# Patient Record
Sex: MALE | Race: WHITE | NOT HISPANIC OR LATINO | Employment: OTHER | URBAN - METROPOLITAN AREA
[De-identification: names, ages, dates, MRNs, and addresses within clinical notes are randomized per-mention and may not be internally consistent; named-entity substitution may affect disease eponyms.]

---

## 2023-05-22 LAB
ALANINE AMINOTRANSFERASE (SGPT) (U/L) IN SER/PLAS: 14 U/L (ref 10–52)
ALBUMIN (G/DL) IN SER/PLAS: 4 G/DL (ref 3.4–5)
ALKALINE PHOSPHATASE (U/L) IN SER/PLAS: 76 U/L (ref 33–136)
ANION GAP IN SER/PLAS: 12 MMOL/L (ref 10–20)
ASPARTATE AMINOTRANSFERASE (SGOT) (U/L) IN SER/PLAS: 16 U/L (ref 9–39)
BILIRUBIN TOTAL (MG/DL) IN SER/PLAS: 0.4 MG/DL (ref 0–1.2)
CALCIUM (MG/DL) IN SER/PLAS: 9.1 MG/DL (ref 8.6–10.3)
CARBON DIOXIDE, TOTAL (MMOL/L) IN SER/PLAS: 26 MMOL/L (ref 21–32)
CHLORIDE (MMOL/L) IN SER/PLAS: 107 MMOL/L (ref 98–107)
CHOLESTEROL (MG/DL) IN SER/PLAS: 149 MG/DL (ref 0–199)
CHOLESTEROL IN HDL (MG/DL) IN SER/PLAS: 47.1 MG/DL
CHOLESTEROL/HDL RATIO: 3.2
CREATININE (MG/DL) IN SER/PLAS: 0.93 MG/DL (ref 0.5–1.3)
ERYTHROCYTE DISTRIBUTION WIDTH (RATIO) BY AUTOMATED COUNT: 14 % (ref 11.5–14.5)
ERYTHROCYTE MEAN CORPUSCULAR HEMOGLOBIN CONCENTRATION (G/DL) BY AUTOMATED: 31.2 G/DL (ref 32–36)
ERYTHROCYTE MEAN CORPUSCULAR VOLUME (FL) BY AUTOMATED COUNT: 94 FL (ref 80–100)
ERYTHROCYTES (10*6/UL) IN BLOOD BY AUTOMATED COUNT: 4.79 X10E12/L (ref 4.5–5.9)
ESTIMATED AVERAGE GLUCOSE FOR HBA1C: 108 MG/DL
GFR MALE: 90 ML/MIN/1.73M2
GLUCOSE (MG/DL) IN SER/PLAS: 91 MG/DL (ref 74–99)
HEMATOCRIT (%) IN BLOOD BY AUTOMATED COUNT: 44.9 % (ref 41–52)
HEMOGLOBIN (G/DL) IN BLOOD: 14 G/DL (ref 13.5–17.5)
HEMOGLOBIN A1C/HEMOGLOBIN TOTAL IN BLOOD: 5.4 %
LDL: 85 MG/DL (ref 0–99)
LEUKOCYTES (10*3/UL) IN BLOOD BY AUTOMATED COUNT: 12 X10E9/L (ref 4.4–11.3)
NRBC (PER 100 WBCS) BY AUTOMATED COUNT: 0 /100 WBC (ref 0–0)
PLATELETS (10*3/UL) IN BLOOD AUTOMATED COUNT: 281 X10E9/L (ref 150–450)
POTASSIUM (MMOL/L) IN SER/PLAS: 3.9 MMOL/L (ref 3.5–5.3)
PROTEIN TOTAL: 7 G/DL (ref 6.4–8.2)
SEDIMENTATION RATE, ERYTHROCYTE: 25 MM/H (ref 0–20)
SODIUM (MMOL/L) IN SER/PLAS: 141 MMOL/L (ref 136–145)
THYROTROPIN (MIU/L) IN SER/PLAS BY DETECTION LIMIT <= 0.05 MIU/L: 0.57 MIU/L (ref 0.44–3.98)
TRIGLYCERIDE (MG/DL) IN SER/PLAS: 85 MG/DL (ref 0–149)
UREA NITROGEN (MG/DL) IN SER/PLAS: 20 MG/DL (ref 6–23)
VLDL: 17 MG/DL (ref 0–40)

## 2023-08-21 LAB
ALANINE AMINOTRANSFERASE (SGPT) (U/L) IN SER/PLAS: 14 U/L (ref 10–52)
ALBUMIN (G/DL) IN SER/PLAS: 4 G/DL (ref 3.4–5)
ALKALINE PHOSPHATASE (U/L) IN SER/PLAS: 68 U/L (ref 33–136)
ANION GAP IN SER/PLAS: 12 MMOL/L (ref 10–20)
ASPARTATE AMINOTRANSFERASE (SGOT) (U/L) IN SER/PLAS: 15 U/L (ref 9–39)
BILIRUBIN TOTAL (MG/DL) IN SER/PLAS: 0.4 MG/DL (ref 0–1.2)
CALCIUM (MG/DL) IN SER/PLAS: 9.2 MG/DL (ref 8.6–10.3)
CARBON DIOXIDE, TOTAL (MMOL/L) IN SER/PLAS: 27 MMOL/L (ref 21–32)
CHLORIDE (MMOL/L) IN SER/PLAS: 106 MMOL/L (ref 98–107)
CREATININE (MG/DL) IN SER/PLAS: 0.9 MG/DL (ref 0.5–1.3)
ERYTHROCYTE DISTRIBUTION WIDTH (RATIO) BY AUTOMATED COUNT: 14.3 % (ref 11.5–14.5)
ERYTHROCYTE MEAN CORPUSCULAR HEMOGLOBIN CONCENTRATION (G/DL) BY AUTOMATED: 31.6 G/DL (ref 32–36)
ERYTHROCYTE MEAN CORPUSCULAR VOLUME (FL) BY AUTOMATED COUNT: 93 FL (ref 80–100)
ERYTHROCYTES (10*6/UL) IN BLOOD BY AUTOMATED COUNT: 4.81 X10E12/L (ref 4.5–5.9)
GFR MALE: >90 ML/MIN/1.73M2
GLUCOSE (MG/DL) IN SER/PLAS: 98 MG/DL (ref 74–99)
HEMATOCRIT (%) IN BLOOD BY AUTOMATED COUNT: 44.6 % (ref 41–52)
HEMOGLOBIN (G/DL) IN BLOOD: 14.1 G/DL (ref 13.5–17.5)
LEUKOCYTES (10*3/UL) IN BLOOD BY AUTOMATED COUNT: 13.4 X10E9/L (ref 4.4–11.3)
MAGNESIUM (MG/DL) IN SER/PLAS: 1.98 MG/DL (ref 1.6–2.4)
NRBC (PER 100 WBCS) BY AUTOMATED COUNT: 0 /100 WBC (ref 0–0)
PLATELETS (10*3/UL) IN BLOOD AUTOMATED COUNT: 290 X10E9/L (ref 150–450)
POTASSIUM (MMOL/L) IN SER/PLAS: 4.9 MMOL/L (ref 3.5–5.3)
PROTEIN TOTAL: 6.8 G/DL (ref 6.4–8.2)
SODIUM (MMOL/L) IN SER/PLAS: 140 MMOL/L (ref 136–145)
UREA NITROGEN (MG/DL) IN SER/PLAS: 27 MG/DL (ref 6–23)

## 2023-10-24 DIAGNOSIS — I25.10 CORONARY ARTERY DISEASE INVOLVING NATIVE CORONARY ARTERY OF NATIVE HEART WITHOUT ANGINA PECTORIS: Primary | ICD-10-CM

## 2023-10-24 RX ORDER — SIMVASTATIN 40 MG/1
40 TABLET, FILM COATED ORAL NIGHTLY
COMMUNITY
Start: 2023-07-21 | End: 2023-10-24 | Stop reason: SDUPTHER

## 2023-10-24 RX ORDER — SIMVASTATIN 40 MG/1
40 TABLET, FILM COATED ORAL NIGHTLY
Qty: 90 TABLET | Refills: 1 | Status: SHIPPED | OUTPATIENT
Start: 2023-10-24 | End: 2024-04-15

## 2023-11-11 ENCOUNTER — HOSPITAL ENCOUNTER (OUTPATIENT)
Facility: HOSPITAL | Age: 68
Setting detail: OBSERVATION
Discharge: HOME | DRG: 694 | End: 2023-11-12
Attending: STUDENT IN AN ORGANIZED HEALTH CARE EDUCATION/TRAINING PROGRAM | Admitting: STUDENT IN AN ORGANIZED HEALTH CARE EDUCATION/TRAINING PROGRAM
Payer: COMMERCIAL

## 2023-11-11 ENCOUNTER — CLINICAL SUPPORT (OUTPATIENT)
Dept: CARDIOLOGY | Facility: CLINIC | Age: 68
End: 2023-11-11
Payer: COMMERCIAL

## 2023-11-11 ENCOUNTER — APPOINTMENT (OUTPATIENT)
Dept: RADIOLOGY | Facility: HOSPITAL | Age: 68
DRG: 694 | End: 2023-11-11
Payer: COMMERCIAL

## 2023-11-11 DIAGNOSIS — R10.30 LOWER ABDOMINAL PAIN: ICD-10-CM

## 2023-11-11 DIAGNOSIS — N13.39 OTHER HYDRONEPHROSIS: Primary | ICD-10-CM

## 2023-11-11 DIAGNOSIS — T82.310A TYPE I ENDOLEAK OF AORTIC GRAFT (CMS-HCC): ICD-10-CM

## 2023-11-11 LAB
ALBUMIN SERPL BCP-MCNC: 4.6 G/DL (ref 3.4–5)
ALP SERPL-CCNC: 74 U/L (ref 33–136)
ALT SERPL W P-5'-P-CCNC: 8 U/L (ref 10–52)
ANION GAP SERPL CALC-SCNC: 12 MMOL/L (ref 10–20)
APPEARANCE UR: ABNORMAL
AST SERPL W P-5'-P-CCNC: 11 U/L (ref 9–39)
BASOPHILS # BLD AUTO: 0.06 X10*3/UL (ref 0–0.1)
BASOPHILS NFR BLD AUTO: 0.5 %
BILIRUB SERPL-MCNC: 0.6 MG/DL (ref 0–1.2)
BILIRUB UR STRIP.AUTO-MCNC: NEGATIVE MG/DL
BUN SERPL-MCNC: 23 MG/DL (ref 6–23)
CALCIUM SERPL-MCNC: 9.6 MG/DL (ref 8.6–10.3)
CARDIAC TROPONIN I PNL SERPL HS: 3 NG/L (ref 0–20)
CARDIAC TROPONIN I PNL SERPL HS: 7 NG/L (ref 0–20)
CHLORIDE SERPL-SCNC: 107 MMOL/L (ref 98–107)
CO2 SERPL-SCNC: 27 MMOL/L (ref 21–32)
COLOR UR: ABNORMAL
CREAT SERPL-MCNC: 0.88 MG/DL (ref 0.5–1.3)
EOSINOPHIL # BLD AUTO: 0.15 X10*3/UL (ref 0–0.7)
EOSINOPHIL NFR BLD AUTO: 1.1 %
ERYTHROCYTE [DISTWIDTH] IN BLOOD BY AUTOMATED COUNT: 14 % (ref 11.5–14.5)
GFR SERPL CREATININE-BSD FRML MDRD: >90 ML/MIN/1.73M*2
GLUCOSE SERPL-MCNC: 98 MG/DL (ref 74–99)
GLUCOSE UR STRIP.AUTO-MCNC: NEGATIVE MG/DL
HCT VFR BLD AUTO: 43.5 % (ref 41–52)
HGB BLD-MCNC: 14.3 G/DL (ref 13.5–17.5)
IMM GRANULOCYTES # BLD AUTO: 0.06 X10*3/UL (ref 0–0.7)
IMM GRANULOCYTES NFR BLD AUTO: 0.5 % (ref 0–0.9)
KETONES UR STRIP.AUTO-MCNC: NEGATIVE MG/DL
LEUKOCYTE ESTERASE UR QL STRIP.AUTO: NEGATIVE
LIPASE SERPL-CCNC: 33 U/L (ref 9–82)
LYMPHOCYTES # BLD AUTO: 2.41 X10*3/UL (ref 1.2–4.8)
LYMPHOCYTES NFR BLD AUTO: 18.4 %
MAGNESIUM SERPL-MCNC: 1.73 MG/DL (ref 1.6–2.4)
MCH RBC QN AUTO: 30.6 PG (ref 26–34)
MCHC RBC AUTO-ENTMCNC: 32.9 G/DL (ref 32–36)
MCV RBC AUTO: 93 FL (ref 80–100)
MONOCYTES # BLD AUTO: 0.63 X10*3/UL (ref 0.1–1)
MONOCYTES NFR BLD AUTO: 4.8 %
MUCOUS THREADS #/AREA URNS AUTO: ABNORMAL /LPF
NEUTROPHILS # BLD AUTO: 9.81 X10*3/UL (ref 1.2–7.7)
NEUTROPHILS NFR BLD AUTO: 74.7 %
NITRITE UR QL STRIP.AUTO: NEGATIVE
NRBC BLD-RTO: 0 /100 WBCS (ref 0–0)
PH UR STRIP.AUTO: 6 [PH]
PLATELET # BLD AUTO: 275 X10*3/UL (ref 150–450)
POTASSIUM SERPL-SCNC: 4.6 MMOL/L (ref 3.5–5.3)
PROT SERPL-MCNC: 7.6 G/DL (ref 6.4–8.2)
PROT UR STRIP.AUTO-MCNC: ABNORMAL MG/DL
RBC # BLD AUTO: 4.68 X10*6/UL (ref 4.5–5.9)
RBC # UR STRIP.AUTO: ABNORMAL /UL
RBC #/AREA URNS AUTO: >20 /HPF
SODIUM SERPL-SCNC: 141 MMOL/L (ref 136–145)
SP GR UR STRIP.AUTO: 1.05
UROBILINOGEN UR STRIP.AUTO-MCNC: <2 MG/DL
WBC # BLD AUTO: 13.1 X10*3/UL (ref 4.4–11.3)
WBC #/AREA URNS AUTO: ABNORMAL /HPF

## 2023-11-11 PROCEDURE — G0378 HOSPITAL OBSERVATION PER HR: HCPCS

## 2023-11-11 PROCEDURE — 74174 CTA ABD&PLVS W/CONTRAST: CPT | Performed by: RADIOLOGY

## 2023-11-11 PROCEDURE — 85025 COMPLETE CBC W/AUTO DIFF WBC: CPT | Performed by: STUDENT IN AN ORGANIZED HEALTH CARE EDUCATION/TRAINING PROGRAM

## 2023-11-11 PROCEDURE — 71045 X-RAY EXAM CHEST 1 VIEW: CPT

## 2023-11-11 PROCEDURE — 84484 ASSAY OF TROPONIN QUANT: CPT | Performed by: STUDENT IN AN ORGANIZED HEALTH CARE EDUCATION/TRAINING PROGRAM

## 2023-11-11 PROCEDURE — 83690 ASSAY OF LIPASE: CPT | Performed by: STUDENT IN AN ORGANIZED HEALTH CARE EDUCATION/TRAINING PROGRAM

## 2023-11-11 PROCEDURE — 99291 CRITICAL CARE FIRST HOUR: CPT | Mod: 25 | Performed by: STUDENT IN AN ORGANIZED HEALTH CARE EDUCATION/TRAINING PROGRAM

## 2023-11-11 PROCEDURE — 81001 URINALYSIS AUTO W/SCOPE: CPT | Performed by: STUDENT IN AN ORGANIZED HEALTH CARE EDUCATION/TRAINING PROGRAM

## 2023-11-11 PROCEDURE — 71275 CT ANGIOGRAPHY CHEST: CPT

## 2023-11-11 PROCEDURE — 2500000004 HC RX 250 GENERAL PHARMACY W/ HCPCS (ALT 636 FOR OP/ED): Performed by: STUDENT IN AN ORGANIZED HEALTH CARE EDUCATION/TRAINING PROGRAM

## 2023-11-11 PROCEDURE — 2550000001 HC RX 255 CONTRASTS: Performed by: STUDENT IN AN ORGANIZED HEALTH CARE EDUCATION/TRAINING PROGRAM

## 2023-11-11 PROCEDURE — 71045 X-RAY EXAM CHEST 1 VIEW: CPT | Performed by: RADIOLOGY

## 2023-11-11 PROCEDURE — 71275 CT ANGIOGRAPHY CHEST: CPT | Performed by: RADIOLOGY

## 2023-11-11 PROCEDURE — 2500000004 HC RX 250 GENERAL PHARMACY W/ HCPCS (ALT 636 FOR OP/ED)

## 2023-11-11 PROCEDURE — 93005 ELECTROCARDIOGRAM TRACING: CPT

## 2023-11-11 PROCEDURE — 80053 COMPREHEN METABOLIC PANEL: CPT | Performed by: STUDENT IN AN ORGANIZED HEALTH CARE EDUCATION/TRAINING PROGRAM

## 2023-11-11 PROCEDURE — 1210000001 HC SEMI-PRIVATE ROOM DAILY

## 2023-11-11 PROCEDURE — 83735 ASSAY OF MAGNESIUM: CPT | Performed by: STUDENT IN AN ORGANIZED HEALTH CARE EDUCATION/TRAINING PROGRAM

## 2023-11-11 PROCEDURE — 36415 COLL VENOUS BLD VENIPUNCTURE: CPT | Performed by: STUDENT IN AN ORGANIZED HEALTH CARE EDUCATION/TRAINING PROGRAM

## 2023-11-11 RX ORDER — MORPHINE SULFATE 4 MG/ML
4 INJECTION, SOLUTION INTRAMUSCULAR; INTRAVENOUS ONCE
Status: COMPLETED | OUTPATIENT
Start: 2023-11-11 | End: 2023-11-11

## 2023-11-11 RX ADMIN — MORPHINE SULFATE 4 MG: 4 INJECTION, SOLUTION INTRAMUSCULAR; INTRAVENOUS at 20:43

## 2023-11-11 RX ADMIN — HYDROMORPHONE HYDROCHLORIDE 0.5 MG: 1 INJECTION, SOLUTION INTRAMUSCULAR; INTRAVENOUS; SUBCUTANEOUS at 22:53

## 2023-11-11 RX ADMIN — HYDROMORPHONE HYDROCHLORIDE 0.5 MG: 1 INJECTION, SOLUTION INTRAMUSCULAR; INTRAVENOUS; SUBCUTANEOUS at 21:50

## 2023-11-11 RX ADMIN — IOHEXOL 100 ML: 350 INJECTION, SOLUTION INTRAVENOUS at 21:01

## 2023-11-11 ASSESSMENT — PAIN - FUNCTIONAL ASSESSMENT
PAIN_FUNCTIONAL_ASSESSMENT: 0-10
PAIN_FUNCTIONAL_ASSESSMENT: 0-10

## 2023-11-11 ASSESSMENT — LIFESTYLE VARIABLES
HAVE YOU EVER FELT YOU SHOULD CUT DOWN ON YOUR DRINKING: NO
REASON UNABLE TO ASSESS: NO
REASON UNABLE TO ASSESS: YES
EVER FELT BAD OR GUILTY ABOUT YOUR DRINKING: NO
HAVE PEOPLE ANNOYED YOU BY CRITICIZING YOUR DRINKING: NO
EVER HAD A DRINK FIRST THING IN THE MORNING TO STEADY YOUR NERVES TO GET RID OF A HANGOVER: NO

## 2023-11-11 ASSESSMENT — PAIN DESCRIPTION - LOCATION
LOCATION: ABDOMEN
LOCATION: ABDOMEN

## 2023-11-11 ASSESSMENT — PAIN SCALES - GENERAL
PAINLEVEL_OUTOF10: 10 - WORST POSSIBLE PAIN
PAINLEVEL_OUTOF10: 10 - WORST POSSIBLE PAIN

## 2023-11-12 VITALS
HEIGHT: 70 IN | BODY MASS INDEX: 18.61 KG/M2 | RESPIRATION RATE: 17 BRPM | HEART RATE: 82 BPM | DIASTOLIC BLOOD PRESSURE: 62 MMHG | SYSTOLIC BLOOD PRESSURE: 132 MMHG | OXYGEN SATURATION: 94 % | WEIGHT: 130 LBS | TEMPERATURE: 97.2 F

## 2023-11-12 LAB
ANION GAP SERPL CALC-SCNC: 9 MMOL/L (ref 10–20)
BUN SERPL-MCNC: 19 MG/DL (ref 6–23)
CALCIUM SERPL-MCNC: 9 MG/DL (ref 8.6–10.3)
CHLORIDE SERPL-SCNC: 106 MMOL/L (ref 98–107)
CO2 SERPL-SCNC: 27 MMOL/L (ref 21–32)
CREAT SERPL-MCNC: 0.76 MG/DL (ref 0.5–1.3)
GFR SERPL CREATININE-BSD FRML MDRD: >90 ML/MIN/1.73M*2
GLUCOSE SERPL-MCNC: 115 MG/DL (ref 74–99)
MAGNESIUM SERPL-MCNC: 1.65 MG/DL (ref 1.6–2.4)
POTASSIUM SERPL-SCNC: 3.4 MMOL/L (ref 3.5–5.3)
SODIUM SERPL-SCNC: 139 MMOL/L (ref 136–145)

## 2023-11-12 PROCEDURE — 2500000001 HC RX 250 WO HCPCS SELF ADMINISTERED DRUGS (ALT 637 FOR MEDICARE OP): Performed by: STUDENT IN AN ORGANIZED HEALTH CARE EDUCATION/TRAINING PROGRAM

## 2023-11-12 PROCEDURE — 80048 BASIC METABOLIC PNL TOTAL CA: CPT | Performed by: STUDENT IN AN ORGANIZED HEALTH CARE EDUCATION/TRAINING PROGRAM

## 2023-11-12 PROCEDURE — 99239 HOSP IP/OBS DSCHRG MGMT >30: CPT | Performed by: INTERNAL MEDICINE

## 2023-11-12 PROCEDURE — 2500000001 HC RX 250 WO HCPCS SELF ADMINISTERED DRUGS (ALT 637 FOR MEDICARE OP)

## 2023-11-12 PROCEDURE — 2500000004 HC RX 250 GENERAL PHARMACY W/ HCPCS (ALT 636 FOR OP/ED): Performed by: STUDENT IN AN ORGANIZED HEALTH CARE EDUCATION/TRAINING PROGRAM

## 2023-11-12 PROCEDURE — 99223 1ST HOSP IP/OBS HIGH 75: CPT | Performed by: STUDENT IN AN ORGANIZED HEALTH CARE EDUCATION/TRAINING PROGRAM

## 2023-11-12 PROCEDURE — G0378 HOSPITAL OBSERVATION PER HR: HCPCS

## 2023-11-12 PROCEDURE — 99222 1ST HOSP IP/OBS MODERATE 55: CPT | Performed by: SURGERY

## 2023-11-12 PROCEDURE — 36415 COLL VENOUS BLD VENIPUNCTURE: CPT | Performed by: STUDENT IN AN ORGANIZED HEALTH CARE EDUCATION/TRAINING PROGRAM

## 2023-11-12 PROCEDURE — 83735 ASSAY OF MAGNESIUM: CPT

## 2023-11-12 RX ORDER — CALCIUM CARBONATE 300MG(750)
400 TABLET,CHEWABLE ORAL DAILY
COMMUNITY
End: 2024-04-22 | Stop reason: WASHOUT

## 2023-11-12 RX ORDER — ONDANSETRON HYDROCHLORIDE 2 MG/ML
4 INJECTION, SOLUTION INTRAVENOUS EVERY 6 HOURS PRN
Status: DISCONTINUED | OUTPATIENT
Start: 2023-11-12 | End: 2023-11-12 | Stop reason: HOSPADM

## 2023-11-12 RX ORDER — FERROUS SULFATE 325(65) MG
325 TABLET ORAL
COMMUNITY

## 2023-11-12 RX ORDER — POTASSIUM CHLORIDE 1.5 G/1.58G
20 POWDER, FOR SOLUTION ORAL ONCE
Status: DISCONTINUED | OUTPATIENT
Start: 2023-11-12 | End: 2023-11-12 | Stop reason: HOSPADM

## 2023-11-12 RX ORDER — SIMVASTATIN 20 MG/1
40 TABLET, FILM COATED ORAL NIGHTLY
Status: DISCONTINUED | OUTPATIENT
Start: 2023-11-12 | End: 2023-11-12 | Stop reason: HOSPADM

## 2023-11-12 RX ORDER — PREDNISONE 10 MG/1
5 TABLET ORAL DAILY
Status: DISCONTINUED | OUTPATIENT
Start: 2023-11-12 | End: 2023-11-12 | Stop reason: HOSPADM

## 2023-11-12 RX ORDER — SACUBITRIL AND VALSARTAN 24; 26 MG/1; MG/1
1 TABLET, FILM COATED ORAL 2 TIMES DAILY
COMMUNITY
End: 2024-01-15 | Stop reason: SDUPTHER

## 2023-11-12 RX ORDER — METHOTREXATE 2.5 MG/1
6 TABLET ORAL
COMMUNITY

## 2023-11-12 RX ORDER — PREDNISONE 10 MG/1
5 TABLET ORAL DAILY
COMMUNITY

## 2023-11-12 RX ORDER — ACETAMINOPHEN 325 MG/1
650 TABLET ORAL EVERY 6 HOURS PRN
Status: DISCONTINUED | OUTPATIENT
Start: 2023-11-12 | End: 2023-11-12 | Stop reason: HOSPADM

## 2023-11-12 RX ORDER — ASPIRIN 81 MG/1
81 TABLET ORAL DAILY
COMMUNITY

## 2023-11-12 RX ORDER — OXYCODONE HYDROCHLORIDE 5 MG/1
5 TABLET ORAL EVERY 6 HOURS PRN
Qty: 15 TABLET | Refills: 0 | Status: ON HOLD | OUTPATIENT
Start: 2023-11-12 | End: 2023-11-22

## 2023-11-12 RX ORDER — ASPIRIN 81 MG/1
81 TABLET ORAL DAILY
Status: DISCONTINUED | OUTPATIENT
Start: 2023-11-12 | End: 2023-11-12 | Stop reason: HOSPADM

## 2023-11-12 RX ORDER — FLUTICASONE FUROATE, UMECLIDINIUM BROMIDE AND VILANTEROL TRIFENATATE 100; 62.5; 25 UG/1; UG/1; UG/1
1 POWDER RESPIRATORY (INHALATION) DAILY
COMMUNITY

## 2023-11-12 RX ORDER — LANOLIN ALCOHOL/MO/W.PET/CERES
400 CREAM (GRAM) TOPICAL DAILY
Status: DISCONTINUED | OUTPATIENT
Start: 2023-11-12 | End: 2023-11-12 | Stop reason: HOSPADM

## 2023-11-12 RX ORDER — OXYCODONE HYDROCHLORIDE 5 MG/1
5 TABLET ORAL EVERY 6 HOURS PRN
Status: DISCONTINUED | OUTPATIENT
Start: 2023-11-12 | End: 2023-11-12 | Stop reason: HOSPADM

## 2023-11-12 RX ORDER — TRAMADOL HYDROCHLORIDE 50 MG/1
50 TABLET ORAL EVERY 8 HOURS PRN
Status: DISCONTINUED | OUTPATIENT
Start: 2023-11-12 | End: 2023-11-12 | Stop reason: HOSPADM

## 2023-11-12 RX ORDER — METOPROLOL SUCCINATE 50 MG/1
50 TABLET, EXTENDED RELEASE ORAL DAILY
Status: DISCONTINUED | OUTPATIENT
Start: 2023-11-12 | End: 2023-11-12 | Stop reason: HOSPADM

## 2023-11-12 RX ORDER — METOPROLOL SUCCINATE 50 MG/1
50 TABLET, EXTENDED RELEASE ORAL DAILY
COMMUNITY
End: 2024-01-15 | Stop reason: SDUPTHER

## 2023-11-12 RX ORDER — ACETAMINOPHEN, DIPHENHYDRAMINE HCL, PHENYLEPHRINE HCL 325; 25; 5 MG/1; MG/1; MG/1
TABLET ORAL
COMMUNITY
End: 2024-04-29 | Stop reason: ENTERED-IN-ERROR

## 2023-11-12 RX ORDER — FERROUS SULFATE 325(65) MG
65 TABLET ORAL DAILY
Status: DISCONTINUED | OUTPATIENT
Start: 2023-11-12 | End: 2023-11-12 | Stop reason: HOSPADM

## 2023-11-12 RX ADMIN — METOPROLOL SUCCINATE 50 MG: 50 TABLET, EXTENDED RELEASE ORAL at 09:15

## 2023-11-12 RX ADMIN — HYDROMORPHONE HYDROCHLORIDE 0.5 MG: 1 INJECTION, SOLUTION INTRAMUSCULAR; INTRAVENOUS; SUBCUTANEOUS at 02:36

## 2023-11-12 ASSESSMENT — ENCOUNTER SYMPTOMS
RESPIRATORY NEGATIVE: 1
ENDOCRINE NEGATIVE: 1
CARDIOVASCULAR NEGATIVE: 1
DIFFICULTY URINATING: 0
ALLERGIC/IMMUNOLOGIC NEGATIVE: 1
CONSTITUTIONAL NEGATIVE: 1
ABDOMINAL PAIN: 1
NEUROLOGICAL NEGATIVE: 1
PSYCHIATRIC NEGATIVE: 1
HEMATOLOGIC/LYMPHATIC NEGATIVE: 1
FLANK PAIN: 1
HEMATURIA: 1

## 2023-11-12 NOTE — ED NOTES
Patient is becoming agitated with staff and asking to leave home at this time. States there isn't any reason why the doctor's can't just admit him when there's a bed available and that he wants to go home. When told that his test results were critical and needing to stay for monitored. Patient became defensive and stated that all we would do was watch him die and he can do that at home. MD made aware and going to talk to patient     Helena Hoyt RN  11/12/23 0128

## 2023-11-12 NOTE — DISCHARGE SUMMARY
Discharge Diagnosis  Other hydronephrosis    Discharge Meds     Your medication list        CONTINUE taking these medications        Instructions Last Dose Given Next Dose Due   aspirin 81 mg EC tablet           Entresto 24-26 mg tablet  Generic drug: sacubitriL-valsartan           FeosoL tablet  Generic drug: ferrous sulfate (325 mg ferrous sulfate)           magnesium oxide 400 mg tablet  Commonly known as: Mag-Ox           methotrexate 2.5 mg tablet  Commonly known as: Trexall           metoprolol succinate XL 50 mg 24 hr tablet  Commonly known as: Toprol-XL           predniSONE 5 mg tablet  Commonly known as: Deltasone           simvastatin 40 mg tablet  Commonly known as: Zocor      Take 1 tablet (40 mg) by mouth once daily at bedtime.       Trelegy Ellipta 100-62.5-25 mcg blister with device  Generic drug: fluticasone-umeclidin-vilanter           Vitamin B-12 5,000 mcg tablet, sublingual  Generic drug: cyanocobalamin (vitamin B-12)                    Test Results Pending At Discharge  Pending Labs       No current pending labs.            Hospital Course   67 yo M with PMHx of ruptured AAA s/p repair 2020, CAD with h/o PCI, chronic systolic CHF (EF 25-30%, 10/2021), s/p ICD, HTN, HLD, COPD, and rheumatoid arthritis presents with intermittent L flank pain accompanied with hematuria over the past week. This evening his L flank pain became much more severe. 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 Urology evaluated, to follow up as an outpatient for biopsy. Vascular surgery evaluated, to follow up as an outpatient. Provided pain medications for 5 days per [place] law as time of dc. Given information regarding lung nodule clinic.     Pertinent Physical Exam At Time of Discharge  Physical Exam  G: aox3, NAD, cooperative, resting  comfortably  HENT: neck supple, no JVD  Eyes: clear sclera  CV: RRR s1 s2  L: clear  Abd: soft, NT, non distended  Ext: no c/c/e  : L flank pain, no CVA tenderness  N: no appreciable acute focal deficits  Psych: appropriate mood and behavior    Outpatient Follow-Up  Future Appointments   Date Time Provider Department Center   11/27/2023 11:15 AM Olvin Alfred MD PhD [id number] [place]         Heike Barrera DO

## 2023-11-12 NOTE — ED PROCEDURE NOTE
Procedure  Critical Care    Performed by: Miki Wiley MD  Authorized by: Miki Wiley MD    Critical care provider statement:     Critical care time (minutes):  60    Critical care time was exclusive of:  Separately billable procedures and treating other patients and teaching time    Critical care was necessary to treat or prevent imminent or life-threatening deterioration of the following conditions: Graft endoleak, hydronephrosis.    Critical care was time spent personally by me on the following activities:  Development of treatment plan with patient or surrogate, discussions with consultants, evaluation of patient's response to treatment, examination of patient, obtaining history from patient or surrogate, ordering and review of laboratory studies, ordering and review of radiographic studies, ordering and performing treatments and interventions and review of old charts    Care discussed with: admitting provider                 Miki Wiley MD  11/12/23 0022

## 2023-11-12 NOTE — PROGRESS NOTES
Attempted bedside visit for Care Coordination assessment and discharge planning. Provider service at bedside. Will re-attempt assessment as able.    1226: Patient discharged home. No skilled needs identified.       JERRY NG, RN ED [facility]

## 2023-11-12 NOTE — H&P
History Of Present Illness:  67 yo M with PMHx of ruptured AAA s/p repair 2020, CAD with h/o PCI, chronic systolic CHF (EF 25-30%, 10/2021), s/p ICD, HTN, HLD, COPD, and rheumatoid arthritis presents with intermittent L flank pain accompanied with hematuria over the past week. This evening his L flank pain became much more severe. He denies fever, chills, CP, sob, n/v. He did have diarrhea earlier in the week which has since resolved. In the ED CT showing concern for a possible multifocal urothelial malignancy as well as a type I endoleak. Vascular sx was consulted and no acute intervention recommended. Urology also made aware of CT findings.     PSHx: bronchoscopy, LHC with PCI, AAA repair, ICD, seroma I&D, R salivary gland sx  Social: former smoker, denies EtOH, and illicit  Fam Hx: HTN    Allergies  Patient has no known allergies.    Review of Systems   Genitourinary:  Positive for flank pain and hematuria.   All other systems reviewed and are negative.    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  New 10mm ELVIE lung nodule    CAD with h/o PCI  Chronic systolic CHF (EF 25-30%, 10/2021)  s/p ICD  HTN, HLD  COPD  Rheumatoid arthritis  DVT ppx  Full code    Plan:  - Urology consulted from the ED, will followup further recommendations, pain control  - Vascular sx consulted from the ED, reviewed images and no intervention at  this point in time  - Outpatient followup regarding lung nodule, can refer to lung nodule clinic  - Continue home metoprolol, entresto, statin  - Continue home chronic prednisone (for RA)  - Continue home Trelegy, he states daughter will bring in   - With hematuria and possible type I endoleak will hold home ASA and pharmacologic DVT ppx at this point     Estuardo Barrera, DO

## 2023-11-12 NOTE — ED PROVIDER NOTES
HPI   Chief Complaint   Patient presents with    Abdominal Pain     Patient with hx of ruptured AAA with repair in 2020 presents today with severe abdominal pain radiating to right size and states he is also experiencing hematuria. He is doubled over in triage, yelling out in pain.        HPI  Patient is a 68-year-old male with a past medical history of AAA s/p repair who presents to the ED complaining of severe abdominal pain.  Patient reports experiencing hematuria that began 1 week ago. Denies any dysuria, fever, chills, constipation, chest pain, shortness of breath.  He reports having diarrhea for the past 2 to 3 days.  Patient reports that he is aware of lung nodule found on CT and has been following with a pulmonologist.                  Bernardo Coma Scale Score: 15                  Patient History   Past Medical History:   Diagnosis Date    Abnormal findings on diagnostic imaging of heart and coronary circulation     Abnormal echocardiogram    Abnormal findings on diagnostic imaging of other specified body structures     Abnormal ultrasound    Abnormal findings on diagnostic imaging of other specified body structures     Abnormal ultrasound    Abnormal findings on diagnostic imaging of other specified body structures     Abnormal chest x-ray    Abnormal findings on diagnostic imaging of other specified body structures     Abnormal CT of the chest    Abnormal findings on diagnostic imaging of other specified body structures     Abnormal computed tomography angiography (CTA)    Abnormal findings on diagnostic imaging of other specified body structures     Abnormal CT of the chest    Personal history of other medical treatment     History of echocardiogram     Past Surgical History:   Procedure Laterality Date    CT ABDOMEN PELVIS ANGIOGRAM W AND/OR WO IV CONTRAST  11/7/2019    CT ABDOMEN PELVIS ANGIOGRAM W AND/OR WO IV CONTRAST 11/7/2019 [facility]    CT ABDOMEN PELVIS ANGIOGRAM W AND/OR WO IV  CONTRAST  7/8/2020    CT ABDOMEN PELVIS ANGIOGRAM W AND/OR WO IV CONTRAST 7/8/2020 [facility]    OTHER SURGICAL HISTORY  05/30/2019    Salivary gland surgery    OTHER SURGICAL HISTORY  06/16/2020    Bronchoscopy    OTHER SURGICAL HISTORY  06/16/2020    Seroma incision and drainage    OTHER SURGICAL HISTORY  04/02/2021    Cardioverter defibrillator insertion    OTHER SURGICAL HISTORY  01/30/2020    Cardiac catheterization with stent placement    OTHER SURGICAL HISTORY  06/16/2020    Abdominal aortic aneurysm repair endovascular     No family history on file.  Social History     Tobacco Use    Smoking status: Not on file    Smokeless tobacco: Not on file   Substance Use Topics    Alcohol use: Not on file    Drug use: Not on file       Physical Exam   ED Triage Vitals [11/11/23 2030]   Temp Heart Rate Resp BP   36.2 °C (97.2 °F) 75 22 (!) 186/105      SpO2 Temp src Heart Rate Source Patient Position   96 % -- -- --      BP Location FiO2 (%)     Right arm --       Physical Exam  Abdominal:      Tenderness: There is generalized abdominal tenderness and tenderness in the left lower quadrant. There is guarding.         ED Course & MDM   ED Course as of 11/11/23 2316   Sat Nov 11, 2023 2040 68-year-old male presenting with abdominal pain. [WH]   2040 Order CTA chest abdomen pelvis, CXR, EKG, CMP, CBC, UA, lipase, troponin, magnesium, morphine 4 mg [WH]   2301 Case was discussed with Dr. Ngo and  with concerns for endoleak.  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  Patient had improvement after Dilaudid.  He continues to be stable.  Awaiting urology recommendations.  states that patient does not require transfer from vascular surgery standpoint.  Dr. Ngo will consult at [facility]. [ZS]   2303 Dr. You from urology was placed on consultation for hydronephrosis [ZS]   2316 Spoke with Dr. Barrera.  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

## 2023-11-12 NOTE — CONSULTS
Inpatient consult to Vascular Surgery  Consult performed by: Erika Ngo MD  Consult ordered by: Miki Wiley MD  Reason for consult: endoleak          Reason For Consult  endoleak     History Of Present Illness  Yobany Small is a 68 y.o. male presenting with 3 day h/o LLQ abdominal pain. He states it became acutely more severe last night, prompting him to present to the ED. He also reports associated blood in the urine. CTA reveals left ureteral mass and hydronephrosis. 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 He underwent EVAR for rupture infrarenal AAA in 2020.     Past Medical History  He has a past medical history of Abnormal findings on diagnostic imaging of heart and coronary circulation, Abnormal findings on diagnostic imaging of other specified body structures, Abnormal findings on diagnostic imaging of other specified body structures, Abnormal findings on diagnostic imaging of other specified body structures, Abnormal findings on diagnostic imaging of other specified body structures, Abnormal findings on diagnostic imaging of other specified body structures, Abnormal findings on diagnostic imaging of other specified body structures, and Personal history of other medical treatment.    Surgical History  He has a past surgical history that includes Other surgical history (05/30/2019); Other surgical history (06/16/2020); Other surgical history (06/16/2020); Other surgical history (04/02/2021); Other surgical history (01/30/2020); Other surgical history (06/16/2020); CT angio abdomen pelvis w and or wo IV IV contrast (11/7/2019); and CT angio abdomen pelvis w and or wo IV IV contrast (7/8/2020).     Social History  He has no history on file for tobacco use, alcohol use, and drug use.    Family History  No family history on file.     Allergies  Patient has no known allergies.    Review of Systems   Constitutional: Negative.    HENT: Negative.      Respiratory: Negative.     Cardiovascular: Negative.    Gastrointestinal:  Positive for abdominal pain.   Endocrine: Negative.    Genitourinary:  Positive for flank pain and hematuria.   Allergic/Immunologic: Negative.    Neurological: Negative.    Hematological: Negative.    Psychiatric/Behavioral: Negative.          Physical Exam  Vitals reviewed.   Constitutional:       General: He is not in acute distress.     Appearance: Normal appearance. He is normal weight.   HENT:      Head: Normocephalic and atraumatic.   Eyes:      Extraocular Movements: Extraocular movements intact.      Conjunctiva/sclera: Conjunctivae normal.      Pupils: Pupils are equal, round, and reactive to light.   Neck:      Vascular: No carotid bruit.   Cardiovascular:      Rate and Rhythm: Normal rate and regular rhythm.      Pulses: Normal pulses.           Femoral pulses are 2+ on the right side and 2+ on the left side.       Dorsalis pedis pulses are 2+ on the right side and 2+ on the left side.        Posterior tibial pulses are 2+ on the right side and 2+ on the left side.      Heart sounds: Normal heart sounds.   Pulmonary:      Effort: Pulmonary effort is normal.      Breath sounds: Normal breath sounds.   Abdominal:      General: Abdomen is flat. Bowel sounds are normal. There is no distension.      Palpations: Abdomen is soft.      Tenderness: There is no abdominal tenderness. There is no guarding or rebound.   Genitourinary:     Comments: Blood-tinged urine noted in urinal container  Musculoskeletal:         General: No swelling or tenderness. Normal range of motion.      Cervical back: Normal range of motion and neck supple. No tenderness.   Skin:     General: Skin is warm and dry.      Capillary Refill: Capillary refill takes less than 2 seconds.   Neurological:      General: No focal deficit present.      Mental Status: He is alert and oriented to person, place, and time.      Cranial Nerves: No cranial nerve deficit.       "Sensory: No sensory deficit.      Motor: No weakness.   Psychiatric:         Mood and Affect: Mood normal.         Behavior: Behavior normal.          Last Recorded Vitals  Blood pressure 120/75, pulse 78, temperature 36.2 °C (97.2 °F), resp. rate 17, height 1.778 m (5' 10\"), weight 59 kg (130 lb), SpO2 96 %.    Relevant Results    Current Facility-Administered Medications:     acetaminophen (Tylenol) tablet 650 mg, 650 mg, oral, q6h PRN, Estuardo Barrera DO    [Held by provider] aspirin EC tablet 81 mg, 81 mg, oral, Daily, Estuardo Barrera, DO    cyanocobalamin (Vitamin B-12) tablet 2,500 mcg, 2,500 mcg, oral, Daily, Estuardo Barrera, DO    ferrous sulfate (325 mg ferrous sulfate) tablet 65 mg of iron, 65 mg of iron, oral, Daily, Estuardo Barrera, DO    fluticasone-umeclidin-vilanter (TRELEGY-ELLIPTA) 100-62.5-25 mcg 100-62.5-25 mcg/puff inhaler 1 puff, 1 puff, inhalation, Daily, Estuardo Barrera DO    magnesium oxide (Mag-Ox) tablet 400 mg, 400 mg, oral, Daily, Estuardo Barrera DO    metoprolol succinate XL (Toprol-XL) 24 hr tablet 50 mg, 50 mg, oral, Daily, Estuardo Barrera, DO, 50 mg at 11/12/23 0915    ondansetron (Zofran) injection 4 mg, 4 mg, intravenous, q6h PRN, Estuardo Barrera DO    oxyCODONE (Roxicodone) immediate release tablet 5 mg, 5 mg, oral, q6h PRN, Stan Kelly, DO    oxygen (O2) therapy, , inhalation, Continuous PRN - O2/gases, Estuardo Barrera DO    potassium chloride (Klor-Con) packet 20 mEq, 20 mEq, oral, Once, Heike Barrera DO    predniSONE (Deltasone) tablet 5 mg, 5 mg, oral, Daily, Estuardo Barrera DO    sacubitriL-valsartan (Entresto) 24-26 mg per tablet 1 tablet, 1 tablet, oral, BID, Estuardo S Barrera, DO    simvastatin (Zocor) tablet 40 mg, 40 mg, oral, Nightly, Estuardo Barrera DO    traMADol (Ultram) tablet 50 mg, 50 mg, oral, q8h PRN, Stan Kelly DO    Current Outpatient Medications:     aspirin 81 mg EC tablet, Take 1 tablet (81 mg) by mouth once daily., Disp: , Rfl:     cyanocobalamin, vitamin B-12, (Vitamin B-12) 5,000 mcg " tablet, sublingual, Place under the tongue., Disp: , Rfl:     ferrous sulfate, 325 mg ferrous sulfate, (FeosoL) tablet, Take 1 tablet (325 mg) by mouth once daily with a meal., Disp: , Rfl:     fluticasone-umeclidin-vilanter (Trelegy Ellipta) 100-62.5-25 mcg blister with device, Inhale., Disp: , Rfl:     magnesium oxide (Mag-Ox) 400 mg tablet, 1 tablet (400 mg) once daily., Disp: , Rfl:     methotrexate (Trexall) 2.5 mg tablet, Take 1 tablet (2.5 mg total) by mouth 1 (one) time per week.  Follow directions carefully, and ask to explain any part you do not understand. Take exactly as directed. 6 tabs Saturday, Disp: , Rfl:     metoprolol succinate XL (Toprol-XL) 50 mg 24 hr tablet, Take 1 tablet (50 mg) by mouth once daily. Do not crush or chew., Disp: , Rfl:     predniSONE (Deltasone) 5 mg tablet, Take 1 tablet (5 mg) by mouth once daily., Disp: , Rfl:     sacubitriL-valsartan (Entresto) 24-26 mg tablet, Take 1 tablet by mouth 2 times a day., Disp: , Rfl:     simvastatin (Zocor) 40 mg tablet, Take 1 tablet (40 mg) by mouth once daily at bedtime., Disp: 90 tablet, Rfl: 1     Results for orders placed or performed during the hospital encounter of 11/11/23 (from the past 24 hour(s))   CBC and Auto Differential   Result Value Ref Range    WBC 13.1 (H) 4.4 - 11.3 x10*3/uL    nRBC 0.0 0.0 - 0.0 /100 WBCs    RBC 4.68 4.50 - 5.90 x10*6/uL    Hemoglobin 14.3 13.5 - 17.5 g/dL    Hematocrit 43.5 41.0 - 52.0 %    MCV 93 80 - 100 fL    MCH 30.6 26.0 - 34.0 pg    MCHC 32.9 32.0 - 36.0 g/dL    RDW 14.0 11.5 - 14.5 %    Platelets 275 150 - 450 x10*3/uL    Neutrophils % 74.7 40.0 - 80.0 %    Immature Granulocytes %, Automated 0.5 0.0 - 0.9 %    Lymphocytes % 18.4 13.0 - 44.0 %    Monocytes % 4.8 2.0 - 10.0 %    Eosinophils % 1.1 0.0 - 6.0 %    Basophils % 0.5 0.0 - 2.0 %    Neutrophils Absolute 9.81 (H) 1.20 - 7.70 x10*3/uL    Immature Granulocytes Absolute, Automated 0.06 0.00 - 0.70 x10*3/uL    Lymphocytes Absolute 2.41 1.20 -  4.80 x10*3/uL    Monocytes Absolute 0.63 0.10 - 1.00 x10*3/uL    Eosinophils Absolute 0.15 0.00 - 0.70 x10*3/uL    Basophils Absolute 0.06 0.00 - 0.10 x10*3/uL   Comprehensive Metabolic Panel   Result Value Ref Range    Glucose 98 74 - 99 mg/dL    Sodium 141 136 - 145 mmol/L    Potassium 4.6 3.5 - 5.3 mmol/L    Chloride 107 98 - 107 mmol/L    Bicarbonate 27 21 - 32 mmol/L    Anion Gap 12 10 - 20 mmol/L    Urea Nitrogen 23 6 - 23 mg/dL    Creatinine 0.88 0.50 - 1.30 mg/dL    eGFR >90 >60 mL/min/1.73m*2    Calcium 9.6 8.6 - 10.3 mg/dL    Albumin 4.6 3.4 - 5.0 g/dL    Alkaline Phosphatase 74 33 - 136 U/L    Total Protein 7.6 6.4 - 8.2 g/dL    AST 11 9 - 39 U/L    Bilirubin, Total 0.6 0.0 - 1.2 mg/dL    ALT 8 (L) 10 - 52 U/L   Magnesium   Result Value Ref Range    Magnesium 1.73 1.60 - 2.40 mg/dL   Lipase   Result Value Ref Range    Lipase 33 9 - 82 U/L   Troponin I, High Sensitivity, Initial   Result Value Ref Range    Troponin I, High Sensitivity 7 0 - 20 ng/L   Urinalysis with Reflex Microscopic   Result Value Ref Range    Color, Urine Red (N) Straw, Yellow    Appearance, Urine Hazy (N) Clear    Specific Gravity, Urine 1.047 (N) 1.005 - 1.035    pH, Urine 6.0 5.0, 5.5, 6.0, 6.5, 7.0, 7.5, 8.0    Protein, Urine 100 (2+) (N) NEGATIVE mg/dL    Glucose, Urine NEGATIVE NEGATIVE mg/dL    Blood, Urine LARGE (3+) (A) NEGATIVE    Ketones, Urine NEGATIVE NEGATIVE mg/dL    Bilirubin, Urine NEGATIVE NEGATIVE    Urobilinogen, Urine <2.0 <2.0 mg/dL    Nitrite, Urine NEGATIVE NEGATIVE    Leukocyte Esterase, Urine NEGATIVE NEGATIVE   Microscopic Only, Urine   Result Value Ref Range    WBC, Urine 1-5 1-5, NONE /HPF    RBC, Urine >20 (A) NONE, 1-2, 3-5 /HPF    Mucus, Urine 2+ Reference range not established. /LPF   Troponin, High Sensitivity, 1 Hour   Result Value Ref Range    Troponin I, High Sensitivity 3 0 - 20 ng/L   Basic Metabolic Panel   Result Value Ref Range    Glucose 115 (H) 74 - 99 mg/dL    Sodium 139 136 - 145 mmol/L     Potassium 3.4 (L) 3.5 - 5.3 mmol/L    Chloride 106 98 - 107 mmol/L    Bicarbonate 27 21 - 32 mmol/L    Anion Gap 9 (L) 10 - 20 mmol/L    Urea Nitrogen 19 6 - 23 mg/dL    Creatinine 0.76 0.50 - 1.30 mg/dL    eGFR >90 >60 mL/min/1.73m*2    Calcium 9.0 8.6 - 10.3 mg/dL   Magnesium   Result Value Ref Range    Magnesium 1.65 1.60 - 2.40 mg/dL      XR chest 1 view    Result Date: 11/11/2023  Interpreted By:  Sarah Pham, STUDY: XR CHEST 1 VIEW;  11/11/2023 10:31 pm   INDICATION: Signs/Symptoms:Chest Pain   COMPARISON: Chest x-ray 03/14/2023. CT angiogram chest, abdomen, pelvis 11/11/2023.   ACCESSION NUMBER(S): [id number]   ORDERING CLINICIAN: THERESA LOPEZ   TECHNIQUE: 2 portable upright frontal views of the chest were obtained.   FINDINGS: Monitoring wires are overlying the patient.   There is a left-sided pacemaker partially obscuring left hemithorax.   The cardiomediastinal silhouette is within normal limits. Redemonstration of bilateral emphysematous changes with linear scarring at the right upper lobe. There is a 10 mm nodularity at the left upper lobe, corresponding to the 10 mm left upper lobe pulmonary nodule on earlier CT angiogram.   No focal consolidation, pleural effusion or pneumothorax.   Vascular stent noted at the upper abdomen.       1. No radiographic evidence of acute cardiopulmonary process. 2. Emphysema. 3. 10 mm nodularity at the left upper lobe, corresponding to the 10 mm left upper lobe pulmonary nodule on earlier CT angiogram. Please follow-up as per CT angiogram recommendation.       MACRO: None.   Signed by: Sarah Pham 11/11/2023 11:01 PM Dictation workstation:   [id number]    CT angio chest abdomen pelvis    Result Date: 11/11/2023  Interpreted By:  Miladis Alas, STUDY: CT ANGIO CHEST ABDOMEN PELVIS;  11/11/2023 9:02 pm   INDICATION: Signs/Symptoms:History of AAA with repair presenting for severe abdominal pain with tenderness to palpation.   COMPARISON: CT chest and CT abdomen  pelvis 03/14/2023, chest CT 03/03/2022   ACCESSION NUMBER(S): [id number]   ORDERING CLINICIAN: THERESA LOPEZ   TECHNIQUE: Axial CT images of the chest, abdomen and pelvis  before and after intravenous administration of contrast using CT angiographic technique. Coronal and sagittal images are reconstructed.  3D reconstructions were obtained at a separate workstation.   FINDINGS: VASCULAR: THORACIC AORTA: Initial noncontrast images demonstrate no aortic intramural hematoma. No aortic aneurysm or dissection. Mild calcification and noncalcified plaque/thrombus in the aortic arch and descending thoracic aorta. The great vessel origins are patent with no significant stenosis.   ABDOMINAL AORTA: Aorta bi-iliac stent graft extending from the level of the renal arteries through the left common iliac and right external iliac arteries. There is contrast pooling within the excluded aneurysm sac at the upper aspect of the stent, just inferior to the accessory renal artery origin. The excluded aneurysm sac has increased in size, measuring 4.4 x 4 cm just below the level of the renal arteries (previously 3.7 x 3.2 cm) and 4.1 x 3.5 cm more distally (previously 4.3 x 3.3 cm).   No periaortic hematoma, fluid or inflammatory stranding.   New irregular contrast outpouching at the celiac artery origin measuring 6 mm in depth. 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   The superior mesenteric artery is patent with no significant stenosis. The bilateral renal arteries are patent with no significant stenosis. Accessory right renal artery is noted. Retrograde flow noted in the inferior mesenteric artery, not extend into the excluded aneurysm sac.   Stable size of the excluded aneurysm sac involving the right common iliac artery, 2.4 cm. 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 No significant stenosis of the bilateral common, internal and external iliac arteries. No significant stenosis of the common femoral arteries.   No pulmonary embolism.   CHEST:   HEART: Normal size. Pacemaker/ICD lead in the right ventricle. Severe coronary artery calcifications noted. No pericardial effusion. MEDIASTINUM AND TORIE: No pathologically enlarged thoracic lymph nodes. LUNG, PLEURA, LARGE AIRWAYS: No pleural effusion or pneumothorax. Severe emphysema. New 10 mm left upper lobe nodule, series 601, image 111. Stable nodular/bandlike opacity in the right upper lobe measuring 1.4 x 2 cm. 7 mm right upper lobe nodule is stable from 03/03/2022. CHEST WALL AND LOWER NECK: Within normal limits.   ABDOMEN:   BONES: No acute osseous abnormality. ABDOMINAL WALL: Within normal limits.   LIVER: Numerous low-density hepatic lesions are stable and most consistent with cysts. BILE DUCTS: No biliary dilatation. GALLBLADDER: No calcified gallstones. No pericholecystic inflammatory changes. PANCREAS: Within normal limits. SPLEEN: Within normal limits. ADRENALS:  Within normal limits. KIDNEYS AND URETERS: New mild left hydroureteronephrosis. There 2 separate hyperdense filling defects in the mid and distal left ureter, the largest measures 13 mm and demonstrates enhancement (46 Hounsfield units on precontrast and 105 Hounsfield units on the portal venous phase). The smaller distal mass measures approximately 8 mm and also appears to enhance. Diffuse left ureteral enhancement is noted. There are nonobstructing left renal calculi measuring 11 mm and 3 mm as well as a 2 mm nonobstructing the right renal calculus. Bilateral renal cysts are noted.   PELVIS:   REPRODUCTIVE ORGANS: Enlarged prostate. BLADDER: A nodule involving the base of the urinary bladder is favored to represent a prostate nodule impressing on the bladder. There is ill-defined hyperdensity within the urinary bladder at the left ureterovesical junction,  differential considerations include hematoma or mass.   RETROPERITONEUM: No retroperitoneal hematoma. No pathologically enlarged lymph nodes. BOWEL: No dilated bowel. Colonic diverticulosis without acute diverticulitis. The appendix is not seen with certainty. No pericecal inflammatory changes to suggest acute appendicitis. PERITONEUM: No ascites or free air, no fluid collection.       New mild left hydroureteronephrosis with enhancing filling defects in the mid and distal the left ureter as described above. 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 Ill-defined hyperdensity in the urinary bladder near the left ureterovesical junction may represent mass or hematoma. Urology consultation is recommended.   Status post abdominal aortic stent graft. 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 Interval increase in the excluded aneurysm sac size.   New irregular outpouching of contrast at the celiac artery origin measuring 6 mm in depth, suspicious for ulcerated plaque versus penetrating atheromatous ulcer. Vascular surgery consultation is recommended.   New 10 mm left upper lobe pulmonary nodule. Follow-up PET CT is recommended. Additional nodular densities as above are stable from 03/03/2022, however, attention on follow-up is recommended.   Severe emphysema.   MACRO: Miladis Alas discussed the significance and urgency of this critical finding by telephone with  THERESA LOPEZ on 11/11/2023 at 9:51 pm. (**-RCF-**) Findings:  See findings.   Signed by: Miladis Alas 11/11/2023 10:08 PM Dictation workstation:   [id number]       Assessment/Plan   Type 1a endoleak s/p EVAR  Left ureteral mass  Hydronephrosis    67yo male with h/o EVAR for ruptured AAA. He is found to have type 1a endoleak on current CTA with increase in size of aneurysm sac (was 4.4cm, now 3.7cm) when compared to prior imaging 8 months ago. There is no e/o rupture. He also has e/o left ureteral mass and  hydronephrosis, which has found to be the cause of his left flank pain and hematuria. This is concerning for malignancy and patient currently being evaluated by urology. Repair of his endoleak will likely require a major open repair. There is no need to do this on an urgent basis. He should first complete urology work up, as decision to proceed with endoleak repair will be dependent on urology prognosis. He is to follow up in the vascular office in a few weeks once urology work up complete.     I spent 35 minutes in the professional and overall care of this patient.

## 2023-11-12 NOTE — CONSULTS
Reason For Consult  L hydro with filling defects in L ureter, gross hematuria    History Of Present Illness  Yobany Small is a 68 y.o. male presenting with abdominal pain. Pain started L flank radiates to LLQ and R LLQ/R flank. Initially pain was 11/10. This more it is now 3/10. Saw blood in his urine a week ago. Has never seen a Urologist. 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      Past Medical History  He has a past medical history of Abnormal findings on diagnostic imaging of heart and coronary circulation, Abnormal findings on diagnostic imaging of other specified body structures, Abnormal findings on diagnostic imaging of other specified body structures, Abnormal findings on diagnostic imaging of other specified body structures, Abnormal findings on diagnostic imaging of other specified body structures, Abnormal findings on diagnostic imaging of other specified body structures, Abnormal findings on diagnostic imaging of other specified body structures, and Personal history of other medical treatment.    Surgical History  He has a past surgical history that includes Other surgical history (05/30/2019); Other surgical history (06/16/2020); Other surgical history (06/16/2020); Other surgical history (04/02/2021); Other surgical history (01/30/2020); Other surgical history (06/16/2020); CT angio abdomen pelvis w and or wo IV IV contrast (11/7/2019); and CT angio abdomen pelvis w and or wo IV IV contrast (7/8/2020).     Social History  He has no history on file for tobacco use, alcohol use, and drug use.    Family History  No family history on file.     Allergies  Patient has no known allergies.    Review of Systems   Genitourinary:  Positive for flank pain, hematuria and penile pain. Negative for difficulty urinating.         Physical Exam  No distress  Nasal cannula  Bedside urinal with jean colored urine      Current Facility-Administered Medications:     acetaminophen  (Tylenol) tablet 650 mg, 650 mg, oral, q6h PRN, Estuardo Barrera, DO    [Held by provider] aspirin EC tablet 81 mg, 81 mg, oral, Daily, Estuardo Barrera, DO    cyanocobalamin (Vitamin B-12) tablet 2,500 mcg, 2,500 mcg, oral, Daily, Estuardo S Barrera, DO    ferrous sulfate (325 mg ferrous sulfate) tablet 65 mg of iron, 65 mg of iron, oral, Daily, Estuardo Barrera, DO    fluticasone-umeclidin-vilanter (TRELEGY-ELLIPTA) 100-62.5-25 mcg 100-62.5-25 mcg/puff inhaler 1 puff, 1 puff, inhalation, Daily, Estuardo Barrera, DO    HYDROmorphone (Dilaudid) injection 0.5 mg, 0.5 mg, intravenous, q3h PRN, Estuardo Barrera, DO, 0.5 mg at 11/12/23 0236    magnesium oxide (Mag-Ox) tablet 400 mg, 400 mg, oral, Daily, Estuardo Barrera, DO    metoprolol succinate XL (Toprol-XL) 24 hr tablet 50 mg, 50 mg, oral, Daily, Estuardo Barrera, DO    ondansetron (Zofran) injection 4 mg, 4 mg, intravenous, q6h PRN, Estuarod Barrera,     oxygen (O2) therapy, , inhalation, Continuous PRN - O2/gases, Estuardo Barrera DO    potassium chloride (Klor-Con) packet 20 mEq, 20 mEq, oral, Once, Heike Barrera DO    predniSONE (Deltasone) tablet 5 mg, 5 mg, oral, Daily, Estuardo Barrera, DO    sacubitriL-valsartan (Entresto) 24-26 mg per tablet 1 tablet, 1 tablet, oral, BID, Estuardo Barrera, DO    simvastatin (Zocor) tablet 40 mg, 40 mg, oral, Nightly, Estuardo Barrera, DO    Current Outpatient Medications:     aspirin 81 mg EC tablet, Take 1 tablet (81 mg) by mouth once daily., Disp: , Rfl:     cyanocobalamin, vitamin B-12, (Vitamin B-12) 5,000 mcg tablet, sublingual, Place under the tongue., Disp: , Rfl:     ferrous sulfate, 325 mg ferrous sulfate, (FeosoL) tablet, Take 1 tablet (325 mg) by mouth once daily with a meal., Disp: , Rfl:     fluticasone-umeclidin-vilanter (Trelegy Ellipta) 100-62.5-25 mcg blister with device, Inhale., Disp: , Rfl:     magnesium oxide (Mag-Ox) 400 mg tablet, 1 tablet (400 mg) once daily., Disp: , Rfl:     methotrexate (Trexall) 2.5 mg tablet, Take 1 tablet (2.5 mg total) by  "mouth 1 (one) time per week.  Follow directions carefully, and ask to explain any part you do not understand. Take exactly as directed. 6 tabs Saturday, Disp: , Rfl:     metoprolol succinate XL (Toprol-XL) 50 mg 24 hr tablet, Take 1 tablet (50 mg) by mouth once daily. Do not crush or chew., Disp: , Rfl:     predniSONE (Deltasone) 5 mg tablet, Take 1 tablet (5 mg) by mouth once daily., Disp: , Rfl:     sacubitriL-valsartan (Entresto) 24-26 mg tablet, Take 1 tablet by mouth 2 times a day., Disp: , Rfl:     simvastatin (Zocor) 40 mg tablet, Take 1 tablet (40 mg) by mouth once daily at bedtime., Disp: 90 tablet, Rfl: 1     Last Recorded Vitals  Blood pressure 149/66, pulse 87, temperature 36.2 °C (97.2 °F), resp. rate 23, height 1.778 m (5' 10\"), weight 59 kg (130 lb), SpO2 98 %.    Relevant Results  Results for orders placed or performed during the hospital encounter of 11/11/23 (from the past 96 hour(s))   CBC and Auto Differential   Result Value Ref Range    WBC 13.1 (H) 4.4 - 11.3 x10*3/uL    nRBC 0.0 0.0 - 0.0 /100 WBCs    RBC 4.68 4.50 - 5.90 x10*6/uL    Hemoglobin 14.3 13.5 - 17.5 g/dL    Hematocrit 43.5 41.0 - 52.0 %    MCV 93 80 - 100 fL    MCH 30.6 26.0 - 34.0 pg    MCHC 32.9 32.0 - 36.0 g/dL    RDW 14.0 11.5 - 14.5 %    Platelets 275 150 - 450 x10*3/uL    Neutrophils % 74.7 40.0 - 80.0 %    Immature Granulocytes %, Automated 0.5 0.0 - 0.9 %    Lymphocytes % 18.4 13.0 - 44.0 %    Monocytes % 4.8 2.0 - 10.0 %    Eosinophils % 1.1 0.0 - 6.0 %    Basophils % 0.5 0.0 - 2.0 %    Neutrophils Absolute 9.81 (H) 1.20 - 7.70 x10*3/uL    Immature Granulocytes Absolute, Automated 0.06 0.00 - 0.70 x10*3/uL    Lymphocytes Absolute 2.41 1.20 - 4.80 x10*3/uL    Monocytes Absolute 0.63 0.10 - 1.00 x10*3/uL    Eosinophils Absolute 0.15 0.00 - 0.70 x10*3/uL    Basophils Absolute 0.06 0.00 - 0.10 x10*3/uL   Comprehensive Metabolic Panel   Result Value Ref Range    Glucose 98 74 - 99 mg/dL    Sodium 141 136 - 145 mmol/L    " Potassium 4.6 3.5 - 5.3 mmol/L    Chloride 107 98 - 107 mmol/L    Bicarbonate 27 21 - 32 mmol/L    Anion Gap 12 10 - 20 mmol/L    Urea Nitrogen 23 6 - 23 mg/dL    Creatinine 0.88 0.50 - 1.30 mg/dL    eGFR >90 >60 mL/min/1.73m*2    Calcium 9.6 8.6 - 10.3 mg/dL    Albumin 4.6 3.4 - 5.0 g/dL    Alkaline Phosphatase 74 33 - 136 U/L    Total Protein 7.6 6.4 - 8.2 g/dL    AST 11 9 - 39 U/L    Bilirubin, Total 0.6 0.0 - 1.2 mg/dL    ALT 8 (L) 10 - 52 U/L   Magnesium   Result Value Ref Range    Magnesium 1.73 1.60 - 2.40 mg/dL   Lipase   Result Value Ref Range    Lipase 33 9 - 82 U/L   Troponin I, High Sensitivity, Initial   Result Value Ref Range    Troponin I, High Sensitivity 7 0 - 20 ng/L   Urinalysis with Reflex Microscopic   Result Value Ref Range    Color, Urine Red (N) Straw, Yellow    Appearance, Urine Hazy (N) Clear    Specific Gravity, Urine 1.047 (N) 1.005 - 1.035    pH, Urine 6.0 5.0, 5.5, 6.0, 6.5, 7.0, 7.5, 8.0    Protein, Urine 100 (2+) (N) NEGATIVE mg/dL    Glucose, Urine NEGATIVE NEGATIVE mg/dL    Blood, Urine LARGE (3+) (A) NEGATIVE    Ketones, Urine NEGATIVE NEGATIVE mg/dL    Bilirubin, Urine NEGATIVE NEGATIVE    Urobilinogen, Urine <2.0 <2.0 mg/dL    Nitrite, Urine NEGATIVE NEGATIVE    Leukocyte Esterase, Urine NEGATIVE NEGATIVE   Microscopic Only, Urine   Result Value Ref Range    WBC, Urine 1-5 1-5, NONE /HPF    RBC, Urine >20 (A) NONE, 1-2, 3-5 /HPF    Mucus, Urine 2+ Reference range not established. /LPF   Troponin, High Sensitivity, 1 Hour   Result Value Ref Range    Troponin I, High Sensitivity 3 0 - 20 ng/L   Basic Metabolic Panel   Result Value Ref Range    Glucose 115 (H) 74 - 99 mg/dL    Sodium 139 136 - 145 mmol/L    Potassium 3.4 (L) 3.5 - 5.3 mmol/L    Chloride 106 98 - 107 mmol/L    Bicarbonate 27 21 - 32 mmol/L    Anion Gap 9 (L) 10 - 20 mmol/L    Urea Nitrogen 19 6 - 23 mg/dL    Creatinine 0.76 0.50 - 1.30 mg/dL    eGFR >90 >60 mL/min/1.73m*2    Calcium 9.0 8.6 - 10.3 mg/dL      XR chest  1 view    Result Date: 11/11/2023  Interpreted By:  Sarah Pham, STUDY: XR CHEST 1 VIEW;  11/11/2023 10:31 pm   INDICATION: Signs/Symptoms:Chest Pain   COMPARISON: Chest x-ray 03/14/2023. CT angiogram chest, abdomen, pelvis 11/11/2023.   ACCESSION NUMBER(S): [id number]   ORDERING CLINICIAN: THERESA LOPEZ   TECHNIQUE: 2 portable upright frontal views of the chest were obtained.   FINDINGS: Monitoring wires are overlying the patient.   There is a left-sided pacemaker partially obscuring left hemithorax.   The cardiomediastinal silhouette is within normal limits. Redemonstration of bilateral emphysematous changes with linear scarring at the right upper lobe. There is a 10 mm nodularity at the left upper lobe, corresponding to the 10 mm left upper lobe pulmonary nodule on earlier CT angiogram.   No focal consolidation, pleural effusion or pneumothorax.   Vascular stent noted at the upper abdomen.       1. No radiographic evidence of acute cardiopulmonary process. 2. Emphysema. 3. 10 mm nodularity at the left upper lobe, corresponding to the 10 mm left upper lobe pulmonary nodule on earlier CT angiogram. Please follow-up as per CT angiogram recommendation.       MACRO: None.   Signed by: Sarah Pham 11/11/2023 11:01 PM Dictation workstation:   [id number]    CT angio chest abdomen pelvis    Result Date: 11/11/2023  Interpreted By:  Miladis Alas, STUDY: CT ANGIO CHEST ABDOMEN PELVIS;  11/11/2023 9:02 pm   INDICATION: Signs/Symptoms:History of AAA with repair presenting for severe abdominal pain with tenderness to palpation.   COMPARISON: CT chest and CT abdomen pelvis 03/14/2023, chest CT 03/03/2022   ACCESSION NUMBER(S): [id number]   ORDERING CLINICIAN: THERESA LOPEZ   TECHNIQUE: Axial CT images of the chest, abdomen and pelvis  before and after intravenous administration of contrast using CT angiographic technique. Coronal and sagittal images are reconstructed.  3D reconstructions were obtained at a separate  workstation.   FINDINGS: VASCULAR: THORACIC AORTA: Initial noncontrast images demonstrate no aortic intramural hematoma. No aortic aneurysm or dissection. Mild calcification and noncalcified plaque/thrombus in the aortic arch and descending thoracic aorta. The great vessel origins are patent with no significant stenosis.   ABDOMINAL AORTA: Aorta bi-iliac stent graft extending from the level of the renal arteries through the left common iliac and right external iliac arteries. There is contrast pooling within the excluded aneurysm sac at the upper aspect of the stent, just inferior to the accessory renal artery origin. The excluded aneurysm sac has increased in size, measuring 4.4 x 4 cm just below the level of the renal arteries (previously 3.7 x 3.2 cm) and 4.1 x 3.5 cm more distally (previously 4.3 x 3.3 cm).   No periaortic hematoma, fluid or inflammatory stranding.   New irregular contrast outpouching at the celiac artery origin measuring 6 mm in depth. 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   The superior mesenteric artery is patent with no significant stenosis. The bilateral renal arteries are patent with no significant stenosis. Accessory right renal artery is noted. Retrograde flow noted in the inferior mesenteric artery, not extend into the excluded aneurysm sac.   Stable size of the excluded aneurysm sac involving the right common iliac artery, 2.4 cm. 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 No significant stenosis of the bilateral common, internal and external iliac arteries. No significant stenosis of the common femoral arteries.   No pulmonary embolism.   CHEST:   HEART: Normal size. Pacemaker/ICD lead in the right ventricle. Severe coronary artery calcifications noted. No pericardial effusion. MEDIASTINUM AND TORIE: No  pathologically enlarged thoracic lymph nodes. LUNG, PLEURA, LARGE AIRWAYS: No pleural effusion or pneumothorax. Severe emphysema. New 10 mm left upper lobe nodule, series 601, image 111. Stable nodular/bandlike opacity in the right upper lobe measuring 1.4 x 2 cm. 7 mm right upper lobe nodule is stable from 03/03/2022. CHEST WALL AND LOWER NECK: Within normal limits.   ABDOMEN:   BONES: No acute osseous abnormality. ABDOMINAL WALL: Within normal limits.   LIVER: Numerous low-density hepatic lesions are stable and most consistent with cysts. BILE DUCTS: No biliary dilatation. GALLBLADDER: No calcified gallstones. No pericholecystic inflammatory changes. PANCREAS: Within normal limits. SPLEEN: Within normal limits. ADRENALS:  Within normal limits. KIDNEYS AND URETERS: New mild left hydroureteronephrosis. There 2 separate hyperdense filling defects in the mid and distal left ureter, the largest measures 13 mm and demonstrates enhancement (46 Hounsfield units on precontrast and 105 Hounsfield units on the portal venous phase). The smaller distal mass measures approximately 8 mm and also appears to enhance. Diffuse left ureteral enhancement is noted. There are nonobstructing left renal calculi measuring 11 mm and 3 mm as well as a 2 mm nonobstructing the right renal calculus. Bilateral renal cysts are noted.   PELVIS:   REPRODUCTIVE ORGANS: Enlarged prostate. BLADDER: A nodule involving the base of the urinary bladder is favored to represent a prostate nodule impressing on the bladder. There is ill-defined hyperdensity within the urinary bladder at the left ureterovesical junction, differential considerations include hematoma or mass.   RETROPERITONEUM: No retroperitoneal hematoma. No pathologically enlarged lymph nodes. BOWEL: No dilated bowel. Colonic diverticulosis without acute diverticulitis. The appendix is not seen with certainty. No pericecal inflammatory changes to suggest acute appendicitis. PERITONEUM: No  ascites or free air, no fluid collection.       New mild left hydroureteronephrosis with enhancing filling defects in the mid and distal the left ureter as described above. 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 Ill-defined hyperdensity in the urinary bladder near the left ureterovesical junction may represent mass or hematoma. Urology consultation is recommended.   Status post abdominal aortic stent graft. 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 Interval increase in the excluded aneurysm sac size.   New irregular outpouching of contrast at the celiac artery origin measuring 6 mm in depth, suspicious for ulcerated plaque versus penetrating atheromatous ulcer. Vascular surgery consultation is recommended.   New 10 mm left upper lobe pulmonary nodule. Follow-up PET CT is recommended. Additional nodular densities as above are stable from 03/03/2022, however, attention on follow-up is recommended.   Severe emphysema.   MACRO: Miladis Alas discussed the significance and urgency of this critical finding by telephone with  THERESA LOPEZ on 11/11/2023 at 9:51 pm. (**-RCF-**) Findings:  See findings.   Signed by: Miladis Alas 11/11/2023 10:08 PM Dictation workstation:   [id number]        Assessment/Plan   Gross hematuria/L hydro L ureteral filling defects  -Pain improving  -No intervention while admitted  -Needs outpatient cysto poss TURBT b/l RGP L ureteroscopy L ureteral bx poss ureteral stent  -If bx confirmed malignancy, needs L nephroureterectomy    Thank you for allowing us to participate in this patient's care, pt OK for discharge from a  standpoint.    Deepak Benson PA-C

## 2023-11-18 ENCOUNTER — APPOINTMENT (OUTPATIENT)
Dept: RADIOLOGY | Facility: HOSPITAL | Age: 68
DRG: 656 | End: 2023-11-18
Payer: COMMERCIAL

## 2023-11-18 ENCOUNTER — HOSPITAL ENCOUNTER (INPATIENT)
Facility: HOSPITAL | Age: 68
LOS: 4 days | Discharge: HOME | DRG: 656 | End: 2023-11-22
Attending: STUDENT IN AN ORGANIZED HEALTH CARE EDUCATION/TRAINING PROGRAM | Admitting: INTERNAL MEDICINE
Payer: COMMERCIAL

## 2023-11-18 DIAGNOSIS — N13.39 OTHER HYDRONEPHROSIS: ICD-10-CM

## 2023-11-18 DIAGNOSIS — N20.1 URETERAL CALCULUS: ICD-10-CM

## 2023-11-18 DIAGNOSIS — K52.9 COLITIS: Primary | ICD-10-CM

## 2023-11-18 DIAGNOSIS — N32.89 BLADDER MASS: ICD-10-CM

## 2023-11-18 DIAGNOSIS — N28.89 URETERAL MASS: ICD-10-CM

## 2023-11-18 LAB
ALBUMIN SERPL BCP-MCNC: 4 G/DL (ref 3.4–5)
ALP SERPL-CCNC: 69 U/L (ref 33–136)
ALT SERPL W P-5'-P-CCNC: 7 U/L (ref 10–52)
ANION GAP SERPL CALC-SCNC: 12 MMOL/L (ref 10–20)
APPEARANCE UR: ABNORMAL
AST SERPL W P-5'-P-CCNC: 10 U/L (ref 9–39)
BASOPHILS # BLD AUTO: 0.05 X10*3/UL (ref 0–0.1)
BASOPHILS NFR BLD AUTO: 0.3 %
BILIRUB SERPL-MCNC: 0.4 MG/DL (ref 0–1.2)
BILIRUB UR STRIP.AUTO-MCNC: NEGATIVE MG/DL
BUN SERPL-MCNC: 23 MG/DL (ref 6–23)
CALCIUM SERPL-MCNC: 9 MG/DL (ref 8.6–10.3)
CHLORIDE SERPL-SCNC: 108 MMOL/L (ref 98–107)
CO2 SERPL-SCNC: 27 MMOL/L (ref 21–32)
COLOR UR: ABNORMAL
CREAT SERPL-MCNC: 1.06 MG/DL (ref 0.5–1.3)
EOSINOPHIL # BLD AUTO: 0.09 X10*3/UL (ref 0–0.7)
EOSINOPHIL NFR BLD AUTO: 0.5 %
ERYTHROCYTE [DISTWIDTH] IN BLOOD BY AUTOMATED COUNT: 14 % (ref 11.5–14.5)
GFR SERPL CREATININE-BSD FRML MDRD: 77 ML/MIN/1.73M*2
GLUCOSE SERPL-MCNC: 107 MG/DL (ref 74–99)
GLUCOSE UR STRIP.AUTO-MCNC: NEGATIVE MG/DL
HCT VFR BLD AUTO: 41 % (ref 41–52)
HGB BLD-MCNC: 13.4 G/DL (ref 13.5–17.5)
IMM GRANULOCYTES # BLD AUTO: 0.07 X10*3/UL (ref 0–0.7)
IMM GRANULOCYTES NFR BLD AUTO: 0.4 % (ref 0–0.9)
KETONES UR STRIP.AUTO-MCNC: ABNORMAL MG/DL
LACTATE SERPL-SCNC: 1.2 MMOL/L (ref 0.4–2)
LEUKOCYTE ESTERASE UR QL STRIP.AUTO: ABNORMAL
LIPASE SERPL-CCNC: 29 U/L (ref 9–82)
LYMPHOCYTES # BLD AUTO: 1.72 X10*3/UL (ref 1.2–4.8)
LYMPHOCYTES NFR BLD AUTO: 9.7 %
MAGNESIUM SERPL-MCNC: 1.79 MG/DL (ref 1.6–2.4)
MCH RBC QN AUTO: 30.4 PG (ref 26–34)
MCHC RBC AUTO-ENTMCNC: 32.7 G/DL (ref 32–36)
MCV RBC AUTO: 93 FL (ref 80–100)
MONOCYTES # BLD AUTO: 0.85 X10*3/UL (ref 0.1–1)
MONOCYTES NFR BLD AUTO: 4.8 %
NEUTROPHILS # BLD AUTO: 14.9 X10*3/UL (ref 1.2–7.7)
NEUTROPHILS NFR BLD AUTO: 84.3 %
NITRITE UR QL STRIP.AUTO: NEGATIVE
NRBC BLD-RTO: 0 /100 WBCS (ref 0–0)
PH UR STRIP.AUTO: 6.5 [PH]
PLATELET # BLD AUTO: 271 X10*3/UL (ref 150–450)
POTASSIUM SERPL-SCNC: 3.4 MMOL/L (ref 3.5–5.3)
PROT SERPL-MCNC: 6.8 G/DL (ref 6.4–8.2)
PROT UR STRIP.AUTO-MCNC: ABNORMAL MG/DL
RBC # BLD AUTO: 4.41 X10*6/UL (ref 4.5–5.9)
RBC # UR STRIP.AUTO: ABNORMAL /UL
RBC #/AREA URNS AUTO: >20 /HPF
SODIUM SERPL-SCNC: 144 MMOL/L (ref 136–145)
SP GR UR STRIP.AUTO: 1.01
UROBILINOGEN UR STRIP.AUTO-MCNC: ABNORMAL MG/DL
WBC # BLD AUTO: 17.7 X10*3/UL (ref 4.4–11.3)
WBC #/AREA URNS AUTO: ABNORMAL /HPF

## 2023-11-18 PROCEDURE — 2500000005 HC RX 250 GENERAL PHARMACY W/O HCPCS

## 2023-11-18 PROCEDURE — 80053 COMPREHEN METABOLIC PANEL: CPT | Performed by: STUDENT IN AN ORGANIZED HEALTH CARE EDUCATION/TRAINING PROGRAM

## 2023-11-18 PROCEDURE — 83605 ASSAY OF LACTIC ACID: CPT | Performed by: STUDENT IN AN ORGANIZED HEALTH CARE EDUCATION/TRAINING PROGRAM

## 2023-11-18 PROCEDURE — 87086 URINE CULTURE/COLONY COUNT: CPT | Mod: PARLAB | Performed by: STUDENT IN AN ORGANIZED HEALTH CARE EDUCATION/TRAINING PROGRAM

## 2023-11-18 PROCEDURE — 74177 CT ABD & PELVIS W/CONTRAST: CPT

## 2023-11-18 PROCEDURE — 87040 BLOOD CULTURE FOR BACTERIA: CPT | Mod: PARLAB | Performed by: STUDENT IN AN ORGANIZED HEALTH CARE EDUCATION/TRAINING PROGRAM

## 2023-11-18 PROCEDURE — 96374 THER/PROPH/DIAG INJ IV PUSH: CPT

## 2023-11-18 PROCEDURE — 2500000004 HC RX 250 GENERAL PHARMACY W/ HCPCS (ALT 636 FOR OP/ED): Performed by: STUDENT IN AN ORGANIZED HEALTH CARE EDUCATION/TRAINING PROGRAM

## 2023-11-18 PROCEDURE — 99285 EMERGENCY DEPT VISIT HI MDM: CPT | Mod: 25 | Performed by: STUDENT IN AN ORGANIZED HEALTH CARE EDUCATION/TRAINING PROGRAM

## 2023-11-18 PROCEDURE — 2500000004 HC RX 250 GENERAL PHARMACY W/ HCPCS (ALT 636 FOR OP/ED)

## 2023-11-18 PROCEDURE — 2550000001 HC RX 255 CONTRASTS: Performed by: STUDENT IN AN ORGANIZED HEALTH CARE EDUCATION/TRAINING PROGRAM

## 2023-11-18 PROCEDURE — 96361 HYDRATE IV INFUSION ADD-ON: CPT

## 2023-11-18 PROCEDURE — 83735 ASSAY OF MAGNESIUM: CPT

## 2023-11-18 PROCEDURE — 96375 TX/PRO/DX INJ NEW DRUG ADDON: CPT

## 2023-11-18 PROCEDURE — 74177 CT ABD & PELVIS W/CONTRAST: CPT | Performed by: SURGERY

## 2023-11-18 PROCEDURE — 99222 1ST HOSP IP/OBS MODERATE 55: CPT

## 2023-11-18 PROCEDURE — 81001 URINALYSIS AUTO W/SCOPE: CPT | Performed by: STUDENT IN AN ORGANIZED HEALTH CARE EDUCATION/TRAINING PROGRAM

## 2023-11-18 PROCEDURE — 83690 ASSAY OF LIPASE: CPT | Performed by: STUDENT IN AN ORGANIZED HEALTH CARE EDUCATION/TRAINING PROGRAM

## 2023-11-18 PROCEDURE — 96376 TX/PRO/DX INJ SAME DRUG ADON: CPT

## 2023-11-18 PROCEDURE — C9113 INJ PANTOPRAZOLE SODIUM, VIA: HCPCS

## 2023-11-18 PROCEDURE — 36415 COLL VENOUS BLD VENIPUNCTURE: CPT | Performed by: STUDENT IN AN ORGANIZED HEALTH CARE EDUCATION/TRAINING PROGRAM

## 2023-11-18 PROCEDURE — 2500000001 HC RX 250 WO HCPCS SELF ADMINISTERED DRUGS (ALT 637 FOR MEDICARE OP)

## 2023-11-18 PROCEDURE — 1200000002 HC GENERAL ROOM WITH TELEMETRY DAILY

## 2023-11-18 PROCEDURE — 85025 COMPLETE CBC W/AUTO DIFF WBC: CPT | Performed by: STUDENT IN AN ORGANIZED HEALTH CARE EDUCATION/TRAINING PROGRAM

## 2023-11-18 RX ORDER — LABETALOL HYDROCHLORIDE 5 MG/ML
10 INJECTION, SOLUTION INTRAVENOUS EVERY 4 HOURS PRN
Status: DISCONTINUED | OUTPATIENT
Start: 2023-11-18 | End: 2023-11-18

## 2023-11-18 RX ORDER — FLUTICASONE FUROATE AND VILANTEROL 100; 25 UG/1; UG/1
1 POWDER RESPIRATORY (INHALATION)
Status: DISCONTINUED | OUTPATIENT
Start: 2023-11-18 | End: 2023-11-22

## 2023-11-18 RX ORDER — LIDOCAINE 560 MG/1
1 PATCH PERCUTANEOUS; TOPICAL; TRANSDERMAL DAILY
Status: DISCONTINUED | OUTPATIENT
Start: 2023-11-18 | End: 2023-11-22 | Stop reason: HOSPADM

## 2023-11-18 RX ORDER — HEPARIN SODIUM 5000 [USP'U]/ML
5000 INJECTION, SOLUTION INTRAVENOUS; SUBCUTANEOUS EVERY 8 HOURS
Status: DISCONTINUED | OUTPATIENT
Start: 2023-11-18 | End: 2023-11-22 | Stop reason: HOSPADM

## 2023-11-18 RX ORDER — PREDNISONE 5 MG/1
5 TABLET ORAL DAILY
Status: DISCONTINUED | OUTPATIENT
Start: 2023-11-18 | End: 2023-11-22 | Stop reason: HOSPADM

## 2023-11-18 RX ORDER — PANTOPRAZOLE SODIUM 40 MG/10ML
40 INJECTION, POWDER, LYOPHILIZED, FOR SOLUTION INTRAVENOUS DAILY
Status: DISCONTINUED | OUTPATIENT
Start: 2023-11-18 | End: 2023-11-21

## 2023-11-18 RX ORDER — ALUMINUM HYDROXIDE, MAGNESIUM HYDROXIDE, AND SIMETHICONE 1200; 120; 1200 MG/30ML; MG/30ML; MG/30ML
30 SUSPENSION ORAL EVERY 6 HOURS PRN
Status: DISCONTINUED | OUTPATIENT
Start: 2023-11-18 | End: 2023-11-22 | Stop reason: HOSPADM

## 2023-11-18 RX ORDER — SIMVASTATIN 20 MG/1
40 TABLET, FILM COATED ORAL NIGHTLY
Status: DISCONTINUED | OUTPATIENT
Start: 2023-11-18 | End: 2023-11-22 | Stop reason: HOSPADM

## 2023-11-18 RX ORDER — ONDANSETRON HYDROCHLORIDE 2 MG/ML
4 INJECTION, SOLUTION INTRAVENOUS EVERY 8 HOURS PRN
Status: DISCONTINUED | OUTPATIENT
Start: 2023-11-18 | End: 2023-11-22 | Stop reason: HOSPADM

## 2023-11-18 RX ORDER — ACETAMINOPHEN 650 MG/1
650 SUPPOSITORY RECTAL EVERY 6 HOURS PRN
Status: DISCONTINUED | OUTPATIENT
Start: 2023-11-18 | End: 2023-11-22 | Stop reason: HOSPADM

## 2023-11-18 RX ORDER — OXYCODONE AND ACETAMINOPHEN 5; 325 MG/1; MG/1
1 TABLET ORAL EVERY 6 HOURS PRN
Status: DISCONTINUED | OUTPATIENT
Start: 2023-11-18 | End: 2023-11-19

## 2023-11-18 RX ORDER — ACETAMINOPHEN 160 MG/5ML
650 SOLUTION ORAL EVERY 6 HOURS PRN
Status: DISCONTINUED | OUTPATIENT
Start: 2023-11-18 | End: 2023-11-22 | Stop reason: HOSPADM

## 2023-11-18 RX ORDER — DEXTROSE MONOHYDRATE, SODIUM CHLORIDE, SODIUM LACTATE, POTASSIUM CHLORIDE, CALCIUM CHLORIDE 5; 600; 310; 179; 20 G/100ML; MG/100ML; MG/100ML; MG/100ML; MG/100ML
150 INJECTION, SOLUTION INTRAVENOUS CONTINUOUS
Status: DISCONTINUED | OUTPATIENT
Start: 2023-11-18 | End: 2023-11-20

## 2023-11-18 RX ORDER — ONDANSETRON HYDROCHLORIDE 2 MG/ML
4 INJECTION, SOLUTION INTRAVENOUS ONCE
Status: COMPLETED | OUTPATIENT
Start: 2023-11-18 | End: 2023-11-18

## 2023-11-18 RX ORDER — LABETALOL HYDROCHLORIDE 5 MG/ML
10 INJECTION, SOLUTION INTRAVENOUS EVERY 4 HOURS PRN
Status: DISCONTINUED | OUTPATIENT
Start: 2023-11-18 | End: 2023-11-22 | Stop reason: HOSPADM

## 2023-11-18 RX ORDER — TALC
3 POWDER (GRAM) TOPICAL NIGHTLY PRN
Status: DISCONTINUED | OUTPATIENT
Start: 2023-11-18 | End: 2023-11-22 | Stop reason: HOSPADM

## 2023-11-18 RX ORDER — POLYETHYLENE GLYCOL 3350 17 G/17G
17 POWDER, FOR SOLUTION ORAL DAILY PRN
Status: DISCONTINUED | OUTPATIENT
Start: 2023-11-18 | End: 2023-11-22 | Stop reason: HOSPADM

## 2023-11-18 RX ORDER — LEVOFLOXACIN 5 MG/ML
750 INJECTION, SOLUTION INTRAVENOUS ONCE
Status: COMPLETED | OUTPATIENT
Start: 2023-11-18 | End: 2023-11-18

## 2023-11-18 RX ORDER — DICYCLOMINE HYDROCHLORIDE 20 MG/1
20 TABLET ORAL 3 TIMES DAILY PRN
Status: DISCONTINUED | OUTPATIENT
Start: 2023-11-18 | End: 2023-11-22 | Stop reason: HOSPADM

## 2023-11-18 RX ORDER — METHOTREXATE 2.5 MG/1
15 TABLET ORAL
Status: DISCONTINUED | OUTPATIENT
Start: 2023-11-18 | End: 2023-11-22 | Stop reason: HOSPADM

## 2023-11-18 RX ORDER — ASPIRIN 81 MG/1
81 TABLET ORAL DAILY
Status: DISCONTINUED | OUTPATIENT
Start: 2023-11-18 | End: 2023-11-22 | Stop reason: HOSPADM

## 2023-11-18 RX ORDER — METOPROLOL SUCCINATE 50 MG/1
50 TABLET, EXTENDED RELEASE ORAL DAILY
Status: DISCONTINUED | OUTPATIENT
Start: 2023-11-18 | End: 2023-11-22 | Stop reason: HOSPADM

## 2023-11-18 RX ORDER — FERROUS SULFATE 325(65) MG
65 TABLET ORAL DAILY
Status: DISCONTINUED | OUTPATIENT
Start: 2023-11-18 | End: 2023-11-22 | Stop reason: HOSPADM

## 2023-11-18 RX ORDER — ACETAMINOPHEN 325 MG/1
650 TABLET ORAL EVERY 6 HOURS PRN
Status: DISCONTINUED | OUTPATIENT
Start: 2023-11-18 | End: 2023-11-22 | Stop reason: HOSPADM

## 2023-11-18 RX ADMIN — SODIUM CHLORIDE, POTASSIUM CHLORIDE, SODIUM LACTATE AND CALCIUM CHLORIDE 1000 ML: 600; 310; 30; 20 INJECTION, SOLUTION INTRAVENOUS at 03:25

## 2023-11-18 RX ADMIN — HYDROMORPHONE HYDROCHLORIDE 0.4 MG: 1 INJECTION, SOLUTION INTRAMUSCULAR; INTRAVENOUS; SUBCUTANEOUS at 16:42

## 2023-11-18 RX ADMIN — PANTOPRAZOLE SODIUM 40 MG: 40 INJECTION, POWDER, FOR SOLUTION INTRAVENOUS at 12:19

## 2023-11-18 RX ADMIN — HYDROMORPHONE HYDROCHLORIDE 0.5 MG: 1 INJECTION, SOLUTION INTRAMUSCULAR; INTRAVENOUS; SUBCUTANEOUS at 03:03

## 2023-11-18 RX ADMIN — IOHEXOL 75 ML: 350 INJECTION, SOLUTION INTRAVENOUS at 04:06

## 2023-11-18 RX ADMIN — METHOTREXATE SODIUM 15 MG: 2.5 TABLET ORAL at 23:11

## 2023-11-18 RX ADMIN — HYDROMORPHONE HYDROCHLORIDE 0.4 MG: 1 INJECTION, SOLUTION INTRAMUSCULAR; INTRAVENOUS; SUBCUTANEOUS at 20:42

## 2023-11-18 RX ADMIN — LEVOFLOXACIN 750 MG: 750 INJECTION, SOLUTION INTRAVENOUS at 07:34

## 2023-11-18 RX ADMIN — ONDANSETRON 4 MG: 2 INJECTION INTRAMUSCULAR; INTRAVENOUS at 03:04

## 2023-11-18 RX ADMIN — OXYCODONE HYDROCHLORIDE AND ACETAMINOPHEN 1 TABLET: 5; 325 TABLET ORAL at 11:27

## 2023-11-18 RX ADMIN — HYDROMORPHONE HYDROCHLORIDE 0.5 MG: 1 INJECTION, SOLUTION INTRAMUSCULAR; INTRAVENOUS; SUBCUTANEOUS at 03:41

## 2023-11-18 RX ADMIN — METOPROLOL SUCCINATE 50 MG: 50 TABLET, EXTENDED RELEASE ORAL at 13:47

## 2023-11-18 RX ADMIN — DICYCLOMINE HYDROCHLORIDE 20 MG: 20 TABLET ORAL at 13:47

## 2023-11-18 RX ADMIN — ONDANSETRON 4 MG: 2 INJECTION INTRAMUSCULAR; INTRAVENOUS at 08:37

## 2023-11-18 RX ADMIN — HYDROMORPHONE HYDROCHLORIDE 0.5 MG: 1 INJECTION, SOLUTION INTRAMUSCULAR; INTRAVENOUS; SUBCUTANEOUS at 05:53

## 2023-11-18 RX ADMIN — Medication 2 L/MIN: at 15:00

## 2023-11-18 RX ADMIN — PREDNISONE 5 MG: 5 TABLET ORAL at 13:47

## 2023-11-18 RX ADMIN — POTASSIUM CHLORIDE, SODIUM CHLORIDE, CALCIUM CHLORIDE, SODIUM LACTATE, AND DEXTROSE MONOHYDRATE 75 ML/HR: 1.79; 6; .2; 3.1; 5 INJECTION, SOLUTION INTRAVENOUS at 08:30

## 2023-11-18 RX ADMIN — HYDROMORPHONE HYDROCHLORIDE 0.4 MG: 1 INJECTION, SOLUTION INTRAMUSCULAR; INTRAVENOUS; SUBCUTANEOUS at 12:19

## 2023-11-18 RX ADMIN — POTASSIUM CHLORIDE, SODIUM CHLORIDE, CALCIUM CHLORIDE, SODIUM LACTATE, AND DEXTROSE MONOHYDRATE 100 ML/HR: 1.79; 6; .2; 3.1; 5 INJECTION, SOLUTION INTRAVENOUS at 23:10

## 2023-11-18 RX ADMIN — HYDROMORPHONE HYDROCHLORIDE 0.5 MG: 1 INJECTION, SOLUTION INTRAMUSCULAR; INTRAVENOUS; SUBCUTANEOUS at 08:24

## 2023-11-18 RX ADMIN — SIMVASTATIN 40 MG: 20 TABLET, FILM COATED ORAL at 20:43

## 2023-11-18 SDOH — SOCIAL STABILITY: SOCIAL INSECURITY: ARE YOU OR HAVE YOU BEEN THREATENED OR ABUSED PHYSICALLY, EMOTIONALLY, OR SEXUALLY BY ANYONE?: NO

## 2023-11-18 SDOH — SOCIAL STABILITY: SOCIAL INSECURITY: DOES ANYONE TRY TO KEEP YOU FROM HAVING/CONTACTING OTHER FRIENDS OR DOING THINGS OUTSIDE YOUR HOME?: NO

## 2023-11-18 SDOH — SOCIAL STABILITY: SOCIAL INSECURITY: HAS ANYONE EVER THREATENED TO HURT YOUR FAMILY OR YOUR PETS?: NO

## 2023-11-18 SDOH — SOCIAL STABILITY: SOCIAL INSECURITY: HAVE YOU HAD THOUGHTS OF HARMING ANYONE ELSE?: YES

## 2023-11-18 SDOH — SOCIAL STABILITY: SOCIAL INSECURITY: DO YOU FEEL ANYONE HAS EXPLOITED OR TAKEN ADVANTAGE OF YOU FINANCIALLY OR OF YOUR PERSONAL PROPERTY?: NO

## 2023-11-18 SDOH — SOCIAL STABILITY: SOCIAL INSECURITY: ABUSE: ADULT

## 2023-11-18 SDOH — SOCIAL STABILITY: SOCIAL INSECURITY: ARE THERE ANY APPARENT SIGNS OF INJURIES/BEHAVIORS THAT COULD BE RELATED TO ABUSE/NEGLECT?: NO

## 2023-11-18 SDOH — SOCIAL STABILITY: SOCIAL INSECURITY: DO YOU FEEL UNSAFE GOING BACK TO THE PLACE WHERE YOU ARE LIVING?: NO

## 2023-11-18 SDOH — SOCIAL STABILITY: SOCIAL INSECURITY: WERE YOU ABLE TO COMPLETE ALL THE BEHAVIORAL HEALTH SCREENINGS?: YES

## 2023-11-18 ASSESSMENT — COGNITIVE AND FUNCTIONAL STATUS - GENERAL
PATIENT BASELINE BEDBOUND: NO
DAILY ACTIVITIY SCORE: 24
CLIMB 3 TO 5 STEPS WITH RAILING: A LITTLE
MOBILITY SCORE: 22
WALKING IN HOSPITAL ROOM: A LITTLE

## 2023-11-18 ASSESSMENT — ACTIVITIES OF DAILY LIVING (ADL)
LACK_OF_TRANSPORTATION: NO
JUDGMENT_ADEQUATE_SAFELY_COMPLETE_DAILY_ACTIVITIES: YES
TOILETING: INDEPENDENT
HEARING - RIGHT EAR: FUNCTIONAL
PATIENT'S MEMORY ADEQUATE TO SAFELY COMPLETE DAILY ACTIVITIES?: YES
ADEQUATE_TO_COMPLETE_ADL: YES
BATHING: INDEPENDENT
WALKS IN HOME: INDEPENDENT
DRESSING YOURSELF: INDEPENDENT
FEEDING YOURSELF: INDEPENDENT
GROOMING: INDEPENDENT
HEARING - LEFT EAR: FUNCTIONAL

## 2023-11-18 ASSESSMENT — PAIN SCALES - GENERAL
PAINLEVEL_OUTOF10: 9
PAINLEVEL_OUTOF10: 7
PAINLEVEL_OUTOF10: 9
PAINLEVEL_OUTOF10: 10 - WORST POSSIBLE PAIN
PAINLEVEL_OUTOF10: 6
PAINLEVEL_OUTOF10: 10 - WORST POSSIBLE PAIN
PAINLEVEL_OUTOF10: 7

## 2023-11-18 ASSESSMENT — LIFESTYLE VARIABLES
HAVE PEOPLE ANNOYED YOU BY CRITICIZING YOUR DRINKING: NO
SKIP TO QUESTIONS 9-10: 1
REASON UNABLE TO ASSESS: NO
HOW MANY STANDARD DRINKS CONTAINING ALCOHOL DO YOU HAVE ON A TYPICAL DAY: PATIENT DOES NOT DRINK
HOW OFTEN DO YOU HAVE 6 OR MORE DRINKS ON ONE OCCASION: NEVER
HOW OFTEN DO YOU HAVE A DRINK CONTAINING ALCOHOL: NEVER
EVER HAD A DRINK FIRST THING IN THE MORNING TO STEADY YOUR NERVES TO GET RID OF A HANGOVER: NO
HAVE YOU EVER FELT YOU SHOULD CUT DOWN ON YOUR DRINKING: NO
EVER FELT BAD OR GUILTY ABOUT YOUR DRINKING: NO
AUDIT-C TOTAL SCORE: 0
AUDIT-C TOTAL SCORE: 0

## 2023-11-18 ASSESSMENT — PAIN DESCRIPTION - PAIN TYPE: TYPE: ACUTE PAIN

## 2023-11-18 ASSESSMENT — PATIENT HEALTH QUESTIONNAIRE - PHQ9
1. LITTLE INTEREST OR PLEASURE IN DOING THINGS: NOT AT ALL
2. FEELING DOWN, DEPRESSED OR HOPELESS: NOT AT ALL
SUM OF ALL RESPONSES TO PHQ9 QUESTIONS 1 & 2: 0

## 2023-11-18 ASSESSMENT — PAIN DESCRIPTION - LOCATION
LOCATION: ABDOMEN
LOCATION: OTHER (COMMENT)

## 2023-11-18 ASSESSMENT — COLUMBIA-SUICIDE SEVERITY RATING SCALE - C-SSRS
2. HAVE YOU ACTUALLY HAD ANY THOUGHTS OF KILLING YOURSELF?: NO
1. IN THE PAST MONTH, HAVE YOU WISHED YOU WERE DEAD OR WISHED YOU COULD GO TO SLEEP AND NOT WAKE UP?: NO
6. HAVE YOU EVER DONE ANYTHING, STARTED TO DO ANYTHING, OR PREPARED TO DO ANYTHING TO END YOUR LIFE?: NO

## 2023-11-18 ASSESSMENT — PAIN - FUNCTIONAL ASSESSMENT
PAIN_FUNCTIONAL_ASSESSMENT: 0-10
PAIN_FUNCTIONAL_ASSESSMENT: 0-10

## 2023-11-18 ASSESSMENT — PAIN DESCRIPTION - ORIENTATION: ORIENTATION: LEFT

## 2023-11-18 NOTE — ED PROVIDER NOTES
HPI   Chief Complaint   Patient presents with    Abdominal Pain     Patient arrives with severe abdominal pain in his upper left quadrant. Patient was here last week and diagnosed with a bladder mass and was told to follow up with urology but hasn't been able to get into the office. Patient is stating 10/10 pain with nausea and vomiting that started around 2000 tonight. Patient was prescribed oxycodone with no relief last time he was here.        This is a 68-year-old male with past medical history of ruptured AAA status postrepair recent diagnosis of intra-abdominal mass with hydronephrosis presenting to the emergency department for abdominal pain, vomiting, hematuria.  Patient states she is supposed to have follow-up with urology to schedule a biopsy of the mass.  He had initially presented to the emergency department for similar symptoms 1 week ago and had been admitted for symptoms.  Also been found to have an endoleak at that time.  States that since discharge he had been doing well until this evening when he had acute onset sharp abdominal pain.  States he took 3 oxycodone over 3 hours with no improvement of his symptoms.  He has had multiple episodes of vomiting and nausea, all nonbloody.  Symptoms feel similar to past episode.  He was still having normal bowel movements today.  He is still urinating blood.  He otherwise denies fever/chills, headaches, chest pain, shortness of breath.        History provided by:  Spouse and patient   used: No                        No data recorded                Patient History   Past Medical History:   Diagnosis Date    Abnormal findings on diagnostic imaging of heart and coronary circulation     Abnormal echocardiogram    Abnormal findings on diagnostic imaging of other specified body structures     Abnormal ultrasound    Abnormal findings on diagnostic imaging of other specified body structures     Abnormal ultrasound    Abnormal findings on diagnostic  imaging of other specified body structures     Abnormal chest x-ray    Abnormal findings on diagnostic imaging of other specified body structures     Abnormal CT of the chest    Abnormal findings on diagnostic imaging of other specified body structures     Abnormal computed tomography angiography (CTA)    Abnormal findings on diagnostic imaging of other specified body structures     Abnormal CT of the chest    Personal history of other medical treatment     History of echocardiogram     Past Surgical History:   Procedure Laterality Date    CT ABDOMEN PELVIS ANGIOGRAM W AND/OR WO IV CONTRAST  11/7/2019    CT ABDOMEN PELVIS ANGIOGRAM W AND/OR WO IV CONTRAST 11/7/2019 PAR ANCILLARY LEGACY    CT ABDOMEN PELVIS ANGIOGRAM W AND/OR WO IV CONTRAST  7/8/2020    CT ABDOMEN PELVIS ANGIOGRAM W AND/OR WO IV CONTRAST 7/8/2020 PAR ANCILLARY LEGACY    OTHER SURGICAL HISTORY  05/30/2019    Salivary gland surgery    OTHER SURGICAL HISTORY  06/16/2020    Bronchoscopy    OTHER SURGICAL HISTORY  06/16/2020    Seroma incision and drainage    OTHER SURGICAL HISTORY  04/02/2021    Cardioverter defibrillator insertion    OTHER SURGICAL HISTORY  01/30/2020    Cardiac catheterization with stent placement    OTHER SURGICAL HISTORY  06/16/2020    Abdominal aortic aneurysm repair endovascular     No family history on file.  Social History     Tobacco Use    Smoking status: Not on file    Smokeless tobacco: Not on file   Substance Use Topics    Alcohol use: Not on file    Drug use: Not on file       Physical Exam   ED Triage Vitals [11/18/23 0203]   Temp Heart Rate Resp BP   36.9 °C (98.4 °F) 91 18 (!) 185/94      SpO2 Temp Source Heart Rate Source Patient Position   96 % Tympanic Monitor --      BP Location FiO2 (%)     -- --       Physical Exam  GEN: uncomfortable appearing, no acute distress  CVS/CHEST: reg rate, nl rhythm, no murmurs/gallops/rubs  PULM: CTAB b/l no wheezes, crackles, or rhonchi   GI: left sided ttp with voluntary guarding,  ND, no masses or organomegaly  BACK: left CVA tenderness, no vertebral point tenderness  EXT: no LE edema, 2+ periph pulses in bilat radial and DP   NEURO:  no focal deficits, no facial asymmetry, moving all extremities  PSYCH: AAOx3 answers questions appropriately    ED Course & MDM   Diagnoses as of 11/24/23 1959   Colitis       Medical Decision Making  This is a 68-year-old male with past medical history of ruptured AAA status postrepair recent diagnosis of intra-abdominal mass with hydronephrosis presenting to the emergency department for abdominal pain, vomiting, hematuria.  Patient stable upon presentation to the emergency department, does appear to be uncomfortable but in no acute distress, vitals are unremarkable.  On exam he does have left-sided abdominal tenderness with voluntary guarding.  Remainder of his exam is largely unremarkable.  EMR was reviewed from his recent emergency department visit.  At that time patient's lab work had been remarkable for mild leukocytosis but otherwise largely unremarkable.  He did have a CT angio of the chest/abdomen/pelvis which showed his endoleak.  There was also a potential ulcerated plaque versus penetrating atheromatous ulcer.  The findings of the multifocal urothelial malignancy concerns were also seen.  Patient had been admitted overnight with plans for outpatient follow-up with both vascular surgery and urology.  Patient to be evaluated for possible worsening of these conditions.  Patient's lab work now with a rising leukocytosis.  Repeat CT imaging of the abdomen and pelvis did redemonstrate the previous findings which were largely unchanged.  Patient does now have signs of distal colitis which is likely contributing to his symptoms given the abdominal pain and diarrhea.  Patient's urinalysis also with findings of an infection.  He was treated with Levaquin for both his colitis and UTI.  He did require multiple doses of pain medications and nausea medications and  will require admission for further management.    Procedure  Procedures     Yoni Vargas MD  11/24/23 2006

## 2023-11-18 NOTE — H&P
History Of Present Illness  Yobany Small is a 68 y.o. male presenting with severe abdominal pain, nausea and vomiting. The patient states that this pain reminds him of the episode that happened last week when he came to the ED for further evaluation.  He says that he is frustrated that no biopsy has been obtained of his abdominal mass yet, he states he knows it could be cancerous but that he hasn't been able to follow up with Urology in the OP setting yet. Continues to have hematuria and non-bloody emesis with intense cramping LLQ abdominal pain that comes and goes spontaneously. Denies constitutional symptoms, fever, chills. Endorses loose stool up to 4 times daily. He states that pain medicine he got in the ED decreased his pain from 10 to 7  but it didn't last very long, only 1-2 hours. He states he is unable to sleep for in a row days now due to the severity and frequency of the bouts of pain in the abdomen. U/A with LE, red, with hematuria and WBCs     Past Medical History  He has a past medical history of Abnormal findings on diagnostic imaging of heart and coronary circulation, Abnormal findings on diagnostic imaging of other specified body structures, Abnormal findings on diagnostic imaging of other specified body structures, Abnormal findings on diagnostic imaging of other specified body structures, Abnormal findings on diagnostic imaging of other specified body structures, Abnormal findings on diagnostic imaging of other specified body structures, Abnormal findings on diagnostic imaging of other specified body structures, and Personal history of other medical treatment.    Surgical History  He has a past surgical history that includes Other surgical history (05/30/2019); Other surgical history (06/16/2020); Other surgical history (06/16/2020); Other surgical history (04/02/2021); Other surgical history (01/30/2020); Other surgical history (06/16/2020); CT angio abdomen pelvis w and or wo IV IV  contrast (11/7/2019); and CT angio abdomen pelvis w and or wo IV IV contrast (7/8/2020).     Social History  He reports that he has never smoked. He has never used smokeless tobacco. No history on file for alcohol use and drug use.    Family History  No family history on file.     Allergies  Patient has no known allergies.       Physical Exam  Constitutional: No respiratory acute distress, cooperative, answers questions appropriately then elderly male  Eyes: EOMI, clear sclera  ENMT: mucous membranes moist  Head/Neck: Neck supple, Trachea midline  Respiratory/Thorax: CTAB, no wheezes, no crackles  Cardiovascular: Tachycardia with regular rate and rhythm, normal s1, s2, no murmurs, distal pulses 2+, no edema  Gastrointestinal: Bowel sounds present, diffuse tenderness, no rebounding, nonperitoneal, no palpable masses  Musculoskeletal: ROM intact, no gross deformity diffuse  Neurological: No focal deficits, normal sensation  Psychological: Appropriate mood and behavior, however extremely anxious  Skin: Warm and dry, jeff blood in the urinal, ~200 cc     Last Recorded Vitals  /56   Pulse 98   Temp 35.9 °C (96.6 °F)   Resp 22   Wt 54.4 kg (120 lb)   SpO2 (!) 87%     Relevant Results  Scheduled medications  [Held by provider] aspirin, 81 mg, oral, Daily  ferrous sulfate (325 mg ferrous sulfate), 65 mg of iron, oral, TID  tiotropium, 2 Inhalation, inhalation, Daily   And  fluticasone furoate-vilanteroL, 1 puff, inhalation, Daily  [Held by provider] heparin (porcine), 5,000 Units, subcutaneous, q8h  lidocaine, 1 patch, transdermal, Daily  methotrexate, 2.5 mg, oral, Weekly  metoprolol succinate XL, 50 mg, oral, Daily  pantoprazole, 40 mg, intravenous, Daily  predniSONE, 5 mg, oral, Daily  simvastatin, 40 mg, oral, Nightly        Continuous medications  dextrose 5% lactated Ringer's with KCl 20 mEq/L, 75 mL/hr, Last Rate: 75 mL/hr (11/18/23 1030)      PRN medications  PRN medications: acetaminophen **OR**  acetaminophen **OR** acetaminophen, alum-mag hydroxide-simeth, dicyclomine, HYDROmorphone, melatonin, ondansetron, oxyCODONE-acetaminophen, polyethylene glycol  Results for orders placed or performed during the hospital encounter of 11/18/23 (from the past 24 hour(s))   CBC and Auto Differential   Result Value Ref Range    WBC 17.7 (H) 4.4 - 11.3 x10*3/uL    nRBC 0.0 0.0 - 0.0 /100 WBCs    RBC 4.41 (L) 4.50 - 5.90 x10*6/uL    Hemoglobin 13.4 (L) 13.5 - 17.5 g/dL    Hematocrit 41.0 41.0 - 52.0 %    MCV 93 80 - 100 fL    MCH 30.4 26.0 - 34.0 pg    MCHC 32.7 32.0 - 36.0 g/dL    RDW 14.0 11.5 - 14.5 %    Platelets 271 150 - 450 x10*3/uL    Neutrophils % 84.3 40.0 - 80.0 %    Immature Granulocytes %, Automated 0.4 0.0 - 0.9 %    Lymphocytes % 9.7 13.0 - 44.0 %    Monocytes % 4.8 2.0 - 10.0 %    Eosinophils % 0.5 0.0 - 6.0 %    Basophils % 0.3 0.0 - 2.0 %    Neutrophils Absolute 14.90 (H) 1.20 - 7.70 x10*3/uL    Immature Granulocytes Absolute, Automated 0.07 0.00 - 0.70 x10*3/uL    Lymphocytes Absolute 1.72 1.20 - 4.80 x10*3/uL    Monocytes Absolute 0.85 0.10 - 1.00 x10*3/uL    Eosinophils Absolute 0.09 0.00 - 0.70 x10*3/uL    Basophils Absolute 0.05 0.00 - 0.10 x10*3/uL   Comprehensive metabolic panel   Result Value Ref Range    Glucose 107 (H) 74 - 99 mg/dL    Sodium 144 136 - 145 mmol/L    Potassium 3.4 (L) 3.5 - 5.3 mmol/L    Chloride 108 (H) 98 - 107 mmol/L    Bicarbonate 27 21 - 32 mmol/L    Anion Gap 12 10 - 20 mmol/L    Urea Nitrogen 23 6 - 23 mg/dL    Creatinine 1.06 0.50 - 1.30 mg/dL    eGFR 77 >60 mL/min/1.73m*2    Calcium 9.0 8.6 - 10.3 mg/dL    Albumin 4.0 3.4 - 5.0 g/dL    Alkaline Phosphatase 69 33 - 136 U/L    Total Protein 6.8 6.4 - 8.2 g/dL    AST 10 9 - 39 U/L    Bilirubin, Total 0.4 0.0 - 1.2 mg/dL    ALT 7 (L) 10 - 52 U/L   Lipase   Result Value Ref Range    Lipase 29 9 - 82 U/L   Lactate   Result Value Ref Range    Lactate 1.2 0.4 - 2.0 mmol/L   Urinalysis with Reflex Microscopic and Culture   Result  Value Ref Range    Color, Urine Red (N) Straw, Yellow    Appearance, Urine Turbid (N) Clear    Specific Gravity, Urine 1.015 1.005 - 1.035    pH, Urine 6.5 5.0, 5.5, 6.0, 6.5, 7.0, 7.5, 8.0    Protein, Urine 100 (2+) (A) NEGATIVE, 10 (TRACE) mg/dL    Glucose, Urine NEGATIVE NEGATIVE mg/dL    Blood, Urine 1.0 (3+) (A) NEGATIVE    Ketones, Urine 5 (TRACE) (A) NEGATIVE mg/dL    Bilirubin, Urine NEGATIVE NEGATIVE    Urobilinogen, Urine NORM NORM mg/dL    Nitrite, Urine NEGATIVE NEGATIVE    Leukocyte Esterase, Urine 75 Jose Carlos/µL (A) NEGATIVE   Microscopic Only, Urine   Result Value Ref Range    WBC, Urine 21-50 (A) 1-5, NONE /HPF    RBC, Urine >20 (A) NONE, 1-2, 3-5 /HPF   Blood Culture    Specimen: Peripheral Venipuncture; Blood culture   Result Value Ref Range    Blood Culture Loaded on Instrument - Culture in progress    Blood Culture    Specimen: Peripheral Venipuncture; Blood culture   Result Value Ref Range    Blood Culture Loaded on Instrument - Culture in progress      [unfilled]      Assessment/Plan   Assessment:  Colitis   Concern for urothelial malignancy  Concern for acute cystitis  Severe LLQ abdominal pain  Hematuria  Hypokalemia  Hyperchloremia  Diarrhea  Hypertensive urgency  Nausea and vomiting  Leukocytosis w/ bandemia  Protein calorie malnutrition  AAA status post-repair  Pulmonary nodule  Arthropathic psoriasis  Presence of automatic (implantable) cardiac defibrillator    -Levaquin Day 1. Blood and urine cx pending. 11/18 Ct abdomen with findings compatible to urothelial malignancy. NPO with meds with sips, ice chips okay. Zofran PRN. Consult placed to urology.  -Pain management. IV dilaudid and oral Percocet. No need for bowel regimen as the patient endorses severe diarrhea that has been unremitting for days. Previous C dif + 2020, will repeat PCR.   -Will consider consulting palliative care for pain management and/or goals of care discussion if the patient pain continues to be difficult to manage, or his  prognosis becomes more guarded.  -Med rec updated.  -Will attempt to keep BP well controlled during this hospitalization to decrease the strain on the AAA. PRN antihypertensives added.  -Suspended DVT prophylaxis heparin and holding aspirin while hematuria is prominent. Continued to use SCDs for dvt prophylaxis.    I have reviewed and interpreted all lab test imaging studies and documentations from other healthcare providers. Discussed antibiotics and potential side effects with patient.     Case to be discussed with attending, A&P above reflect tentative plan. Please await for final signature from attending physician on service.    Gina Gutierrez MD PGY-2  Please message me if you have any questions

## 2023-11-18 NOTE — CARE PLAN
The patient's goals for the shift include      The clinical goals for the shift include control pain.      Problem: Pain - Adult  Goal: Verbalizes/displays adequate comfort level or baseline comfort level  Outcome: Progressing     Problem: Safety - Adult  Goal: Free from fall injury  Outcome: Progressing     Problem: Discharge Planning  Goal: Discharge to home or other facility with appropriate resources  Outcome: Progressing     Problem: Chronic Conditions and Co-morbidities  Goal: Patient's chronic conditions and co-morbidity symptoms are monitored and maintained or improved  Outcome: Progressing

## 2023-11-19 LAB
ANION GAP SERPL CALC-SCNC: 10 MMOL/L (ref 10–20)
BACTERIA UR CULT: NO GROWTH
BUN SERPL-MCNC: 15 MG/DL (ref 6–23)
CALCIUM SERPL-MCNC: 8.2 MG/DL (ref 8.6–10.3)
CHLORIDE SERPL-SCNC: 108 MMOL/L (ref 98–107)
CO2 SERPL-SCNC: 26 MMOL/L (ref 21–32)
CREAT SERPL-MCNC: 1.36 MG/DL (ref 0.5–1.3)
ERYTHROCYTE [DISTWIDTH] IN BLOOD BY AUTOMATED COUNT: 13.9 % (ref 11.5–14.5)
GFR SERPL CREATININE-BSD FRML MDRD: 57 ML/MIN/1.73M*2
GLUCOSE SERPL-MCNC: 96 MG/DL (ref 74–99)
HCT VFR BLD AUTO: 34.3 % (ref 41–52)
HGB BLD-MCNC: 11.3 G/DL (ref 13.5–17.5)
MCH RBC QN AUTO: 30.8 PG (ref 26–34)
MCHC RBC AUTO-ENTMCNC: 32.9 G/DL (ref 32–36)
MCV RBC AUTO: 94 FL (ref 80–100)
NRBC BLD-RTO: 0 /100 WBCS (ref 0–0)
PLATELET # BLD AUTO: 183 X10*3/UL (ref 150–450)
POTASSIUM SERPL-SCNC: 4.1 MMOL/L (ref 3.5–5.3)
RBC # BLD AUTO: 3.67 X10*6/UL (ref 4.5–5.9)
SODIUM SERPL-SCNC: 140 MMOL/L (ref 136–145)
WBC # BLD AUTO: 9.7 X10*3/UL (ref 4.4–11.3)

## 2023-11-19 PROCEDURE — 85027 COMPLETE CBC AUTOMATED: CPT

## 2023-11-19 PROCEDURE — 2500000001 HC RX 250 WO HCPCS SELF ADMINISTERED DRUGS (ALT 637 FOR MEDICARE OP)

## 2023-11-19 PROCEDURE — 99232 SBSQ HOSP IP/OBS MODERATE 35: CPT

## 2023-11-19 PROCEDURE — 80048 BASIC METABOLIC PNL TOTAL CA: CPT

## 2023-11-19 PROCEDURE — C9113 INJ PANTOPRAZOLE SODIUM, VIA: HCPCS

## 2023-11-19 PROCEDURE — 1200000002 HC GENERAL ROOM WITH TELEMETRY DAILY

## 2023-11-19 PROCEDURE — 2500000004 HC RX 250 GENERAL PHARMACY W/ HCPCS (ALT 636 FOR OP/ED)

## 2023-11-19 PROCEDURE — 36415 COLL VENOUS BLD VENIPUNCTURE: CPT

## 2023-11-19 RX ORDER — OXYCODONE AND ACETAMINOPHEN 5; 325 MG/1; MG/1
1 TABLET ORAL EVERY 4 HOURS PRN
Status: DISCONTINUED | OUTPATIENT
Start: 2023-11-19 | End: 2023-11-19

## 2023-11-19 RX ORDER — LEVOFLOXACIN 5 MG/ML
750 INJECTION, SOLUTION INTRAVENOUS
Status: DISCONTINUED | OUTPATIENT
Start: 2023-11-20 | End: 2023-11-20

## 2023-11-19 RX ORDER — OXYCODONE HYDROCHLORIDE 5 MG/1
10 TABLET ORAL EVERY 4 HOURS PRN
Status: DISCONTINUED | OUTPATIENT
Start: 2023-11-19 | End: 2023-11-22 | Stop reason: HOSPADM

## 2023-11-19 RX ORDER — POLYETHYLENE GLYCOL 3350 17 G/17G
17 POWDER, FOR SOLUTION ORAL DAILY
Status: DISCONTINUED | OUTPATIENT
Start: 2023-11-19 | End: 2023-11-22 | Stop reason: HOSPADM

## 2023-11-19 RX ORDER — HYDROCODONE BITARTRATE AND ACETAMINOPHEN 10; 325 MG/1; MG/1
1 TABLET ORAL EVERY 4 HOURS PRN
Status: DISCONTINUED | OUTPATIENT
Start: 2023-11-19 | End: 2023-11-19

## 2023-11-19 RX ORDER — AMOXICILLIN 250 MG
1 CAPSULE ORAL DAILY
Status: DISCONTINUED | OUTPATIENT
Start: 2023-11-19 | End: 2023-11-21

## 2023-11-19 RX ADMIN — OXYCODONE HYDROCHLORIDE 10 MG: 5 TABLET ORAL at 09:45

## 2023-11-19 RX ADMIN — METOPROLOL SUCCINATE 50 MG: 50 TABLET, EXTENDED RELEASE ORAL at 08:01

## 2023-11-19 RX ADMIN — HYDROMORPHONE HYDROCHLORIDE 0.4 MG: 1 INJECTION, SOLUTION INTRAMUSCULAR; INTRAVENOUS; SUBCUTANEOUS at 08:01

## 2023-11-19 RX ADMIN — PANTOPRAZOLE SODIUM 40 MG: 40 INJECTION, POWDER, FOR SOLUTION INTRAVENOUS at 08:01

## 2023-11-19 RX ADMIN — PREDNISONE 5 MG: 5 TABLET ORAL at 08:01

## 2023-11-19 RX ADMIN — POLYETHYLENE GLYCOL 3350 17 G: 17 POWDER, FOR SOLUTION ORAL at 13:51

## 2023-11-19 RX ADMIN — OXYCODONE HYDROCHLORIDE 10 MG: 5 TABLET ORAL at 22:25

## 2023-11-19 RX ADMIN — HYDROMORPHONE HYDROCHLORIDE 0.4 MG: 1 INJECTION, SOLUTION INTRAMUSCULAR; INTRAVENOUS; SUBCUTANEOUS at 01:26

## 2023-11-19 RX ADMIN — OXYCODONE HYDROCHLORIDE 10 MG: 5 TABLET ORAL at 13:45

## 2023-11-19 RX ADMIN — ONDANSETRON 4 MG: 2 INJECTION INTRAMUSCULAR; INTRAVENOUS at 13:46

## 2023-11-19 RX ADMIN — OXYCODONE HYDROCHLORIDE 10 MG: 5 TABLET ORAL at 18:22

## 2023-11-19 RX ADMIN — SIMVASTATIN 40 MG: 20 TABLET, FILM COATED ORAL at 22:25

## 2023-11-19 RX ADMIN — HYDROMORPHONE HYDROCHLORIDE 0.4 MG: 1 INJECTION, SOLUTION INTRAMUSCULAR; INTRAVENOUS; SUBCUTANEOUS at 16:03

## 2023-11-19 RX ADMIN — POTASSIUM CHLORIDE, SODIUM CHLORIDE, CALCIUM CHLORIDE, SODIUM LACTATE, AND DEXTROSE MONOHYDRATE 150 ML/HR: 1.79; 6; .2; 3.1; 5 INJECTION, SOLUTION INTRAVENOUS at 22:26

## 2023-11-19 ASSESSMENT — COGNITIVE AND FUNCTIONAL STATUS - GENERAL
CLIMB 3 TO 5 STEPS WITH RAILING: A LITTLE
WALKING IN HOSPITAL ROOM: A LITTLE
DAILY ACTIVITIY SCORE: 24
MOBILITY SCORE: 22
MOBILITY SCORE: 22
DAILY ACTIVITIY SCORE: 24
DAILY ACTIVITIY SCORE: 24
WALKING IN HOSPITAL ROOM: A LITTLE
WALKING IN HOSPITAL ROOM: A LITTLE
CLIMB 3 TO 5 STEPS WITH RAILING: A LITTLE
MOBILITY SCORE: 22
CLIMB 3 TO 5 STEPS WITH RAILING: A LITTLE

## 2023-11-19 ASSESSMENT — PAIN SCALES - GENERAL
PAINLEVEL_OUTOF10: 7
PAINLEVEL_OUTOF10: 8
PAINLEVEL_OUTOF10: 6
PAINLEVEL_OUTOF10: 6
PAINLEVEL_OUTOF10: 7
PAINLEVEL_OUTOF10: 9
PAINLEVEL_OUTOF10: 7

## 2023-11-19 ASSESSMENT — PAIN - FUNCTIONAL ASSESSMENT: PAIN_FUNCTIONAL_ASSESSMENT: 0-10

## 2023-11-19 NOTE — PROGRESS NOTES
Medication Adjustment    The following medication(s) was/were adjusted for Yobany Small per protocol/policy due to altered renal function.    Medication(s) adjusted:   Levofloxacin from 750mg IV q24h to 750mg IV q48h    Adams Galeas, [facility]

## 2023-11-19 NOTE — CONSULTS
Reason For Consult  Hematuria, urothelial lesion    History Of Present Illness  Yobany Small is a 68 y.o. male presenting with severe abdominal pain, nausea and vomiting. The patient states that this pain reminds him of the episode that happened last week when he came to the ED for further evaluation.  He says that he is frustrated that no biopsy has been obtained of his abdominal mass yet, He was seen in our office last Tuesday and Surgery was arrange for him, I will have our surgery schedulers reach out to him with the date and time.         Past Medical History  He has a past medical history of Abnormal findings on diagnostic imaging of heart and coronary circulation, Abnormal findings on diagnostic imaging of other specified body structures, Abnormal findings on diagnostic imaging of other specified body structures, Abnormal findings on diagnostic imaging of other specified body structures, Abnormal findings on diagnostic imaging of other specified body structures, Abnormal findings on diagnostic imaging of other specified body structures, Abnormal findings on diagnostic imaging of other specified body structures, and Personal history of other medical treatment.    Surgical History  He has a past surgical history that includes Other surgical history (05/30/2019); Other surgical history (06/16/2020); Other surgical history (06/16/2020); Other surgical history (04/02/2021); Other surgical history (01/30/2020); Other surgical history (06/16/2020); CT angio abdomen pelvis w and or wo IV IV contrast (11/7/2019); and CT angio abdomen pelvis w and or wo IV IV contrast (7/8/2020).     Social History  He reports that he has never smoked. He has never used smokeless tobacco. No history on file for alcohol use and drug use.    Family History  No family history on file.     Allergies  Patient has no known allergies.    Review of Systems  Reviewed ROS from H&P     Physical Exam  Alert and oriented x3  Abd  soft     Last  "Recorded Vitals  Blood pressure 132/71, pulse 72, temperature 36.8 °C (98.2 °F), resp. rate 14, height 1.803 m (5' 11\"), weight 54.4 kg (120 lb), SpO2 94 %.    Relevant Results      Results for orders placed or performed during the hospital encounter of 11/18/23 (from the past 24 hour(s))   CBC   Result Value Ref Range    WBC 9.7 4.4 - 11.3 x10*3/uL    nRBC 0.0 0.0 - 0.0 /100 WBCs    RBC 3.67 (L) 4.50 - 5.90 x10*6/uL    Hemoglobin 11.3 (L) 13.5 - 17.5 g/dL    Hematocrit 34.3 (L) 41.0 - 52.0 %    MCV 94 80 - 100 fL    MCH 30.8 26.0 - 34.0 pg    MCHC 32.9 32.0 - 36.0 g/dL    RDW 13.9 11.5 - 14.5 %    Platelets 183 150 - 450 x10*3/uL   Basic metabolic panel   Result Value Ref Range    Glucose 96 74 - 99 mg/dL    Sodium 140 136 - 145 mmol/L    Potassium 4.1 3.5 - 5.3 mmol/L    Chloride 108 (H) 98 - 107 mmol/L    Bicarbonate 26 21 - 32 mmol/L    Anion Gap 10 10 - 20 mmol/L    Urea Nitrogen 15 6 - 23 mg/dL    Creatinine 1.36 (H) 0.50 - 1.30 mg/dL    eGFR 57 (L) >60 mL/min/1.73m*2    Calcium 8.2 (L) 8.6 - 10.3 mg/dL    CT abdomen pelvis w IV contrast    Result Date: 11/18/2023  Interpreted By:  Deepak Ornelas, STUDY: CT ABDOMEN PELVIS W IV CONTRAST; ;  11/18/2023 4:05 am   INDICATION: Signs/Symptoms:left abdominal pain, known mass.   COMPARISON: CT angiography of the chest, abdomen and pelvis from 11/11/2023.   ACCESSION NUMBER(S): [id number]   ORDERING CLINICIAN: DEYANIRA CERVANTES   TECHNIQUE: Axial CT images of the abdomen and pelvis with coronal and sagittal reconstructed images performed after intravenous administration of 75 cc Omnipaque 350.   FINDINGS: LOWER CHEST: No acute abnormality of the lung bases. Severe emphysema. Normal heart size. Pacing lead in right ventricle. BONES: No acute osseous abnormality. Mild degenerative changes of the imaged spine. ABDOMINAL WALL: Within normal limits.   ABDOMEN:   LIVER: Enlarged. Few scattered cysts. BILE DUCTS: Normal caliber. GALLBLADDER: No calcified gallstones. No wall " thickening. PANCREAS: Stable cystic lesion at the juncture of the head and uncinate process of the pancreas, measuring up to 16 mm in transaxial diameter. Otherwise, normal. SPLEEN: Within normal limits. ADRENALS: Within normal limits. KIDNEYS and URETERS: Similar mild left pelvicaliectasis and proximal ureterectasis to the level of the mid ureter, within and distal to which there is enhancing soft tissue that extends to the UVJ, compatible with urothelial malignancy. Trace left perinephric fluid may relate to prior caliceal rupture. Slightly asymmetric delayed enhancement of the left kidney relative to the right kidney. Few scattered renal cortical cysts incidentally noted. 11 mm nonobstructing calculus in the left lower pole. Right kidney and ureter otherwise are normal.     VESSELS: No change in appearance of juxtarenal and infrarenal abdominal aortic aneurysm, post endovascular stent repair, again noting abnormal enhancement in the aneurysm sac, predominating along the proximal right lateral aspect, compatible with endoleak. And dilation of the celiac axis measuring up to 11 mm again seen. Redemonstrated irregular outpouching enhancement from the distal aspect of the celiac axis suspicious for penetrating atherosclerotic ulcer or small aneurysm. Focal dilation of the right common femoral vein again seen. RETROPERITONEUM: No pathologically enlarged retroperitoneal lymph nodes.   PELVIS:   REPRODUCTIVE ORGANS: Prostate is not enlarged. BLADDER: Ill-defined intraluminal hyperattenuation along the posterior bladder wall may relate to a mass and/or hematoma.   BOWEL: Stomach is grossly unremarkable. No dilated or thickened small bowel. New/progressive disproportionate mural thickening in the distal colon, with mucosal hyperemia suggests colitis. Colonic diverticulosis without evidence of acute diverticulitis. Normal appendix. PERITONEUM: No ascites or free air, no fluid collection.       New/progressive  disproportionate mural thickening in the distal colon, with mucosal hyperemia suggests colitis. Colonic diverticulosis without evidence of acute diverticulitis.   Similar mild left pelvicaliectasis and proximal ureterectasis to the level of the mid ureter, within and distal to which there is enhancing soft tissue that extends to the UVJ, compatible with urothelial malignancy. Trace left perinephric fluid may relate to prior caliceal rupture. Slightly asymmetric delayed enhancement of the left kidney relative to the right kidney. 11 mm calculus again seen in the left lower pole.   Ill-defined intraluminal hyperattenuation along the posterior bladder wall may relate to a mass and/or hematoma.   Hepatomegaly. Incidental scattered hepatic cysts.   No change in appearance of juxtarenal and infrarenal abdominal aortic aneurysm, post endovascular stent repair, again noting abnormal enhancement in the aneurysm sac, predominating along the proximal right lateral aspect, compatible with endoleak. And dilation of the celiac axis measuring up to 11 mm again seen. Redemonstrated irregular outpouching enhancement from the distal aspect of the celiac axis suspicious for penetrating atherosclerotic ulcer or small aneurysm.   Severe emphysema in the lung bases.   Additional findings as discussed above.             MACRO: None   Signed by: Deepak Ornelas 11/18/2023 4:33 AM Dictation workstation:   [id number]    XR chest 1 view    Result Date: 11/11/2023  Interpreted By:  Sarah Pham, STUDY: XR CHEST 1 VIEW;  11/11/2023 10:31 pm   INDICATION: Signs/Symptoms:Chest Pain   COMPARISON: Chest x-ray 03/14/2023. CT angiogram chest, abdomen, pelvis 11/11/2023.   ACCESSION NUMBER(S): [id number]   ORDERING CLINICIAN: THERESA LOPEZ   TECHNIQUE: 2 portable upright frontal views of the chest were obtained.   FINDINGS: Monitoring wires are overlying the patient.   There is a left-sided pacemaker partially obscuring left hemithorax.   The  cardiomediastinal silhouette is within normal limits. Redemonstration of bilateral emphysematous changes with linear scarring at the right upper lobe. There is a 10 mm nodularity at the left upper lobe, corresponding to the 10 mm left upper lobe pulmonary nodule on earlier CT angiogram.   No focal consolidation, pleural effusion or pneumothorax.   Vascular stent noted at the upper abdomen.       1. No radiographic evidence of acute cardiopulmonary process. 2. Emphysema. 3. 10 mm nodularity at the left upper lobe, corresponding to the 10 mm left upper lobe pulmonary nodule on earlier CT angiogram. Please follow-up as per CT angiogram recommendation.       MACRO: None.   Signed by: Sarah Pham 11/11/2023 11:01 PM Dictation workstation:   [id number]    CT angio chest abdomen pelvis    Result Date: 11/11/2023  Interpreted By:  Miladis Alas, STUDY: CT ANGIO CHEST ABDOMEN PELVIS;  11/11/2023 9:02 pm   INDICATION: Signs/Symptoms:History of AAA with repair presenting for severe abdominal pain with tenderness to palpation.   COMPARISON: CT chest and CT abdomen pelvis 03/14/2023, chest CT 03/03/2022   ACCESSION NUMBER(S): [id number]   ORDERING CLINICIAN: THERESA LOPEZ   TECHNIQUE: Axial CT images of the chest, abdomen and pelvis  before and after intravenous administration of contrast using CT angiographic technique. Coronal and sagittal images are reconstructed.  3D reconstructions were obtained at a separate workstation.   FINDINGS: VASCULAR: THORACIC AORTA: Initial noncontrast images demonstrate no aortic intramural hematoma. No aortic aneurysm or dissection. Mild calcification and noncalcified plaque/thrombus in the aortic arch and descending thoracic aorta. The great vessel origins are patent with no significant stenosis.   ABDOMINAL AORTA: Aorta bi-iliac stent graft extending from the level of the renal arteries through the left common iliac and right external iliac arteries. There is contrast pooling within the  excluded aneurysm sac at the upper aspect of the stent, just inferior to the accessory renal artery origin. The excluded aneurysm sac has increased in size, measuring 4.4 x 4 cm just below the level of the renal arteries (previously 3.7 x 3.2 cm) and 4.1 x 3.5 cm more distally (previously 4.3 x 3.3 cm).   No periaortic hematoma, fluid or inflammatory stranding.   New irregular contrast outpouching at the celiac artery origin measuring 6 mm in depth. 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   The superior mesenteric artery is patent with no significant stenosis. The bilateral renal arteries are patent with no significant stenosis. Accessory right renal artery is noted. Retrograde flow noted in the inferior mesenteric artery, not extend into the excluded aneurysm sac.   Stable size of the excluded aneurysm sac involving the right common iliac artery, 2.4 cm. 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 No significant stenosis of the bilateral common, internal and external iliac arteries. No significant stenosis of the common femoral arteries.   No pulmonary embolism.   CHEST:   HEART: Normal size. Pacemaker/ICD lead in the right ventricle. Severe coronary artery calcifications noted. No pericardial effusion. MEDIASTINUM AND TORIE: No pathologically enlarged thoracic lymph nodes. LUNG, PLEURA, LARGE AIRWAYS: No pleural effusion or pneumothorax. Severe emphysema. New 10 mm left upper lobe nodule, series 601, image 111. Stable nodular/bandlike opacity in the right upper lobe measuring 1.4 x 2 cm. 7 mm right upper lobe nodule is stable from 03/03/2022. CHEST WALL AND LOWER NECK: Within normal limits.   ABDOMEN:   BONES: No acute osseous abnormality. ABDOMINAL WALL: Within normal limits.   LIVER: Numerous low-density hepatic lesions are stable and most  consistent with cysts. BILE DUCTS: No biliary dilatation. GALLBLADDER: No calcified gallstones. No pericholecystic inflammatory changes. PANCREAS: Within normal limits. SPLEEN: Within normal limits. ADRENALS:  Within normal limits. KIDNEYS AND URETERS: New mild left hydroureteronephrosis. There 2 separate hyperdense filling defects in the mid and distal left ureter, the largest measures 13 mm and demonstrates enhancement (46 Hounsfield units on precontrast and 105 Hounsfield units on the portal venous phase). The smaller distal mass measures approximately 8 mm and also appears to enhance. Diffuse left ureteral enhancement is noted. There are nonobstructing left renal calculi measuring 11 mm and 3 mm as well as a 2 mm nonobstructing the right renal calculus. Bilateral renal cysts are noted.   PELVIS:   REPRODUCTIVE ORGANS: Enlarged prostate. BLADDER: A nodule involving the base of the urinary bladder is favored to represent a prostate nodule impressing on the bladder. There is ill-defined hyperdensity within the urinary bladder at the left ureterovesical junction, differential considerations include hematoma or mass.   RETROPERITONEUM: No retroperitoneal hematoma. No pathologically enlarged lymph nodes. BOWEL: No dilated bowel. Colonic diverticulosis without acute diverticulitis. The appendix is not seen with certainty. No pericecal inflammatory changes to suggest acute appendicitis. PERITONEUM: No ascites or free air, no fluid collection.       New mild left hydroureteronephrosis with enhancing filling defects in the mid and distal the left ureter as described above. 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 Ill-defined hyperdensity in the urinary bladder near the left ureterovesical junction may represent mass or hematoma. Urology consultation is recommended.   Status post abdominal aortic stent graft. 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 Interval increase in the  excluded aneurysm sac size.   New irregular outpouching of contrast at the celiac artery origin measuring 6 mm in depth, suspicious for ulcerated plaque versus penetrating atheromatous ulcer. Vascular surgery consultation is recommended.   New 10 mm left upper lobe pulmonary nodule. Follow-up PET CT is recommended. Additional nodular densities as above are stable from 03/03/2022, however, attention on follow-up is recommended.   Severe emphysema.   MACRO: Miladis Alas discussed the significance and urgency of this critical finding by telephone with  THERESA LOPEZ on 11/11/2023 at 9:51 pm. (**-RCF-**) Findings:  See findings.   Signed by: Miladis Alas 11/11/2023 10:08 PM Dictation workstation:   [id number]       Assessment/Plan     Gross hematuria/L hydro L ureteral filling defects  -Pain improving  -No intervention while admitted Will have office schedulers call tomorrow to give him his surgery date and time  -Needs outpatient cysto poss TURBT b/l RGP L ureteroscopy L ureteral bx poss ureteral stent  -If bx confirmed malignancy, needs L nephroureterectomy    I spent 20 minutes in the professional and overall care of this patient.      Yobany Packer PA-C

## 2023-11-19 NOTE — CARE PLAN
The patient's goals for the shift include      The clinical goals for the shift include pain control      Problem: Pain - Adult  Goal: Verbalizes/displays adequate comfort level or baseline comfort level  Outcome: Progressing     Problem: Safety - Adult  Goal: Free from fall injury  Outcome: Progressing     Problem: Discharge Planning  Goal: Discharge to home or other facility with appropriate resources  Outcome: Progressing     Problem: Chronic Conditions and Co-morbidities  Goal: Patient's chronic conditions and co-morbidity symptoms are monitored and maintained or improved  Outcome: Progressing

## 2023-11-19 NOTE — PROGRESS NOTES
Subjective:  Complaining of difficulty with pain control and frustration with his general health.          Physical Exam  Constitutional: No respiratory acute distress, cooperative, answers questions appropriately then elderly male  Eyes: EOMI, clear sclera  ENMT: mucous membranes moist  Head/Neck: Neck supple, Trachea midline  Respiratory/Thorax: CTAB, no wheezes, no crackles  Cardiovascular: Tachycardia with regular rate and rhythm, normal s1, s2, no murmurs, distal pulses 2+, no edema  Gastrointestinal: Bowel sounds present, diffuse tenderness, no rebounding, nonperitoneal, no palpable masses  Musculoskeletal: ROM intact, no gross deformity diffuse  Neurological: No focal deficits, normal sensation  Psychological: Appropriate mood and behavior, however extremely anxious  Skin: Warm and dry    Last Recorded Vitals  BP (!) 125/91   Pulse 89   Temp 36.6 °C (97.9 °F)   Resp 16   Wt 54.4 kg (120 lb)   SpO2 93%     Relevant Results  Scheduled medications  [Held by provider] aspirin, 81 mg, oral, Daily  ferrous sulfate (325 mg ferrous sulfate), 65 mg of iron, oral, Daily  tiotropium, 2 Inhalation, inhalation, Daily   And  fluticasone furoate-vilanteroL, 1 puff, inhalation, Daily  [Held by provider] heparin (porcine), 5,000 Units, subcutaneous, q8h  [START ON 11/20/2023] levoFLOXacin, 750 mg, intravenous, q48h  lidocaine, 1 patch, transdermal, Daily  methotrexate, 15 mg, oral, Weekly  metoprolol succinate XL, 50 mg, oral, Daily  pantoprazole, 40 mg, intravenous, Daily  polyethylene glycol, 17 g, oral, Daily  predniSONE, 5 mg, oral, Daily  sennosides-docusate sodium, 1 tablet, oral, Daily  simvastatin, 40 mg, oral, Nightly        Continuous medications  dextrose 5% lactated Ringer's with KCl 20 mEq/L, 150 mL/hr, Last Rate: 150 mL/hr (11/19/23 0931)      PRN medications  PRN medications: acetaminophen **OR** acetaminophen **OR** acetaminophen, alum-mag hydroxide-simeth, dicyclomine, HYDROmorphone, labetaloL,  melatonin, ondansetron, oxyCODONE, oxygen, polyethylene glycol  Results for orders placed or performed during the hospital encounter of 11/18/23 (from the past 24 hour(s))   CBC   Result Value Ref Range    WBC 9.7 4.4 - 11.3 x10*3/uL    nRBC 0.0 0.0 - 0.0 /100 WBCs    RBC 3.67 (L) 4.50 - 5.90 x10*6/uL    Hemoglobin 11.3 (L) 13.5 - 17.5 g/dL    Hematocrit 34.3 (L) 41.0 - 52.0 %    MCV 94 80 - 100 fL    MCH 30.8 26.0 - 34.0 pg    MCHC 32.9 32.0 - 36.0 g/dL    RDW 13.9 11.5 - 14.5 %    Platelets 183 150 - 450 x10*3/uL   Basic metabolic panel   Result Value Ref Range    Glucose 96 74 - 99 mg/dL    Sodium 140 136 - 145 mmol/L    Potassium 4.1 3.5 - 5.3 mmol/L    Chloride 108 (H) 98 - 107 mmol/L    Bicarbonate 26 21 - 32 mmol/L    Anion Gap 10 10 - 20 mmol/L    Urea Nitrogen 15 6 - 23 mg/dL    Creatinine 1.36 (H) 0.50 - 1.30 mg/dL    eGFR 57 (L) >60 mL/min/1.73m*2    Calcium 8.2 (L) 8.6 - 10.3 mg/dL     [unfilled]      Assessment/Plan   Assessment:  Colitis   Concern for urothelial malignancy  Concern for acute cystitis  Severe LLQ abdominal pain  Hematuria  Hypokalemia  Hyperchloremia  Acute kidney injury  Diarrhea - resolved  Hypertensive urgency - improved  Nausea and vomiting- resolved  Leukocytosis w/ bandemia  Protein calorie malnutrition  AAA status post-repair  Pulmonary nodule  Arthropathic psoriasis  Presence of automatic (implantable) cardiac defibrillator     -Levaquin Day 2. Blood and urine cx pending. 11/18 Ct abdomen with findings compatible to urothelial malignancy. The patient has noted calculi of 11 mm on the left side, his pain is cramping in nature and colicky. Consult placed to urology, no surgical plans at this time. Consult to GI per patient request.  -Pain management. IV dilaudid and oral Roxicodone. Bowel regimen as the patient endorses constipation, no concern for C diff.  -Will consider consulting palliative care for pain management and/or goals of care discussion if the patient pain continues to  be difficult to manage, or his prognosis becomes more guarded.  -Med rec updated.  -Will attempt to keep BP well controlled during this hospitalization to decrease the strain on the AAA. PRN antihypertensives added.  -Suspended DVT prophylaxis heparin and holding aspirin while hematuria is prominent. Continued to use SCDs for dvt prophylaxis.  -Spoke extensively with patient's wife regarding her concerns, messaged urology team to assess possibility for inpatient operative management given the note states no inpatient surgery.     I have reviewed and interpreted all lab test imaging studies and documentations from other healthcare providers. Discussed antibiotics and potential side effects with patient.      Case to be discussed with attending, A&P above reflect tentative plan. Please await for final signature from attending physician on service.     Gina Gutierrez MD PGY-2  Please message me if you have any questions

## 2023-11-20 PROBLEM — N28.89 URETERAL MASS: Status: ACTIVE | Noted: 2023-11-18

## 2023-11-20 PROBLEM — N32.89 BLADDER MASS: Status: ACTIVE | Noted: 2023-11-18

## 2023-11-20 PROBLEM — N20.1 URETERAL CALCULUS: Status: ACTIVE | Noted: 2023-11-18

## 2023-11-20 LAB
ANION GAP SERPL CALC-SCNC: 9 MMOL/L (ref 10–20)
BUN SERPL-MCNC: 10 MG/DL (ref 6–23)
CALCIUM SERPL-MCNC: 8.2 MG/DL (ref 8.6–10.3)
CHLORIDE SERPL-SCNC: 105 MMOL/L (ref 98–107)
CO2 SERPL-SCNC: 28 MMOL/L (ref 21–32)
CREAT SERPL-MCNC: 1.39 MG/DL (ref 0.5–1.3)
ERYTHROCYTE [DISTWIDTH] IN BLOOD BY AUTOMATED COUNT: 13.8 % (ref 11.5–14.5)
GFR SERPL CREATININE-BSD FRML MDRD: 56 ML/MIN/1.73M*2
GLUCOSE SERPL-MCNC: 84 MG/DL (ref 74–99)
HCT VFR BLD AUTO: 36.2 % (ref 41–52)
HGB BLD-MCNC: 11.6 G/DL (ref 13.5–17.5)
MCH RBC QN AUTO: 30.4 PG (ref 26–34)
MCHC RBC AUTO-ENTMCNC: 32 G/DL (ref 32–36)
MCV RBC AUTO: 95 FL (ref 80–100)
NRBC BLD-RTO: 0 /100 WBCS (ref 0–0)
PLATELET # BLD AUTO: 195 X10*3/UL (ref 150–450)
POTASSIUM SERPL-SCNC: 4.3 MMOL/L (ref 3.5–5.3)
RBC # BLD AUTO: 3.81 X10*6/UL (ref 4.5–5.9)
SODIUM SERPL-SCNC: 138 MMOL/L (ref 136–145)
WBC # BLD AUTO: 10.6 X10*3/UL (ref 4.4–11.3)

## 2023-11-20 PROCEDURE — 1200000002 HC GENERAL ROOM WITH TELEMETRY DAILY

## 2023-11-20 PROCEDURE — 2500000005 HC RX 250 GENERAL PHARMACY W/O HCPCS

## 2023-11-20 PROCEDURE — 36415 COLL VENOUS BLD VENIPUNCTURE: CPT

## 2023-11-20 PROCEDURE — 99222 1ST HOSP IP/OBS MODERATE 55: CPT | Performed by: STUDENT IN AN ORGANIZED HEALTH CARE EDUCATION/TRAINING PROGRAM

## 2023-11-20 PROCEDURE — 80048 BASIC METABOLIC PNL TOTAL CA: CPT

## 2023-11-20 PROCEDURE — 99232 SBSQ HOSP IP/OBS MODERATE 35: CPT

## 2023-11-20 PROCEDURE — 85027 COMPLETE CBC AUTOMATED: CPT

## 2023-11-20 PROCEDURE — 2500000001 HC RX 250 WO HCPCS SELF ADMINISTERED DRUGS (ALT 637 FOR MEDICARE OP)

## 2023-11-20 PROCEDURE — 99222 1ST HOSP IP/OBS MODERATE 55: CPT

## 2023-11-20 PROCEDURE — 2500000004 HC RX 250 GENERAL PHARMACY W/ HCPCS (ALT 636 FOR OP/ED): Performed by: STUDENT IN AN ORGANIZED HEALTH CARE EDUCATION/TRAINING PROGRAM

## 2023-11-20 PROCEDURE — C9113 INJ PANTOPRAZOLE SODIUM, VIA: HCPCS

## 2023-11-20 PROCEDURE — 2500000004 HC RX 250 GENERAL PHARMACY W/ HCPCS (ALT 636 FOR OP/ED)

## 2023-11-20 RX ORDER — CEFTRIAXONE 1 G/50ML
1 INJECTION, SOLUTION INTRAVENOUS EVERY 24 HOURS
Status: DISCONTINUED | OUTPATIENT
Start: 2023-11-20 | End: 2023-11-22 | Stop reason: HOSPADM

## 2023-11-20 RX ORDER — HYDROXYZINE HYDROCHLORIDE 25 MG/1
25 TABLET, FILM COATED ORAL EVERY 8 HOURS PRN
Status: DISCONTINUED | OUTPATIENT
Start: 2023-11-20 | End: 2023-11-22 | Stop reason: HOSPADM

## 2023-11-20 RX ORDER — BISACODYL 10 MG/1
10 SUPPOSITORY RECTAL DAILY PRN
Status: DISCONTINUED | OUTPATIENT
Start: 2023-11-20 | End: 2023-11-22 | Stop reason: HOSPADM

## 2023-11-20 RX ADMIN — SIMVASTATIN 40 MG: 20 TABLET, FILM COATED ORAL at 20:11

## 2023-11-20 RX ADMIN — PREDNISONE 5 MG: 5 TABLET ORAL at 09:46

## 2023-11-20 RX ADMIN — LEVOFLOXACIN 750 MG: 750 INJECTION, SOLUTION INTRAVENOUS at 13:06

## 2023-11-20 RX ADMIN — PANTOPRAZOLE SODIUM 40 MG: 40 INJECTION, POWDER, FOR SOLUTION INTRAVENOUS at 09:46

## 2023-11-20 RX ADMIN — OXYCODONE HYDROCHLORIDE 10 MG: 5 TABLET ORAL at 21:26

## 2023-11-20 RX ADMIN — METOPROLOL SUCCINATE 50 MG: 50 TABLET, EXTENDED RELEASE ORAL at 09:46

## 2023-11-20 RX ADMIN — OXYCODONE HYDROCHLORIDE 10 MG: 5 TABLET ORAL at 02:49

## 2023-11-20 RX ADMIN — OXYCODONE HYDROCHLORIDE 10 MG: 5 TABLET ORAL at 17:17

## 2023-11-20 RX ADMIN — CEFTRIAXONE SODIUM 1 G: 1 INJECTION, SOLUTION INTRAVENOUS at 20:11

## 2023-11-20 RX ADMIN — POLYETHYLENE GLYCOL 3350 17 G: 17 POWDER, FOR SOLUTION ORAL at 09:47

## 2023-11-20 RX ADMIN — OXYCODONE HYDROCHLORIDE 10 MG: 5 TABLET ORAL at 07:39

## 2023-11-20 RX ADMIN — POTASSIUM CHLORIDE, SODIUM CHLORIDE, CALCIUM CHLORIDE, SODIUM LACTATE, AND DEXTROSE MONOHYDRATE 150 ML/HR: 1.79; 6; .2; 3.1; 5 INJECTION, SOLUTION INTRAVENOUS at 07:40

## 2023-11-20 RX ADMIN — OXYCODONE HYDROCHLORIDE 10 MG: 5 TABLET ORAL at 13:06

## 2023-11-20 ASSESSMENT — COGNITIVE AND FUNCTIONAL STATUS - GENERAL
MOBILITY SCORE: 24
DAILY ACTIVITIY SCORE: 24
DRESSING REGULAR LOWER BODY CLOTHING: A LITTLE
MOBILITY SCORE: 24
HELP NEEDED FOR BATHING: A LITTLE
DAILY ACTIVITIY SCORE: 21
DRESSING REGULAR UPPER BODY CLOTHING: A LITTLE

## 2023-11-20 ASSESSMENT — ENCOUNTER SYMPTOMS
APPETITE CHANGE: 1
CONSTIPATION: 1
FLANK PAIN: 1
NEUROLOGICAL NEGATIVE: 1
FATIGUE: 1
VOMITING: 1
HEMATOLOGIC/LYMPHATIC NEGATIVE: 1
ENDOCRINE NEGATIVE: 1
DYSURIA: 1
DIARRHEA: 1
CARDIOVASCULAR NEGATIVE: 1
NAUSEA: 1
EYES NEGATIVE: 1
ABDOMINAL PAIN: 1
SLEEP DISTURBANCE: 1
NERVOUS/ANXIOUS: 1
FREQUENCY: 1
ALLERGIC/IMMUNOLOGIC NEGATIVE: 1
RESPIRATORY NEGATIVE: 1

## 2023-11-20 ASSESSMENT — PAIN - FUNCTIONAL ASSESSMENT
PAIN_FUNCTIONAL_ASSESSMENT: 0-10

## 2023-11-20 ASSESSMENT — PAIN DESCRIPTION - LOCATION
LOCATION: ABDOMEN
LOCATION: BACK

## 2023-11-20 ASSESSMENT — PAIN SCALES - GENERAL
PAINLEVEL_OUTOF10: 9
PAINLEVEL_OUTOF10: 3
PAINLEVEL_OUTOF10: 6
PAINLEVEL_OUTOF10: 8
PAINLEVEL_OUTOF10: 8

## 2023-11-20 ASSESSMENT — PAIN DESCRIPTION - ORIENTATION: ORIENTATION: LEFT

## 2023-11-20 NOTE — PROGRESS NOTES
Subjective:  Patient seen at bedside.  He reports persistent abdominal pain.  Denies any chest pain, shortness of breath, fevers, chills.  Plan for inpatient urology procedure on Wednesday at 12 PM         Physical Exam  Constitutional: No respiratory acute distress, cooperative, answers questions appropriately then elderly male  Eyes: EOMI, clear sclera  ENMT: mucous membranes moist  Head/Neck: Neck supple, Trachea midline  Respiratory/Thorax: CTAB, no wheezes, no crackles  Cardiovascular: Tachycardia with regular rate and rhythm, normal s1, s2, no murmurs, distal pulses 2+, no edema  Gastrointestinal: Bowel sounds present, diffuse tenderness, no rebounding, nonperitoneal, no palpable masses  Musculoskeletal: ROM intact, no gross deformity diffuse  Neurological: No focal deficits, normal sensation  Psychological: Appropriate mood and behavior, however extremely anxious  Skin: Warm and dry    Last Recorded Vitals  /59   Pulse 83   Temp 35.9 °C (96.6 °F) (Temporal)   Resp 16   Wt 54.4 kg (119 lb 14.9 oz)   SpO2 (!) 88%        Assessment/Plan   Assessment:  Hematuria  Concern for urothelial malignancy  Ureteral mass  Bladder mass  Acute cystitis  LLQ pain  Colitis   Hypokalemia, resolved  Hyperchloremia, resolved  Acute kidney injury  Diarrhea - resolved  Hypertensive urgency - improved  Nausea and vomiting- resolved  Leukocytosis w/ bandemia  Protein calorie malnutrition  AAA status post-repair  Pulmonary nodule  Arthropathic psoriasis  Presence of automatic (implantable) cardiac defibrillator     -Discontinued Levaquin 11/20 initiate rocephin for possible UTI.  Blood and urine cx pending. 11/18 CT abdomen with findings compatible to urothelial malignancy. The patient has noted calculi of 11 mm on the left side, his pain is cramping in nature and colicky.   -Consult placed to urology, plan for cystoscopy possible TURBT b/l RGP L ureteroscopy L ureteral biopsy w/ possible ureteral stent on Wednesday at  noon. Consult to GI per patient request.  -Discontinued fluids, creatinine uptrending.  Initial bladder scan showed 180 cc.  Will repeat and monitor output.  -Pain management. IV dilaudid and oral Roxicodone. Bowel regimen as the patient endorses constipation, no concern for C diff.  -Will consider consulting palliative care for pain management and/or goals of care discussion if the patient pain continues to be difficult to manage, or his prognosis becomes more guarded.  -Will attempt to keep BP well controlled during this hospitalization to decrease the strain on the AAA. PRN antihypertensives added.  -Suspended DVT prophylaxis heparin and holding aspirin while hematuria is prominent. Continued to use SCDs for dvt prophylaxis.       Chris Pereira, DO

## 2023-11-20 NOTE — PROGRESS NOTES
11/20/23 1407   Discharge Planning   Living Arrangements Spouse/significant other   Support Systems Spouse/significant other   Type of Residence Private residence   Who is requesting discharge planning? Provider   Patient expects to be discharged to: home     Met with pt, pt admit for nausea and vomiting--colitis, await for consulted plans, pt will need bx to r/o urothelial maglinancy.  Pt tells me that he is independent at home and CT team will follow as needs arise.  Doretha Ruiz RN [facility]

## 2023-11-20 NOTE — PROGRESS NOTES
Physical Therapy                 Therapy Communication Note    Patient Name: Yobany Small  MRN: [id number]  Today's Date: 11/20/2023     Discipline: Physical Therapy    Missed Visit Reason:      Missed Time: Attempt    Comment:  Screen only.  Pt ind, no PT needs

## 2023-11-20 NOTE — PROGRESS NOTES
"Yobany Small is a 68 y.o. male on day 2 of admission presenting with Colitis.    Subjective   F/U Gross hematuria/L hydro L ureteral filling defects  Doing well plan surgery for Wednesday.      Objective     Physical Exam    Last Recorded Vitals  Blood pressure 137/71, pulse 70, temperature 36.5 °C (97.7 °F), temperature source Temporal, resp. rate 16, height 1.803 m (5' 11\"), weight 54.4 kg (120 lb), SpO2 95 %.  Intake/Output last 3 Shifts:  I/O last 3 completed shifts:  In: 1260 (23.1 mL/kg) [P.O.:60; IV Piggyback:1200]  Out: 1050 (19.3 mL/kg) [Urine:1050 (0.5 mL/kg/hr)]  Weight: 54.4 kg     Relevant Results  Scheduled medications  [Held by provider] aspirin, 81 mg, oral, Daily  ferrous sulfate (325 mg ferrous sulfate), 65 mg of iron, oral, Daily  tiotropium, 2 Inhalation, inhalation, Daily   And  fluticasone furoate-vilanteroL, 1 puff, inhalation, Daily  [Held by provider] heparin (porcine), 5,000 Units, subcutaneous, q8h  levoFLOXacin, 750 mg, intravenous, q48h  lidocaine, 1 patch, transdermal, Daily  methotrexate, 15 mg, oral, Weekly  metoprolol succinate XL, 50 mg, oral, Daily  pantoprazole, 40 mg, intravenous, Daily  polyethylene glycol, 17 g, oral, Daily  predniSONE, 5 mg, oral, Daily  sennosides-docusate sodium, 1 tablet, oral, Daily  simvastatin, 40 mg, oral, Nightly      Continuous medications     PRN medications  PRN medications: acetaminophen **OR** acetaminophen **OR** acetaminophen, alum-mag hydroxide-simeth, bisacodyl, dicyclomine, HYDROmorphone, hydrOXYzine HCL, labetaloL, melatonin, ondansetron, oxyCODONE, oxygen, polyethylene glycol    Results for orders placed or performed during the hospital encounter of 11/18/23 (from the past 24 hour(s))   CBC   Result Value Ref Range    WBC 10.6 4.4 - 11.3 x10*3/uL    nRBC 0.0 0.0 - 0.0 /100 WBCs    RBC 3.81 (L) 4.50 - 5.90 x10*6/uL    Hemoglobin 11.6 (L) 13.5 - 17.5 g/dL    Hematocrit 36.2 (L) 41.0 - 52.0 %    MCV 95 80 - 100 fL    MCH 30.4 26.0 - 34.0 " pg    MCHC 32.0 32.0 - 36.0 g/dL    RDW 13.8 11.5 - 14.5 %    Platelets 195 150 - 450 x10*3/uL   Basic metabolic panel   Result Value Ref Range    Glucose 84 74 - 99 mg/dL    Sodium 138 136 - 145 mmol/L    Potassium 4.3 3.5 - 5.3 mmol/L    Chloride 105 98 - 107 mmol/L    Bicarbonate 28 21 - 32 mmol/L    Anion Gap 9 (L) 10 - 20 mmol/L    Urea Nitrogen 10 6 - 23 mg/dL    Creatinine 1.39 (H) 0.50 - 1.30 mg/dL    eGFR 56 (L) >60 mL/min/1.73m*2    Calcium 8.2 (L) 8.6 - 10.3 mg/dL       CT abdomen pelvis w IV contrast    Result Date: 11/18/2023  Interpreted By:  Deepak Ornelas, STUDY: CT ABDOMEN PELVIS W IV CONTRAST; ;  11/18/2023 4:05 am   INDICATION: Signs/Symptoms:left abdominal pain, known mass.   COMPARISON: CT angiography of the chest, abdomen and pelvis from 11/11/2023.   ACCESSION NUMBER(S): [id number]   ORDERING CLINICIAN: DEYANIRA CERVANTES   TECHNIQUE: Axial CT images of the abdomen and pelvis with coronal and sagittal reconstructed images performed after intravenous administration of 75 cc Omnipaque 350.   FINDINGS: LOWER CHEST: No acute abnormality of the lung bases. Severe emphysema. Normal heart size. Pacing lead in right ventricle. BONES: No acute osseous abnormality. Mild degenerative changes of the imaged spine. ABDOMINAL WALL: Within normal limits.   ABDOMEN:   LIVER: Enlarged. Few scattered cysts. BILE DUCTS: Normal caliber. GALLBLADDER: No calcified gallstones. No wall thickening. PANCREAS: Stable cystic lesion at the juncture of the head and uncinate process of the pancreas, measuring up to 16 mm in transaxial diameter. Otherwise, normal. SPLEEN: Within normal limits. ADRENALS: Within normal limits. KIDNEYS and URETERS: Similar mild left pelvicaliectasis and proximal ureterectasis to the level of the mid ureter, within and distal to which there is enhancing soft tissue that extends to the UVJ, compatible with urothelial malignancy. Trace left perinephric fluid may relate to prior caliceal rupture. Slightly  asymmetric delayed enhancement of the left kidney relative to the right kidney. Few scattered renal cortical cysts incidentally noted. 11 mm nonobstructing calculus in the left lower pole. Right kidney and ureter otherwise are normal.     VESSELS: No change in appearance of juxtarenal and infrarenal abdominal aortic aneurysm, post endovascular stent repair, again noting abnormal enhancement in the aneurysm sac, predominating along the proximal right lateral aspect, compatible with endoleak. And dilation of the celiac axis measuring up to 11 mm again seen. Redemonstrated irregular outpouching enhancement from the distal aspect of the celiac axis suspicious for penetrating atherosclerotic ulcer or small aneurysm. Focal dilation of the right common femoral vein again seen. RETROPERITONEUM: No pathologically enlarged retroperitoneal lymph nodes.   PELVIS:   REPRODUCTIVE ORGANS: Prostate is not enlarged. BLADDER: Ill-defined intraluminal hyperattenuation along the posterior bladder wall may relate to a mass and/or hematoma.   BOWEL: Stomach is grossly unremarkable. No dilated or thickened small bowel. New/progressive disproportionate mural thickening in the distal colon, with mucosal hyperemia suggests colitis. Colonic diverticulosis without evidence of acute diverticulitis. Normal appendix. PERITONEUM: No ascites or free air, no fluid collection.       New/progressive disproportionate mural thickening in the distal colon, with mucosal hyperemia suggests colitis. Colonic diverticulosis without evidence of acute diverticulitis.   Similar mild left pelvicaliectasis and proximal ureterectasis to the level of the mid ureter, within and distal to which there is enhancing soft tissue that extends to the UVJ, compatible with urothelial malignancy. Trace left perinephric fluid may relate to prior caliceal rupture. Slightly asymmetric delayed enhancement of the left kidney relative to the right kidney. 11 mm calculus again seen  in the left lower pole.   Ill-defined intraluminal hyperattenuation along the posterior bladder wall may relate to a mass and/or hematoma.   Hepatomegaly. Incidental scattered hepatic cysts.   No change in appearance of juxtarenal and infrarenal abdominal aortic aneurysm, post endovascular stent repair, again noting abnormal enhancement in the aneurysm sac, predominating along the proximal right lateral aspect, compatible with endoleak. And dilation of the celiac axis measuring up to 11 mm again seen. Redemonstrated irregular outpouching enhancement from the distal aspect of the celiac axis suspicious for penetrating atherosclerotic ulcer or small aneurysm.   Severe emphysema in the lung bases.   Additional findings as discussed above.             MACRO: None   Signed by: Deepak Ornelas 11/18/2023 4:33 AM Dictation workstation:   [id number]    XR chest 1 view    Result Date: 11/11/2023  Interpreted By:  Sarah Pham, STUDY: XR CHEST 1 VIEW;  11/11/2023 10:31 pm   INDICATION: Signs/Symptoms:Chest Pain   COMPARISON: Chest x-ray 03/14/2023. CT angiogram chest, abdomen, pelvis 11/11/2023.   ACCESSION NUMBER(S): [id number]   ORDERING CLINICIAN: THERESA LOPEZ   TECHNIQUE: 2 portable upright frontal views of the chest were obtained.   FINDINGS: Monitoring wires are overlying the patient.   There is a left-sided pacemaker partially obscuring left hemithorax.   The cardiomediastinal silhouette is within normal limits. Redemonstration of bilateral emphysematous changes with linear scarring at the right upper lobe. There is a 10 mm nodularity at the left upper lobe, corresponding to the 10 mm left upper lobe pulmonary nodule on earlier CT angiogram.   No focal consolidation, pleural effusion or pneumothorax.   Vascular stent noted at the upper abdomen.       1. No radiographic evidence of acute cardiopulmonary process. 2. Emphysema. 3. 10 mm nodularity at the left upper lobe, corresponding to the 10 mm left upper lobe  pulmonary nodule on earlier CT angiogram. Please follow-up as per CT angiogram recommendation.       MACRO: None.   Signed by: Sarah Pham 11/11/2023 11:01 PM Dictation workstation:   [id number]    CT angio chest abdomen pelvis    Result Date: 11/11/2023  Interpreted By:  Miladis Alas, STUDY: CT ANGIO CHEST ABDOMEN PELVIS;  11/11/2023 9:02 pm   INDICATION: Signs/Symptoms:History of AAA with repair presenting for severe abdominal pain with tenderness to palpation.   COMPARISON: CT chest and CT abdomen pelvis 03/14/2023, chest CT 03/03/2022   ACCESSION NUMBER(S): [id number]   ORDERING CLINICIAN: THERESA LOPEZ   TECHNIQUE: Axial CT images of the chest, abdomen and pelvis  before and after intravenous administration of contrast using CT angiographic technique. Coronal and sagittal images are reconstructed.  3D reconstructions were obtained at a separate workstation.   FINDINGS: VASCULAR: THORACIC AORTA: Initial noncontrast images demonstrate no aortic intramural hematoma. No aortic aneurysm or dissection. Mild calcification and noncalcified plaque/thrombus in the aortic arch and descending thoracic aorta. The great vessel origins are patent with no significant stenosis.   ABDOMINAL AORTA: Aorta bi-iliac stent graft extending from the level of the renal arteries through the left common iliac and right external iliac arteries. There is contrast pooling within the excluded aneurysm sac at the upper aspect of the stent, just inferior to the accessory renal artery origin. The excluded aneurysm sac has increased in size, measuring 4.4 x 4 cm just below the level of the renal arteries (previously 3.7 x 3.2 cm) and 4.1 x 3.5 cm more distally (previously 4.3 x 3.3 cm).   No periaortic hematoma, fluid or inflammatory stranding.   New irregular contrast outpouching at the celiac artery origin measuring 6 mm in depth. 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 Focal nonocclusive  dissection of the celiac near the bifurcation is stable.   The superior mesenteric artery is patent with no significant stenosis. The bilateral renal arteries are patent with no significant stenosis. Accessory right renal artery is noted. Retrograde flow noted in the inferior mesenteric artery, not extend into the excluded aneurysm sac.   Stable size of the excluded aneurysm sac involving the right common iliac artery, 2.4 cm. 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 No significant stenosis of the bilateral common, internal and external iliac arteries. No significant stenosis of the common femoral arteries.   No pulmonary embolism.   CHEST:   HEART: Normal size. Pacemaker/ICD lead in the right ventricle. Severe coronary artery calcifications noted. No pericardial effusion. MEDIASTINUM AND TORIE: No pathologically enlarged thoracic lymph nodes. LUNG, PLEURA, LARGE AIRWAYS: No pleural effusion or pneumothorax. Severe emphysema. New 10 mm left upper lobe nodule, series 601, image 111. Stable nodular/bandlike opacity in the right upper lobe measuring 1.4 x 2 cm. 7 mm right upper lobe nodule is stable from 03/03/2022. CHEST WALL AND LOWER NECK: Within normal limits.   ABDOMEN:   BONES: No acute osseous abnormality. ABDOMINAL WALL: Within normal limits.   LIVER: Numerous low-density hepatic lesions are stable and most consistent with cysts. BILE DUCTS: No biliary dilatation. GALLBLADDER: No calcified gallstones. No pericholecystic inflammatory changes. PANCREAS: Within normal limits. SPLEEN: Within normal limits. ADRENALS:  Within normal limits. KIDNEYS AND URETERS: New mild left hydroureteronephrosis. There 2 separate hyperdense filling defects in the mid and distal left ureter, the largest measures 13 mm and demonstrates enhancement (46 Hounsfield units on precontrast and 105 Hounsfield units on the portal venous phase). The smaller distal mass measures approximately 8  mm and also appears to enhance. Diffuse left ureteral enhancement is noted. There are nonobstructing left renal calculi measuring 11 mm and 3 mm as well as a 2 mm nonobstructing the right renal calculus. Bilateral renal cysts are noted.   PELVIS:   REPRODUCTIVE ORGANS: Enlarged prostate. BLADDER: A nodule involving the base of the urinary bladder is favored to represent a prostate nodule impressing on the bladder. There is ill-defined hyperdensity within the urinary bladder at the left ureterovesical junction, differential considerations include hematoma or mass.   RETROPERITONEUM: No retroperitoneal hematoma. No pathologically enlarged lymph nodes. BOWEL: No dilated bowel. Colonic diverticulosis without acute diverticulitis. The appendix is not seen with certainty. No pericecal inflammatory changes to suggest acute appendicitis. PERITONEUM: No ascites or free air, no fluid collection.       New mild left hydroureteronephrosis with enhancing filling defects in the mid and distal the left ureter as described above. 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 Ill-defined hyperdensity in the urinary bladder near the left ureterovesical junction may represent mass or hematoma. Urology consultation is recommended.   Status post abdominal aortic stent graft. 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 Interval increase in the excluded aneurysm sac size.   New irregular outpouching of contrast at the celiac artery origin measuring 6 mm in depth, suspicious for ulcerated plaque versus penetrating atheromatous ulcer. Vascular surgery consultation is recommended.   New 10 mm left upper lobe pulmonary nodule. Follow-up PET CT is recommended. Additional nodular densities as above are stable from 03/03/2022, however, attention on follow-up is recommended.   Severe emphysema.   MACRO: Miladis Alas discussed the significance and urgency of this critical finding by telephone with  THERESA  JOHN on 11/11/2023 at 9:51 pm. (**-RCF-**) Findings:  See findings.   Signed by: Miladis Alas 11/11/2023 10:08 PM Dictation workstation:   [id number]           This patient currently has cardiac telemetry ordered; if you would like to modify or discontinue the telemetry order, click here to go to the orders activity to modify/discontinue the order.                 Assessment/Plan   Principal Problem:    Colitis  Active Problems:    Bladder mass    Ureteral mass    Ureteral calculus    Gross hematuria/L hydro L ureteral filling defects  -Pain improving  -Planm surgery Wednesday with Dr You for cysto poss TURBT b/l RGP L ureteroscopy L ureteral bx poss ureteral stent  -If bx confirmed malignancy, needs L nephroureterectomy       I spent 30 minutes in the professional and overall care of this patient.      Yobany Packer PA-C

## 2023-11-20 NOTE — CONSULTS
"GI consult note:    HPI:  Yobany Small is a 68 y.o. year old male with a history of psoriasis, AAA s/p repair, pulmonary nodule, CAD and s/p ICD, and recently discovered abdominal mass who presented to [facility] for worsening abdominal pain as well as nausea/vomiting. He reports lifelong IBS (unclear diagnosis) due to which he has 2-3 bowel movements daily.  Since last ER visit, he states he was doing overall per his baseline until recently.  Patient reports Friday night he had hotdogs for dinner which he did not tolerate.  He reports 1 episode of nonbloody emesis and left sided abdominal pain which he describes as \"pressure.\"  Patient has not a bowel movement during this time either.  Patient states he attempted to tolerate the pain, however noting hematuria and poor appetite which warranted him to come into the ER.  He denies any weight loss over this time, no fevers or chills.    Admission work-up for CT abdomen/pelvis demonstrating new/progressive mural thickening of distal colon, left urothelial malignancy possibly, ill-defined intraluminal hyperattenuation along posterior bladder, and hepatomegaly.  Urology evaluated patient, recommended outpatient biopsy and surgery.  Patient is additionally being treated with Levaquin for suspected colitis per primary team.  GI consulted for further comanagement.    A 10 point ROS was performed with the patient denying any complaint at this time aside from those listed in the HPI above.     Past Medical History: as above  Past Surgical History: denies   Family History: unknown   Social History: denies smoking, EtOH, illicit drug use    Visit Vitals  /71   Pulse 70   Temp 36.5 °C (97.7 °F) (Temporal)   Resp 16      Physical Exam:   GENERAL: Awake/ alert, cooperative.   HEAD:  Normocephalic, atraumatic.  EYES:  Round and reactive.  ENT:  No nasal discharge, mucous membranes moist and pink.  NECK:  Atraumatic, no meningismus.  CARDIOVASCULAR:  Regular rate and rhythm, " no murmur.  RESPIRATORY:  Diminished breath sounds, no active work of breathing.   ABDOMEN:  Soft, left sided tenderness, nondistended.  EXTREMITIES:  No peripheral edema, no calf tenderness.  SKIN:  Warm and dry.  NEUROLOGICAL:  Nonfocal grossly.    Lab Results   Component Value Date    WBC 10.6 11/20/2023    HGB 11.6 (L) 11/20/2023    HCT 36.2 (L) 11/20/2023    MCV 95 11/20/2023     11/20/2023      Lab Results   Component Value Date    GLUCOSE 84 11/20/2023    CALCIUM 8.2 (L) 11/20/2023     11/20/2023    K 4.3 11/20/2023    CO2 28 11/20/2023     11/20/2023    BUN 10 11/20/2023    CREATININE 1.39 (H) 11/20/2023      Medications:  [Held by provider] aspirin, 81 mg, oral, Daily  ferrous sulfate (325 mg ferrous sulfate), 65 mg of iron, oral, Daily  tiotropium, 2 Inhalation, inhalation, Daily   And  fluticasone furoate-vilanteroL, 1 puff, inhalation, Daily  [Held by provider] heparin (porcine), 5,000 Units, subcutaneous, q8h  levoFLOXacin, 750 mg, intravenous, q48h  lidocaine, 1 patch, transdermal, Daily  methotrexate, 15 mg, oral, Weekly  metoprolol succinate XL, 50 mg, oral, Daily  pantoprazole, 40 mg, intravenous, Daily  polyethylene glycol, 17 g, oral, Daily  predniSONE, 5 mg, oral, Daily  sennosides-docusate sodium, 1 tablet, oral, Daily  simvastatin, 40 mg, oral, Nightly       Assessment/ Plan:  Yobany Small is a 68 y.o. year old male with a history of psoriasis, AAA s/p repair, pulmonary nodule, CAD and s/p ICD, and recently discovered urothelial mass who presented to [facility] for worsening abdominal pain as well as nausea/vomiting.    #Left-sided urothelial malignancy, with hematuria  #Acute on chronic anemia  #Constipation, history of IBS?   -CT abdomen/pelvis reviewed.  Suspect mural thickening as well as clinical features of abdominal pain/vomiting relating to urologic etiology.  Urology following.  -Monitor H/H, suspect all likely secondary to hematuria. Admits to daily Excedrin use.    -Bowel regimen and GasX PRN     Ignacio Sandoval DO   Internal Medicine PGY-3      Attending note    EGD never  Colonoscopy never  Family history reviewed, not pertinent to chief complaint  2 bowel movements per day  Takes Excedrin daily, denies alcohol marijuana drug use smoking    1-2-week history of left lower quadrant discomfort, positive at night, associated with hematuria, recent CAT scan showing mass rule out urothelial cancer, thickened distal colon, aortic aneurysm endoleak, esophageal thickening, see official report, bowel regimen, Gas-X as needed, Protonix twice a day, antiemetics as needed, will need to investigate possible urothelial mass, after which can proceed with GI investigations, will follow

## 2023-11-20 NOTE — PROGRESS NOTES
Occupational Therapy                 Therapy Communication Note    Patient Name: Yobany Small  MRN: [id number]  Today's Date: 11/19/2023     Discipline: Occupational Therapy    Missed Visit Reason: Missed Visit Reason:  (not needed per interview with pt -declined.Screen only)    Missed Time: Attempt    Comment:

## 2023-11-20 NOTE — CONSULTS
Consults    Reason For Consult  Reason for Consult: communication / medical decision making and symptom management     History Of Present Illness  Yobany Small is a 68 y.o. male with past medical history of  previously imaged abdominal mass not further evaluated, pulmonary nodule, AAA status postrepair, arthropathic psoriasis, and present automatic implantable cardiac defibrillator,  is presenting from home with abdominal pain, nausea, and vomiting.  The patient stated he was evaluated by the emergency department last week and had the abdominal mass identified, but was frustrated that no biopsy has yet been obtained, and does have concern that this mass could be cancerous, but did state he has not been able to follow-up with urology as an outpatient yet.  The patient endorsed continued hematuria and nonbloody emesis along with intense cramping of the left lower quadrant abdomen that is sporadic and intermittent in nature; he also endorsed loose stools up to 4 times per day.  The patient stated he was given pain medication in the emergency department the last time, and that this did reduce his pain, but did not last for long.  He also endorsed inability to sleep for days in a row due to the severity and frequency of the abdominal pain.  Findings in the emergency department at this time were consistent for colitis as well as concern for urothelial malignancy and concern for acute cystitis, as well as hematuria, hypokalemia, hyperchloremia, diarrhea, hypertensive urgency, nausea and vomiting, and leukocytosis with bandemia, as well as protein calorie malnutrition.  The patient was initiated on IV antibiotics and made n.p.o. except for ice chips; he was admitted under the medicine service.  He was initiated on IV Dilaudid and oral Percocet.  Urology was consulted and noted gross hematuria/left hydro ureteral filling defects; recommendation was that the patient's pain was improving and no immediate intervention was  "needed, but the patient will need outpatient cystoscopy and possibly TURBT bilateral RGP left ureteroscopy, left ureteral biopsy, and possible ureteral stent, and if the patient's biopsy confirms malignancy, will need left nephroureterectomy.  Palliative care was consulted to discuss goals of care and for symptom management.    Upon assessment of the patient this morning, the patient was awake and stated he is feeling \"horrible\".  The patient stated he has had left lower quadrant and flank pain for approximately 2 weeks, and stated the only cause of worsening pain seems to be eating.  The patient stated he subsequently has not been eating well.  He did report recent nausea, but stated he is not having nausea this morning.  The patient stated his last bowel movement was 3 days ago, and did endorse feeling mildly constipated.  The patient stated he has been having \"horrible sleep\", especially since admission to the hospital.  He also endorsed feeling anxious and depressed due to not having had the biopsy yet and wondering if he does have cancer.  The patient stated his overall goal is that he wants to live.    ESAS ([place] Symptom Assessment System):  Pain: Moderate to severe  Shortness of breath: None  Loss of appetite: Severe  Nausea: None  Constipation: Mild  Tiredness: Mild  Drowsiness: Moderate  Anxiety: Moderate  Depression: Moderate  How you feel overall: \"How am I supposed to feel?  I feel horrible.\"    PPS (Palliative Prognostic Scale): 100%    PPI (Palliative Prognostic Index): 2.5    PMH: as above     Personal/Social History  The patient previously lives at home.  He reports that he has never smoked. He has never used smokeless tobacco. No history on file for alcohol use and drug use.    Past Medical History  He has a past medical history of Abnormal findings on diagnostic imaging of heart and coronary circulation, Abnormal findings on diagnostic imaging of other specified body structures, Abnormal " findings on diagnostic imaging of other specified body structures, Abnormal findings on diagnostic imaging of other specified body structures, Abnormal findings on diagnostic imaging of other specified body structures, Abnormal findings on diagnostic imaging of other specified body structures, Abnormal findings on diagnostic imaging of other specified body structures, and Personal history of other medical treatment.    Surgical History  He has a past surgical history that includes Other surgical history (05/30/2019); Other surgical history (06/16/2020); Other surgical history (06/16/2020); Other surgical history (04/02/2021); Other surgical history (01/30/2020); Other surgical history (06/16/2020); CT angio abdomen pelvis w and or wo IV IV contrast (11/7/2019); and CT angio abdomen pelvis w and or wo IV IV contrast (7/8/2020).     Family History  No family history on file.  Allergies  Patient has no known allergies.    Review of Systems   Constitutional:  Positive for appetite change and fatigue.   HENT: Negative.     Eyes: Negative.    Respiratory: Negative.     Cardiovascular: Negative.    Gastrointestinal:  Positive for abdominal pain, constipation, diarrhea, nausea and vomiting.   Endocrine: Negative.    Genitourinary:  Positive for dysuria, flank pain, frequency and urgency.   Skin: Negative.    Allergic/Immunologic: Negative.    Neurological: Negative.    Hematological: Negative.    Psychiatric/Behavioral:  Positive for sleep disturbance. The patient is nervous/anxious.         Physical Exam  Constitutional:       General: He is not in acute distress.     Appearance: Normal appearance. He is normal weight. He is not ill-appearing.      Comments: Thin   HENT:      Head: Normocephalic and atraumatic.      Nose: Nose normal.      Mouth/Throat:      Mouth: Mucous membranes are moist.      Pharynx: Oropharynx is clear.   Eyes:      Extraocular Movements: Extraocular movements intact.      Pupils: Pupils are equal,  "round, and reactive to light.   Cardiovascular:      Rate and Rhythm: Normal rate and regular rhythm.      Pulses: Normal pulses.      Heart sounds: Normal heart sounds.   Pulmonary:      Effort: Pulmonary effort is normal. No respiratory distress.      Breath sounds: Normal breath sounds.   Abdominal:      General: Abdomen is flat. Bowel sounds are normal. There is no distension.      Palpations: Abdomen is soft.      Tenderness: There is abdominal tenderness. There is left CVA tenderness and guarding.   Musculoskeletal:         General: No deformity. Normal range of motion.      Cervical back: Normal range of motion.   Skin:     General: Skin is warm and dry.   Neurological:      General: No focal deficit present.      Mental Status: He is alert and oriented to person, place, and time.   Psychiatric:         Mood and Affect: Mood normal.         Behavior: Behavior normal.         Thought Content: Thought content normal.         Judgment: Judgment normal.      Comments: Slight agitation with assessment questions noted; does endorse anxiety and depression that he has not yet had the biopsy to identify if he has cancer         Last Recorded Vitals  Blood pressure 124/86, pulse 80, temperature 36.4 °C (97.5 °F), resp. rate 14, height 1.803 m (5' 11\"), weight 54.4 kg (120 lb), SpO2 94 %.    Relevant Results  Scheduled medications  [Held by provider] aspirin, 81 mg, oral, Daily  ferrous sulfate (325 mg ferrous sulfate), 65 mg of iron, oral, Daily  tiotropium, 2 Inhalation, inhalation, Daily   And  fluticasone furoate-vilanteroL, 1 puff, inhalation, Daily  [Held by provider] heparin (porcine), 5,000 Units, subcutaneous, q8h  levoFLOXacin, 750 mg, intravenous, q48h  lidocaine, 1 patch, transdermal, Daily  methotrexate, 15 mg, oral, Weekly  metoprolol succinate XL, 50 mg, oral, Daily  pantoprazole, 40 mg, intravenous, Daily  polyethylene glycol, 17 g, oral, Daily  predniSONE, 5 mg, oral, Daily  sennosides-docusate sodium, " 1 tablet, oral, Daily  simvastatin, 40 mg, oral, Nightly      Continuous medications  dextrose 5% lactated Ringer's with KCl 20 mEq/L, 150 mL/hr, Last Rate: 150 mL/hr (11/20/23 0740)      PRN medications  PRN medications: acetaminophen **OR** acetaminophen **OR** acetaminophen, alum-mag hydroxide-simeth, dicyclomine, HYDROmorphone, hydrOXYzine HCL, labetaloL, melatonin, ondansetron, oxyCODONE, oxygen, polyethylene glycol    Results for orders placed or performed during the hospital encounter of 11/18/23 (from the past 24 hour(s))   CBC   Result Value Ref Range    WBC 10.6 4.4 - 11.3 x10*3/uL    nRBC 0.0 0.0 - 0.0 /100 WBCs    RBC 3.81 (L) 4.50 - 5.90 x10*6/uL    Hemoglobin 11.6 (L) 13.5 - 17.5 g/dL    Hematocrit 36.2 (L) 41.0 - 52.0 %    MCV 95 80 - 100 fL    MCH 30.4 26.0 - 34.0 pg    MCHC 32.0 32.0 - 36.0 g/dL    RDW 13.8 11.5 - 14.5 %    Platelets 195 150 - 450 x10*3/uL   Basic metabolic panel   Result Value Ref Range    Glucose 84 74 - 99 mg/dL    Sodium 138 136 - 145 mmol/L    Potassium 4.3 3.5 - 5.3 mmol/L    Chloride 105 98 - 107 mmol/L    Bicarbonate 28 21 - 32 mmol/L    Anion Gap 9 (L) 10 - 20 mmol/L    Urea Nitrogen 10 6 - 23 mg/dL    Creatinine 1.39 (H) 0.50 - 1.30 mg/dL    eGFR 56 (L) >60 mL/min/1.73m*2    Calcium 8.2 (L) 8.6 - 10.3 mg/dL     CT abdomen pelvis w IV contrast    Result Date: 11/18/2023  Interpreted By:  Deepak Ornelas, STUDY: CT ABDOMEN PELVIS W IV CONTRAST; ;  11/18/2023 4:05 am   INDICATION: Signs/Symptoms:left abdominal pain, known mass.   COMPARISON: CT angiography of the chest, abdomen and pelvis from 11/11/2023.   ACCESSION NUMBER(S): [id number]   ORDERING CLINICIAN: DEYANIRA CERVANTES   TECHNIQUE: Axial CT images of the abdomen and pelvis with coronal and sagittal reconstructed images performed after intravenous administration of 75 cc Omnipaque 350.   FINDINGS: LOWER CHEST: No acute abnormality of the lung bases. Severe emphysema. Normal heart size. Pacing lead in right ventricle. BONES:  No acute osseous abnormality. Mild degenerative changes of the imaged spine. ABDOMINAL WALL: Within normal limits.   ABDOMEN:   LIVER: Enlarged. Few scattered cysts. BILE DUCTS: Normal caliber. GALLBLADDER: No calcified gallstones. No wall thickening. PANCREAS: Stable cystic lesion at the juncture of the head and uncinate process of the pancreas, measuring up to 16 mm in transaxial diameter. Otherwise, normal. SPLEEN: Within normal limits. ADRENALS: Within normal limits. KIDNEYS and URETERS: Similar mild left pelvicaliectasis and proximal ureterectasis to the level of the mid ureter, within and distal to which there is enhancing soft tissue that extends to the UVJ, compatible with urothelial malignancy. Trace left perinephric fluid may relate to prior caliceal rupture. Slightly asymmetric delayed enhancement of the left kidney relative to the right kidney. Few scattered renal cortical cysts incidentally noted. 11 mm nonobstructing calculus in the left lower pole. Right kidney and ureter otherwise are normal.     VESSELS: No change in appearance of juxtarenal and infrarenal abdominal aortic aneurysm, post endovascular stent repair, again noting abnormal enhancement in the aneurysm sac, predominating along the proximal right lateral aspect, compatible with endoleak. And dilation of the celiac axis measuring up to 11 mm again seen. Redemonstrated irregular outpouching enhancement from the distal aspect of the celiac axis suspicious for penetrating atherosclerotic ulcer or small aneurysm. Focal dilation of the right common femoral vein again seen. RETROPERITONEUM: No pathologically enlarged retroperitoneal lymph nodes.   PELVIS:   REPRODUCTIVE ORGANS: Prostate is not enlarged. BLADDER: Ill-defined intraluminal hyperattenuation along the posterior bladder wall may relate to a mass and/or hematoma.   BOWEL: Stomach is grossly unremarkable. No dilated or thickened small bowel. New/progressive disproportionate mural  thickening in the distal colon, with mucosal hyperemia suggests colitis. Colonic diverticulosis without evidence of acute diverticulitis. Normal appendix. PERITONEUM: No ascites or free air, no fluid collection.       New/progressive disproportionate mural thickening in the distal colon, with mucosal hyperemia suggests colitis. Colonic diverticulosis without evidence of acute diverticulitis.   Similar mild left pelvicaliectasis and proximal ureterectasis to the level of the mid ureter, within and distal to which there is enhancing soft tissue that extends to the UVJ, compatible with urothelial malignancy. Trace left perinephric fluid may relate to prior caliceal rupture. Slightly asymmetric delayed enhancement of the left kidney relative to the right kidney. 11 mm calculus again seen in the left lower pole.   Ill-defined intraluminal hyperattenuation along the posterior bladder wall may relate to a mass and/or hematoma.   Hepatomegaly. Incidental scattered hepatic cysts.   No change in appearance of juxtarenal and infrarenal abdominal aortic aneurysm, post endovascular stent repair, again noting abnormal enhancement in the aneurysm sac, predominating along the proximal right lateral aspect, compatible with endoleak. And dilation of the celiac axis measuring up to 11 mm again seen. Redemonstrated irregular outpouching enhancement from the distal aspect of the celiac axis suspicious for penetrating atherosclerotic ulcer or small aneurysm.   Severe emphysema in the lung bases.   Additional findings as discussed above.             MACRO: None   Signed by: Deepak Ornelas 11/18/2023 4:33 AM Dictation workstation:   [id number]    XR chest 1 view    Result Date: 11/11/2023  Interpreted By:  Sarah Pham, STUDY: XR CHEST 1 VIEW;  11/11/2023 10:31 pm   INDICATION: Signs/Symptoms:Chest Pain   COMPARISON: Chest x-ray 03/14/2023. CT angiogram chest, abdomen, pelvis 11/11/2023.   ACCESSION NUMBER(S): [id number]   ORDERING  CLINICIAN: THERESA LOPEZ   TECHNIQUE: 2 portable upright frontal views of the chest were obtained.   FINDINGS: Monitoring wires are overlying the patient.   There is a left-sided pacemaker partially obscuring left hemithorax.   The cardiomediastinal silhouette is within normal limits. Redemonstration of bilateral emphysematous changes with linear scarring at the right upper lobe. There is a 10 mm nodularity at the left upper lobe, corresponding to the 10 mm left upper lobe pulmonary nodule on earlier CT angiogram.   No focal consolidation, pleural effusion or pneumothorax.   Vascular stent noted at the upper abdomen.       1. No radiographic evidence of acute cardiopulmonary process. 2. Emphysema. 3. 10 mm nodularity at the left upper lobe, corresponding to the 10 mm left upper lobe pulmonary nodule on earlier CT angiogram. Please follow-up as per CT angiogram recommendation.       MACRO: None.   Signed by: Sarah Pham 11/11/2023 11:01 PM Dictation workstation:   [id number]    CT angio chest abdomen pelvis    Result Date: 11/11/2023  Interpreted By:  Miladis Alas, STUDY: CT ANGIO CHEST ABDOMEN PELVIS;  11/11/2023 9:02 pm   INDICATION: Signs/Symptoms:History of AAA with repair presenting for severe abdominal pain with tenderness to palpation.   COMPARISON: CT chest and CT abdomen pelvis 03/14/2023, chest CT 03/03/2022   ACCESSION NUMBER(S): [id number]   ORDERING CLINICIAN: THERESA LOPEZ   TECHNIQUE: Axial CT images of the chest, abdomen and pelvis  before and after intravenous administration of contrast using CT angiographic technique. Coronal and sagittal images are reconstructed.  3D reconstructions were obtained at a separate workstation.   FINDINGS: VASCULAR: THORACIC AORTA: Initial noncontrast images demonstrate no aortic intramural hematoma. No aortic aneurysm or dissection. Mild calcification and noncalcified plaque/thrombus in the aortic arch and descending thoracic aorta. The great vessel origins are  patent with no significant stenosis.   ABDOMINAL AORTA: Aorta bi-iliac stent graft extending from the level of the renal arteries through the left common iliac and right external iliac arteries. There is contrast pooling within the excluded aneurysm sac at the upper aspect of the stent, just inferior to the accessory renal artery origin. The excluded aneurysm sac has increased in size, measuring 4.4 x 4 cm just below the level of the renal arteries (previously 3.7 x 3.2 cm) and 4.1 x 3.5 cm more distally (previously 4.3 x 3.3 cm).   No periaortic hematoma, fluid or inflammatory stranding.   New irregular contrast outpouching at the celiac artery origin measuring 6 mm in depth. 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   The superior mesenteric artery is patent with no significant stenosis. The bilateral renal arteries are patent with no significant stenosis. Accessory right renal artery is noted. Retrograde flow noted in the inferior mesenteric artery, not extend into the excluded aneurysm sac.   Stable size of the excluded aneurysm sac involving the right common iliac artery, 2.4 cm. 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 No significant stenosis of the bilateral common, internal and external iliac arteries. No significant stenosis of the common femoral arteries.   No pulmonary embolism.   CHEST:   HEART: Normal size. Pacemaker/ICD lead in the right ventricle. Severe coronary artery calcifications noted. No pericardial effusion. MEDIASTINUM AND TORIE: No pathologically enlarged thoracic lymph nodes. LUNG, PLEURA, LARGE AIRWAYS: No pleural effusion or pneumothorax. Severe emphysema. New 10 mm left upper lobe nodule, series 601, image 111. Stable nodular/bandlike opacity in the right upper lobe measuring 1.4 x 2 cm. 7 mm right upper lobe nodule is  stable from 03/03/2022. CHEST WALL AND LOWER NECK: Within normal limits.   ABDOMEN:   BONES: No acute osseous abnormality. ABDOMINAL WALL: Within normal limits.   LIVER: Numerous low-density hepatic lesions are stable and most consistent with cysts. BILE DUCTS: No biliary dilatation. GALLBLADDER: No calcified gallstones. No pericholecystic inflammatory changes. PANCREAS: Within normal limits. SPLEEN: Within normal limits. ADRENALS:  Within normal limits. KIDNEYS AND URETERS: New mild left hydroureteronephrosis. There 2 separate hyperdense filling defects in the mid and distal left ureter, the largest measures 13 mm and demonstrates enhancement (46 Hounsfield units on precontrast and 105 Hounsfield units on the portal venous phase). The smaller distal mass measures approximately 8 mm and also appears to enhance. Diffuse left ureteral enhancement is noted. There are nonobstructing left renal calculi measuring 11 mm and 3 mm as well as a 2 mm nonobstructing the right renal calculus. Bilateral renal cysts are noted.   PELVIS:   REPRODUCTIVE ORGANS: Enlarged prostate. BLADDER: A nodule involving the base of the urinary bladder is favored to represent a prostate nodule impressing on the bladder. There is ill-defined hyperdensity within the urinary bladder at the left ureterovesical junction, differential considerations include hematoma or mass.   RETROPERITONEUM: No retroperitoneal hematoma. No pathologically enlarged lymph nodes. BOWEL: No dilated bowel. Colonic diverticulosis without acute diverticulitis. The appendix is not seen with certainty. No pericecal inflammatory changes to suggest acute appendicitis. PERITONEUM: No ascites or free air, no fluid collection.       New mild left hydroureteronephrosis with enhancing filling defects in the mid and distal the left ureter as described above. 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 Ill-defined hyperdensity in the urinary bladder near the  left ureterovesical junction may represent mass or hematoma. Urology consultation is recommended.   Status post abdominal aortic stent graft. 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 Interval increase in the excluded aneurysm sac size.   New irregular outpouching of contrast at the celiac artery origin measuring 6 mm in depth, suspicious for ulcerated plaque versus penetrating atheromatous ulcer. Vascular surgery consultation is recommended.   New 10 mm left upper lobe pulmonary nodule. Follow-up PET CT is recommended. Additional nodular densities as above are stable from 03/03/2022, however, attention on follow-up is recommended.   Severe emphysema.   MACRO: Miladis Alas discussed the significance and urgency of this critical finding by telephone with  THERESA LOPEZ on 11/11/2023 at 9:51 pm. (**-RCF-**) Findings:  See findings.   Signed by: Miladis Alas 11/11/2023 10:08 PM Dictation workstation:   [id number]          Assessment/Plan   IMP:  Yobany Small is a 68 y.o. male with past medical history of previously imaged abdominal mass not further evaluated, pulmonary nodule, AAA status postrepair, arthropathic psoriasis, and present automatic implantable cardiac defibrillator, is presenting from home with abdominal pain, nausea, and vomiting.  The patient stated he was evaluated by the emergency department last week and had the abdominal mass identified, but was frustrated that no biopsy has yet been obtained, and does have concern that this mass could be cancerous, but did state he has not been able to follow-up with urology as an outpatient yet.  The patient endorsed continued hematuria and nonbloody emesis along with intense cramping of the left lower quadrant abdomen that is sporadic and intermittent in nature; he also endorsed loose stools up to 4 times per day.  The patient stated he was given pain medication in the emergency department the last time, and that this did reduce his pain,  but did not last for long.  He also endorsed inability to sleep for days in a row due to the severity and frequency of the abdominal pain.  Findings in the emergency department at this time were consistent for colitis as well as concern for urothelial malignancy and concern for acute cystitis, as well as hematuria, hypokalemia, hyperchloremia, diarrhea, hypertensive urgency, nausea and vomiting, and leukocytosis with bandemia, as well as protein calorie malnutrition.  The patient was initiated on IV antibiotics and made n.p.o. except for ice chips; he was admitted under the medicine service.  He was initiated on IV Dilaudid and oral Percocet.  Urology was consulted and noted gross hematuria/left hydro ureteral filling defects; recommendation was that the patient's pain was improving and no immediate intervention was needed, but the patient will need outpatient cystoscopy and possibly TURBT bilateral RGP left ureteroscopy, left ureteral biopsy, and possible ureteral stent, and if the patient's biopsy confirms malignancy, will need left nephroureterectomy.  Palliative care was consulted to discuss goals of care and for symptom management.    11/20/2023-   The patient did endorse moderate to severe left flank/left lower quadrant pain that is tender to touch and is worsened by eating.  Of note, the patient has used Dilaudid 0.4 mg IV x2 in the past 24 hours, as well as Oxy IR 10 mg p.o. x6 in the past 24 hours.  Because the patient is using Oxy IR as it is available, and has not had a bowel movement in 3 days and does feel some constipation, this may be adding to his discomfort, and so we do recommend continued MiraLAX as it is ordered twice daily, and if the patient is unable to produce a bowel movement today, we do recommend administering a Dulcolax suppository, and have ordered as needed, as milk of magnesia remains on backorder and is unavailable.  The patient has had intermittent nausea, but stated this is now  resolved; we will not recommend to change his current as needed Zofran order.  The patient did endorse sleeping poorly due to the pain, and so we can consider initiating low-dose trazodone at bedtime if the primary team does agree.  The patient stated his overall goal is to live and he very much would like his biopsy performed so that he knows his plan of care moving forward.  I introduced the practice of palliative care and the services it provides. I then reviewed at length with the patient the clinical diagnosis/medical problems that the patient presented with and the treatments provided so far. We discussed the implications of the current circumstances and option plans going forward.  The patient stated he will pursue all outpatient follow-ups and we will prioritize having the biopsy performed.  I do recommend outpatient palliative care for ongoing symptom management, however, it is unclear if the patient will be receptive to this service, as he did have agitation during my assessment questions today.  I answered the patient's questions and concerns to the best of my ability.  We will continue to monitor for further recommendations of adjustment of pain medication.  Palliative care will continue to follow.    Thank you for allowing us to participate in the care of this gentleman.  Should you have any further questions or concerns regarding his care, please do not hesitate to contact us via [email or web address] (Gina Ulloa during weekdays work hours and Andrez Singh during weekdays after hours and over the weekend)    (This note was generated with voice recognition software and may contain errors including spelling, grammar, syntax and misrecognition of what was dictated, that are not fully corrected)    Patient/proxy preference for information  Prefers full information    Goals of Care  The patient does remain a full code and does wish to continue pursuing curative measures.    I spent 60 minutes in the professional  and overall care of this patient.      Gina Ulloa, APRN-CNP

## 2023-11-20 NOTE — CARE PLAN
The patient's goals for the shift include  to be less anxious.    The clinical goals for the shift include pain control

## 2023-11-20 NOTE — CONSULTS
Nutrition Note  Reason for Assessment  Reason for Assessment: Provider consult order (MST=0)    Pt admitted for severe abdominal pain, N/V; colitis    Recommendation(s):  Advance diet as able  Once diet advanced to at least clear liquids, will resume Ensure clear three times daily (For an additional 240 kcals, 9 gm protein each )    Reweigh at least every 5 days       Assessment    Subjective Data  Energy Intake:  (not established; now NPO)  Food and Nutrient History: Pt has pain whenever he eats;  Pt reports he is having surgery on Wednesday for biopsy.  Pt was on clear liquids, but did  not like the options we have to offer.  Was agreeable to ensure clear     Difficulty chewing: denies   Difficulty swallowing: denies    Objective Data  Per Flowsheet Percent Meal intake:  NPO  Dietary Orders (From admission, onward)       Start     Ordered    11/20/23 1026  NPO Diet Except: Sips with meds; Effective now  Diet effective now        Question:  Except:  Answer:  Sips with meds    11/20/23 1030    11/20/23 1011  Oral nutritional supplements  Until discontinued        Question Answer Comment   Deliver with All meals    Select supplement: Ensure Clear        11/20/23 1010    11/18/23 1024  May Participate in Room Service  Once        Question:  .  Answer:  Yes    11/18/23 1023                     Results from last 7 days   Lab Units 11/20/23  0513 11/19/23  0534 11/18/23  0304   GLUCOSE mg/dL 84 96 107*   SODIUM mmol/L 138 140 144   POTASSIUM mmol/L 4.3 4.1 3.4*   CHLORIDE mmol/L 105 108* 108*   CO2 mmol/L 28 26 27   BUN mg/dL 10 15 23   CREATININE mg/dL 1.39* 1.36* 1.06   EGFR mL/min/1.73m*2 56* 57* 77   CALCIUM mg/dL 8.2* 8.2* 9.0   MAGNESIUM mg/dL  --   --  1.79     Lab Results   Component Value Date    HGBA1C 5.4 05/22/2023    HGBA1C 5.8 01/17/2020         GI per flowsheet:  Gastrointestinal  Gastrointestinal (WDL): Exceptions to WDL  Abdomen Inspection: Soft  Abdominal Tenderness: Guarding, Tenderness  Bowel Sounds:  "All quadrants  Bowel Sounds (All Quadrants): Active  Passing Flatus: Yes  Last BM Date: 11/17/23  Gastrointestinal Symptoms: Nausea (PT refusing bowel regimine at this time.)  Last bowel movement documented: 11/17/23  PMH: concern for urothelial malignancy, PCM, s/p AAA repair   Allergies: Patient has no known allergies.     Anthropometrics:  Height: 180.3 cm (5' 10.98\")  Weight: 54.4 kg (119 lb 14.9 oz)  BMI (Calculated): 16.73  IBW: 78.1 kg  %IBW: 69 %      Weight History / % Weight Change: 11/11/23 59kg (7.7% loss x1 week), 3/14/23 70kg (22% weight loss x8 mo), 11/2022 60.3kg;  Pt reports he used to weigh 155lbs \"many many years ago\"   Interpretation of Weight Loss: >2% in 1 week         Estimated Nutritional Needs:  Method for Estimating Needs: 0311-1753 kcals (25-27 kcals/kg IBW)    Method for Estimating Needs: 65-76g (1.2-1.4g/kg ABW)    Method for Estimating Needs: 1ml/kcal or per MD    Nutrition Focused Physical Findings:   Orbital Fat Pads: Mild-Moderate (slight dark circles and slight hollowing)  Buccal Fat Pads: Mild-Moderate (flat cheeks, minimal bounce)  Triceps: Defer  Ribs: Defer    Temporalis: Mild-Moderate (slight depression)  Pectoralis (Clavicular Region): Mild-Moderate (some protrusion of clavicle)  Deltoid/Trapezius: Mild-Moderate (slight protrusion of acromion process)  Interosseous: Defer  Trapezius/Infraspinatus/Supraspinatus (Scapular Region): Defer  Quadriceps: Defer  Gastrocnemius: Defer    Edema  Edema: none    Hair: Negative  Eyes: Negative  Mouth: Negative  Nails: Negative  Skin: Negative  Pain Score: 9     Nutrition Diagnosis   Patient has Malnutrition Diagnosis: Yes  Diagnosis Status: New  Malnutrition Diagnosis: Severe malnutrition related to acute disease or injury  As Evidenced by: 7% weight loss x1 week, 22% weight loss x8 months; mild to moderate muscle wasting and subcutaneous fat loss.    Patient has Nutrition Diagnosis: Yes  New  Nutrition Diagnosis 1: Inadequate protein " energy intake  Related to (1): colitis, severe abdominal pain  As Evidenced by (1): NPO       Plan    Interventions  Collaboration and Referral of Nutrition Care:  (spoke with patient and s/o in room)  Additional Interventions: advance to oral diet as able  Individualized Nutrition Prescription Provided for : currently NPO;    Nutrition Monitoring and Evaluation   Body Composition/Growth/Weight History  Monitoring and Evaluation Plan: Weight  Weight: Measured weight  Criteria: maintain weight, reweigh at least every 5 days  Biochemical Data, Medical Tests and Procedures  Monitoring and Evaluation Plan: Electrolyte/renal panel  Food/Nutrient Related History Monitoring  Additional Plans: ability to advance oral diet    Progress towards goals: Will assess progress towards goals at follow up.      Education Documentation  No documentation found.        Time Spent (min): 45 minutes  Last Date of Nutrition Visit: 11/20/23  Nutrition Follow-Up Needed?: 3-5 days  Follow up Comment: 11/22-11/24 TG

## 2023-11-21 ENCOUNTER — ANESTHESIA EVENT (OUTPATIENT)
Dept: OPERATING ROOM | Facility: HOSPITAL | Age: 68
DRG: 656 | End: 2023-11-21
Payer: COMMERCIAL

## 2023-11-21 PROBLEM — R94.30 CARDIAC LV EJECTION FRACTION 21-30%: Status: ACTIVE | Noted: 2023-11-21

## 2023-11-21 PROBLEM — N28.1 CYST OF LEFT KIDNEY: Status: ACTIVE | Noted: 2023-11-21

## 2023-11-21 PROBLEM — Z79.02 LONG TERM (CURRENT) USE OF ANTITHROMBOTICS/ANTIPLATELETS: Status: ACTIVE | Noted: 2020-05-04

## 2023-11-21 PROBLEM — R29.898 TMJ CLICK: Status: ACTIVE | Noted: 2023-11-21

## 2023-11-21 PROBLEM — I10 HTN (HYPERTENSION): Status: ACTIVE | Noted: 2023-11-21

## 2023-11-21 PROBLEM — J69.0 ASPIRATION PNEUMONIA (MULTI): Status: ACTIVE | Noted: 2023-11-21

## 2023-11-21 PROBLEM — J44.9 CHRONIC OBSTRUCTIVE PULMONARY DISEASE (COPD) (MULTI): Status: ACTIVE | Noted: 2020-05-04

## 2023-11-21 PROBLEM — Z95.828 S/P REPAIR OF ABDOMINAL AORTIC ANEURYSM USING BIFURCATION GRAFT: Status: ACTIVE | Noted: 2023-11-21

## 2023-11-21 PROBLEM — I25.5 ISCHEMIC CARDIOMYOPATHY: Status: ACTIVE | Noted: 2020-05-04

## 2023-11-21 PROBLEM — F41.1 GENERALIZED ANXIETY DISORDER: Status: ACTIVE | Noted: 2020-05-04

## 2023-11-21 PROBLEM — I25.10 CAD IN NATIVE ARTERY: Status: ACTIVE | Noted: 2023-11-21

## 2023-11-21 PROBLEM — I51.89 CARDIAC MASS: Status: ACTIVE | Noted: 2023-11-21

## 2023-11-21 PROBLEM — I50.22 CHRONIC SYSTOLIC CONGESTIVE HEART FAILURE (MULTI): Status: ACTIVE | Noted: 2020-05-04

## 2023-11-21 PROBLEM — I50.20 HEART FAILURE WITH REDUCED EJECTION FRACTION (MULTI): Status: ACTIVE | Noted: 2023-11-21

## 2023-11-21 PROBLEM — R31.21 ASYMPTOMATIC MICROSCOPIC HEMATURIA: Status: ACTIVE | Noted: 2023-11-21

## 2023-11-21 PROBLEM — Z95.5 PRESENCE OF CORONARY ANGIOPLASTY IMPLANT AND GRAFT: Status: ACTIVE | Noted: 2020-01-01

## 2023-11-21 PROBLEM — I72.3 ANEURYSM OF RIGHT COMMON ILIAC ARTERY (CMS-HCC): Status: ACTIVE | Noted: 2020-05-04

## 2023-11-21 PROBLEM — R93.1 CARDIAC LV EJECTION FRACTION 21-30%: Status: ACTIVE | Noted: 2023-11-21

## 2023-11-21 PROBLEM — Z86.79 S/P REPAIR OF ABDOMINAL AORTIC ANEURYSM USING BIFURCATION GRAFT: Status: ACTIVE | Noted: 2023-11-21

## 2023-11-21 PROBLEM — I71.40 AAA (ABDOMINAL AORTIC ANEURYSM) (CMS-HCC): Status: ACTIVE | Noted: 2020-05-04

## 2023-11-21 LAB
ANION GAP SERPL CALC-SCNC: 10 MMOL/L (ref 10–20)
BUN SERPL-MCNC: 9 MG/DL (ref 6–23)
CALCIUM SERPL-MCNC: 8.3 MG/DL (ref 8.6–10.3)
CHLORIDE SERPL-SCNC: 105 MMOL/L (ref 98–107)
CO2 SERPL-SCNC: 29 MMOL/L (ref 21–32)
CREAT SERPL-MCNC: 1 MG/DL (ref 0.5–1.3)
ERYTHROCYTE [DISTWIDTH] IN BLOOD BY AUTOMATED COUNT: 13.6 % (ref 11.5–14.5)
GFR SERPL CREATININE-BSD FRML MDRD: 82 ML/MIN/1.73M*2
GLUCOSE SERPL-MCNC: 71 MG/DL (ref 74–99)
HCT VFR BLD AUTO: 35.3 % (ref 41–52)
HGB BLD-MCNC: 11.4 G/DL (ref 13.5–17.5)
MAGNESIUM SERPL-MCNC: 1.55 MG/DL (ref 1.6–2.4)
MCH RBC QN AUTO: 30.2 PG (ref 26–34)
MCHC RBC AUTO-ENTMCNC: 32.3 G/DL (ref 32–36)
MCV RBC AUTO: 94 FL (ref 80–100)
NRBC BLD-RTO: 0 /100 WBCS (ref 0–0)
PLATELET # BLD AUTO: 200 X10*3/UL (ref 150–450)
POTASSIUM SERPL-SCNC: 4.3 MMOL/L (ref 3.5–5.3)
RBC # BLD AUTO: 3.77 X10*6/UL (ref 4.5–5.9)
SODIUM SERPL-SCNC: 140 MMOL/L (ref 136–145)
WBC # BLD AUTO: 9.4 X10*3/UL (ref 4.4–11.3)

## 2023-11-21 PROCEDURE — 36415 COLL VENOUS BLD VENIPUNCTURE: CPT

## 2023-11-21 PROCEDURE — 80048 BASIC METABOLIC PNL TOTAL CA: CPT

## 2023-11-21 PROCEDURE — 2500000004 HC RX 250 GENERAL PHARMACY W/ HCPCS (ALT 636 FOR OP/ED)

## 2023-11-21 PROCEDURE — 85027 COMPLETE CBC AUTOMATED: CPT

## 2023-11-21 PROCEDURE — 1200000002 HC GENERAL ROOM WITH TELEMETRY DAILY

## 2023-11-21 PROCEDURE — 2500000001 HC RX 250 WO HCPCS SELF ADMINISTERED DRUGS (ALT 637 FOR MEDICARE OP)

## 2023-11-21 PROCEDURE — 2500000004 HC RX 250 GENERAL PHARMACY W/ HCPCS (ALT 636 FOR OP/ED): Performed by: INTERNAL MEDICINE

## 2023-11-21 PROCEDURE — 2500000001 HC RX 250 WO HCPCS SELF ADMINISTERED DRUGS (ALT 637 FOR MEDICARE OP): Performed by: NURSE PRACTITIONER

## 2023-11-21 PROCEDURE — 99232 SBSQ HOSP IP/OBS MODERATE 35: CPT

## 2023-11-21 PROCEDURE — 2500000004 HC RX 250 GENERAL PHARMACY W/ HCPCS (ALT 636 FOR OP/ED): Performed by: STUDENT IN AN ORGANIZED HEALTH CARE EDUCATION/TRAINING PROGRAM

## 2023-11-21 PROCEDURE — 83735 ASSAY OF MAGNESIUM: CPT

## 2023-11-21 PROCEDURE — 99232 SBSQ HOSP IP/OBS MODERATE 35: CPT | Performed by: INTERNAL MEDICINE

## 2023-11-21 PROCEDURE — 99232 SBSQ HOSP IP/OBS MODERATE 35: CPT | Performed by: STUDENT IN AN ORGANIZED HEALTH CARE EDUCATION/TRAINING PROGRAM

## 2023-11-21 RX ORDER — MAGNESIUM SULFATE HEPTAHYDRATE 40 MG/ML
2 INJECTION, SOLUTION INTRAVENOUS ONCE
Status: COMPLETED | OUTPATIENT
Start: 2023-11-21 | End: 2023-11-21

## 2023-11-21 RX ORDER — PANTOPRAZOLE SODIUM 40 MG/1
40 TABLET, DELAYED RELEASE ORAL
Status: DISCONTINUED | OUTPATIENT
Start: 2023-11-21 | End: 2023-11-22 | Stop reason: HOSPADM

## 2023-11-21 RX ORDER — BISACODYL 5 MG
5 TABLET, DELAYED RELEASE (ENTERIC COATED) ORAL ONCE
Status: DISCONTINUED | OUTPATIENT
Start: 2023-11-21 | End: 2023-11-22 | Stop reason: HOSPADM

## 2023-11-21 RX ORDER — SIMETHICONE 80 MG
80 TABLET,CHEWABLE ORAL 4 TIMES DAILY
Status: DISCONTINUED | OUTPATIENT
Start: 2023-11-21 | End: 2023-11-22 | Stop reason: HOSPADM

## 2023-11-21 RX ORDER — PANTOPRAZOLE SODIUM 40 MG/1
40 TABLET, DELAYED RELEASE ORAL DAILY
Status: DISCONTINUED | OUTPATIENT
Start: 2023-11-21 | End: 2023-11-21

## 2023-11-21 RX ORDER — AMOXICILLIN 250 MG
1 CAPSULE ORAL 2 TIMES DAILY
Status: DISCONTINUED | OUTPATIENT
Start: 2023-11-21 | End: 2023-11-22 | Stop reason: HOSPADM

## 2023-11-21 RX ADMIN — OXYCODONE HYDROCHLORIDE 10 MG: 5 TABLET ORAL at 23:58

## 2023-11-21 RX ADMIN — CEFTRIAXONE SODIUM 1 G: 1 INJECTION, SOLUTION INTRAVENOUS at 18:00

## 2023-11-21 RX ADMIN — OXYCODONE HYDROCHLORIDE 10 MG: 5 TABLET ORAL at 20:07

## 2023-11-21 RX ADMIN — METOPROLOL SUCCINATE 50 MG: 50 TABLET, EXTENDED RELEASE ORAL at 09:07

## 2023-11-21 RX ADMIN — SIMETHICONE 80 MG: 80 TABLET, CHEWABLE ORAL at 18:00

## 2023-11-21 RX ADMIN — OXYCODONE HYDROCHLORIDE 10 MG: 5 TABLET ORAL at 10:15

## 2023-11-21 RX ADMIN — OXYCODONE HYDROCHLORIDE 10 MG: 5 TABLET ORAL at 05:38

## 2023-11-21 RX ADMIN — OXYCODONE HYDROCHLORIDE 10 MG: 5 TABLET ORAL at 15:59

## 2023-11-21 RX ADMIN — SIMVASTATIN 40 MG: 20 TABLET, FILM COATED ORAL at 20:07

## 2023-11-21 RX ADMIN — OXYCODONE HYDROCHLORIDE 10 MG: 5 TABLET ORAL at 01:33

## 2023-11-21 RX ADMIN — MAGNESIUM SULFATE HEPTAHYDRATE 2 G: 40 INJECTION, SOLUTION INTRAVENOUS at 09:10

## 2023-11-21 RX ADMIN — PANTOPRAZOLE SODIUM 40 MG: 40 TABLET, DELAYED RELEASE ORAL at 10:15

## 2023-11-21 RX ADMIN — PREDNISONE 5 MG: 5 TABLET ORAL at 09:07

## 2023-11-21 ASSESSMENT — COGNITIVE AND FUNCTIONAL STATUS - GENERAL
MOBILITY SCORE: 24
MOBILITY SCORE: 24
DAILY ACTIVITIY SCORE: 24
DAILY ACTIVITIY SCORE: 24

## 2023-11-21 ASSESSMENT — PAIN SCALES - GENERAL
PAINLEVEL_OUTOF10: 8
PAINLEVEL_OUTOF10: 0 - NO PAIN
PAINLEVEL_OUTOF10: 7
PAINLEVEL_OUTOF10: 7
PAINLEVEL_OUTOF10: 2
PAINLEVEL_OUTOF10: 6
PAINLEVEL_OUTOF10: 9
PAINLEVEL_OUTOF10: 8
PAINLEVEL_OUTOF10: 3

## 2023-11-21 ASSESSMENT — PAIN - FUNCTIONAL ASSESSMENT
PAIN_FUNCTIONAL_ASSESSMENT: 0-10

## 2023-11-21 ASSESSMENT — PAIN DESCRIPTION - DESCRIPTORS
DESCRIPTORS: ACHING
DESCRIPTORS: ACHING

## 2023-11-21 NOTE — PROGRESS NOTES
Patient admitted for abd pain, nausea, recently discovered urothelial mass, plan for cystoscopy tomorrow afternoon, plan for tomorrow discharge to home, denies any home going needs, Oncoming TCC to follow through hospital course for any changes in discharge plan.

## 2023-11-21 NOTE — PROGRESS NOTES
"Yobany Small is a 68 y.o. male on day 3 of admission presenting with Colitis.    Subjective   Upon assessment of the patient this morning, the patient was drowsy but easily arousable to verbal stimuli.  The patient expressed frustration that he \"is never able to get any sleep here\".  The patient did state his pain is slightly better.  He also vocalized that he is ready for his procedure and biopsy tomorrow, and wants to \"get it over with\".  The patient does continue to have poor appetite.  He remains continent of urine regularly.  The patient's last bowel movement was 4 days ago.    Objective     Physical Exam  Constitutional:       General: He is not in acute distress.     Appearance: Normal appearance. He is normal weight. He is not ill-appearing.      Comments: Thin   HENT:      Head: Normocephalic and atraumatic.      Nose: Nose normal.      Mouth/Throat:      Mouth: Mucous membranes are moist.      Pharynx: Oropharynx is clear.   Eyes:      Extraocular Movements: Extraocular movements intact.      Pupils: Pupils are equal, round, and reactive to light.   Cardiovascular:      Rate and Rhythm: Normal rate and regular rhythm.      Pulses: Normal pulses.      Heart sounds: Normal heart sounds.   Pulmonary:      Effort: Pulmonary effort is normal. No respiratory distress.      Breath sounds: Normal breath sounds.   Abdominal:      General: Abdomen is flat. Bowel sounds are normal. There is no distension.      Palpations: Abdomen is soft.      Tenderness: There is abdominal tenderness. There is left CVA tenderness and guarding.   Musculoskeletal:         General: No deformity. Normal range of motion.      Cervical back: Normal range of motion.   Skin:     General: Skin is warm and dry.   Neurological:      General: No focal deficit present.      Mental Status: He is alert and oriented to person, place, and time.   Psychiatric:         Mood and Affect: Mood normal.         Behavior: Behavior normal.         " "Thought Content: Thought content normal.         Judgment: Judgment normal.      Comments: Slight agitation with assessment questions noted; does endorse anxiety and depression that he has not yet had the biopsy to identify if he has cance    Last Recorded Vitals  Blood pressure 133/65, pulse 79, temperature 36.1 °C (97 °F), resp. rate 16, height 1.803 m (5' 10.98\"), weight 54.4 kg (119 lb 14.9 oz), SpO2 (!) 82 %.  Intake/Output last 3 Shifts:  I/O last 3 completed shifts:  In: 200 (3.7 mL/kg) [IV Piggyback:200]  Out: - (0 mL/kg)   Weight: 54.4 kg     Relevant Results  Scheduled medications  [Held by provider] aspirin, 81 mg, oral, Daily  cefTRIAXone, 1 g, intravenous, q24h  ferrous sulfate (325 mg ferrous sulfate), 65 mg of iron, oral, Daily  tiotropium, 2 Inhalation, inhalation, Daily   And  fluticasone furoate-vilanteroL, 1 puff, inhalation, Daily  [Held by provider] heparin (porcine), 5,000 Units, subcutaneous, q8h  lidocaine, 1 patch, transdermal, Daily  magnesium sulfate, 2 g, intravenous, Once  methotrexate, 15 mg, oral, Weekly  metoprolol succinate XL, 50 mg, oral, Daily  pantoprazole, 40 mg, oral, Daily  polyethylene glycol, 17 g, oral, Daily  predniSONE, 5 mg, oral, Daily  sennosides-docusate sodium, 1 tablet, oral, Daily  simvastatin, 40 mg, oral, Nightly      Continuous medications     PRN medications  PRN medications: acetaminophen **OR** acetaminophen **OR** acetaminophen, alum-mag hydroxide-simeth, bisacodyl, dicyclomine, HYDROmorphone, hydrOXYzine HCL, labetaloL, melatonin, ondansetron, oxyCODONE, oxygen, polyethylene glycol    Results for orders placed or performed during the hospital encounter of 11/18/23 (from the past 24 hour(s))   Magnesium   Result Value Ref Range    Magnesium 1.55 (L) 1.60 - 2.40 mg/dL   Basic Metabolic Panel   Result Value Ref Range    Glucose 71 (L) 74 - 99 mg/dL    Sodium 140 136 - 145 mmol/L    Potassium 4.3 3.5 - 5.3 mmol/L    Chloride 105 98 - 107 mmol/L    Bicarbonate " 29 21 - 32 mmol/L    Anion Gap 10 10 - 20 mmol/L    Urea Nitrogen 9 6 - 23 mg/dL    Creatinine 1.00 0.50 - 1.30 mg/dL    eGFR 82 >60 mL/min/1.73m*2    Calcium 8.3 (L) 8.6 - 10.3 mg/dL   CBC   Result Value Ref Range    WBC 9.4 4.4 - 11.3 x10*3/uL    nRBC 0.0 0.0 - 0.0 /100 WBCs    RBC 3.77 (L) 4.50 - 5.90 x10*6/uL    Hemoglobin 11.4 (L) 13.5 - 17.5 g/dL    Hematocrit 35.3 (L) 41.0 - 52.0 %    MCV 94 80 - 100 fL    MCH 30.2 26.0 - 34.0 pg    MCHC 32.3 32.0 - 36.0 g/dL    RDW 13.6 11.5 - 14.5 %    Platelets 200 150 - 450 x10*3/uL               Malnutrition Diagnosis Status: New  Malnutrition Diagnosis: Severe malnutrition related to acute disease or injury  As Evidenced by: 7% weight loss x1 week, 22% weight loss x8 months; mild to moderate muscle wasting and subcutaneous fat loss.  I agree with the dietitian's malnutrition diagnosis.      Assessment/Plan   IMP:  Yobany Small is a 68 y.o. male with past medical history of previously imaged abdominal mass not further evaluated, pulmonary nodule, AAA status postrepair, arthropathic psoriasis, and present automatic implantable cardiac defibrillator, is presenting from home with abdominal pain, nausea, and vomiting.  The patient stated he was evaluated by the emergency department last week and had the abdominal mass identified, but was frustrated that no biopsy has yet been obtained, and does have concern that this mass could be cancerous, but did state he has not been able to follow-up with urology as an outpatient yet.  The patient endorsed continued hematuria and nonbloody emesis along with intense cramping of the left lower quadrant abdomen that is sporadic and intermittent in nature; he also endorsed loose stools up to 4 times per day.  The patient stated he was given pain medication in the emergency department the last time, and that this did reduce his pain, but did not last for long.  He also endorsed inability to sleep for days in a row due to the severity  and frequency of the abdominal pain.  Findings in the emergency department at this time were consistent for colitis as well as concern for urothelial malignancy and concern for acute cystitis, as well as hematuria, hypokalemia, hyperchloremia, diarrhea, hypertensive urgency, nausea and vomiting, and leukocytosis with bandemia, as well as protein calorie malnutrition.  The patient was initiated on IV antibiotics and made n.p.o. except for ice chips; he was admitted under the medicine service.  He was initiated on IV Dilaudid and oral Percocet.  Urology was consulted and noted gross hematuria/left hydro ureteral filling defects; recommendation was that the patient's pain was improving and no immediate intervention was needed, but the patient will need outpatient cystoscopy and possibly TURBT bilateral RGP left ureteroscopy, left ureteral biopsy, and possible ureteral stent, and if the patient's biopsy confirms malignancy, will need left nephroureterectomy.  Palliative care was consulted to discuss goals of care and for symptom management.     11/20/2023-   The patient did endorse moderate to severe left flank/left lower quadrant pain that is tender to touch and is worsened by eating.  Of note, the patient has used Dilaudid 0.4 mg IV x2 in the past 24 hours, as well as Oxy IR 10 mg p.o. x6 in the past 24 hours.  Because the patient is using Oxy IR as it is available, and has not had a bowel movement in 3 days and does feel some constipation, this may be adding to his discomfort, and so we do recommend continued MiraLAX as it is ordered twice daily, and if the patient is unable to produce a bowel movement today, we do recommend administering a Dulcolax suppository, and have ordered as needed, as milk of magnesia remains on backorder and is unavailable.  The patient has had intermittent nausea, but stated this is now resolved; we will not recommend to change his current as needed Zofran order.  The patient did endorse  "sleeping poorly due to the pain, and so we can consider initiating low-dose trazodone at bedtime if the primary team does agree.  The patient stated his overall goal is to live and he very much would like his biopsy performed so that he knows his plan of care moving forward.  I introduced the practice of palliative care and the services it provides. I then reviewed at length with the patient the clinical diagnosis/medical problems that the patient presented with and the treatments provided so far. We discussed the implications of the current circumstances and option plans going forward.  The patient stated he will pursue all outpatient follow-ups and we will prioritize having the biopsy performed.  I do recommend outpatient palliative care for ongoing symptom management, however, it is unclear if the patient will be receptive to this service, as he did have agitation during my assessment questions today.  I answered the patient's questions and concerns to the best of my ability.  We will continue to monitor for further recommendations of adjustment of pain medication.  Palliative care will continue to follow.    11/21/2023-   The patient stated his pain is slightly improved from yesterday with his current pain regimen.  The patient only used 1 dose as needed Dilaudid since my last assessment, and has been taking his Oxy IR more regularly, with 5 doses in the 24 hours prior to this assessment.  As the patient reported his pain is improved, we do recommend continuation of his current pain regimen.  The patient has not had a bowel movement in 4 days, and is already taking MiraLAX daily as well as Mary Beth-Colace; I would recommend giving a dose of Dulcolax suppository today, and this is already ordered as needed, in order to address constipation.  The patient stated he is ready for his urologic procedure and biopsy tomorrow and wants to \"get it over with\".  The patient did express frustration with having caregivers enter " his room regularly; the patient's door does remain closed, and if privacy can be respected as appropriate during this hospitalization, this may help to alleviate some of the patient's agitation with assessment questions.  The patient's goal is to return home after this hospitalization, and I do continue to recommend outpatient palliative care for ongoing symptom management, however, it is unclear if the patient will want this service.  Palliative care will continue to follow.     Thank you for allowing us to participate in the care of this gentleman.  Should you have any further questions or concerns regarding his care, please do not hesitate to contact us via [employer] (Gina Ulloa during weekdays work hours and Andrez Singh during weekdays after hours and over the weekend)     (This note was generated with voice recognition software and may contain errors including spelling, grammar, syntax and misrecognition of what was dictated, that are not fully corrected)    Provider estimate of survival: Pending biopsy results  Patient Prognostic Awareness: Yes - congruent with provider estimation    Patient/proxy preference for information  Prefers full information    Goals of Care  The patient does remain a full code and does wish to continue pursuing curative measures.         I spent 30 minutes in the professional and overall care of this patient.      Gina Ulloa, APRN-CNP

## 2023-11-21 NOTE — CARE PLAN
Problem: Pain - Adult  Goal: Verbalizes/displays adequate comfort level or baseline comfort level  Outcome: Progressing     Problem: Safety - Adult  Goal: Free from fall injury  Outcome: Progressing     Problem: Discharge Planning  Goal: Discharge to home or other facility with appropriate resources  Outcome: Progressing     Problem: Chronic Conditions and Co-morbidities  Goal: Patient's chronic conditions and co-morbidity symptoms are monitored and maintained or improved  Outcome: Progressing    The clinical goals for the shift include pain control

## 2023-11-21 NOTE — PROGRESS NOTES
Subjective:  Patient seen at bedside.  He reports feeling about the same as yesterday.  Last bowel movement 3 days ago.  Denies any chest pain, shortness of breath, fevers, chills.  Plan for inpatient urology procedure tomorrow at 12 PM.         Physical Exam  Constitutional: No respiratory acute distress, cooperative, answers questions appropriately then elderly male  Eyes: EOMI, clear sclera  ENMT: mucous membranes moist  Head/Neck: Neck supple, Trachea midline  Respiratory/Thorax: CTAB, no wheezes, no crackles  Cardiovascular: Tachycardia with regular rate and rhythm, normal s1, s2, no murmurs, distal pulses 2+, no edema  Gastrointestinal: Bowel sounds present, LLQ tenderness, no rebounding, nonperitoneal, no palpable masses  Musculoskeletal: ROM intact, no gross deformity diffuse  Neurological: No focal deficits, normal sensation  Psychological: Appropriate mood and behavior, however extremely anxious  Skin: Warm and dry    Last Recorded Vitals  /69   Pulse 73   Temp 37 °C (98.6 °F)   Resp 16   Wt 54.4 kg (119 lb 14.9 oz)   SpO2 (!) 88%        Assessment/Plan   Assessment:  Hematuria  Concern for urothelial malignancy  Ureteral mass  Bladder mass  Acute cystitis  Acute kidney injury, resolved  Hypokalemia, resolved  Hyperchloremia, resolved  Diarrhea - resolved  Hypertensive urgency - improved  Nausea and vomiting- resolved  Leukocytosis w/ bandemia  Protein calorie malnutrition  AAA status post-repair  Pulmonary nodule  Arthropathic psoriasis  Presence of automatic (implantable) cardiac defibrillator     -Day 2 Rocephin for possible UTI.  Blood and urine cx pending. 11/18 CT abdomen with findings compatible to urothelial malignancy. The patient has noted calculi of 11 mm on the left side, his pain is cramping in nature and colicky.   -Urology following, plan for cystoscopy possible TURBT b/l RGP L ureteroscopy L ureteral biopsy w/ possible ureteral stent tomorrow at noon.  -Creatinine downtrending,  LAWRENCE resolved.  Patient is making urine with no retention.  -Pain management. IV dilaudid and oral Roxicodone. Bowel regimen as the patient endorses constipation, no concern for C diff.  Ordered Dulcolax once.  -Will consider consulting palliative care for pain management and/or goals of care discussion if the patient pain continues to be difficult to manage, or his prognosis becomes more guarded.  -Will attempt to keep BP well controlled during this hospitalization to decrease the strain on the AAA. PRN antihypertensives added.  -Suspended DVT prophylaxis heparin and holding aspirin while hematuria is prominent. Continued to use SCDs for dvt prophylaxis.       Chris Pereira, DO

## 2023-11-21 NOTE — PROGRESS NOTES
GI consult note:    Subjective:  No overnight events.  Patient has had no bowel movements still, refusing enemas.  Plan for cystoscopy tomorrow afternoon.    Visit Vitals  /69   Pulse 73   Temp 37 °C (98.6 °F)   Resp 16      Physical Exam:   GENERAL: Awake/ alert, cooperative.   HEAD:  Normocephalic, atraumatic.  EYES:  Round and reactive.  ENT:  No nasal discharge, mucous membranes moist and pink.  NECK:  Atraumatic, no meningismus.  CARDIOVASCULAR:  Regular rate and rhythm, no murmur.  RESPIRATORY:  Diminished breath sounds, no active work of breathing.   ABDOMEN:  Soft, left sided tenderness, nondistended.  EXTREMITIES:  No peripheral edema, no calf tenderness.  SKIN:  Warm and dry.  NEUROLOGICAL:  Nonfocal grossly.    Medications:  [Held by provider] aspirin, 81 mg, oral, Daily  bisacodyl, 5 mg, oral, Once  cefTRIAXone, 1 g, intravenous, q24h  ferrous sulfate (325 mg ferrous sulfate), 65 mg of iron, oral, Daily  tiotropium, 2 Inhalation, inhalation, Daily   And  fluticasone furoate-vilanteroL, 1 puff, inhalation, Daily  [Held by provider] heparin (porcine), 5,000 Units, subcutaneous, q8h  lidocaine, 1 patch, transdermal, Daily  methotrexate, 15 mg, oral, Weekly  metoprolol succinate XL, 50 mg, oral, Daily  pantoprazole, 40 mg, oral, Daily  polyethylene glycol, 17 g, oral, Daily  predniSONE, 5 mg, oral, Daily  sennosides-docusate sodium, 1 tablet, oral, Daily  simvastatin, 40 mg, oral, Nightly       Assessment/ Plan:  Yobany Small is a 68 y.o. year old male with a history of psoriasis, AAA s/p repair, pulmonary nodule, CAD and s/p ICD, and recently discovered urothelial mass who presented to [facility] for worsening abdominal pain as well as nausea/vomiting.    #Left-sided urothelial malignancy, with hematuria  #Acute on chronic anemia  #Constipation, history of IBS?   -CT abdomen/pelvis reviewed.  Suspect mural thickening as well as clinical features of abdominal pain/vomiting relating to urologic  etiology.  Urology following, plan for cystoscopy tomorrow.  -Monitor H/H, suspect all likely secondary to hematuria. Admits to daily Excedrin use.   -Bowel regimen and GasX PRN, increase regimen as needed.      Ignacio Sandoval DO   Internal Medicine PGY-3      Attending note    EGD never  Colonoscopy never  Family history reviewed, not pertinent to chief complaint  2 bowel movements per day  Takes Excedrin daily, denies alcohol marijuana drug use smoking    1-2-week history of left lower quadrant discomfort, positive at night, associated with hematuria, recent CAT scan showing mass rule out urothelial cancer, thickened distal colon, aortic aneurysm endoleak, esophageal thickening, see official report, bowel regimen, Gas-X as needed, Protonix twice a day, antiemetics as needed, will need to investigate possible urothelial mass, after which can proceed with GI investigations, will follow

## 2023-11-22 ENCOUNTER — APPOINTMENT (OUTPATIENT)
Dept: RADIOLOGY | Facility: HOSPITAL | Age: 68
DRG: 656 | End: 2023-11-22
Payer: COMMERCIAL

## 2023-11-22 ENCOUNTER — ANESTHESIA (OUTPATIENT)
Dept: OPERATING ROOM | Facility: HOSPITAL | Age: 68
DRG: 656 | End: 2023-11-22
Payer: COMMERCIAL

## 2023-11-22 VITALS
RESPIRATION RATE: 16 BRPM | HEIGHT: 71 IN | OXYGEN SATURATION: 95 % | SYSTOLIC BLOOD PRESSURE: 145 MMHG | BODY MASS INDEX: 16.79 KG/M2 | TEMPERATURE: 98.2 F | HEART RATE: 53 BPM | WEIGHT: 119.93 LBS | DIASTOLIC BLOOD PRESSURE: 68 MMHG

## 2023-11-22 PROBLEM — D64.9 ANEMIA: Status: ACTIVE | Noted: 2023-11-22

## 2023-11-22 PROBLEM — Z98.890 HISTORY OF GENERAL ANESTHESIA: Status: ACTIVE | Noted: 2023-11-22

## 2023-11-22 LAB
ANION GAP SERPL CALC-SCNC: 11 MMOL/L (ref 10–20)
BACTERIA BLD CULT: NORMAL
BACTERIA BLD CULT: NORMAL
BUN SERPL-MCNC: 11 MG/DL (ref 6–23)
CALCIUM SERPL-MCNC: 8.3 MG/DL (ref 8.6–10.3)
CHLORIDE SERPL-SCNC: 104 MMOL/L (ref 98–107)
CO2 SERPL-SCNC: 28 MMOL/L (ref 21–32)
CREAT SERPL-MCNC: 0.92 MG/DL (ref 0.5–1.3)
ERYTHROCYTE [DISTWIDTH] IN BLOOD BY AUTOMATED COUNT: 13.6 % (ref 11.5–14.5)
GFR SERPL CREATININE-BSD FRML MDRD: >90 ML/MIN/1.73M*2
GLUCOSE SERPL-MCNC: 77 MG/DL (ref 74–99)
HCT VFR BLD AUTO: 35 % (ref 41–52)
HGB BLD-MCNC: 11.5 G/DL (ref 13.5–17.5)
MCH RBC QN AUTO: 30.4 PG (ref 26–34)
MCHC RBC AUTO-ENTMCNC: 32.9 G/DL (ref 32–36)
MCV RBC AUTO: 93 FL (ref 80–100)
NRBC BLD-RTO: 0 /100 WBCS (ref 0–0)
PLATELET # BLD AUTO: 221 X10*3/UL (ref 150–450)
POTASSIUM SERPL-SCNC: 4 MMOL/L (ref 3.5–5.3)
RBC # BLD AUTO: 3.78 X10*6/UL (ref 4.5–5.9)
SODIUM SERPL-SCNC: 139 MMOL/L (ref 136–145)
WBC # BLD AUTO: 10 X10*3/UL (ref 4.4–11.3)

## 2023-11-22 PROCEDURE — 3600000008 HC OR TIME - EACH INCREMENTAL 1 MINUTE - PROCEDURE LEVEL THREE: Performed by: UROLOGY

## 2023-11-22 PROCEDURE — 88305 TISSUE EXAM BY PATHOLOGIST: CPT | Mod: TC,PARLAB | Performed by: PHYSICIAN ASSISTANT

## 2023-11-22 PROCEDURE — C1769 GUIDE WIRE: HCPCS | Performed by: UROLOGY

## 2023-11-22 PROCEDURE — 2500000005 HC RX 250 GENERAL PHARMACY W/O HCPCS: Performed by: NURSE ANESTHETIST, CERTIFIED REGISTERED

## 2023-11-22 PROCEDURE — 2780000003 HC OR 278 NO HCPCS: Performed by: UROLOGY

## 2023-11-22 PROCEDURE — A52351 PR CYSTO/URETERO/PYELOSCOPY, DX: Performed by: ANESTHESIOLOGY

## 2023-11-22 PROCEDURE — 2720000007 HC OR 272 NO HCPCS: Performed by: UROLOGY

## 2023-11-22 PROCEDURE — C2617 STENT, NON-COR, TEM W/O DEL: HCPCS | Performed by: UROLOGY

## 2023-11-22 PROCEDURE — 7100000002 HC RECOVERY ROOM TIME - EACH INCREMENTAL 1 MINUTE: Performed by: UROLOGY

## 2023-11-22 PROCEDURE — 0TB78ZX EXCISION OF LEFT URETER, VIA NATURAL OR ARTIFICIAL OPENING ENDOSCOPIC, DIAGNOSTIC: ICD-10-PCS | Performed by: UROLOGY

## 2023-11-22 PROCEDURE — 76000 FLUOROSCOPY <1 HR PHYS/QHP: CPT

## 2023-11-22 PROCEDURE — A52351 PR CYSTO/URETERO/PYELOSCOPY, DX: Performed by: NURSE ANESTHETIST, CERTIFIED REGISTERED

## 2023-11-22 PROCEDURE — 85027 COMPLETE CBC AUTOMATED: CPT

## 2023-11-22 PROCEDURE — 2500000001 HC RX 250 WO HCPCS SELF ADMINISTERED DRUGS (ALT 637 FOR MEDICARE OP)

## 2023-11-22 PROCEDURE — 3700000002 HC GENERAL ANESTHESIA TIME - EACH INCREMENTAL 1 MINUTE: Performed by: UROLOGY

## 2023-11-22 PROCEDURE — 2500000004 HC RX 250 GENERAL PHARMACY W/ HCPCS (ALT 636 FOR OP/ED): Performed by: NURSE PRACTITIONER

## 2023-11-22 PROCEDURE — 88305 TISSUE EXAM BY PATHOLOGIST: CPT | Mod: TC,SUR,PARLAB | Performed by: PHYSICIAN ASSISTANT

## 2023-11-22 PROCEDURE — 36415 COLL VENOUS BLD VENIPUNCTURE: CPT

## 2023-11-22 PROCEDURE — 88305 TISSUE EXAM BY PATHOLOGIST: CPT | Performed by: PATHOLOGY

## 2023-11-22 PROCEDURE — 3600000003 HC OR TIME - INITIAL BASE CHARGE - PROCEDURE LEVEL THREE: Performed by: UROLOGY

## 2023-11-22 PROCEDURE — 80048 BASIC METABOLIC PNL TOTAL CA: CPT

## 2023-11-22 PROCEDURE — 99239 HOSP IP/OBS DSCHRG MGMT >30: CPT | Performed by: INTERNAL MEDICINE

## 2023-11-22 PROCEDURE — 2500000004 HC RX 250 GENERAL PHARMACY W/ HCPCS (ALT 636 FOR OP/ED): Performed by: UROLOGY

## 2023-11-22 PROCEDURE — 2500000004 HC RX 250 GENERAL PHARMACY W/ HCPCS (ALT 636 FOR OP/ED)

## 2023-11-22 PROCEDURE — 0T778DZ DILATION OF LEFT URETER WITH INTRALUMINAL DEVICE, VIA NATURAL OR ARTIFICIAL OPENING ENDOSCOPIC: ICD-10-PCS | Performed by: UROLOGY

## 2023-11-22 PROCEDURE — 2500000004 HC RX 250 GENERAL PHARMACY W/ HCPCS (ALT 636 FOR OP/ED): Performed by: NURSE ANESTHETIST, CERTIFIED REGISTERED

## 2023-11-22 PROCEDURE — 7100000001 HC RECOVERY ROOM TIME - INITIAL BASE CHARGE: Performed by: UROLOGY

## 2023-11-22 PROCEDURE — BT1FZZZ FLUOROSCOPY OF LEFT KIDNEY, URETER AND BLADDER: ICD-10-PCS | Performed by: UROLOGY

## 2023-11-22 PROCEDURE — A4217 STERILE WATER/SALINE, 500 ML: HCPCS | Performed by: UROLOGY

## 2023-11-22 PROCEDURE — 3700000001 HC GENERAL ANESTHESIA TIME - INITIAL BASE CHARGE: Performed by: UROLOGY

## 2023-11-22 RX ORDER — SODIUM CHLORIDE 0.9 G/100ML
IRRIGANT IRRIGATION AS NEEDED
Status: DISCONTINUED | OUTPATIENT
Start: 2023-11-22 | End: 2023-11-22 | Stop reason: HOSPADM

## 2023-11-22 RX ORDER — SULFAMETHOXAZOLE AND TRIMETHOPRIM 800; 160 MG/1; MG/1
1 TABLET ORAL 2 TIMES DAILY
Qty: 6 TABLET | Refills: 0 | Status: SHIPPED | OUTPATIENT
Start: 2023-11-22 | End: 2023-11-25

## 2023-11-22 RX ORDER — LIDOCAINE HYDROCHLORIDE 20 MG/ML
INJECTION, SOLUTION INFILTRATION; PERINEURAL AS NEEDED
Status: DISCONTINUED | OUTPATIENT
Start: 2023-11-22 | End: 2023-11-22

## 2023-11-22 RX ORDER — DEXAMETHASONE SODIUM PHOSPHATE 4 MG/ML
INJECTION, SOLUTION INTRA-ARTICULAR; INTRALESIONAL; INTRAMUSCULAR; INTRAVENOUS; SOFT TISSUE AS NEEDED
Status: DISCONTINUED | OUTPATIENT
Start: 2023-11-22 | End: 2023-11-22

## 2023-11-22 RX ORDER — FENTANYL CITRATE 50 UG/ML
INJECTION, SOLUTION INTRAMUSCULAR; INTRAVENOUS AS NEEDED
Status: DISCONTINUED | OUTPATIENT
Start: 2023-11-22 | End: 2023-11-22

## 2023-11-22 RX ORDER — PROPOFOL 10 MG/ML
INJECTION, EMULSION INTRAVENOUS AS NEEDED
Status: DISCONTINUED | OUTPATIENT
Start: 2023-11-22 | End: 2023-11-22

## 2023-11-22 RX ORDER — MEPERIDINE HYDROCHLORIDE 25 MG/ML
INJECTION INTRAMUSCULAR; INTRAVENOUS; SUBCUTANEOUS AS NEEDED
Status: DISCONTINUED | OUTPATIENT
Start: 2023-11-22 | End: 2023-11-22

## 2023-11-22 RX ORDER — WATER 1 ML/ML
IRRIGANT IRRIGATION AS NEEDED
Status: DISCONTINUED | OUTPATIENT
Start: 2023-11-22 | End: 2023-11-22 | Stop reason: HOSPADM

## 2023-11-22 RX ORDER — CIPROFLOXACIN 500 MG/1
500 TABLET ORAL 2 TIMES DAILY
Qty: 6 TABLET | Refills: 0 | Status: SHIPPED | OUTPATIENT
Start: 2023-11-22 | End: 2023-11-22 | Stop reason: HOSPADM

## 2023-11-22 RX ORDER — ETOMIDATE 2 MG/ML
INJECTION INTRAVENOUS AS NEEDED
Status: DISCONTINUED | OUTPATIENT
Start: 2023-11-22 | End: 2023-11-22

## 2023-11-22 RX ORDER — OXYCODONE HYDROCHLORIDE 5 MG/1
5 TABLET ORAL EVERY 6 HOURS PRN
Qty: 15 TABLET | Refills: 0 | Status: ON HOLD | OUTPATIENT
Start: 2023-11-22 | End: 2024-05-10

## 2023-11-22 RX ORDER — OXYCODONE HYDROCHLORIDE 5 MG/1
5 TABLET ORAL EVERY 6 HOURS PRN
Qty: 15 TABLET | Refills: 0 | Status: SHIPPED | OUTPATIENT
Start: 2023-11-22 | End: 2023-11-22 | Stop reason: SDUPTHER

## 2023-11-22 RX ADMIN — PANTOPRAZOLE SODIUM 40 MG: 40 TABLET, DELAYED RELEASE ORAL at 16:07

## 2023-11-22 RX ADMIN — LIDOCAINE HYDROCHLORIDE 60 MG: 20 INJECTION, SOLUTION INFILTRATION; PERINEURAL at 13:30

## 2023-11-22 RX ADMIN — PROPOFOL 100 MG: 10 INJECTION, EMULSION INTRAVENOUS at 13:35

## 2023-11-22 RX ADMIN — METOPROLOL SUCCINATE 50 MG: 50 TABLET, EXTENDED RELEASE ORAL at 09:30

## 2023-11-22 RX ADMIN — OXYCODONE HYDROCHLORIDE 10 MG: 5 TABLET ORAL at 04:04

## 2023-11-22 RX ADMIN — PROPOFOL 50 MG: 10 INJECTION, EMULSION INTRAVENOUS at 13:30

## 2023-11-22 RX ADMIN — SODIUM CHLORIDE, POTASSIUM CHLORIDE, SODIUM LACTATE AND CALCIUM CHLORIDE: 600; 310; 30; 20 INJECTION, SOLUTION INTRAVENOUS at 13:22

## 2023-11-22 RX ADMIN — ONDANSETRON 4 MG: 2 INJECTION INTRAMUSCULAR; INTRAVENOUS at 14:07

## 2023-11-22 RX ADMIN — OXYCODONE HYDROCHLORIDE 10 MG: 5 TABLET ORAL at 09:21

## 2023-11-22 RX ADMIN — FENTANYL CITRATE 100 MCG: 50 INJECTION, SOLUTION INTRAMUSCULAR; INTRAVENOUS at 13:30

## 2023-11-22 RX ADMIN — PROPOFOL 50 MG: 10 INJECTION, EMULSION INTRAVENOUS at 14:00

## 2023-11-22 RX ADMIN — OXYCODONE HYDROCHLORIDE 10 MG: 5 TABLET ORAL at 16:07

## 2023-11-22 RX ADMIN — ETOMIDATE 10 MG: 2 INJECTION INTRAVENOUS at 13:30

## 2023-11-22 RX ADMIN — PREDNISONE 5 MG: 5 TABLET ORAL at 09:22

## 2023-11-22 RX ADMIN — DEXAMETHASONE SODIUM PHOSPHATE 4 MG: 4 INJECTION, SOLUTION INTRAMUSCULAR; INTRAVENOUS at 13:41

## 2023-11-22 RX ADMIN — PANTOPRAZOLE SODIUM 40 MG: 40 TABLET, DELAYED RELEASE ORAL at 09:30

## 2023-11-22 RX ADMIN — ETOMIDATE 10 MG: 2 INJECTION INTRAVENOUS at 13:35

## 2023-11-22 RX ADMIN — MEPERIDINE HYDROCHLORIDE 25 MG: 25 INJECTION INTRAMUSCULAR; INTRAVENOUS; SUBCUTANEOUS at 14:08

## 2023-11-22 SDOH — HEALTH STABILITY: MENTAL HEALTH: CURRENT SMOKER: 0

## 2023-11-22 ASSESSMENT — PAIN SCALES - GENERAL
PAINLEVEL_OUTOF10: 7
PAINLEVEL_OUTOF10: 0 - NO PAIN
PAIN_LEVEL: 0
PAINLEVEL_OUTOF10: 3
PAINLEVEL_OUTOF10: 7
PAINLEVEL_OUTOF10: 0 - NO PAIN
PAINLEVEL_OUTOF10: 8
PAINLEVEL_OUTOF10: 4
PAINLEVEL_OUTOF10: 0 - NO PAIN
PAINLEVEL_OUTOF10: 3
PAINLEVEL_OUTOF10: 4

## 2023-11-22 ASSESSMENT — PAIN DESCRIPTION - DESCRIPTORS
DESCRIPTORS: DISCOMFORT
DESCRIPTORS: ACHING

## 2023-11-22 NOTE — ANESTHESIA PREPROCEDURE EVALUATION
Patient: Yobany Small    Procedure Information       Date/Time: 11/22/23 1300    Procedure: CYSTOSCOPY/ LEFT RETROGRADE PYELOGRAM/ LEFT URETEROSCOPY/ LEFT LASER LITHOTRIPSY/ POSSIBLE INSERTION OF STENT WITH C-ARM/ POSSIBLE TRANSURETERAL RESECTION OF BLADDER TUMOR/ URETER AND  URETERSAL BOPSY (Left)    Location: [facility] / [facility]    Surgeons: Juan A You MD            Relevant Problems   Anesthesia   (+) History of general anesthesia      Cardiovascular   (+) AAA (abdominal aortic aneurysm) (CMS/HCC)   (+) CAD in native artery   (+) Chronic systolic congestive heart failure (CMS/HCC)   (+) HTN (hypertension)   (+) Heart failure with reduced ejection fraction (CMS/HCC)      /Renal   (+) Cyst of left kidney   (+) Other hydronephrosis      Neuro/Psych   (+) Generalized anxiety disorder      Pulmonary   (+) Aspiration pneumonia (CMS/HCC)   (+) Chronic obstructive pulmonary disease (COPD) (CMS/HCC)      Genitourinary   (+) Bladder mass   (+) Ureteral calculus   (+) Ureteral mass       Clinical information reviewed:   Tobacco  Allergies  Meds  Problems  Med Hx  Surg Hx   Fam Hx  Soc   Hx        NPO Detail:  No data recorded     Physical Exam    Airway  Mallampati: II  TM distance: >3 FB  Neck ROM: full     Cardiovascular - normal exam     Dental   (+) upper dentures     Pulmonary - normal exam     Abdominal - normal exam             Anesthesia Plan    ASA 3     general     The patient is not a current smoker.  Patient was previously instructed to abstain from smoking on day of procedure.  Patient did not smoke on day of procedure.    intravenous induction   Postoperative administration of opioids is intended.  Trial extubation is planned.  Anesthetic plan and risks discussed with patient.  Use of blood products discussed with patient who consented to blood products.    Plan discussed with CRNA.

## 2023-11-22 NOTE — DISCHARGE SUMMARY
Discharge Diagnosis  Hematuria  Concern for urothelial malignancy  Ureteral mass  Acute cystitis  Acute kidney injury, resolved  Hypertensive urgency, resolved    Issues Requiring Follow-Up  Ureter mass biopsy follow-up with urology    Discharge Meds     Your medication list        START taking these medications        Instructions Last Dose Given Next Dose Due   sulfamethoxazole-trimethoprim 800-160 mg tablet  Commonly known as: Bactrim DS  Notes to patient: Begin tonight, take until gone       Take 1 tablet by mouth 2 times a day for 3 days.              CHANGE how you take these medications        Instructions Last Dose Given Next Dose Due   oxyCODONE 5 mg immediate release tablet  Commonly known as: Roxicodone  What changed: reasons to take this      Take 1 tablet (5 mg) by mouth every 6 hours if needed for severe pain (7 - 10).              CONTINUE taking these medications        Instructions Last Dose Given Next Dose Due   aspirin 81 mg EC tablet           Entresto 24-26 mg tablet  Generic drug: sacubitriL-valsartan           FeosoL tablet  Generic drug: ferrous sulfate (325 mg ferrous sulfate)           magnesium oxide 400 mg tablet  Commonly known as: Mag-Ox           methotrexate 2.5 mg tablet  Commonly known as: Trexall  Notes to patient: Resume home schedule           metoprolol succinate XL 50 mg 24 hr tablet  Commonly known as: Toprol-XL           predniSONE 5 mg tablet  Commonly known as: Deltasone           simvastatin 40 mg tablet  Commonly known as: Zocor      Take 1 tablet (40 mg) by mouth once daily at bedtime.       Trelegy Ellipta 100-62.5-25 mcg blister with device  Generic drug: fluticasone-umeclidin-vilanter           Vitamin B-12 5,000 mcg tablet, sublingual  Generic drug: cyanocobalamin (vitamin B-12)                     Where to Get Your Medications        These medications were sent to [facility] [address] [place] [address]  [address] [facility]  [address]      Phone: [phone]   oxyCODONE 5 mg immediate release tablet  sulfamethoxazole-trimethoprim 800-160 mg tablet         Test Results Pending At Discharge  Pending Labs       Order Current Status    Extra Urine Gray Tube Collected (11/18/23 0553)    Surgical Pathology Exam In process    Urinalysis with Reflex Microscopic and Culture In process            Hospital Course  Patient is a 68-year-old male with a past medical history of AAA s/p repair, pulmonary nodule, arthritic psoriasis, ICD who presented with severe abdominal pain and hematuria.  He described the pain as diffuse with the left lower quadrant being especially worse.  He also reported nausea, vomiting and diarrhea.  Patient had some electrolyte disturbances which were replenished.  CT scan showed signs of urothelial malignancy in the left ureter as well as some signs of endoleak, vascular surgery and urology were consulted in the ED.  Patient was also found to have a UTI on UA.  His hospital course was complicated by an LAWRENCE.  Patient was given 3-day course of Rocephin.  Cystoscopy was performed on 11/22 with biopsy of mass performed.  Hays catheter was also inserted during cystoscopy.  Patient reports feeling better overall with some abdominal tenderness that persists but has improved since admission.  Patient is to be discharged on oxycodone and Bactrim twice daily for 3 days.  Patient should follow-up with urology within 1 week of discharge.  Patient should follow-up with his PCP within 1 week of discharge.    Pertinent Physical Exam At Time of Discharge  Physical Exam    Outpatient Follow-Up  Future Appointments   Date Time Provider Department Center   11/27/2023 11:15 AM Olvin Alfred MD PhD [id number] [place]         Chris Pereira DO

## 2023-11-22 NOTE — ANESTHESIA PROCEDURE NOTES
Airway  Date/Time: 11/22/2023 1:35 PM  Urgency: elective    Airway not difficult    Staffing  Performed: CRNA   Authorized by: Jono Wylie MD    Performed by: COURTNEY Mcintyre-MAXIM  Patient location during procedure: OR    Indications and Patient Condition  Indications for airway management: anesthesia  Spontaneous ventilation: present  Sedation level: deep  Preoxygenated: yes  Patient position: sniffing  Mask difficulty assessment: 0 - not attempted  Planned trial extubation    Final Airway Details  Final airway type: supraglottic airway      Successful airway: Size 4     Number of attempts at approach: 1

## 2023-11-22 NOTE — HOSPITAL COURSE
Patient is a 68-year-old male with a past medical history of AAA s/p repair, pulmonary nodule, arthritic psoriasis, ICD who presented with severe abdominal pain and hematuria.  He described the pain as diffuse with the left lower quadrant being especially worse.  He also reported nausea, vomiting and diarrhea.  Patient had some electrolyte disturbances which were replenished.  CT scan showed signs of urothelial malignancy in the left ureter as well as some signs of endoleak, vascular surgery and urology were consulted in the ED.  Patient was also found to have a UTI on UA.  His hospital course was complicated by an LAWRENCE.  Patient was given 3-day course of Rocephin.  Cystoscopy was performed on 11/22 with biopsy of mass performed.  Hays catheter was also inserted during cystoscopy.  Patient reports feeling better overall with some abdominal tenderness that persists but has improved since admission.  Patient is to be discharged on ciprofloxacin 500 mg twice daily for 3 days.  Patient should follow-up with urology within 1 week of discharge.  Patient should follow-up with his PCP within 1 week of discharge.

## 2023-11-22 NOTE — PROGRESS NOTES
Subjective:  Patient seen at bedside.  He reports feeling better today.  Denies any chest pain, shortness of breath. Improved abdominal pain.  Plan for cystoscopy today.         Physical Exam  Constitutional: No respiratory acute distress, cooperative, answers questions appropriately then elderly male  Eyes: EOMI, clear sclera  ENMT: mucous membranes moist  Head/Neck: Neck supple, Trachea midline  Respiratory/Thorax: CTAB, no wheezes, no crackles  Cardiovascular: Tachycardia with regular rate and rhythm, normal s1, s2, no murmurs, distal pulses 2+, no edema  Gastrointestinal: Bowel sounds present, LLQ tenderness, no rebounding, nonperitoneal, no palpable masses  Musculoskeletal: ROM intact, no gross deformity diffuse  Neurological: No focal deficits, normal sensation  Psychological: Appropriate mood and behavior, however extremely anxious  Skin: Warm and dry    Last Recorded Vitals  /88   Pulse 69   Temp 36.6 °C (97.9 °F) (Temporal)   Resp 18   Wt 54.4 kg (119 lb 14.9 oz)   SpO2 94%        Assessment/Plan   Assessment:  Hematuria  Concern for urothelial malignancy  Ureteral mass  Bladder mass  Acute cystitis  Acute kidney injury, resolved  Hypokalemia, resolved  Hyperchloremia, resolved  Diarrhea - resolved  Hypertensive urgency - improved  Nausea and vomiting- resolved  Leukocytosis w/ bandemia  Protein calorie malnutrition  AAA status post-repair  Pulmonary nodule  Arthropathic psoriasis  Presence of automatic (implantable) cardiac defibrillator     -Day 3 Rocephin for UTI.  Blood and urine cx negative. 11/18 CT abdomen with findings compatible to urothelial malignancy. The patient has noted calculi of 11 mm on the left side, his pain is cramping in nature and colicky.   -Urology following, plan for cystoscopy possible TURBT b/l RGP L ureteroscopy L ureteral biopsy w/ possible ureteral stent planned for today.  -Creatinine downtrending, LAWRENCE resolved.  Patient is making urine with no retention.  -Pain  management. IV dilaudid and oral Roxicodone. Bowel regimen as the patient endorses constipation, no concern for C diff.  Ordered Dulcolax once.  -Will consider consulting palliative care for pain management and/or goals of care discussion if the patient pain continues to be difficult to manage, or his prognosis becomes more guarded.  -Will attempt to keep BP well controlled during this hospitalization to decrease the strain on the AAA. PRN antihypertensives added.  -Suspended DVT prophylaxis heparin and holding aspirin while hematuria is prominent. Continued to use SCDs for dvt prophylaxis.       Chirs Pereira, DO

## 2023-11-22 NOTE — OP NOTE
CYSTOSCOPY/ LEFT URETEROSCOPY/ INSERTION OF STENT WITH C-ARM/  URETER BIOPSY (L) Operative Note     Date: 2023 - 2023  OR Location: [facility]    Name: Yobany Small : 1955, Age: 68 y.o., MRN: [id number], Sex: male    Diagnosis  Pre-op Diagnosis     * Bladder mass [N32.89]     * Ureteral mass [N28.89]     * Ureteral calculus [N20.1] Post-op Diagnosis     * Bladder mass [N32.89]     * Ureteral mass [N28.89]     * Ureteral calculus [N20.1]     Procedures  CYSTOSCOPY/ LEFT URETEROSCOPY/ INSERTION OF STENT WITH C-ARM/  URETER BIOPSY  95113 - [place] CYSTO W/INSERT URETERAL STENT      Surgeons      * Juan A You - Primary    Resident/Fellow/Other Assistant:  Surgeon(s) and Role:    Procedure Summary  Anesthesia: General  ASA: III  Anesthesia Staff: Anesthesiologist: Jono Wylie MD  CRNA: COURTNEY Mcintyre-CRNA  Estimated Blood Loss: none mL  Intra-op Medications:   Medication Name Total Dose   sodium chloride 0.9 % irrigation solution 3,000 mL              Anesthesia Record               Intraprocedure I/O Totals          Intake    .00 mL    Propofol Drip 0.00 mL    The total shown is the total volume documented since Anesthesia Start was filed.    Total Intake 500 mL       Output    Est. Blood Loss 30 mL    Total Output 30 mL       Net    Net Volume 470 mL          Specimen:   ID Type Source Tests Collected by Time   1 : LEFT URETERAL TUMOR Tissue URETER BIOPSY LEFT SURGICAL PATHOLOGY EXAM Juan A You MD 2023 1349        Staff:   Circulator: Elyssa Finn RN; Carmen Nielsen RN; Gabrielle Claire, RN  Scrub Person: Curry Alejandra; Matt Velazquez RN         Drains and/or Catheters:   Urethral Catheter 20 Fr. 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 male who is having surgery for Bladder mass [N32.89]  Ureteral mass [N28.89]  Ureteral calculus  [N20.1]. none    The patient was seen in the preoperative area. The risks, benefits, complications, treatment options, non-operative alternatives, expected recovery and outcomes were discussed with the patient. The possibilities of reaction to medication, pulmonary aspiration, injury to surrounding structures, bleeding, recurrent infection, the need for additional procedures, failure to diagnose a condition, and creating a complication requiring transfusion or operation were discussed with the patient. The patient concurred with the proposed plan, giving informed consent.  The site of surgery was properly noted/marked if necessary per policy. The patient has been actively warmed in preoperative area. Preoperative antibiotics have been ordered and given within 1 hours of incision. Venous thrombosis prophylaxis have been ordered including bilateral sequential compression devices    Procedure Details: The patient was taken to the operative suite after informed consent was obtained.  Cystoscopy was performed with a 30 an 70 degree lens.  There was no evidence of tumors, diverticuli or stones in the bladder.  There was a large median lobe of the prostate making it difficult to find the ureter.   A glidewire was inserted up the left ureter and ureteroscopy was then performed.  Tumor that is consistent with transitional cell carcinoma was seen in the ureter.  Using a stone basket, the tumor was grasped and some tumor was sent for analysis.  There was a significant amount of tumor blocking the ureter.  At this point, a glidewire was manipulated into the renal pelvis and A 6 x 26 JJ stent was inserted and position was confirmed with cystoscopy and fluoroscopic guidance.  The bladder was drained and the patient was taken to the recovery room in good condition.   The patient will very likely need a laparoscopic nephroureterectomy pending pathology.  A juarez was placed.      Complications:  None; patient tolerated the procedure  well.    Disposition: PACU - hemodynamically stable.  Condition: stable         Additional Details: none    Attending Attestation: I was present and scrubbed for the entire procedure.    [employer]  Phone Number: [phone]

## 2023-11-27 ENCOUNTER — APPOINTMENT (OUTPATIENT)
Dept: CARDIOLOGY | Facility: CLINIC | Age: 68
End: 2023-11-27
Payer: COMMERCIAL

## 2023-12-07 LAB
LABORATORY COMMENT REPORT: NORMAL
PATH REPORT.FINAL DX SPEC: NORMAL
PATH REPORT.GROSS SPEC: NORMAL
PATH REPORT.RELEVANT HX SPEC: NORMAL
PATH REPORT.TOTAL CANCER: NORMAL

## 2023-12-22 ENCOUNTER — HOSPITAL ENCOUNTER (OUTPATIENT)
Dept: CARDIOLOGY | Facility: HOSPITAL | Age: 68
Discharge: HOME | End: 2023-12-22
Payer: COMMERCIAL

## 2023-12-22 PROCEDURE — 93005 ELECTROCARDIOGRAM TRACING: CPT

## 2023-12-27 LAB
ATRIAL RATE: 84 BPM
PR INTERVAL: 35 MS
Q ONSET: 251 MS
QRS COUNT: 13 BEATS
QRS DURATION: 144 MS
QT INTERVAL: 442 MS
QTC CALCULATION(BAZETT): 507 MS
QTC FREDERICIA: 484 MS
R AXIS: -6 DEGREES
T AXIS: 37 DEGREES
T OFFSET: 472 MS
VENTRICULAR RATE: 79 BPM

## 2023-12-29 LAB
ATRIAL RATE: 87 BPM
P AXIS: 78 DEGREES
P OFFSET: 190 MS
P ONSET: 152 MS
PR INTERVAL: 138 MS
Q ONSET: 221 MS
QRS COUNT: 14 BEATS
QRS DURATION: 92 MS
QT INTERVAL: 392 MS
QTC CALCULATION(BAZETT): 471 MS
QTC FREDERICIA: 443 MS
R AXIS: 94 DEGREES
T AXIS: 62 DEGREES
T OFFSET: 417 MS
VENTRICULAR RATE: 87 BPM

## 2024-01-15 ENCOUNTER — OFFICE VISIT (OUTPATIENT)
Dept: CARDIOLOGY | Facility: CLINIC | Age: 69
End: 2024-01-15
Payer: COMMERCIAL

## 2024-01-15 VITALS
OXYGEN SATURATION: 98 % | RESPIRATION RATE: 16 BRPM | BODY MASS INDEX: 17.3 KG/M2 | SYSTOLIC BLOOD PRESSURE: 138 MMHG | WEIGHT: 124 LBS | DIASTOLIC BLOOD PRESSURE: 90 MMHG | HEART RATE: 80 BPM

## 2024-01-15 DIAGNOSIS — Z01.810 PREOP CARDIOVASCULAR EXAM: ICD-10-CM

## 2024-01-15 DIAGNOSIS — I25.118 CORONARY ARTERY DISEASE INVOLVING NATIVE CORONARY ARTERY OF NATIVE HEART WITH OTHER FORM OF ANGINA PECTORIS (CMS-HCC): Primary | ICD-10-CM

## 2024-01-15 DIAGNOSIS — R06.02 SHORTNESS OF BREATH: ICD-10-CM

## 2024-01-15 PROCEDURE — 1159F MED LIST DOCD IN RCRD: CPT | Performed by: STUDENT IN AN ORGANIZED HEALTH CARE EDUCATION/TRAINING PROGRAM

## 2024-01-15 PROCEDURE — 3080F DIAST BP >= 90 MM HG: CPT | Performed by: STUDENT IN AN ORGANIZED HEALTH CARE EDUCATION/TRAINING PROGRAM

## 2024-01-15 PROCEDURE — 1125F AMNT PAIN NOTED PAIN PRSNT: CPT | Performed by: STUDENT IN AN ORGANIZED HEALTH CARE EDUCATION/TRAINING PROGRAM

## 2024-01-15 PROCEDURE — 99215 OFFICE O/P EST HI 40 MIN: CPT | Performed by: STUDENT IN AN ORGANIZED HEALTH CARE EDUCATION/TRAINING PROGRAM

## 2024-01-15 PROCEDURE — 3075F SYST BP GE 130 - 139MM HG: CPT | Performed by: STUDENT IN AN ORGANIZED HEALTH CARE EDUCATION/TRAINING PROGRAM

## 2024-01-15 PROCEDURE — 1036F TOBACCO NON-USER: CPT | Performed by: STUDENT IN AN ORGANIZED HEALTH CARE EDUCATION/TRAINING PROGRAM

## 2024-01-15 RX ORDER — METOPROLOL SUCCINATE 50 MG/1
50 TABLET, EXTENDED RELEASE ORAL DAILY
Qty: 90 TABLET | Refills: 3 | Status: SHIPPED | OUTPATIENT
Start: 2024-01-15 | End: 2024-05-10 | Stop reason: HOSPADM

## 2024-01-15 RX ORDER — SACUBITRIL AND VALSARTAN 24; 26 MG/1; MG/1
1 TABLET, FILM COATED ORAL 2 TIMES DAILY
Qty: 180 TABLET | Refills: 3 | Status: SHIPPED | OUTPATIENT
Start: 2024-01-15 | End: 2025-01-14

## 2024-01-15 NOTE — PROGRESS NOTES
From November 2022    Signed  Encounter Date: 11/14/2022  Olvin Alfred MD PhD         Diagnoses/Problems  Assessed    · AAA (abdominal aortic aneurysm) (441.4) (I71.40)   · Abd Aortic ultrasound [10/10/2019]: Aorta/Common Iliac Arteries/IVC: A fusiform aneurysm      is noted at the level of the infrarenal aorta.   · CAD in native artery (414.01) (I25.10)   · HTN (hypertension) (401.9) (I10)     History of Present Illness  February 6, 2020  Patient is a 64-year-old male who underwent left heart catheterization and coronary angiogram with me in January 2020 and was found to have significant stenosis in a large size left circumflex which was treated with one drug-eluting stent. He was also found to have 40% proximal LAD stenosis and a small nondominant RCA. His ejection fraction was 20% in echocardiogram. Also there was some questionable mass in right atrium which was evaluated in transesophageal echocardiogram and possibly was a prominent eustachian valve further evaluation with cardiac MRI was suggested. Patient currently has LifeVest. He denies having any chest pain or shortness of breath or palpitation. His EKG today shows sinus rhythm with a rate of 98 and mild nonspecific ST-T changes. An echocardiogram he also had mild aortic regurgitation. His past medical history is also significant for hypertension,AAA 5.2 followed by Dr. Ngo, COPD, psoriatic arthritis on MTX.     June 17, 2020  Patient was admitted to the hospital in May 2020 because of rupture of the aortic aneurysm for which she received emergent surgery. He had prolonged recovery after that in the hospital. We repeated echocardiogram in May 2020 why he was in the hospital and ejection fraction remained to be low at 20-25%. He is here today for follow-up. He denies having any chest pain or shortness of breath. He is veering to LifeVest.     April 5, 2021  Patient denies having any chest pain or shortness of breath or palpitations. He had his  ICD placed in July 2020. His EKG today shows sinus rhythm with a rate of 100 and evidence for LVH and no significant ST-T changes. He had labs done in December 2020 that showed hemoglobin of 12, creatinine 0.9, potassium 4.4 and magnesium 2.1. He has gained about 16 pounds since last visit and feels significantly better. Following up with vascular surgery in couple weeks     December 15, 2021  Patient denies having chest pain shortness of breath or palpitation and overall feels good.        November 14, 2022  Patient denies having chest pain or shortness of breath or palpitation.  His weight is down about 9 pounds compared to the last visit. He had an EKG done in March 2022 that showed sinus rhythm with a rate of 88 and no significant ST-T changes. He had labs done in March 2022 that showed potassium 4.9, creatinine 0.9              Follow up visit    01/15/24    Yobany Small is a 68 y.o. male who is here for follow-up after over 1 year.  Patient was found to have renal cancer for which he requires to undergo surgery.  He was also found to have apparently a leak in his aortic aneurysm repair for which he also needs surgery and follows with Dr. Ngo.  Patient denies having chest pain but reports having shortness of breath with moderate to heavy activity.      Past Medical History  Past Medical History:   Diagnosis Date    Abnormal findings on diagnostic imaging of heart and coronary circulation     Abnormal echocardiogram    Abnormal findings on diagnostic imaging of other specified body structures     Abnormal ultrasound    Abnormal findings on diagnostic imaging of other specified body structures     Abnormal ultrasound    Abnormal findings on diagnostic imaging of other specified body structures     Abnormal chest x-ray    Abnormal findings on diagnostic imaging of other specified body structures     Abnormal CT of the chest    Abnormal findings on diagnostic imaging of other specified body structures      Abnormal computed tomography angiography (CTA)    Abnormal findings on diagnostic imaging of other specified body structures     Abnormal CT of the chest    Personal history of other medical treatment     History of echocardiogram        Past Surgical History  Past Surgical History:   Procedure Laterality Date    CT ABDOMEN PELVIS ANGIOGRAM W AND/OR WO IV CONTRAST  11/7/2019    CT ABDOMEN PELVIS ANGIOGRAM W AND/OR WO IV CONTRAST 11/7/2019 [facility]    CT ABDOMEN PELVIS ANGIOGRAM W AND/OR WO IV CONTRAST  7/8/2020    CT ABDOMEN PELVIS ANGIOGRAM W AND/OR WO IV CONTRAST 7/8/2020 [facility]    OTHER SURGICAL HISTORY  05/30/2019    Salivary gland surgery    OTHER SURGICAL HISTORY  06/16/2020    Bronchoscopy    OTHER SURGICAL HISTORY  06/16/2020    Seroma incision and drainage    OTHER SURGICAL HISTORY  04/02/2021    Cardioverter defibrillator insertion    OTHER SURGICAL HISTORY  01/30/2020    Cardiac catheterization with stent placement    OTHER SURGICAL HISTORY  06/16/2020    Abdominal aortic aneurysm repair endovascular        Social history  Social History     Socioeconomic History    Marital status:      Spouse name: Not on file    Number of children: Not on file    Years of education: Not on file    Highest education level: Not on file   Occupational History    Not on file   Tobacco Use    Smoking status: Never    Smokeless tobacco: Never   Substance and Sexual Activity    Alcohol use: Not on file    Drug use: Not on file    Sexual activity: Not on file   Other Topics Concern    Not on file   Social History Narrative    Not on file     Social Determinants of Health     Financial Resource Strain: Low Risk  (11/18/2023)    Overall Financial Resource Strain (CARDIA)     Difficulty of Paying Living Expenses: Not hard at all   Food Insecurity: Not on file   Transportation Needs: No Transportation Needs (11/18/2023)    PRAPARE - Transportation     Lack of Transportation (Medical): No     Lack of  Transportation (Non-Medical): No   Physical Activity: Not on file   Stress: Not on file   Social Connections: Not on file   Intimate Partner Violence: Not on file   Housing Stability: Low Risk  (11/18/2023)    Housing Stability Vital Sign     Unable to Pay for Housing in the Last Year: No     Number of Places Lived in the Last Year: 1     Unstable Housing in the Last Year: No        Family History  No family history on file.     12 system point of review is negative except for what described in history of present illness    Allergies:  No Known Allergies     Outpatient Medications:  Current Outpatient Medications   Medication Instructions    aspirin 81 mg, oral, Daily    cyanocobalamin, vitamin B-12, (Vitamin B-12) 5,000 mcg tablet, sublingual sublingual    ferrous sulfate (325 mg ferrous sulfate) (FEOSOL) 325 mg, oral, Daily with breakfast    fluticasone-umeclidin-vilanter (Trelegy Ellipta) 100-62.5-25 mcg blister with device inhalation    magnesium oxide (MAG-OX) 400 mg, Daily    methotrexate (TREXALL) 2.5 mg, oral, Weekly, Follow directions carefully, and ask to explain any part you do not understand. Take exactly as directed.<BR>6 tabs Saturday    metoprolol succinate XL (TOPROL-XL) 50 mg, oral, Daily, Do not crush or chew.    oxyCODONE (ROXICODONE) 5 mg, oral, Every 6 hours PRN    predniSONE (DELTASONE) 5 mg, oral, Daily    sacubitriL-valsartan (Entresto) 24-26 mg tablet 1 tablet, oral, 2 times daily    simvastatin (ZOCOR) 40 mg, oral, Nightly         Last Recorded Vitals:      11/22/2023     2:31 PM 11/22/2023     2:45 PM 11/22/2023     3:00 PM 11/22/2023     3:15 PM 11/22/2023     3:30 PM 11/22/2023     3:45 PM 1/15/2024     2:33 PM   Vitals   Systolic 120 122 122 147 148 145 138   Diastolic 58 65 64 76 74 68 90   Heart Rate 57 57 51 66 62 53 80   Temp 36.8 °C (98.2 °F)         Resp 16 16 16 16 16 16 16   Weight (lb)       124   BMI       17.3 kg/m2   BSA (m2)       1.68 m2   Visit Report       Report     Visit Vitals  /90 (BP Location: Left arm, Patient Position: Sitting)   Pulse 80   Resp 16   Wt 56.2 kg (124 lb)   SpO2 98%   BMI 17.30 kg/m²   Smoking Status Never   BSA 1.68 m²        Physical Exam:    General: Awake, alert/oriented x3, well developed, no acute distress  Head: Atraumatic/Normocephalic  Eyes: Normal external exam, EOMI, PERRLA  ENT: Oropharynx normal, moist mucous membranes  Cardiovascular: RRR, S1/S2, no murmurs, rubs, or gallops, radial pulses +2, no edema of extremities  Pulmonary: CTAB, no respiratory distress. No wheezes, rales, or ronchi  Abdomen: +BS, soft, non-tender, nondistended, no guarding or rebound  MSK: No joint swelling, normal movements of all extremities. Range of motion- normal.  Extremities: no edema, no cyanosis  Neuro: Alert/oriented x3, no focal motor or sensory deficits  Psychiatric: Judgment intact. Appropriate mood and behavior      I reviewed recent available cardiac studies.      Labs    Lab Results   Component Value Date    WBC 10.0 11/22/2023    HGB 11.5 (L) 11/22/2023    HCT 35.0 (L) 11/22/2023     11/22/2023    CHOL 149 05/22/2023    TRIG 85 05/22/2023    HDL 47.1 05/22/2023    ALT 7 (L) 11/18/2023    AST 10 11/18/2023     11/22/2023    K 4.0 11/22/2023     11/22/2023    CREATININE 0.92 11/22/2023    BUN 11 11/22/2023    CO2 28 11/22/2023    TSH 0.57 05/22/2023    INR 1.0 07/09/2020    HGBA1C 5.4 05/22/2023     Lab Results   Component Value Date    CKTOTAL 108 05/06/2020    TROPONINI <0.02 12/21/2020        Lab Results   Component Value Date    INR 1.0 07/09/2020    INR 1.2 (H) 05/13/2020    INR 1.0 05/04/2020    PROTIME 11.6 07/09/2020    PROTIME 13.3 (H) 05/13/2020    PROTIME 11.5 05/04/2020             Assessment/Plan     Considering the patient's extensive cardiac history, coronary artery disease and cardiomyopathy, we will go ahead and obtain echocardiogram as well as a stress test to evaluate cardiac function and any possible residual  ischemia for better risk stratification prior to his planned surgeries.  Will try to get the tests scheduled as soon as possible and provide further recommendations from cardiac standpoint.  Will continue with current medications from cardiac standpoint.    Follow-up in clinic with me in 3 months with prior labs      Olvin Alfred MD, PhD, FAC, [facility]  Interventional Cardiology, [facility]  Associate Professor of Medicine, [facility]  Office: [phone]         **Disclaimer: This note was dictated by speech recognition, and every effort has been made to prevent any error in transcription, however minor errors may be present**

## 2024-01-16 ENCOUNTER — TELEPHONE (OUTPATIENT)
Dept: CARDIOLOGY | Facility: CLINIC | Age: 69
End: 2024-01-16
Payer: COMMERCIAL

## 2024-01-16 DIAGNOSIS — T82.310A TYPE I ENDOLEAK OF AORTIC GRAFT (CMS-HCC): ICD-10-CM

## 2024-01-16 DIAGNOSIS — Z86.79 S/P REPAIR OF ABDOMINAL AORTIC ANEURYSM USING BIFURCATION GRAFT: ICD-10-CM

## 2024-01-16 DIAGNOSIS — Z95.828 S/P REPAIR OF ABDOMINAL AORTIC ANEURYSM USING BIFURCATION GRAFT: ICD-10-CM

## 2024-01-18 ENCOUNTER — TELEPHONE (OUTPATIENT)
Dept: VASCULAR SURGERY | Facility: CLINIC | Age: 69
End: 2024-01-18
Payer: COMMERCIAL

## 2024-01-19 ENCOUNTER — TELEPHONE (OUTPATIENT)
Dept: VASCULAR SURGERY | Facility: CLINIC | Age: 69
End: 2024-01-19
Payer: COMMERCIAL

## 2024-01-19 NOTE — TELEPHONE ENCOUNTER
I have attempted to contact  And Mrs. Small     . There is no answer at the following phone number [phone] . I have left a voice mail message for the patient to contact Dr. Street's  office nurse at [phone] to schedule an appointment to be evaluated by Dr. Street. nt.  Radha Fountain RN BSN

## 2024-01-22 ENCOUNTER — APPOINTMENT (OUTPATIENT)
Dept: CARDIOLOGY | Facility: CLINIC | Age: 69
End: 2024-01-22
Payer: COMMERCIAL

## 2024-01-23 ENCOUNTER — HOSPITAL ENCOUNTER (OUTPATIENT)
Dept: RADIOLOGY | Facility: CLINIC | Age: 69
Discharge: HOME | End: 2024-01-23
Payer: COMMERCIAL

## 2024-01-23 ENCOUNTER — HOSPITAL ENCOUNTER (OUTPATIENT)
Dept: CARDIOLOGY | Facility: CLINIC | Age: 69
Discharge: HOME | End: 2024-01-23
Payer: COMMERCIAL

## 2024-01-23 ENCOUNTER — TELEPHONE (OUTPATIENT)
Dept: CARDIOLOGY | Facility: CLINIC | Age: 69
End: 2024-01-23
Payer: COMMERCIAL

## 2024-01-23 DIAGNOSIS — I25.10 CAD IN NATIVE ARTERY: Primary | ICD-10-CM

## 2024-01-23 DIAGNOSIS — R06.02 SHORTNESS OF BREATH: ICD-10-CM

## 2024-01-23 DIAGNOSIS — D64.9 ANEMIA: ICD-10-CM

## 2024-01-23 DIAGNOSIS — I42.9 CARDIOMYOPATHY, UNSPECIFIED (MULTI): ICD-10-CM

## 2024-01-23 DIAGNOSIS — R07.89 OTHER CHEST PAIN: ICD-10-CM

## 2024-01-23 DIAGNOSIS — Z01.810 PREOP CARDIOVASCULAR EXAM: ICD-10-CM

## 2024-01-23 DIAGNOSIS — I25.118 CORONARY ARTERY DISEASE INVOLVING NATIVE CORONARY ARTERY OF NATIVE HEART WITH OTHER FORM OF ANGINA PECTORIS (CMS-HCC): ICD-10-CM

## 2024-01-23 DIAGNOSIS — I35.1 NONRHEUMATIC AORTIC (VALVE) INSUFFICIENCY: ICD-10-CM

## 2024-01-23 DIAGNOSIS — I25.5 ISCHEMIC CARDIOMYOPATHY: ICD-10-CM

## 2024-01-23 DIAGNOSIS — I25.10 ATHEROSCLEROTIC HEART DISEASE OF NATIVE CORONARY ARTERY WITHOUT ANGINA PECTORIS: ICD-10-CM

## 2024-01-23 DIAGNOSIS — Z01.818 ENCOUNTER FOR OTHER PREPROCEDURAL EXAMINATION: ICD-10-CM

## 2024-01-23 LAB
AORTIC VALVE MEAN GRADIENT: 2.4 MMHG
AORTIC VALVE PEAK VELOCITY: 1.04 M/S
AV PEAK GRADIENT: 4.3 MMHG
AVA (PEAK VEL): 2.51 CM2
AVA (VTI): 2.07 CM2
EJECTION FRACTION APICAL 4 CHAMBER: 37.9
EJECTION FRACTION: 25 %
LEFT VENTRICLE INTERNAL DIMENSION DIASTOLE: 4.44 CM (ref 3.5–6)
LEFT VENTRICULAR OUTFLOW TRACT DIAMETER: 2 CM
RIGHT VENTRICLE FREE WALL PEAK S': 0.12 CM/S
RIGHT VENTRICLE PEAK SYSTOLIC PRESSURE: 35.9 MMHG
TRICUSPID ANNULAR PLANE SYSTOLIC EXCURSION: 1.5 CM

## 2024-01-23 PROCEDURE — 93306 TTE W/DOPPLER COMPLETE: CPT

## 2024-01-23 PROCEDURE — 2500000004 HC RX 250 GENERAL PHARMACY W/ HCPCS (ALT 636 FOR OP/ED): Performed by: STUDENT IN AN ORGANIZED HEALTH CARE EDUCATION/TRAINING PROGRAM

## 2024-01-23 PROCEDURE — 78452 HT MUSCLE IMAGE SPECT MULT: CPT | Performed by: INTERNAL MEDICINE

## 2024-01-23 PROCEDURE — 93016 CV STRESS TEST SUPVJ ONLY: CPT | Performed by: STUDENT IN AN ORGANIZED HEALTH CARE EDUCATION/TRAINING PROGRAM

## 2024-01-23 PROCEDURE — 93306 TTE W/DOPPLER COMPLETE: CPT | Performed by: STUDENT IN AN ORGANIZED HEALTH CARE EDUCATION/TRAINING PROGRAM

## 2024-01-23 PROCEDURE — 78452 HT MUSCLE IMAGE SPECT MULT: CPT

## 2024-01-23 PROCEDURE — 93017 CV STRESS TEST TRACING ONLY: CPT

## 2024-01-23 RX ORDER — REGADENOSON 0.08 MG/ML
0.4 INJECTION, SOLUTION INTRAVENOUS ONCE
Status: COMPLETED | OUTPATIENT
Start: 2024-01-23 | End: 2024-01-23

## 2024-01-23 RX ADMIN — REGADENOSON 0.4 MG: 0.08 INJECTION, SOLUTION INTRAVENOUS at 11:53

## 2024-01-23 RX ADMIN — PERFLUTREN 2 ML OF DILUTION: 6.52 INJECTION, SUSPENSION INTRAVENOUS at 09:46

## 2024-01-23 NOTE — TELEPHONE ENCOUNTER
I have attempted to contact   Mrs. Small    . There is no answer at the following phone number  [phone]        . I have left a voice mail message for the patient to contact Dr. Reynoso office nurse at [phone] to schedule a consultative visit.  Radha Fountain RN BSN

## 2024-01-24 ENCOUNTER — DOCUMENTATION (OUTPATIENT)
Dept: CARDIOLOGY | Facility: CLINIC | Age: 69
End: 2024-01-24
Payer: COMMERCIAL

## 2024-01-24 ENCOUNTER — TELEPHONE (OUTPATIENT)
Dept: VASCULAR SURGERY | Facility: CLINIC | Age: 69
End: 2024-01-24
Payer: COMMERCIAL

## 2024-01-24 NOTE — TELEPHONE ENCOUNTER
"I have had the pleasure of speaking with Mrs. Small.   I have offered to schedule Mr. Small  an office visit  to see Dr. Street.  Per Mrs. Small,  \" I can not  schedule anything at this time my   just had a stress test  I need to speak with cardiology  I am not  scheduling anythng right now. \"    I have provided our office contact information  for Mrs. Small to call to schedule at her convenience, .   TREVIN Fountain RN   "

## 2024-01-24 NOTE — PROGRESS NOTES
I reviewed the results of the echocardiogram and the nuclear stress test that were done for the patient this week to better risk stratify him prior to his upcoming surgeries.  He does have a stable level of cardiomyopathy with low ejection fraction of about 25%.  A nuclear test there was no evidence of ongoing ischemia.  Overall while he is at higher risk from cardiac standpoint for his vascular surgery and surgery for his kidney, I believe the risk is not prohibitive.  I recommend admission to the hospital and consultation with cardiology prior to proceed with his surgeries to decrease the risk of perioperative cardiovascular events.  Will forward the note to Dr. Ngo.

## 2024-01-25 ENCOUNTER — APPOINTMENT (OUTPATIENT)
Dept: RADIOLOGY | Facility: CLINIC | Age: 69
End: 2024-01-25
Payer: COMMERCIAL

## 2024-01-25 ENCOUNTER — APPOINTMENT (OUTPATIENT)
Dept: CARDIOLOGY | Facility: CLINIC | Age: 69
End: 2024-01-25
Payer: COMMERCIAL

## 2024-01-25 NOTE — TELEPHONE ENCOUNTER
Pt wife is calling said Dr DEUTSCH said he was going to call the pt  24hrs after he had his testing done they still have not heard from him or his results pts wife said she left a msg on 1-24-24 also

## 2024-01-26 NOTE — TELEPHONE ENCOUNTER
Called Kristen to discuss clearance. Informed Kristen that Dr. Alfred has reviewed all cardiac testing and has been in touch with Dr. Ngo regarding treatment plan. Kristen concerned because she feels the surgery with Dr. You is so important due to cancer diagnosis.     Informed Kristen that I will review with Dr. Alfred regarding clearance with Dr. You and then I will call her next week with a treatment plan. Kristen very understanding and receptive to our discussion regarding treatment plan. Kristen verbalizes understanding of all instructions and information provided. All questions and concerns addressed at this time.    Please advise regarding surgery with Dr You. Thanks.

## 2024-01-26 NOTE — TELEPHONE ENCOUNTER
Patient is looking for cardiac clearance.  Patient needs cancer surgery as soon as possible.  This is urgent per the wife!  She has called 3 times now trying to get clearance.  Please call her back today.

## 2024-01-29 NOTE — TELEPHONE ENCOUNTER
Per Dr. Alfred, cardiac clearance faxed to Dr. You's office. Also called Kristen and notified her of clearance and for pt to continue baby ASA without interruption. Kristen verbalizes understanding of all instructions.

## 2024-02-05 ENCOUNTER — TELEPHONE (OUTPATIENT)
Dept: VASCULAR SURGERY | Facility: CLINIC | Age: 69
End: 2024-02-05

## 2024-02-05 ENCOUNTER — TELEPHONE (OUTPATIENT)
Dept: VASCULAR SURGERY | Facility: HOSPITAL | Age: 69
End: 2024-02-05
Payer: COMMERCIAL

## 2024-02-05 NOTE — TELEPHONE ENCOUNTER
I have had the pleasure os speaking with Mrs. Small .   The patient has been scheduled for an office visit with Dr. Kar Street on 2/21/2023 @ 9:30 .  Directions to the Vascular Clinic located at [address] [place] [address] provided.   Additionally our  office number has been provided to Mrs. Small.    TREVIN Fountain RN

## 2024-02-05 NOTE — TELEPHONE ENCOUNTER
"Pt called wanting to speak with you regarding his AAA. He stated he was suppose to have another procedure but it was canceled do do his AAA leaking. Pt seemed very agitated and would not give me anymore information.     It looks like Xochitl also spoke to the pt's wife:    I have had the pleasure of speaking with Mrs. Small.   I have offered to schedule Mr. Small  an office visit  to see Dr. Street.  Per Mrs. Small,  \" I can not  schedule anything at this time my   just had a stress test  I need to speak with cardiology  I am not  scheduling anythng right now. \"     I have provided our office contact information  for Mrs. Small to call to schedule at her convenience, .   TREVIN Fountain RN   "

## 2024-02-13 NOTE — PROGRESS NOTES
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     He was taken for L URS by Dr. You with [facility] in 11/2023. Pathology showed detached un oriented fragments of papillary urothelial neoplasm, favoring LG papillary urothelial carcinoma. There is concern for transitional cell carcinoma. Report states there was a significant amount of tumor blocking the ureter and a stent was placed.  However, the size, location and focality of the tumor are not well documented.    He is experiencing significant gross hematuria and LLQ pain now.  He still has a stent in place.  He has quite a large prostate on imaging.    He recently has had other health issues.  He is being evaluated by vascular surgery next week for a ? Endoleak from [facility].  He recently has a echo and then a stress test.  Echocardiogram showed severely decreased EF and injection fraction of 25-30%. Most recent stress test was normal with improved ejection fraction.     Imaging 11/2023 shows L hydronephrosis with potential mass in the mid ureter. There is some thickening of his colon. He had abnormal enhancement of the aneurysm sac compatible with an endoleak.     PMHx: AAA, CAD, COPD, HTN,     PSHx: Endovascular stent repair     Social: Former smoker    Family: None.             Review of Systems   All other systems reviewed and are negative.      Objective   Physical Exam  Gen: No acute distress     Psych: Alert and oriented x3     Neuro:  Normal ROM    Resp: Nonlabored respirations     CV: Regular rate and rhythm     Abd: S, NT, ND. 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 He was taken for L URS by Dr. You with [facility] in 11/2023. Pathology showed detached un  oriented fragments of papillary urothelial neoplasm, favoring LG papillary urothelial carcinoma. There is concern for transitional cell carcinoma. They used a basket. Report states there was a significant amount of tumor blocking the ureter and a stent was placed.     I personally reviewed his imaging and we discussed this in detail. Imaging 11/2023 shows L hydronephrosis with potential mass in the mid ureter, but it is difficult to tell. There is some thickening of his colon. He had abnormal enhancement of the aneurysm sac compatible with an endoleak.     I explained that his LLQ and urinary symptoms is most likely related to his stent in place. We can try a medication which is usually prescribed to address stent related urinary symptoms. Side effects of the medication were discussed with the patient including dry mouth, dry eyes and constipation.     We discussed that I believe he requires a repeat left ureteroscopy however, he will need to see vascular surgery first to be cleared. Ureteroscopy is an outpatient procedure that will be performed downtown. I would remove the stent in place and determine the focality and size of tumor. The patient understands and agrees with this plan.                  Plan:  Patient is seeing vascular surgery, Dr. Street, next week.  AAA issues, if any, take precedence over this. If cleared will plan for a left diagnostic URS to determine size, location and focality of treatment.  I would love to try to avoid a nephroureterectomy on a LG tumor in a male with multiple comorbidities if possible.            Scribe Attestation  By signing my name below, I, Katelyn Casalla, Scribe   attest that this documentation has been prepared under the direction and in the presence of Дмитрий Thompson MD MPH.

## 2024-02-16 ENCOUNTER — OFFICE VISIT (OUTPATIENT)
Dept: UROLOGY | Facility: CLINIC | Age: 69
End: 2024-02-16
Payer: COMMERCIAL

## 2024-02-16 VITALS
DIASTOLIC BLOOD PRESSURE: 70 MMHG | SYSTOLIC BLOOD PRESSURE: 136 MMHG | HEART RATE: 76 BPM | BODY MASS INDEX: 17.36 KG/M2 | HEIGHT: 71 IN | WEIGHT: 124 LBS

## 2024-02-16 DIAGNOSIS — C66.2 MALIGNANT NEOPLASM OF LEFT URETER (MULTI): Primary | ICD-10-CM

## 2024-02-16 PROBLEM — C66.9: Status: ACTIVE | Noted: 2024-02-16

## 2024-02-16 PROCEDURE — 1125F AMNT PAIN NOTED PAIN PRSNT: CPT | Performed by: STUDENT IN AN ORGANIZED HEALTH CARE EDUCATION/TRAINING PROGRAM

## 2024-02-16 PROCEDURE — 3078F DIAST BP <80 MM HG: CPT | Performed by: STUDENT IN AN ORGANIZED HEALTH CARE EDUCATION/TRAINING PROGRAM

## 2024-02-16 PROCEDURE — 99204 OFFICE O/P NEW MOD 45 MIN: CPT | Performed by: STUDENT IN AN ORGANIZED HEALTH CARE EDUCATION/TRAINING PROGRAM

## 2024-02-16 PROCEDURE — 1160F RVW MEDS BY RX/DR IN RCRD: CPT | Performed by: STUDENT IN AN ORGANIZED HEALTH CARE EDUCATION/TRAINING PROGRAM

## 2024-02-16 PROCEDURE — 1036F TOBACCO NON-USER: CPT | Performed by: STUDENT IN AN ORGANIZED HEALTH CARE EDUCATION/TRAINING PROGRAM

## 2024-02-16 PROCEDURE — 3075F SYST BP GE 130 - 139MM HG: CPT | Performed by: STUDENT IN AN ORGANIZED HEALTH CARE EDUCATION/TRAINING PROGRAM

## 2024-02-16 PROCEDURE — 1159F MED LIST DOCD IN RCRD: CPT | Performed by: STUDENT IN AN ORGANIZED HEALTH CARE EDUCATION/TRAINING PROGRAM

## 2024-02-16 NOTE — LETTER
February 17, 2024     Kar Street MD  [id number] [address]  Department Of Surgery-Vascular  [facility] [address]    Patient: Yobany Small   YOB: 1955   Date of Visit: 2/16/2024       Dear Dr. Kar Street MD:    Below are my notes for this consultation.  If you have questions, please do not hesitate to call me. I look forward to following your patient along with you.       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     He was taken for L URS by Dr. You with [facility] in 11/2023. Pathology showed detached un oriented fragments of papillary urothelial neoplasm, favoring LG papillary urothelial carcinoma. There is concern for transitional cell carcinoma. Report states there was a significant amount of tumor blocking the ureter and a stent was placed.  However, the size, location and focality of the tumor are not well documented.    He is experiencing significant gross hematuria and LLQ pain now.  He still has a stent in place.  He has quite a large prostate on imaging.    He recently has had other health issues.  He is being evaluated by vascular surgery next week for a ? Endoleak from [facility].  He recently has a echo and then a stress test.  Echocardiogram showed severely decreased EF and injection fraction of 25-30%. Most recent stress test was normal with improved ejection fraction.     Imaging 11/2023 shows L hydronephrosis with potential mass in the mid ureter. There is some thickening of his colon. He had abnormal enhancement of the aneurysm sac compatible with an endoleak.     PMHx: AAA, CAD, COPD, HTN,     PSHx: Endovascular stent repair     Social: Former smoker    Family: None.             Review of Systems   All other systems reviewed and are  negative.      Objective   Physical Exam  Gen: No acute distress     Psych: Alert and oriented x3     Neuro:  Normal ROM    Resp: Nonlabored respirations     CV: Regular rate and rhythm     Abd: S, NT, ND. Thin    : Deferred    Skin: Warm, dry and intact without rashes     Lymphatics: No peripheral edema       Assessment/Plan   Problem List Items Addressed This Visit             ICD-10-CM    Ureteral cancer (CMS/HCC) - Primary C66.9     I reviewed his operative note. He was taken for L URS by Dr. You with [facility] in 11/2023. Pathology showed detached un oriented fragments of papillary urothelial neoplasm, favoring LG papillary urothelial carcinoma. There is concern for transitional cell carcinoma. They used a basket. Report states there was a significant amount of tumor blocking the ureter and a stent was placed.     I personally reviewed his imaging and we discussed this in detail. Imaging 11/2023 shows L hydronephrosis with potential mass in the mid ureter, but it is difficult to tell. There is some thickening of his colon. He had abnormal enhancement of the aneurysm sac compatible with an endoleak.     I explained that his LLQ and urinary symptoms is most likely related to his stent in place. We can try a medication which is usually prescribed to address stent related urinary symptoms. Side effects of the medication were discussed with the patient including dry mouth, dry eyes and constipation.     We discussed that I believe he requires a repeat left ureteroscopy however, he will need to see vascular surgery first to be cleared. Ureteroscopy is an outpatient procedure that will be performed downtown. I would remove the stent in place and determine the focality and size of tumor. The patient understands and agrees with this plan.                  Plan:  Patient is seeing vascular surgery, Dr. Street, next week.  AAA issues, if any, take precedence over this. If cleared will plan for a left diagnostic URS to  determine size, location and focality of treatment.  I would love to try to avoid a nephroureterectomy on a LG tumor in a male with multiple comorbidities if possible.            Scribe Attestation  By signing my name below, I, Katelyn Casalla, Scribe   attest that this documentation has been prepared under the direction and in the presence of Дмитрий Thompson MD MPH.

## 2024-02-16 NOTE — ASSESSMENT & PLAN NOTE
I reviewed his operative note. He was taken for L URS by Dr. You with [facility] in 11/2023. Pathology showed detached un oriented fragments of papillary urothelial neoplasm, favoring LG papillary urothelial carcinoma. There is concern for transitional cell carcinoma. They used a basket. Report states there was a significant amount of tumor blocking the ureter and a stent was placed.     I personally reviewed his imaging and we discussed this in detail. Imaging 11/2023 shows L hydronephrosis with potential mass in the mid ureter, but it is difficult to tell. There is some thickening of his colon. He had abnormal enhancement of the aneurysm sac compatible with an endoleak.     I explained that his LLQ and urinary symptoms is most likely related to his stent in place. We can try a medication which is usually prescribed to address stent related urinary symptoms. Side effects of the medication were discussed with the patient including dry mouth, dry eyes and constipation.     We discussed that I believe he requires a repeat left ureteroscopy however, he will need to see vascular surgery first to be cleared. This is an outpatient procedure that will be performed downtown. I would remove the stent in place and determine the focality and size of tumor. The patient understands and agrees with this plan.

## 2024-02-17 RX ORDER — OXYBUTYNIN CHLORIDE 10 MG/1
10 TABLET, EXTENDED RELEASE ORAL DAILY PRN
Qty: 30 TABLET | Refills: 1 | Status: SHIPPED | OUTPATIENT
Start: 2024-02-17 | End: 2024-04-15

## 2024-02-21 ENCOUNTER — TELEPHONE (OUTPATIENT)
Dept: VASCULAR SURGERY | Facility: HOSPITAL | Age: 69
End: 2024-02-21

## 2024-02-21 ENCOUNTER — OFFICE VISIT (OUTPATIENT)
Dept: VASCULAR SURGERY | Facility: HOSPITAL | Age: 69
End: 2024-02-21
Payer: COMMERCIAL

## 2024-02-21 VITALS
OXYGEN SATURATION: 93 % | HEART RATE: 70 BPM | HEIGHT: 71 IN | DIASTOLIC BLOOD PRESSURE: 80 MMHG | SYSTOLIC BLOOD PRESSURE: 137 MMHG | WEIGHT: 123.6 LBS | BODY MASS INDEX: 17.3 KG/M2

## 2024-02-21 DIAGNOSIS — I71.33 RUPTURED INFRARENAL ABDOMINAL AORTIC ANEURYSM (AAA) (MULTI): ICD-10-CM

## 2024-02-21 DIAGNOSIS — T82.310A TYPE IA ENDOLEAK OF AORTIC GRAFT (CMS-HCC): ICD-10-CM

## 2024-02-21 DIAGNOSIS — I71.43 INFRARENAL ABDOMINAL AORTIC ANEURYSM (AAA) WITHOUT RUPTURE (CMS-HCC): Primary | ICD-10-CM

## 2024-02-21 DIAGNOSIS — C66.9: ICD-10-CM

## 2024-02-21 PROBLEM — R10.30 LOWER ABDOMINAL PAIN: Status: ACTIVE | Noted: 2023-03-14

## 2024-02-21 PROBLEM — R93.89 ABNORMAL COMPUTED TOMOGRAPHY SCAN: Status: ACTIVE | Noted: 2024-02-21

## 2024-02-21 PROBLEM — R06.02 SHORTNESS OF BREATH: Status: ACTIVE | Noted: 2024-02-21

## 2024-02-21 PROBLEM — R93.1 ABNORMAL ECHOCARDIOGRAPHY: Status: ACTIVE | Noted: 2023-11-21

## 2024-02-21 PROBLEM — J02.9 SORE THROAT: Status: ACTIVE | Noted: 2024-02-21

## 2024-02-21 PROBLEM — Z20.822 CONTACT WITH AND (SUSPECTED) EXPOSURE TO COVID-19: Status: ACTIVE | Noted: 2023-03-14

## 2024-02-21 PROCEDURE — 1159F MED LIST DOCD IN RCRD: CPT | Performed by: SURGERY

## 2024-02-21 PROCEDURE — 3078F DIAST BP <80 MM HG: CPT | Performed by: SURGERY

## 2024-02-21 PROCEDURE — 1126F AMNT PAIN NOTED NONE PRSNT: CPT | Performed by: SURGERY

## 2024-02-21 PROCEDURE — 1036F TOBACCO NON-USER: CPT | Performed by: SURGERY

## 2024-02-21 PROCEDURE — 99215 OFFICE O/P EST HI 40 MIN: CPT | Performed by: SURGERY

## 2024-02-21 PROCEDURE — 1160F RVW MEDS BY RX/DR IN RCRD: CPT | Performed by: SURGERY

## 2024-02-21 PROCEDURE — 3075F SYST BP GE 130 - 139MM HG: CPT | Performed by: SURGERY

## 2024-02-21 RX ORDER — TAMSULOSIN HYDROCHLORIDE 0.4 MG/1
0.4 CAPSULE ORAL DAILY
COMMUNITY
Start: 2024-02-20

## 2024-02-21 ASSESSMENT — PATIENT HEALTH QUESTIONNAIRE - PHQ9
1. LITTLE INTEREST OR PLEASURE IN DOING THINGS: NOT AT ALL
SUM OF ALL RESPONSES TO PHQ9 QUESTIONS 1 AND 2: 0
2. FEELING DOWN, DEPRESSED OR HOPELESS: NOT AT ALL

## 2024-02-21 ASSESSMENT — COLUMBIA-SUICIDE SEVERITY RATING SCALE - C-SSRS
2. HAVE YOU ACTUALLY HAD ANY THOUGHTS OF KILLING YOURSELF?: NO
6. HAVE YOU EVER DONE ANYTHING, STARTED TO DO ANYTHING, OR PREPARED TO DO ANYTHING TO END YOUR LIFE?: NO
1. IN THE PAST MONTH, HAVE YOU WISHED YOU WERE DEAD OR WISHED YOU COULD GO TO SLEEP AND NOT WAKE UP?: NO

## 2024-02-21 ASSESSMENT — PAIN SCALES - GENERAL: PAINLEVEL: 0-NO PAIN

## 2024-02-21 NOTE — TELEPHONE ENCOUNTER
I have attempted to contact      . There is no answer at the following phone number  [phone]       . I have left a voice mail message for the patient to contact Dr. Street's  office nurse at [phone] .  Per Dr. Street the patient  needs  a full body PET scan  scheduled. I have called the patient to assist in scheduling.   Radha Fountain RN BSN an

## 2024-02-21 NOTE — PROGRESS NOTES
Vascular Surgery Clinic Note    CC: AAA    HPI:  Yobayn Small is 68 y.o. male with history of rupture AAA in 5/2020 that was treated by [facility] by Dr. Ngo at [place]. 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 At that time she ended up extending the right iliac limb into the external iliac and ligated the hypogastric artery directly. She also injected thrombin into the sac to treat type II leak. He has done well since then. He has known CHF with defibrillator in place and EF of 25-30% and no myocardial ischemia detected on nuclear stress test. This EF is stable compared to echo in 2021.   CTA in 11/2023 shows an obvious type Ia endoleak. The sac measures 4.4cm up from 3.7cm.  Note was made in the report of new 10mm left upper lobe pulmonary nodule. 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  He works part time at a [employer]. He can walk some, but doesn't usually walk long distances. 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 He sees Dr. Thompson for this, who is awaiting my input on his AAA to proceed with potential surgical treatment of the left ureter cancer.    Medical History:   has a past medical history of Abnormal findings on diagnostic imaging of heart and coronary circulation, Abnormal findings on diagnostic imaging of other specified body structures, Abnormal findings on diagnostic imaging of other specified body structures, Abnormal findings on diagnostic imaging of other specified body structures, Abnormal findings on diagnostic imaging of other specified body structures, Abnormal findings on diagnostic imaging of other specified body structures, Abnormal findings on diagnostic imaging of other specified body structures, and Personal history of other medical treatment.    Meds:   Current Outpatient Medications on File Prior to Visit   Medication  Sig Dispense Refill    aspirin 81 mg EC tablet Take 1 tablet (81 mg) by mouth once daily.      methotrexate (Trexall) 2.5 mg tablet Take 1 tablet (2.5 mg total) by mouth 1 (one) time per week.  Follow directions carefully, and ask to explain any part you do not understand. Take exactly as directed.  6 tabs Saturday      metoprolol succinate XL (Toprol-XL) 50 mg 24 hr tablet Take 1 tablet (50 mg) by mouth once daily. Do not crush or chew. 90 tablet 3    predniSONE (Deltasone) 5 mg tablet Take 1 tablet (5 mg) by mouth once daily.      sacubitriL-valsartan (Entresto) 24-26 mg tablet Take 1 tablet by mouth 2 times a day. 180 tablet 3    simvastatin (Zocor) 40 mg tablet Take 1 tablet (40 mg) by mouth once daily at bedtime. 90 tablet 1    tamsulosin (Flomax) 0.4 mg 24 hr capsule       cyanocobalamin, vitamin B-12, (Vitamin B-12) 5,000 mcg tablet, sublingual Place under the tongue.      ferrous sulfate, 325 mg ferrous sulfate, (FeosoL) tablet Take 1 tablet by mouth once daily with breakfast.      fluticasone-umeclidin-vilanter (Trelegy Ellipta) 100-62.5-25 mcg blister with device Inhale.      magnesium oxide (Mag-Ox) 400 mg tablet 1 tablet (400 mg) once daily.      oxybutynin XL (Ditropan-XL) 10 mg 24 hr tablet Take 1 tablet (10 mg) by mouth once daily as needed (take 1 tablet daily as needed for bladder spasms). Do not crush, chew, or split. (Patient not taking: Reported on 2/21/2024) 30 tablet 1    oxyCODONE (Roxicodone) 5 mg immediate release tablet Take 1 tablet (5 mg) by mouth every 6 hours if needed for severe pain (7 - 10). (Patient not taking: Reported on 1/15/2024) 15 tablet 0     No current facility-administered medications on file prior to visit.        Allergies:   No Known Allergies    SH:    Social Determinants of Health     Tobacco Use: Low Risk  (2/21/2024)    Patient History     Smoking Tobacco Use: Never     Smokeless Tobacco Use: Never     Passive Exposure: Not on file   Alcohol Use: Not At Risk  (11/18/2023)    AUDIT-C     Frequency of Alcohol Consumption: Never     Average Number of Drinks: Patient does not drink     Frequency of Binge Drinking: Never   Financial Resource Strain: Low Risk  (11/18/2023)    Overall Financial Resource Strain (CARDIA)     Difficulty of Paying Living Expenses: Not hard at all   Food Insecurity: Not on file   Transportation Needs: No Transportation Needs (11/18/2023)    PRAPARE - Transportation     Lack of Transportation (Medical): No     Lack of Transportation (Non-Medical): No   Physical Activity: Not on file   Stress: Not on file   Social Connections: Not on file   Intimate Partner Violence: Not on file   Depression: Not at risk (2/21/2024)    PHQ-2     PHQ-2 Score: 0   Housing Stability: Low Risk  (11/18/2023)    Housing Stability Vital Sign     Unable to Pay for Housing in the Last Year: No     Number of Places Lived in the Last Year: 1     Unstable Housing in the Last Year: No   Utilities: Not on file   Digital Equity: Not on file        FH:  No family history on file.     ROS:  All systems were reviewed and are negative except as per HPI.    Objective:  Vitals:  Vitals:    02/21/24 0916   BP: 137/80   Pulse: 70   SpO2:         Exam:  In NAD, somewhat frail appearing, but generally well. Thin  Abd Soft, ND/NT. Midline laparotomy scar well healed, no pulsatile mass appreciated.  Vascular examination:  Palpable femoral and pedal pulses  Palpable radial pulses    Assessment & Plan:  Yobany Small is 68 y.o. male with type Ia endoleak. We discussed open repair. R kidney is fed by multiple small arteries. L kidney may need to be removed. He needs a PET CT scan. I will order this and discuss kidney issues with Dr. Thompson. Will discuss in aortic conference as well.      I spent a total of 40 minutes on the day of the visit.         Kar Street M.D.

## 2024-02-23 DIAGNOSIS — C66.2 MALIGNANT NEOPLASM OF LEFT URETER (MULTI): Primary | ICD-10-CM

## 2024-02-29 ENCOUNTER — APPOINTMENT (OUTPATIENT)
Dept: VASCULAR SURGERY | Facility: CLINIC | Age: 69
End: 2024-02-29
Payer: COMMERCIAL

## 2024-03-01 ENCOUNTER — TELEMEDICINE (OUTPATIENT)
Dept: UROLOGY | Facility: CLINIC | Age: 69
End: 2024-03-01
Payer: COMMERCIAL

## 2024-03-01 DIAGNOSIS — C68.9 UROTHELIAL CARCINOMA (MULTI): Primary | ICD-10-CM

## 2024-03-01 PROCEDURE — 1159F MED LIST DOCD IN RCRD: CPT | Performed by: STUDENT IN AN ORGANIZED HEALTH CARE EDUCATION/TRAINING PROGRAM

## 2024-03-01 PROCEDURE — 99214 OFFICE O/P EST MOD 30 MIN: CPT | Performed by: STUDENT IN AN ORGANIZED HEALTH CARE EDUCATION/TRAINING PROGRAM

## 2024-03-01 PROCEDURE — 1126F AMNT PAIN NOTED NONE PRSNT: CPT | Performed by: STUDENT IN AN ORGANIZED HEALTH CARE EDUCATION/TRAINING PROGRAM

## 2024-03-01 PROCEDURE — 1160F RVW MEDS BY RX/DR IN RCRD: CPT | Performed by: STUDENT IN AN ORGANIZED HEALTH CARE EDUCATION/TRAINING PROGRAM

## 2024-03-01 PROCEDURE — 1036F TOBACCO NON-USER: CPT | Performed by: STUDENT IN AN ORGANIZED HEALTH CARE EDUCATION/TRAINING PROGRAM

## 2024-03-01 NOTE — ASSESSMENT & PLAN NOTE
I had a long discussion with the patient's wife. Normally, I would recommend a ureteroscopy to dictate size, location and focality of tumor. However, the patient is planned for a vascular surgery Triple A repair with a vascular graft and I would not be able to perform a diagnostic procedure prior to this surgery. I discussed his case with his vascular surgeon regarding whether we can combine these surgeries and whether I would be able to save the kidney and take only part of the ureter out or remove the entire kidney and ureter.  To minimize his risk of urine leak and graft infection, the best plan would be to remove both the L kidney and the ureter. We discussed the profound risk of graft infection due to urine leak.     A PET scan will be ordered which is helpful for Dr. Street's planned vascular graft. We will also order a CT urogram to look at the lower urinary tract. The patient understands and agrees with this plan.

## 2024-03-01 NOTE — PROGRESS NOTES
Subjective     Yobany Small is a 68 y.o. male with LG urothelial carcinoma s/p L URS who presents for 3 week FUV.     He was taken for L URS [place]. [place] [facility] [place]. Pathology showed detached un oriented fragments of papillary urothelial neoplasm, favoring LG papillary urothelial carcinoma. There is concern for transitional cell carcinoma. Report states there was a significant amount of tumor blocking the ureter and a stent was placed.     He had bx completed at [facility] which showed LG urothelial carcinoma. They attempted to do surgery for him but due to different medical issues, he was delayed.     He is experiencing significant gross hematuria and LLQ pain now.  He still has a stent in place.  He has quite a large prostate on imaging.    He recently has had other health issues.  He had signs of triple A repair fail requiring a repeat surgery. He was evaluated by Dr. Street who is planning for a repair to be done through open incision. There was discussion whether or not we should combine these surgeries.     Imaging 11/2023 shows L hydronephrosis with potential mass in the mid ureter. There is some thickening of his colon. He had abnormal enhancement of the aneurysm sac compatible with an endoleak.     PET scan done for vascular surgery is scheduled 3/7/2024 and they are seeing Dr. Street again on 3/14/2024.     Past medical, surgical, family and social history were reviewed and unchanged since last visit besides what is in the HPI.               Review of Systems   All other systems reviewed and are negative.      Objective   Physical Exam  Gen: No acute distress     Psych: Alert and oriented x3     Resp: Nonlabored respirations     Assessment/Plan   Problem List Items Addressed This Visit             ICD-10-CM    Urothelial carcinoma (CMS/HCC) - Primary C68.9     I had a long discussion with the patient's wife. Normally, I would recommend a ureteroscopy to dictate size, location and focality  of tumor. However, the patient is planned for a vascular surgery Triple A repair with a vascular graft and I would not be able to perform a diagnostic procedure prior to this surgery. I discussed his case with his vascular surgeon regarding whether we can combine these surgeries and whether I would be able to save the kidney and take only part of the ureter out or remove the entire kidney and ureter.  To minimize his risk of urine leak and graft infection, the best plan would be to remove both the L kidney and the ureter. We discussed the profound risk of graft infection due to urine leak.     A PET scan will be ordered which is helpful for Dr. Street's planned vascular graft. We will also order a CT urogram to look at the lower urinary tract. The patient understands and agrees with this plan.                Plan:  Patient is seeing vascular surgery, Dr. Street, 3/14/2024.  He is being presented at Aortic conference.  He is getting a PET scan for this.  For me and his UTC, we will check a CT Urogram.  Will plan for nephroureterectomy to avoid risk of graft infection if the decision is to proceed with surgery on the aorta and the UTC at the same time.      Scribe Attestation  By signing my name below, I, Katelyn Casalla, Scribe   attest that this documentation has been prepared under the direction and in the presence of Дмитрий Thompson MD MPH.

## 2024-03-07 ENCOUNTER — HOSPITAL ENCOUNTER (OUTPATIENT)
Dept: RADIOLOGY | Facility: CLINIC | Age: 69
Discharge: HOME | End: 2024-03-07
Payer: COMMERCIAL

## 2024-03-07 DIAGNOSIS — I71.33 RUPTURED INFRARENAL ABDOMINAL AORTIC ANEURYSM (AAA) (MULTI): ICD-10-CM

## 2024-03-07 DIAGNOSIS — T82.310A TYPE IA ENDOLEAK OF AORTIC GRAFT (CMS-HCC): ICD-10-CM

## 2024-03-07 DIAGNOSIS — C66.9: ICD-10-CM

## 2024-03-07 LAB — GLUCOSE BLD MANUAL STRIP-MCNC: 79 MG/DL (ref 74–99)

## 2024-03-07 PROCEDURE — 3430000001 HC RX 343 DIAGNOSTIC RADIOPHARMACEUTICALS: Performed by: SURGERY

## 2024-03-07 PROCEDURE — 78816 PET IMAGE W/CT FULL BODY: CPT | Mod: PI

## 2024-03-07 PROCEDURE — A9552 F18 FDG: HCPCS | Performed by: SURGERY

## 2024-03-07 PROCEDURE — 82947 ASSAY GLUCOSE BLOOD QUANT: CPT

## 2024-03-07 RX ORDER — FLUDEOXYGLUCOSE F 18 200 MCI/ML
14.5 INJECTION, SOLUTION INTRAVENOUS
Status: COMPLETED | OUTPATIENT
Start: 2024-03-07 | End: 2024-03-07

## 2024-03-07 RX ADMIN — FLUDEOXYGLUCOSE F 18 14.5 MILLICURIE: 200 INJECTION, SOLUTION INTRAVENOUS at 10:59

## 2024-03-08 ENCOUNTER — TELEPHONE (OUTPATIENT)
Dept: VASCULAR SURGERY | Facility: HOSPITAL | Age: 69
End: 2024-03-08
Payer: COMMERCIAL

## 2024-03-08 DIAGNOSIS — Z01.818 PRE-OPERATIVE EXAM: ICD-10-CM

## 2024-03-08 DIAGNOSIS — I71.40 ABDOMINAL AORTIC ANEURYSM (AAA) WITHOUT RUPTURE, UNSPECIFIED PART (CMS-HCC): ICD-10-CM

## 2024-03-08 NOTE — TELEPHONE ENCOUNTER
I have attempted to contact   Mrs. Small   . There is no answer at the following phone number  [phone]   . I have left a voice mail message for the patient to contact Dr. Street's  office nurse at [phone] .    Dr. Street has requested that the patient have PFT test ( complete PFT  pre/Post bronchodilator)  before any planned surgical procedure. Orders entered. I was calling to assist in scheduling .   Radha Fountain RN BSN

## 2024-03-08 NOTE — TELEPHONE ENCOUNTER
I have attempted to schedule   for  PFT  tests as requested by Dr. Street.  Per Mrs. Small, I want to wait  until we speak with Dr. Street before we schedule any further tests.  TREVIN Harley

## 2024-03-08 NOTE — TELEPHONE ENCOUNTER
I have attempted to contact   Mrs. Small  . There is no answer at the following phone number   [phone]  . I have left a voice mail message for the patient to contact Dr. Street's  office nurse at [phone] to schedule PFT tests .   Radha Fountain RN BSN

## 2024-03-14 ENCOUNTER — LAB (OUTPATIENT)
Dept: LAB | Facility: LAB | Age: 69
End: 2024-03-14
Payer: COMMERCIAL

## 2024-03-14 ENCOUNTER — TELEMEDICINE (OUTPATIENT)
Dept: VASCULAR SURGERY | Facility: CLINIC | Age: 69
End: 2024-03-14
Payer: COMMERCIAL

## 2024-03-14 DIAGNOSIS — C66.2 MALIGNANT NEOPLASM OF LEFT URETER (MULTI): ICD-10-CM

## 2024-03-14 DIAGNOSIS — I71.42 JUXTARENAL ABDOMINAL AORTIC ANEURYSM (AAA) WITHOUT RUPTURE (CMS-HCC): Primary | ICD-10-CM

## 2024-03-14 LAB
CREAT SERPL-MCNC: 0.96 MG/DL (ref 0.5–1.3)
EGFRCR SERPLBLD CKD-EPI 2021: 87 ML/MIN/1.73M*2

## 2024-03-14 PROCEDURE — 1159F MED LIST DOCD IN RCRD: CPT | Performed by: SURGERY

## 2024-03-14 PROCEDURE — 1036F TOBACCO NON-USER: CPT | Performed by: SURGERY

## 2024-03-14 PROCEDURE — 1160F RVW MEDS BY RX/DR IN RCRD: CPT | Performed by: SURGERY

## 2024-03-14 PROCEDURE — 36415 COLL VENOUS BLD VENIPUNCTURE: CPT

## 2024-03-14 PROCEDURE — 82565 ASSAY OF CREATININE: CPT

## 2024-03-14 PROCEDURE — 99214 OFFICE O/P EST MOD 30 MIN: CPT | Performed by: SURGERY

## 2024-03-14 NOTE — PROGRESS NOTES
Vascular Surgery Clinic Note    CC: aaa    HPI:  Yobany Small is 68 y.o. male with history of type Ia endoleak. He needs PFT and then schedule explant.     Medical History:   has a past medical history of Abnormal findings on diagnostic imaging of heart and coronary circulation, Abnormal findings on diagnostic imaging of other specified body structures, Abnormal findings on diagnostic imaging of other specified body structures, Abnormal findings on diagnostic imaging of other specified body structures, Abnormal findings on diagnostic imaging of other specified body structures, Abnormal findings on diagnostic imaging of other specified body structures, Abnormal findings on diagnostic imaging of other specified body structures, and Personal history of other medical treatment.    Meds:   Current Outpatient Medications on File Prior to Visit   Medication Sig Dispense Refill    aspirin 81 mg EC tablet Take 1 tablet (81 mg) by mouth once daily.      cyanocobalamin, vitamin B-12, (Vitamin B-12) 5,000 mcg tablet, sublingual Place under the tongue.      ferrous sulfate, 325 mg ferrous sulfate, (FeosoL) tablet Take 1 tablet by mouth once daily with breakfast.      fluticasone-umeclidin-vilanter (Trelegy Ellipta) 100-62.5-25 mcg blister with device Inhale.      magnesium oxide (Mag-Ox) 400 mg tablet 1 tablet (400 mg) once daily.      methotrexate (Trexall) 2.5 mg tablet Take 1 tablet (2.5 mg total) by mouth 1 (one) time per week.  Follow directions carefully, and ask to explain any part you do not understand. Take exactly as directed.  6 tabs Saturday      metoprolol succinate XL (Toprol-XL) 50 mg 24 hr tablet Take 1 tablet (50 mg) by mouth once daily. Do not crush or chew. 90 tablet 3    oxybutynin XL (Ditropan-XL) 10 mg 24 hr tablet Take 1 tablet (10 mg) by mouth once daily as needed (take 1 tablet daily as needed for bladder spasms). Do not crush, chew, or split. (Patient not taking: Reported on 2/21/2024) 30 tablet 1     oxyCODONE (Roxicodone) 5 mg immediate release tablet Take 1 tablet (5 mg) by mouth every 6 hours if needed for severe pain (7 - 10). (Patient not taking: Reported on 1/15/2024) 15 tablet 0    predniSONE (Deltasone) 5 mg tablet Take 1 tablet (5 mg) by mouth once daily.      sacubitriL-valsartan (Entresto) 24-26 mg tablet Take 1 tablet by mouth 2 times a day. 180 tablet 3    simvastatin (Zocor) 40 mg tablet Take 1 tablet (40 mg) by mouth once daily at bedtime. 90 tablet 1    tamsulosin (Flomax) 0.4 mg 24 hr capsule        No current facility-administered medications on file prior to visit.        Allergies:   No Known Allergies    SH:    Social Determinants of Health     Tobacco Use: Low Risk  (2/21/2024)    Patient History     Smoking Tobacco Use: Never     Smokeless Tobacco Use: Never     Passive Exposure: Not on file   Alcohol Use: Not At Risk (11/18/2023)    AUDIT-C     Frequency of Alcohol Consumption: Never     Average Number of Drinks: Patient does not drink     Frequency of Binge Drinking: Never   Financial Resource Strain: Low Risk  (11/18/2023)    Overall Financial Resource Strain (CARDIA)     Difficulty of Paying Living Expenses: Not hard at all   Food Insecurity: Not on file   Transportation Needs: No Transportation Needs (11/18/2023)    PRAPARE - Transportation     Lack of Transportation (Medical): No     Lack of Transportation (Non-Medical): No   Physical Activity: Not on file   Stress: Not on file   Social Connections: Not on file   Intimate Partner Violence: Not on file   Depression: Not at risk (2/21/2024)    PHQ-2     PHQ-2 Score: 0   Housing Stability: Low Risk  (11/18/2023)    Housing Stability Vital Sign     Unable to Pay for Housing in the Last Year: No     Number of Places Lived in the Last Year: 1     Unstable Housing in the Last Year: No   Utilities: Not on file   Digital Equity: Not on file        FH:  No family history on file.     ROS:  All systems were reviewed and are negative except  as per HPI.    Objective:  Vitals:  There were no vitals filed for this visit.     Assessment & Plan:  Yobany Small is 68 y.o. male with AAA needing explant of prior EVAR for type Ia endoleak, likely left nephrectomy with urology at that time. PFT then see in person to plan for surgery.      I spent a total of 30 minutes on the day of the visit.         Kar Street M.D.

## 2024-03-15 ENCOUNTER — TELEPHONE (OUTPATIENT)
Dept: VASCULAR SURGERY | Facility: HOSPITAL | Age: 69
End: 2024-03-15
Payer: COMMERCIAL

## 2024-03-15 NOTE — TELEPHONE ENCOUNTER
I have attempted to contact   Mrs. Small   . There is no answer at the following phone number   [phone]    . I have left a voice mail message for the patient to contact Dr. Street's  office nurse at [phone].      Per the patient's  request I have  scheduled the patient for pulmonary function tests  at [facility] on 4/18/2024 @ 2 PM  ( this was the first available appointment on a Monday or Thursday in the afternoon  per the patient's request) .  I have  provided the phone number to central scheduling for the patient to reschedule this appointment if he so chooses.       Radha Fountain RN BSN

## 2024-03-18 ENCOUNTER — APPOINTMENT (OUTPATIENT)
Dept: RADIOLOGY | Facility: HOSPITAL | Age: 69
End: 2024-03-18
Payer: COMMERCIAL

## 2024-03-18 ENCOUNTER — HOSPITAL ENCOUNTER (OUTPATIENT)
Dept: RADIOLOGY | Facility: HOSPITAL | Age: 69
Discharge: HOME | End: 2024-03-18
Payer: COMMERCIAL

## 2024-03-18 DIAGNOSIS — C66.2 MALIGNANT NEOPLASM OF LEFT URETER (MULTI): ICD-10-CM

## 2024-03-18 PROCEDURE — 76377 3D RENDER W/INTRP POSTPROCES: CPT

## 2024-03-18 PROCEDURE — 2550000001 HC RX 255 CONTRASTS: Performed by: STUDENT IN AN ORGANIZED HEALTH CARE EDUCATION/TRAINING PROGRAM

## 2024-03-18 RX ADMIN — IOHEXOL 90 ML: 350 INJECTION, SOLUTION INTRAVENOUS at 15:52

## 2024-03-25 ENCOUNTER — TELEPHONE (OUTPATIENT)
Dept: VASCULAR SURGERY | Facility: HOSPITAL | Age: 69
End: 2024-03-25
Payer: COMMERCIAL

## 2024-03-25 NOTE — TELEPHONE ENCOUNTER
I have attempted to contact   Mrs. Small  . There is no answer at the following phone number   [phone]   . I have left a voice mail message for the patient to contact Dr. Street,  office nurse at [phone]     The proposed  surgical date has changed to 4/23/2024.  Radha Fountain RN BSN

## 2024-03-26 ENCOUNTER — TELEPHONE (OUTPATIENT)
Dept: VASCULAR SURGERY | Facility: HOSPITAL | Age: 69
End: 2024-03-26
Payer: COMMERCIAL

## 2024-03-26 NOTE — TELEPHONE ENCOUNTER
I have attempted to contact     Mrs. Small   . There is no answer at the following phone number    [phone]    . I have left a voice mail message for the patient to contact Dr. Street's  office nurse at [phone]    The planned surgical procedure date has changed from 4/24/2024 to 4/23/2024   Radha Fountain RN BSN

## 2024-04-11 DIAGNOSIS — I25.10 CORONARY ARTERY DISEASE INVOLVING NATIVE CORONARY ARTERY OF NATIVE HEART WITHOUT ANGINA PECTORIS: ICD-10-CM

## 2024-04-12 ENCOUNTER — PREP FOR PROCEDURE (OUTPATIENT)
Dept: VASCULAR SURGERY | Facility: HOSPITAL | Age: 69
End: 2024-04-12
Payer: COMMERCIAL

## 2024-04-12 DIAGNOSIS — C66.2 MALIGNANT NEOPLASM OF LEFT URETER (MULTI): ICD-10-CM

## 2024-04-12 DIAGNOSIS — T82.310A TYPE IA ENDOLEAK OF AORTIC GRAFT (CMS-HCC): Primary | ICD-10-CM

## 2024-04-12 DIAGNOSIS — C64.2 UROTHELIAL CARCINOMA OF KIDNEY, LEFT (MULTI): ICD-10-CM

## 2024-04-15 ENCOUNTER — PREP FOR PROCEDURE (OUTPATIENT)
Dept: VASCULAR SURGERY | Facility: HOSPITAL | Age: 69
End: 2024-04-15
Payer: COMMERCIAL

## 2024-04-15 DIAGNOSIS — T82.310A TYPE IA ENDOLEAK OF AORTIC GRAFT (CMS-HCC): Primary | ICD-10-CM

## 2024-04-15 DIAGNOSIS — C66.2 MALIGNANT NEOPLASM OF LEFT URETER (MULTI): ICD-10-CM

## 2024-04-15 RX ORDER — OXYBUTYNIN CHLORIDE 10 MG/1
TABLET, EXTENDED RELEASE ORAL
Qty: 30 TABLET | Refills: 0 | Status: SHIPPED | OUTPATIENT
Start: 2024-04-15 | End: 2024-05-10 | Stop reason: HOSPADM

## 2024-04-15 RX ORDER — SIMVASTATIN 40 MG/1
40 TABLET, FILM COATED ORAL NIGHTLY
Qty: 90 TABLET | Refills: 0 | Status: SHIPPED | OUTPATIENT
Start: 2024-04-15

## 2024-04-15 RX ORDER — OXYBUTYNIN CHLORIDE 10 MG/1
10 TABLET, EXTENDED RELEASE ORAL DAILY PRN
Qty: 30 TABLET | Refills: 3 | Status: SHIPPED | OUTPATIENT
Start: 2024-04-15 | End: 2024-05-14 | Stop reason: ALTCHOICE

## 2024-04-17 ENCOUNTER — TELEPHONE (OUTPATIENT)
Dept: VASCULAR SURGERY | Facility: CLINIC | Age: 69
End: 2024-04-17

## 2024-04-17 ENCOUNTER — OFFICE VISIT (OUTPATIENT)
Dept: VASCULAR SURGERY | Facility: HOSPITAL | Age: 69
End: 2024-04-17
Payer: COMMERCIAL

## 2024-04-17 VITALS
HEART RATE: 81 BPM | SYSTOLIC BLOOD PRESSURE: 119 MMHG | BODY MASS INDEX: 17.08 KG/M2 | WEIGHT: 122 LBS | HEIGHT: 71 IN | OXYGEN SATURATION: 91 % | DIASTOLIC BLOOD PRESSURE: 76 MMHG

## 2024-04-17 DIAGNOSIS — I71.41 PARARENAL ABDOMINAL AORTIC ANEURYSM (AAA) WITHOUT RUPTURE (CMS-HCC): Primary | ICD-10-CM

## 2024-04-17 DIAGNOSIS — T82.310A TYPE I ENDOLEAK OF AORTIC GRAFT (CMS-HCC): ICD-10-CM

## 2024-04-17 PROBLEM — C64.2: Status: ACTIVE | Noted: 2024-04-12

## 2024-04-17 PROCEDURE — 3078F DIAST BP <80 MM HG: CPT | Performed by: SURGERY

## 2024-04-17 PROCEDURE — 3074F SYST BP LT 130 MM HG: CPT | Performed by: SURGERY

## 2024-04-17 PROCEDURE — 99215 OFFICE O/P EST HI 40 MIN: CPT | Performed by: SURGERY

## 2024-04-17 PROCEDURE — 1036F TOBACCO NON-USER: CPT | Performed by: SURGERY

## 2024-04-17 PROCEDURE — 1125F AMNT PAIN NOTED PAIN PRSNT: CPT | Performed by: SURGERY

## 2024-04-17 PROCEDURE — 1160F RVW MEDS BY RX/DR IN RCRD: CPT | Performed by: SURGERY

## 2024-04-17 PROCEDURE — 1159F MED LIST DOCD IN RCRD: CPT | Performed by: SURGERY

## 2024-04-17 ASSESSMENT — PAIN SCALES - GENERAL: PAINLEVEL: 6

## 2024-04-17 NOTE — PROGRESS NOTES
Vascular Surgery Clinic Note    CC: AAA    HPI:  Yobany Small is 69 y.o. 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 He also has left ureteral cancer. Plan is for open repair of his aneurysm with explant, as well as left nephroureterectomy by urology team. He has PFT scheduled for tomorrow. He has COPD with daily inhaler use (non-rescue), and no O2 dependence. He has pain on his left side all the time and hematuria. He has poor appetite.   Of note he was taken back to the OR on POD 1 following his rupture/EVAR and had open ligation of right hypogastric artery and extension of his right limb into the external iliac artery.  He has an EF of 25-30% with a defibrillator in place. Stress test showed no ischemia, and EF is stable.  His PET CT scan showed no evidence of metastatic disease, likely benign lung nodules, and no evidence of aortic infection.    Medical History:   has a past medical history of Abnormal findings on diagnostic imaging of heart and coronary circulation, Abnormal findings on diagnostic imaging of other specified body structures, Abnormal findings on diagnostic imaging of other specified body structures, Abnormal findings on diagnostic imaging of other specified body structures, Abnormal findings on diagnostic imaging of other specified body structures, Abnormal findings on diagnostic imaging of other specified body structures, Abnormal findings on diagnostic imaging of other specified body structures, and Personal history of other medical treatment.    Meds:   Current Outpatient Medications on File Prior to Visit   Medication Sig Dispense Refill    aspirin 81 mg EC tablet Take 1 tablet (81 mg) by mouth once daily.      cyanocobalamin, vitamin B-12, (Vitamin B-12) 5,000 mcg tablet, sublingual Place under the tongue.      ferrous sulfate, 325 mg ferrous sulfate, (FeosoL) tablet Take 1 tablet by mouth once daily with breakfast.      fluticasone-umeclidin-vilanter (Trelegy  Ellipta) 100-62.5-25 mcg blister with device Inhale.      magnesium oxide (Mag-Ox) 400 mg tablet 1 tablet (400 mg) once daily.      methotrexate (Trexall) 2.5 mg tablet Take 1 tablet (2.5 mg total) by mouth 1 (one) time per week.  Follow directions carefully, and ask to explain any part you do not understand. Take exactly as directed.  6 tabs Saturday      metoprolol succinate XL (Toprol-XL) 50 mg 24 hr tablet Take 1 tablet (50 mg) by mouth once daily. Do not crush or chew. 90 tablet 3    oxybutynin XL (Ditropan-XL) 10 mg 24 hr tablet TAKE 1 TABLET BY MOUTH ONCE DAILY AS NEEDED FOR BLADDER SPASMS. DO NOT CRUSH, CHEW, OR SPLIT. 30 tablet 0    oxyCODONE (Roxicodone) 5 mg immediate release tablet Take 1 tablet (5 mg) by mouth every 6 hours if needed for severe pain (7 - 10). 15 tablet 0    predniSONE (Deltasone) 5 mg tablet Take 1 tablet (5 mg) by mouth once daily.      sacubitriL-valsartan (Entresto) 24-26 mg tablet Take 1 tablet by mouth 2 times a day. 180 tablet 3    simvastatin (Zocor) 40 mg tablet TAKE 1 TABLET BY MOUTH ONCE DAILY AT BEDTIME 90 tablet 0    tamsulosin (Flomax) 0.4 mg 24 hr capsule       oxybutynin XL (Ditropan-XL) 10 mg 24 hr tablet Take 1 tablet (10 mg) by mouth once daily as needed (take 1 tablet daily as needed for bladder spasms). Do not crush, chew, or split. 30 tablet 3    [DISCONTINUED] oxybutynin XL (Ditropan-XL) 10 mg 24 hr tablet Take 1 tablet (10 mg) by mouth once daily as needed (take 1 tablet daily as needed for bladder spasms). Do not crush, chew, or split. (Patient not taking: Reported on 2/21/2024) 30 tablet 1    [DISCONTINUED] simvastatin (Zocor) 40 mg tablet Take 1 tablet (40 mg) by mouth once daily at bedtime. 90 tablet 1     No current facility-administered medications on file prior to visit.        Allergies:   No Known Allergies    SH:    Social Determinants of Health     Tobacco Use: Low Risk  (4/17/2024)    Patient History     Smoking Tobacco Use: Never     Smokeless Tobacco  Use: Never     Passive Exposure: Not on file   Alcohol Use: Not At Risk (11/18/2023)    AUDIT-C     Frequency of Alcohol Consumption: Never     Average Number of Drinks: Patient does not drink     Frequency of Binge Drinking: Never   Financial Resource Strain: Low Risk  (11/18/2023)    Overall Financial Resource Strain (CARDIA)     Difficulty of Paying Living Expenses: Not hard at all   Food Insecurity: Not on file   Transportation Needs: No Transportation Needs (11/18/2023)    PRAPARE - Transportation     Lack of Transportation (Medical): No     Lack of Transportation (Non-Medical): No   Physical Activity: Not on file   Stress: Not on file   Social Connections: Not on file   Intimate Partner Violence: Not on file   Depression: Not at risk (2/21/2024)    PHQ-2     PHQ-2 Score: 0   Housing Stability: Low Risk  (11/18/2023)    Housing Stability Vital Sign     Unable to Pay for Housing in the Last Year: No     Number of Places Lived in the Last Year: 1     Unstable Housing in the Last Year: No   Utilities: Not on file   Digital Equity: Not on file   Health Literacy: Not on file        FH:  No family history on file.     ROS:  All systems were reviewed and are negative except as per HPI.    Objective:  Vitals:  Vitals:    04/17/24 1017   BP: 119/76   Pulse:    SpO2:         Exam:  In NAD, thin, somewhat frail appearing  Abd Soft, ND/NT  Vascular examination:  Palpable femoral and PT pulses bilaterally  Palpable radial pulses bilaterally    Assessment & Plan:  Yobany Small is 69 y.o. male who is scheduled for open AAA repair via left thoracoretroperitoneal approach with co-surgery by Dr. Hobbs to remove left kidney/ureter. PFT tomorrow. He signed consent.      I spent a total of 40 minutes on the day of the visit.         Kar Street M.D.

## 2024-04-17 NOTE — TELEPHONE ENCOUNTER
Following review with dr. Street you have been scheduled for an operative procedure on 4/30/2023.      [facility] to schedule a telephonic and in person appointments.  Prior to your surgery, please obtain the following blood work (requisitions have been entered into the electronic medical record). You may obtain the blood work at [facility] system laboratory.      If Dr. Street has recommended a type and cross match or type and screen your blood work must be obtained at the lab at [facility].     Per Dr. Street's request please hold the following medications  please hold aspirin for one week  prior to surgery , starting 4/23/2024.    Please remember nothing to eat nor drink after 12 midnight.    Please wear comfortable clothes and remember to bring with you your insurance card, a form of identification and your COVID vaccination card with you.   Do not bring valuables such as credit cards, checkbooks, money nor jewelry with you.     PLEASE BRING ALL MEDICATIONS OR AN UPDATED LIST OF CURRENT MEDICATIONS WITH YOU.     You must have a  on the day of the procedure.     If you have any questions please feel free to contact our office at [phone].     Radha Fountain RN BSN   Vascular and Endovascular Surgery.

## 2024-04-18 ENCOUNTER — HOSPITAL ENCOUNTER (OUTPATIENT)
Dept: RESPIRATORY THERAPY | Facility: HOSPITAL | Age: 69
Discharge: HOME | End: 2024-04-18
Payer: COMMERCIAL

## 2024-04-18 DIAGNOSIS — I71.40 ABDOMINAL AORTIC ANEURYSM (AAA) WITHOUT RUPTURE, UNSPECIFIED PART (CMS-HCC): ICD-10-CM

## 2024-04-18 DIAGNOSIS — Z01.818 PRE-OPERATIVE EXAM: ICD-10-CM

## 2024-04-18 PROCEDURE — 94729 DIFFUSING CAPACITY: CPT

## 2024-04-22 ENCOUNTER — PRE-ADMISSION TESTING (OUTPATIENT)
Dept: PREADMISSION TESTING | Facility: HOSPITAL | Age: 69
End: 2024-04-22
Payer: COMMERCIAL

## 2024-04-22 VITALS
BODY MASS INDEX: 17.19 KG/M2 | SYSTOLIC BLOOD PRESSURE: 99 MMHG | OXYGEN SATURATION: 96 % | WEIGHT: 122.8 LBS | HEART RATE: 92 BPM | DIASTOLIC BLOOD PRESSURE: 54 MMHG | HEIGHT: 71 IN | TEMPERATURE: 96.2 F

## 2024-04-22 DIAGNOSIS — T82.310A TYPE IA ENDOLEAK OF AORTIC GRAFT (CMS-HCC): ICD-10-CM

## 2024-04-22 DIAGNOSIS — Z01.818 PREOPERATIVE EXAMINATION: Primary | ICD-10-CM

## 2024-04-22 LAB
ABO GROUP (TYPE) IN BLOOD: NORMAL
ANION GAP SERPL CALC-SCNC: 12 MMOL/L (ref 10–20)
ANTIBODY SCREEN: NORMAL
APPEARANCE UR: ABNORMAL
BASOPHILS # BLD AUTO: 0.07 X10*3/UL (ref 0–0.1)
BASOPHILS NFR BLD AUTO: 0.6 %
BILIRUB UR STRIP.AUTO-MCNC: NEGATIVE MG/DL
BUN SERPL-MCNC: 21 MG/DL (ref 6–23)
CALCIUM SERPL-MCNC: 9 MG/DL (ref 8.6–10.6)
CHLORIDE SERPL-SCNC: 108 MMOL/L (ref 98–107)
CO2 SERPL-SCNC: 28 MMOL/L (ref 21–32)
COLOR UR: ABNORMAL
CREAT SERPL-MCNC: 0.94 MG/DL (ref 0.5–1.3)
EGFRCR SERPLBLD CKD-EPI 2021: 88 ML/MIN/1.73M*2
EOSINOPHIL # BLD AUTO: 0.38 X10*3/UL (ref 0–0.7)
EOSINOPHIL NFR BLD AUTO: 3 %
ERYTHROCYTE [DISTWIDTH] IN BLOOD BY AUTOMATED COUNT: 13.9 % (ref 11.5–14.5)
EST. AVERAGE GLUCOSE BLD GHB EST-MCNC: 105 MG/DL
FIBRINOGEN PPP-MCNC: 395 MG/DL (ref 200–400)
GLUCOSE SERPL-MCNC: 85 MG/DL (ref 74–99)
GLUCOSE UR STRIP.AUTO-MCNC: NORMAL MG/DL
HBA1C MFR BLD: 5.3 %
HCT VFR BLD AUTO: 40.5 % (ref 41–52)
HGB BLD-MCNC: 12.6 G/DL (ref 13.5–17.5)
IMM GRANULOCYTES # BLD AUTO: 0.04 X10*3/UL (ref 0–0.7)
IMM GRANULOCYTES NFR BLD AUTO: 0.3 % (ref 0–0.9)
KETONES UR STRIP.AUTO-MCNC: NEGATIVE MG/DL
LEUKOCYTE ESTERASE UR QL STRIP.AUTO: ABNORMAL
LYMPHOCYTES # BLD AUTO: 2.14 X10*3/UL (ref 1.2–4.8)
LYMPHOCYTES NFR BLD AUTO: 17 %
MCH RBC QN AUTO: 28.6 PG (ref 26–34)
MCHC RBC AUTO-ENTMCNC: 31.1 G/DL (ref 32–36)
MCV RBC AUTO: 92 FL (ref 80–100)
MONOCYTES # BLD AUTO: 0.71 X10*3/UL (ref 0.1–1)
MONOCYTES NFR BLD AUTO: 5.6 %
MUCOUS THREADS #/AREA URNS AUTO: ABNORMAL /LPF
NEUTROPHILS # BLD AUTO: 9.25 X10*3/UL (ref 1.2–7.7)
NEUTROPHILS NFR BLD AUTO: 73.5 %
NITRITE UR QL STRIP.AUTO: NEGATIVE
NRBC BLD-RTO: 0 /100 WBCS (ref 0–0)
PH UR STRIP.AUTO: 5.5 [PH]
PLATELET # BLD AUTO: 294 X10*3/UL (ref 150–450)
POTASSIUM SERPL-SCNC: 4.2 MMOL/L (ref 3.5–5.3)
PROT UR STRIP.AUTO-MCNC: ABNORMAL MG/DL
RBC # BLD AUTO: 4.4 X10*6/UL (ref 4.5–5.9)
RBC # UR STRIP.AUTO: ABNORMAL /UL
RBC #/AREA URNS AUTO: >20 /HPF
RH FACTOR (ANTIGEN D): NORMAL
SODIUM SERPL-SCNC: 144 MMOL/L (ref 136–145)
SP GR UR STRIP.AUTO: 1.02
UROBILINOGEN UR STRIP.AUTO-MCNC: NORMAL MG/DL
WBC # BLD AUTO: 12.6 X10*3/UL (ref 4.4–11.3)
WBC #/AREA URNS AUTO: >50 /HPF

## 2024-04-22 PROCEDURE — 36415 COLL VENOUS BLD VENIPUNCTURE: CPT

## 2024-04-22 PROCEDURE — 86901 BLOOD TYPING SEROLOGIC RH(D): CPT

## 2024-04-22 PROCEDURE — 87081 CULTURE SCREEN ONLY: CPT

## 2024-04-22 PROCEDURE — 81001 URINALYSIS AUTO W/SCOPE: CPT

## 2024-04-22 PROCEDURE — 87086 URINE CULTURE/COLONY COUNT: CPT

## 2024-04-22 PROCEDURE — 85025 COMPLETE CBC W/AUTO DIFF WBC: CPT

## 2024-04-22 PROCEDURE — 99205 OFFICE O/P NEW HI 60 MIN: CPT | Performed by: NURSE PRACTITIONER

## 2024-04-22 PROCEDURE — 83036 HEMOGLOBIN GLYCOSYLATED A1C: CPT

## 2024-04-22 PROCEDURE — 86923 COMPATIBILITY TEST ELECTRIC: CPT

## 2024-04-22 PROCEDURE — 80048 BASIC METABOLIC PNL TOTAL CA: CPT

## 2024-04-22 PROCEDURE — 85384 FIBRINOGEN ACTIVITY: CPT

## 2024-04-22 RX ORDER — CHLORHEXIDINE GLUCONATE ORAL RINSE 1.2 MG/ML
SOLUTION DENTAL
Qty: 473 ML | Refills: 0 | Status: SHIPPED | OUTPATIENT
Start: 2024-04-22 | End: 2024-05-10 | Stop reason: HOSPADM

## 2024-04-22 RX ORDER — CHLORHEXIDINE GLUCONATE 40 MG/ML
SOLUTION TOPICAL 2 TIMES DAILY
Qty: 473 ML | Refills: 0 | Status: SHIPPED | OUTPATIENT
Start: 2024-04-22 | End: 2024-05-10 | Stop reason: HOSPADM

## 2024-04-22 ASSESSMENT — DUKE ACTIVITY SCORE INDEX (DASI)
CAN YOU DO HEAVY WORK AROUND THE HOUSE LIKE SCRUBBING FLOORS OR LIFTING AND MOVING HEAVY FURNITURE: NO
CAN YOU DO LIGHT WORK AROUND THE HOUSE LIKE DUSTING OR WASHING DISHES: YES
CAN YOU WALK A BLOCK OR TWO ON LEVEL GROUND: YES
CAN YOU PARTICIPATE IN STRENOUS SPORTS LIKE SWIMMING, SINGLES TENNIS, FOOTBALL, BASKETBALL, OR SKIING: NO
CAN YOU CLIMB A FLIGHT OF STAIRS OR WALK UP A HILL: YES
CAN YOU WALK INDOORS, SUCH AS AROUND YOUR HOUSE: YES
CAN YOU DO MODERATE WORK AROUND THE HOUSE LIKE VACUUMING, SWEEPING FLOORS OR CARRYING GROCERIES: YES
TOTAL_SCORE: 18.95
CAN YOU RUN A SHORT DISTANCE: NO
CAN YOU HAVE SEXUAL RELATIONS: NO
CAN YOU DO YARD WORK LIKE RAKING LEAVES, WEEDING OR PUSHING A MOWER: NO
CAN YOU PARTICIPATE IN MODERATE RECREATIONAL ACTIVITIES LIKE GOLF, BOWLING, DANCING, DOUBLES TENNIS OR THROWING A BASEBALL OR FOOTBALL: NO
DASI METS SCORE: 5.1
CAN YOU TAKE CARE OF YOURSELF (EAT, DRESS, BATHE, OR USE TOILET): YES

## 2024-04-22 ASSESSMENT — LIFESTYLE VARIABLES: SMOKING_STATUS: NONSMOKER

## 2024-04-22 ASSESSMENT — ENCOUNTER SYMPTOMS
GASTROINTESTINAL NEGATIVE: 1
NECK NEGATIVE: 1
RESPIRATORY NEGATIVE: 1
CARDIOVASCULAR NEGATIVE: 1
EYES NEGATIVE: 1
CONSTITUTIONAL NEGATIVE: 1
MUSCULOSKELETAL NEGATIVE: 1
NEUROLOGICAL NEGATIVE: 1
ENDOCRINE NEGATIVE: 1

## 2024-04-22 NOTE — PREPROCEDURE INSTRUCTIONS
Thank you for visiting [facility] ([facility]) today for your pre-procedure evaluation, you were seen by     Samantha Meeson, MSN, NP-C  Adult-Gerontology Nurse Practitioner II  Department of Anesthesiology and Perioperative Medicine  Main phone [phone]  Direct phone [phone]  Fax [phone]     This summary includes instructions and information to aid you during your perioperative period.  Please read carefully. If you have any questions about your visit today, please call the number listed above.  If you become ill or have any changes to your health before your surgery, please contact your primary care provider and alert your surgeon.    Preparing for your Surgery       Exercises  Preoperative Deep Breathing Exercises  Why it is important to do deep breathing exercises before my surgery?  Deep breathing exercises strengthen your breathing muscles.  This helps you to recover after your surgery and decreases the chance of breathing complications.  How are the deep breathing exercises done?  Sit straight with your back supported.  Breathe in deeply and slowly through your nose. Your lower rib cage should expand and your abdomen may move forward.  Hold that breath for 3 to 5 seconds.  Breathe out through pursed lips, slowly and completely.  Rest and repeat 10 times every hour while awake.  Rest longer if you become dizzy or lightheaded.       Incentive Spirometer   You were provided with an incentive spirometer in CPM/PAT, please follow the below instructions.   You were not provided an incentive spirometer in [facility], please disregard the incentive spirometer instructions  What is an incentive spirometer?  An incentive spirometer is a device used before and after surgery to “exercise” your lungs.  It helps you to take deeper breaths to expand your lungs.  Below is an example of a basic incentive spirometer.  The device you receive may differ slightly but they all function the  same.    Why do I need to use an incentive spirometer?  Using your incentive spirometer prepares your lungs for surgery and helps prevent lung problems after surgery.  How do I use my incentive spirometer?  When you're using your incentive spirometer, make sure to breathe through your mouth. If you breathe through your nose, the incentive spirometer won't work properly. You can hold your nose if you have trouble.  If you feel dizzy at any time, stop and rest. Try again at a later time.  Follow the steps below:  Set up your incentive spirometer, expand the flexible tubing and connect to the outlet.  Sit upright in a chair or bed. Hold the incentive spirometer at eye level.   Put the mouthpiece in your mouth and close your lips tightly around it. Slowly breathe out (exhale) completely.  Breathe in (inhale) slowly through your mouth as deeply as you can. As you take a breath, you will see the piston rise inside the large column. While the piston rises, the indicator should move upwards. It should stay in between the 2 arrows (see Figure).  Try to get the piston as high as you can, while keeping the indicator between the arrows.   If the indicator doesn't stay between the arrows, you're breathing either too fast or too slow.  When you get it as high as you can, hold your breath for 10 seconds, or as long as possible. While you're holding your breath, the piston will slowly fall to the base of the spirometer.  Once the piston reaches the bottom of the spirometer, breathe out slowly through your mouth. Rest for a few seconds.  Repeat 10 times. Try to get the piston to the same level with each breath.  Repeat every hour while awake  You can carefully clean the outside of the mouthpiece with an alcohol wipe or soap and water.      Preoperative Brain Exercises    What are brain exercises?  A brain exercise is any activity that engages your thinking (cognitive) skills.    What types of activities are considered brain  exercises?  Jigsaw puzzles, crossword puzzles, word jumble, memory games, word search, and many more.  Many can be found free online or on your phone via a mobile hannah.    Why should I do brain exercises before my surgery?  More recent research has shown brain exercise before surgery can lower the risk of postoperative delirium (confusion) which can be especially important for older adults.  Patients who did brain exercises for 5 to 10 hours the days before surgery, cut their risk of postoperative delirium in half up to 1 week after surgery.    Sit-to-Stand Exercise    What is the sit-to-stand exercise?  The sit-to-stand exercise strengthens the muscles of your lower body and muscles in the center of your body (core muscles for stability) helping to maintain and improve your strength and mobility.  How do I do the sit-to-stand exercise?  The goal is to do this exercise without using your arms or hands.  If this is too difficult, use your arms and hands or a chair with armrests to help slowly push yourself to the standing position and lower yourself back to the sitting position. As the movement becomes easier use your arms and hands less.    Steps to the sit-to-stand exercise  Sit up tall in a sturdy chair, knees bent, feet flat on the floor shoulder-width apart.  Shift your hips/pelvis forward in the chair to correctly position yourself for the next movement.  Lean forward at your hips.  Stand up straight putting equal weight on both feet.  Check to be sure you are properly aligned with the chair, in a slow controlled movement sit back down.  Repeat this exercise 10-15 times.  If needed you can do it fewer times until your strength improves.  Rest for 1 minute.  Do another 10-15 sit-to-stand exercises.  Try to do this in the morning and evening.        Instructions    Preoperative Fasting Guidelines    Why must I stop eating and drinking near surgery time?  With sedation, food or liquid in your stomach can enter your  lungs causing serious complications  Food can increase nausea and vomiting  When do I need to stop eating and drinking before my surgery?      Do not eat any food after midnight the night before your surgery/procedure. You may have up to 13.5 ounces of clear liquid until TWO hours before your instructed arrival time to the hospital.  This includes water, black tea/coffee, (no milk or cream) apple juice, and electrolyte drinks (Gatorade). You may chew gum until TWO hours before your surgery/procedure            Simple things you can do to help prevent blood clots     Blood clots are blockages that can form in the body's veins. When a blood clot forms in your deep veins, it may be called a deep vein thrombosis, or DVT for short. Blood clots can happen in any part of the body where blood flows, but they are most common in the arms and legs. If a piece of a blood clot breaks free and travels to the lungs, it is called a pulmonary embolus (PE). A PE can be a very serious problem.         Being in the hospital or having surgery can raise your chances of getting a blood clot because you may not be well enough to move around as much as you normally do.         Ways you can help prevent blood clots in the hospital       Wearing SCDs  SCDs stands for Sequential Compression Devices.   SCDs are special sleeves that wrap around your legs. They attach to a pump that fills them with air to gently squeeze your legs every few minutes.  This helps return the blood in your legs to your heart.   SCDs should only be taken off when walking or bathing. SCDs may not be comfortable, but they can help save your life.              Pump SCD leg sleeves  Wearing compression stockings - if your doctor orders them. These special snug-fitting stockings gently squeeze your legs to help blood flow.       Walking. Walking helps move the blood in your legs.   If your doctor says it is ok, try walking the halls at least   5 times a day. Ask us to help  you get up, so you don't fall.      Taking any blood-thinning medicines your doctor orders.              Ways you can help prevent blood clots at home         Wearing compression stockings - if your doctor orders them.   Walking - to help move the blood in your legs.    Taking any blood-thinning medicines your doctor orders.      Signs of a blood clot or PE    Tell your doctor or nurse right away if you have any of the problems listed below.         If you are at home, seek medical care right away. Call 911 for chest pain or problems breathing.            Signs of a blood clot (DVT) - such as pain, swelling, redness, or warmth in your arm or legs.  Signs of a pulmonary embolism (PE) - such as chest pain or feeling short of breath      Tobacco and Alcohol;  Do not drink alcohol or smoke within 24 hours of surgery.  It is best to quite smoking for as long as possible before any surgery or procedure.        The Week before Surgery        Seven days before Surgery  Check your [facility] medication instructions  Do the exercises provided to you by [facility]   Arrange for a responsible, adult licensed  to take you home after surgery and stay with you for 24 hours.  You will not be permitted to drive yourself home if you have received any anesthetic/sedation  Six days before surgery  Check your [facility] medication instructions  Do the exercises provided to you by C[facility]   Start using Chlorhexidene (CHG) body wash if prescribed  Five days before surgery  Check your [facility] medication instructions  Do the exercises provided to you by C[facility]   Continue to use CHG body wash if prescribed  Three days before surgery  Check your [facility] medication instructions  Do the exercises provided to you by C[facility]   Continue to use CHG body wash if prescribed  Two days before surgery  Check your [facility] medication instructions  Do the exercises provided to you by C[facility]   Continue to use CHG body wash if prescribed    The Day before Surgery       Check your [facility]PM medication and  all other CPM instructions including when to stop eating and drinking  You will be called with your arrival time for surgery in the late afternoon.  If you do not receive a call please reach out to your surgeon's office.  Do not smoke or drink 24 hours before surgery  Prepare items to bring with you to the hospital  Shower with your chlorhexidine wash if prescribed  Brush your teeth and use your chlorhexidine dental rinse if prescribed    The Day of Surgery       Check your [facility] medication instructions  Ensure you follow the instructions for when to stop eating and drinking  Shower, if prescribed use CHG.  Do not apply any lotions, creams, moisturizers, perfume or deodorant  Brush your teeth and use your CHG dental rinse if prescribed  Wear loose comfortable clothing  Avoid make-up  Remove  jewelry and piercings, consider professional piercing removal with a plastic spacer if needed  Bring photo ID and Insurance card  Bring an accurate medication list that includes medication dose, frequency and allergies  Bring a copy of your advanced directives (will, health care power of )  Bring any devices and controllers as well as medical devices you have been provided with for surgery (CPAP, slings, braces, etc.)  Dentures, eyeglasses, and contacts will be removed before surgery, please bring cases for contacts or glasses

## 2024-04-22 NOTE — H&P (VIEW-ONLY)
CPM/PAT Evaluation       Name: Yobany Small (Yobany Small)  /Age: [birth year]//69 y.o.     Visit Type:   In-Person       Chief Complaint: type 1a endoleak and urothelial cancer scheduled for surgery    HPI: Patient is a 69-year-old male scheduled for open AAA type I endoleak repair and nephro ureterectomy on 2024 for treatment of type Ia endoleak of aortic graft and urothelial carcinoma of the left kidney.  The patient is referred by Dr. Дмитрий Thompson, Dr. Kar Street and Dr. 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    Past Medical History:   Diagnosis Date    Abnormal findings on diagnostic imaging of heart and coronary circulation     Abnormal echocardiogram    Abnormal findings on diagnostic imaging of other specified body structures     Abnormal ultrasound    Abnormal findings on diagnostic imaging of other specified body structures     Abnormal ultrasound    Abnormal findings on diagnostic imaging of other specified body structures     Abnormal chest x-ray    Abnormal findings on diagnostic imaging of other specified body structures     Abnormal CT of the chest    Abnormal findings on diagnostic imaging of other specified body structures     Abnormal computed tomography angiography (CTA)    Abnormal findings on diagnostic imaging of other specified body structures     Abnormal CT of the chest    BPH (benign prostatic hyperplasia)     COPD (chronic obstructive pulmonary disease) (Multi)     Coronary artery disease     Hyperlipidemia     Hypertension     Personal history of other medical treatment     History of echocardiogram       Past Surgical History:   Procedure Laterality Date    CT ABDOMEN PELVIS  ANGIOGRAM W AND/OR WO IV CONTRAST  11/7/2019    CT ABDOMEN PELVIS ANGIOGRAM W AND/OR WO IV CONTRAST 11/7/2019 [facility]    CT ABDOMEN PELVIS ANGIOGRAM W AND/OR WO IV CONTRAST  7/8/2020    CT ABDOMEN PELVIS ANGIOGRAM W AND/OR WO IV CONTRAST 7/8/2020 [facility]    OTHER SURGICAL HISTORY  05/30/2019    Salivary gland surgery    OTHER SURGICAL HISTORY  06/16/2020    Bronchoscopy    OTHER SURGICAL HISTORY  06/16/2020    Seroma incision and drainage    OTHER SURGICAL HISTORY  04/02/2021    Cardioverter defibrillator insertion    OTHER SURGICAL HISTORY  01/30/2020    Cardiac catheterization with stent placement    OTHER SURGICAL HISTORY  06/16/2020    Abdominal aortic aneurysm repair endovascular       Patient  has no history on file for sexual activity.    Family History   Problem Relation Name Age of Onset    No Known Problems Mother          no relationship    No Known Problems Father          no relationship       No Known Allergies    Prior to Admission medications    Medication Sig Start Date End Date Taking? Authorizing Provider   aspirin 81 mg EC tablet Take 1 tablet (81 mg) by mouth once daily.   Yes Historical Provider, MD   fluticasone-umeclidin-vilanter (Trelegy Ellipta) 100-62.5-25 mcg blister with device Inhale.   Yes Historical Provider, MD   metoprolol succinate XL (Toprol-XL) 50 mg 24 hr tablet Take 1 tablet (50 mg) by mouth once daily. Do not crush or chew. 1/15/24 1/14/25 Yes Olvin Alfred MD PhD   predniSONE (Deltasone) 5 mg tablet Take 1 tablet (5 mg) by mouth once daily.   Yes Historical Provider, MD   sacubitriL-valsartan (Entresto) 24-26 mg tablet Take 1 tablet by mouth 2 times a day. 1/15/24 1/14/25 Yes Olvin Alfred MD PhD   simvastatin (Zocor) 40 mg tablet TAKE 1 TABLET BY MOUTH ONCE DAILY AT BEDTIME 4/15/24  Yes Olvin Alfred MD PhD   tamsulosin (Flomax) 0.4 mg 24 hr capsule  2/20/24  Yes Historical Provider, MD   chlorhexidine (Hibiclens) 4 % external  liquid Apply topically 2 times a day for 5 days. 4/22/24 4/27/24  Samantha A Meeson, APRN-CNP   chlorhexidine (Peridex) 0.12 % solution Swish and spit 15 mL night before surgery and morning of surgery 4/22/24   Samantha A Meeson, APRN-CNP   cyanocobalamin, vitamin B-12, (Vitamin B-12) 5,000 mcg tablet, sublingual Place under the tongue.    Historical Provider, MD   ferrous sulfate, 325 mg ferrous sulfate, (FeosoL) tablet Take 1 tablet by mouth once daily with breakfast.    Historical Provider, MD   methotrexate (Trexall) 2.5 mg tablet Take 1 tablet (2.5 mg total) by mouth 1 (one) time per week.  Follow directions carefully, and ask to explain any part you do not understand. Take exactly as directed.  6 tabs Saturday    Historical Provider, MD   oxybutynin XL (Ditropan-XL) 10 mg 24 hr tablet TAKE 1 TABLET BY MOUTH ONCE DAILY AS NEEDED FOR BLADDER SPASMS. DO NOT CRUSH, CHEW, OR SPLIT. 4/15/24   Дмитрий Thompson MD MPH   oxybutynin XL (Ditropan-XL) 10 mg 24 hr tablet Take 1 tablet (10 mg) by mouth once daily as needed (take 1 tablet daily as needed for bladder spasms). Do not crush, chew, or split. 4/15/24 4/15/25  Дмитрий Thompson MD MPH   oxyCODONE (Roxicodone) 5 mg immediate release tablet Take 1 tablet (5 mg) by mouth every 6 hours if needed for severe pain (7 - 10). 11/22/23   Iván Chapman, DO   magnesium oxide (Mag-Ox) 400 mg tablet 1 tablet (400 mg) once daily.  4/22/24  Historical Provider, MD        PAT ROS:   Constitutional:   neg    Neuro/Psych:   neg    Eyes:   neg     use of corrective lenses  Ears:   neg    Nose:   neg    Mouth:   neg    Throat:   neg    Neck:   neg    Cardio:   neg    Respiratory:   neg    Endocrine:   neg    GI:   neg    :    hematuria  Musculoskeletal:   neg    Hematologic:   neg    Skin:  neg        Physical Exam  Vitals reviewed.   Constitutional:       Appearance: Normal appearance.   HENT:      Head: Normocephalic.      Nose: Nose normal.      Mouth/Throat:      Mouth: Mucous  membranes are moist.   Eyes:      Conjunctiva/sclera: Conjunctivae normal.   Neck:      Vascular: No carotid bruit.   Cardiovascular:      Rate and Rhythm: Normal rate and regular rhythm.      Pulses: Normal pulses.      Heart sounds: Normal heart sounds.   Pulmonary:      Effort: Pulmonary effort is normal.      Breath sounds: Normal breath sounds.   Abdominal:      Palpations: Abdomen is soft.      Tenderness: There is no abdominal tenderness.   Musculoskeletal:         General: Normal range of motion.      Cervical back: Normal range of motion.      Right lower leg: No edema.      Left lower leg: No edema.   Lymphadenopathy:      Cervical: No cervical adenopathy.   Skin:     General: Skin is warm and dry.      Capillary Refill: Capillary refill takes less than 2 seconds.   Neurological:      General: No focal deficit present.      Mental Status: He is alert and oriented to person, place, and time.   Psychiatric:         Mood and Affect: Mood normal.         Behavior: Behavior normal. Behavior is cooperative.         Thought Content: Thought content normal.         Judgment: Judgment normal.          PAT AIRWAY:   Airway:     Neck ROM::  Full   Upper partial   partials      Visit Vitals  BP 99/54   Pulse 92   Temp 35.7 °C (96.2 °F)       DASI Risk Score      Flowsheet Row Most Recent Value   DASI SCORE 18.95   METS Score (Will be calculated only when all the questions are answered) 5.1          Caprini DVT Assessment      Flowsheet Row Most Recent Value   DVT Score 14   Current Status COPD, Major surgery planned, lasting over 3 hours, Present cancer or chemotherapy   History Prior major surgery, Congestive heart failure, COPD   Age 60-75 years   BMI 30 or less          Modified Frailty Index      Flowsheet Row Most Recent Value   Modified Frailty Index Calculator .3636          CHADS2 Stroke Risk  Current as of 22 minutes ago        N/A 3 to 100%: High Risk   2 to < 3%: Medium Risk   0 to < 2%: Low Risk     Last  "Change: N/A          This score determines the patient's risk of having a stroke if the patient has atrial fibrillation.        This score is not applicable to this patient. Components are not calculated.          Revised Cardiac Risk Index      Flowsheet Row Most Recent Value   Revised Cardiac Risk Calculator 3          Apfel Simplified Score      Flowsheet Row Most Recent Value   Apfel Simplified Score Calculator 2          Risk Analysis Index Results This Encounter    No data found in the last 1 encounters.       Stop Bang Score      Flowsheet Row Most Recent Value   Do you snore loudly? 0   Do you often feel tired or fatigued after your sleep? 0   Has anyone ever observed you stop breathing in your sleep? 0   Do you have or are you being treated for high blood pressure? 1   Recent BMI (Calculated) 17   Is BMI greater than 35 kg/m2? 0=No   Age older than 50 years old? 1=Yes   Is your neck circumference greater than 17 inches (Male) or 16 inches (Female)? 0   Gender - Male 1=Yes   STOP-BANG Total Score 3            Assessment and Plan:   Neuro:  No neurologic diagnoses, however, the patient is at an increased risk for post operative delirium secondary to age >/= 65 and type and duration of surgery.  Preoperative brain exercise educational handout provided to patient.    The patient is at an increased risk for perioperative stroke secondary to cardiac disease, increased age, HTN, HLD, vascular surgery , general anesthesia, and op time >2.5 hours.     HEENT/Airway:  No diagnosis or significant findings on chart review or clinical presentation and evaluation.     Cardiovascular:  follows with Dr. Olvin Zarate- last visit 01/15/24 with note update on 01/24/24: \"I reviewed the results of the echocardiogram and the nuclear stress test that were done for the patient this week to better risk stratify him prior to his upcoming surgeries. He does have a stable level of cardiomyopathy with low ejection fraction of " "about 25%.  A nuclear test there was no evidence of ongoing ischemia. Overall while he is at higher risk from cardiac standpoint for his vascular surgery and surgery for his kidney, I believe the risk is not prohibitive.  I recommend admission to the hospital and consultation with cardiology prior to proceed with his surgeries to decrease the risk of perioperative cardiovascular events.\"     Discuss with Dr. Irizarry and since patient is euvolemic and had all preoperative testing completed no need for preoperative admission.  Patient knows to call if there are any changes in condition prior to surgery for update and possible admission.    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  CAD status post PCI with stent placement managed on 81 mg daily aspirin - hold 7 days prior to surgery per vascular recommendations. Ischemic cardiomyopathy with reduced ejection fraction with placement of defibrillator and managed on entresto (hold 24 hours).  Hypertension managed on metoprolol (continue). Hyperlipidemia managed on simvastatin (continue). No additional preoperative testing is currently indicated.    METS are 5.1    RCRI  >/=3 which is 15% % 30 day risk of MACE (risk for cardiac death, nonfatal myocardial infarction, and nonfactal cardiac arrest    DAKSHA score which indicates a  1.4 % risk of intraoperative or 30-day postoperative MACE      Pulmonary: COPD well-controlled with no recent exacerbations.  Preoperative deep breathing educational handout provided to patient.    ARISCAT:   26   points which is a intermediate (13.3%) risk of in-hospital post-op pulmonary complications     PRODIGY:  23  points which is a high risk of post op opioid induced respiratory depression episodes    STOP BANG:  3   points which is a intermediate risk for moderate to severe RICHARD. Patient to discuss risk factors with pcp post op.     Renal: urothelial carcinoma of the left kidney.  Patient " managed on oxybutynin (hold 24 hours).  BPH managed on tamsulosin (continue).    The patient is at increased risk of perioperative renal complications secondary to age>/= 56, male sex, active chf, HTN, and intraperitoneal surgery. Preventative measures include preoperative BP control.     Endocrine: managed on prednisone daily consider preoperative stress dosing.    Hematologic:   anemia managed on oral supplementation (ferrous sulfate and vitamin B12) hold day of surgery.  T/S obtained in [facility]. Preoperative DVT educational handout provided to patient.    Caprini Score:  14  points which is a highest risk of perioperative VTE    Gastrointestinal:   No diagnosis or significant findings on chart review or clinical presentation and evaluation.     EAT-10 score of  0 - self-perceived oropharyngeal dysphagia scale (0-40)     Apfel: 2 points 39%% risk for post operative N/V    Infectious disease:  No diagnosis or significant findings on chart review or clinical presentation and evaluation.     Musculoskeletal: Rheumatoid arthritis managed on methotrexate (continue) and prednisone (continue).    Labs ordered  Results for orders placed or performed in visit on 04/22/24 (from the past 96 hour(s))   Type And Screen   Result Value Ref Range    ABO TYPE AB     Rh TYPE POS     ANTIBODY SCREEN NEG    Basic metabolic panel   Result Value Ref Range    Glucose 85 74 - 99 mg/dL    Sodium 144 136 - 145 mmol/L    Potassium 4.2 3.5 - 5.3 mmol/L    Chloride 108 (H) 98 - 107 mmol/L    Bicarbonate 28 21 - 32 mmol/L    Anion Gap 12 10 - 20 mmol/L    Urea Nitrogen 21 6 - 23 mg/dL    Creatinine 0.94 0.50 - 1.30 mg/dL    eGFR 88 >60 mL/min/1.73m*2    Calcium 9.0 8.6 - 10.6 mg/dL   CBC and Auto Differential   Result Value Ref Range    WBC 12.6 (H) 4.4 - 11.3 x10*3/uL    nRBC 0.0 0.0 - 0.0 /100 WBCs    RBC 4.40 (L) 4.50 - 5.90 x10*6/uL    Hemoglobin 12.6 (L) 13.5 - 17.5 g/dL    Hematocrit 40.5 (L) 41.0 - 52.0 %    MCV 92 80 - 100 fL    MCH 28.6  26.0 - 34.0 pg    MCHC 31.1 (L) 32.0 - 36.0 g/dL    RDW 13.9 11.5 - 14.5 %    Platelets 294 150 - 450 x10*3/uL    Neutrophils % 73.5 40.0 - 80.0 %    Immature Granulocytes %, Automated 0.3 0.0 - 0.9 %    Lymphocytes % 17.0 13.0 - 44.0 %    Monocytes % 5.6 2.0 - 10.0 %    Eosinophils % 3.0 0.0 - 6.0 %    Basophils % 0.6 0.0 - 2.0 %    Neutrophils Absolute 9.25 (H) 1.20 - 7.70 x10*3/uL    Immature Granulocytes Absolute, Automated 0.04 0.00 - 0.70 x10*3/uL    Lymphocytes Absolute 2.14 1.20 - 4.80 x10*3/uL    Monocytes Absolute 0.71 0.10 - 1.00 x10*3/uL    Eosinophils Absolute 0.38 0.00 - 0.70 x10*3/uL    Basophils Absolute 0.07 0.00 - 0.10 x10*3/uL   Fibrinogen   Result Value Ref Range    Fibrinogen 395 200 - 400 mg/dL   Hemoglobin A1C   Result Value Ref Range    Hemoglobin A1C 5.3 see below %    Estimated Average Glucose 105 Not Established mg/dL   Staphylococcus aureus/MRSA colonization, Culture    Specimen: Nares/Axilla/Groin; Swab   Result Value Ref Range    Staph/MRSA Screen Culture (A)      Isolated: Methicillin Resistant Staphylococcus aureus (MRSA)   Urinalysis with Reflex Culture and Microscopic   Result Value Ref Range    Color, Urine Brown (N) Light-Yellow, Yellow, Dark-Yellow    Appearance, Urine Ex.Turbid (N) Clear    Specific Gravity, Urine 1.023 1.005 - 1.035    pH, Urine 5.5 5.0, 5.5, 6.0, 6.5, 7.0, 7.5, 8.0    Protein, Urine 100 (2+) (A) NEGATIVE, 10 (TRACE), 20 (TRACE) mg/dL    Glucose, Urine Normal Normal mg/dL    Blood, Urine OVER (3+) (A) NEGATIVE    Ketones, Urine NEGATIVE NEGATIVE mg/dL    Bilirubin, Urine NEGATIVE NEGATIVE    Urobilinogen, Urine Normal Normal mg/dL    Nitrite, Urine NEGATIVE NEGATIVE    Leukocyte Esterase, Urine 250 Jose Carlos/µL (A) NEGATIVE   Extra Urine Gray Tube   Result Value Ref Range    Extra Tube Hold for add-ons.    Microscopic Only, Urine   Result Value Ref Range    WBC, Urine >50 (A) 1-5, NONE /HPF    RBC, Urine >20 (A) NONE, 1-2, 3-5 /HPF    Mucus, Urine 2+ Reference range  not established. /LPF   Urine Culture    Specimen: Clean Catch/Voided; Urine   Result Value Ref Range    Urine Culture No growth

## 2024-04-22 NOTE — CPM/PAT H&P
CPM/PAT Evaluation       Name: Yobany Small (Yobany Small)  /Age: [birth year]//69 y.o.     Visit Type:   In-Person       Chief Complaint: type 1a endoleak and urothelial cancer scheduled for surgery    HPI: Patient is a 69-year-old male scheduled for open AAA type I endoleak repair and nephro ureterectomy on 2024 for treatment of type Ia endoleak of aortic graft and urothelial carcinoma of the left kidney.  The patient is referred by Dr. Дмитрий Thompson, Dr. Kar Street and Dr. 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    Past Medical History:   Diagnosis Date    Abnormal findings on diagnostic imaging of heart and coronary circulation     Abnormal echocardiogram    Abnormal findings on diagnostic imaging of other specified body structures     Abnormal ultrasound    Abnormal findings on diagnostic imaging of other specified body structures     Abnormal ultrasound    Abnormal findings on diagnostic imaging of other specified body structures     Abnormal chest x-ray    Abnormal findings on diagnostic imaging of other specified body structures     Abnormal CT of the chest    Abnormal findings on diagnostic imaging of other specified body structures     Abnormal computed tomography angiography (CTA)    Abnormal findings on diagnostic imaging of other specified body structures     Abnormal CT of the chest    BPH (benign prostatic hyperplasia)     COPD (chronic obstructive pulmonary disease) (Multi)     Coronary artery disease     Hyperlipidemia     Hypertension     Personal history of other medical treatment     History of echocardiogram       Past Surgical History:   Procedure Laterality Date    CT ABDOMEN PELVIS  ANGIOGRAM W AND/OR WO IV CONTRAST  11/7/2019    CT ABDOMEN PELVIS ANGIOGRAM W AND/OR WO IV CONTRAST 11/7/2019 [facility]    CT ABDOMEN PELVIS ANGIOGRAM W AND/OR WO IV CONTRAST  7/8/2020    CT ABDOMEN PELVIS ANGIOGRAM W AND/OR WO IV CONTRAST 7/8/2020 [facility]    OTHER SURGICAL HISTORY  05/30/2019    Salivary gland surgery    OTHER SURGICAL HISTORY  06/16/2020    Bronchoscopy    OTHER SURGICAL HISTORY  06/16/2020    Seroma incision and drainage    OTHER SURGICAL HISTORY  04/02/2021    Cardioverter defibrillator insertion    OTHER SURGICAL HISTORY  01/30/2020    Cardiac catheterization with stent placement    OTHER SURGICAL HISTORY  06/16/2020    Abdominal aortic aneurysm repair endovascular       Patient  has no history on file for sexual activity.    Family History   Problem Relation Name Age of Onset    No Known Problems Mother          no relationship    No Known Problems Father          no relationship       No Known Allergies    Prior to Admission medications    Medication Sig Start Date End Date Taking? Authorizing Provider   aspirin 81 mg EC tablet Take 1 tablet (81 mg) by mouth once daily.   Yes Historical Provider, MD   fluticasone-umeclidin-vilanter (Trelegy Ellipta) 100-62.5-25 mcg blister with device Inhale.   Yes Historical Provider, MD   metoprolol succinate XL (Toprol-XL) 50 mg 24 hr tablet Take 1 tablet (50 mg) by mouth once daily. Do not crush or chew. 1/15/24 1/14/25 Yes Olvin Alfred MD PhD   predniSONE (Deltasone) 5 mg tablet Take 1 tablet (5 mg) by mouth once daily.   Yes Historical Provider, MD   sacubitriL-valsartan (Entresto) 24-26 mg tablet Take 1 tablet by mouth 2 times a day. 1/15/24 1/14/25 Yes Olvin Alfred MD PhD   simvastatin (Zocor) 40 mg tablet TAKE 1 TABLET BY MOUTH ONCE DAILY AT BEDTIME 4/15/24  Yes Olvin Alfred MD PhD   tamsulosin (Flomax) 0.4 mg 24 hr capsule  2/20/24  Yes Historical Provider, MD   chlorhexidine (Hibiclens) 4 % external  liquid Apply topically 2 times a day for 5 days. 4/22/24 4/27/24  Samantha A Meeson, APRN-CNP   chlorhexidine (Peridex) 0.12 % solution Swish and spit 15 mL night before surgery and morning of surgery 4/22/24   Samantha A Meeson, APRN-CNP   cyanocobalamin, vitamin B-12, (Vitamin B-12) 5,000 mcg tablet, sublingual Place under the tongue.    Historical Provider, MD   ferrous sulfate, 325 mg ferrous sulfate, (FeosoL) tablet Take 1 tablet by mouth once daily with breakfast.    Historical Provider, MD   methotrexate (Trexall) 2.5 mg tablet Take 1 tablet (2.5 mg total) by mouth 1 (one) time per week.  Follow directions carefully, and ask to explain any part you do not understand. Take exactly as directed.  6 tabs Saturday    Historical Provider, MD   oxybutynin XL (Ditropan-XL) 10 mg 24 hr tablet TAKE 1 TABLET BY MOUTH ONCE DAILY AS NEEDED FOR BLADDER SPASMS. DO NOT CRUSH, CHEW, OR SPLIT. 4/15/24   Дмитрий Thompson MD MPH   oxybutynin XL (Ditropan-XL) 10 mg 24 hr tablet Take 1 tablet (10 mg) by mouth once daily as needed (take 1 tablet daily as needed for bladder spasms). Do not crush, chew, or split. 4/15/24 4/15/25  Дмитрий Thompson MD MPH   oxyCODONE (Roxicodone) 5 mg immediate release tablet Take 1 tablet (5 mg) by mouth every 6 hours if needed for severe pain (7 - 10). 11/22/23   Iván Chapman, DO   magnesium oxide (Mag-Ox) 400 mg tablet 1 tablet (400 mg) once daily.  4/22/24  Historical Provider, MD        PAT ROS:   Constitutional:   neg    Neuro/Psych:   neg    Eyes:   neg     use of corrective lenses  Ears:   neg    Nose:   neg    Mouth:   neg    Throat:   neg    Neck:   neg    Cardio:   neg    Respiratory:   neg    Endocrine:   neg    GI:   neg    :    hematuria  Musculoskeletal:   neg    Hematologic:   neg    Skin:  neg        Physical Exam  Vitals reviewed.   Constitutional:       Appearance: Normal appearance.   HENT:      Head: Normocephalic.      Nose: Nose normal.      Mouth/Throat:      Mouth: Mucous  membranes are moist.   Eyes:      Conjunctiva/sclera: Conjunctivae normal.   Neck:      Vascular: No carotid bruit.   Cardiovascular:      Rate and Rhythm: Normal rate and regular rhythm.      Pulses: Normal pulses.      Heart sounds: Normal heart sounds.   Pulmonary:      Effort: Pulmonary effort is normal.      Breath sounds: Normal breath sounds.   Abdominal:      Palpations: Abdomen is soft.      Tenderness: There is no abdominal tenderness.   Musculoskeletal:         General: Normal range of motion.      Cervical back: Normal range of motion.      Right lower leg: No edema.      Left lower leg: No edema.   Lymphadenopathy:      Cervical: No cervical adenopathy.   Skin:     General: Skin is warm and dry.      Capillary Refill: Capillary refill takes less than 2 seconds.   Neurological:      General: No focal deficit present.      Mental Status: He is alert and oriented to person, place, and time.   Psychiatric:         Mood and Affect: Mood normal.         Behavior: Behavior normal. Behavior is cooperative.         Thought Content: Thought content normal.         Judgment: Judgment normal.          PAT AIRWAY:   Airway:     Neck ROM::  Full   Upper partial   partials      Visit Vitals  BP 99/54   Pulse 92   Temp 35.7 °C (96.2 °F)       DASI Risk Score      Flowsheet Row Most Recent Value   DASI SCORE 18.95   METS Score (Will be calculated only when all the questions are answered) 5.1          Caprini DVT Assessment      Flowsheet Row Most Recent Value   DVT Score 14   Current Status COPD, Major surgery planned, lasting over 3 hours, Present cancer or chemotherapy   History Prior major surgery, Congestive heart failure, COPD   Age 60-75 years   BMI 30 or less          Modified Frailty Index      Flowsheet Row Most Recent Value   Modified Frailty Index Calculator .3636          CHADS2 Stroke Risk  Current as of 22 minutes ago        N/A 3 to 100%: High Risk   2 to < 3%: Medium Risk   0 to < 2%: Low Risk     Last  "Change: N/A          This score determines the patient's risk of having a stroke if the patient has atrial fibrillation.        This score is not applicable to this patient. Components are not calculated.          Revised Cardiac Risk Index      Flowsheet Row Most Recent Value   Revised Cardiac Risk Calculator 3          Apfel Simplified Score      Flowsheet Row Most Recent Value   Apfel Simplified Score Calculator 2          Risk Analysis Index Results This Encounter    No data found in the last 1 encounters.       Stop Bang Score      Flowsheet Row Most Recent Value   Do you snore loudly? 0   Do you often feel tired or fatigued after your sleep? 0   Has anyone ever observed you stop breathing in your sleep? 0   Do you have or are you being treated for high blood pressure? 1   Recent BMI (Calculated) 17   Is BMI greater than 35 kg/m2? 0=No   Age older than 50 years old? 1=Yes   Is your neck circumference greater than 17 inches (Male) or 16 inches (Female)? 0   Gender - Male 1=Yes   STOP-BANG Total Score 3            Assessment and Plan:   Neuro:  No neurologic diagnoses, however, the patient is at an increased risk for post operative delirium secondary to age >/= 65 and type and duration of surgery.  Preoperative brain exercise educational handout provided to patient.    The patient is at an increased risk for perioperative stroke secondary to cardiac disease, increased age, HTN, HLD, vascular surgery , general anesthesia, and op time >2.5 hours.     HEENT/Airway:  No diagnosis or significant findings on chart review or clinical presentation and evaluation.     Cardiovascular:  follows with Dr. Olvin Zarate- last visit 01/15/24 with note update on 01/24/24: \"I reviewed the results of the echocardiogram and the nuclear stress test that were done for the patient this week to better risk stratify him prior to his upcoming surgeries. He does have a stable level of cardiomyopathy with low ejection fraction of " "about 25%.  A nuclear test there was no evidence of ongoing ischemia. Overall while he is at higher risk from cardiac standpoint for his vascular surgery and surgery for his kidney, I believe the risk is not prohibitive.  I recommend admission to the hospital and consultation with cardiology prior to proceed with his surgeries to decrease the risk of perioperative cardiovascular events.\"     Discuss with Dr. Irizarry and since patient is euvolemic and had all preoperative testing completed no need for preoperative admission.  Patient knows to call if there are any changes in condition prior to surgery for update and possible admission.    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  CAD status post PCI with stent placement managed on 81 mg daily aspirin - hold 7 days prior to surgery per vascular recommendations. Ischemic cardiomyopathy with reduced ejection fraction with placement of defibrillator and managed on entresto (hold 24 hours).  Hypertension managed on metoprolol (continue). Hyperlipidemia managed on simvastatin (continue). No additional preoperative testing is currently indicated.    METS are 5.1    RCRI  >/=3 which is 15% % 30 day risk of MACE (risk for cardiac death, nonfatal myocardial infarction, and nonfactal cardiac arrest    DAKSHA score which indicates a  1.4 % risk of intraoperative or 30-day postoperative MACE      Pulmonary: COPD well-controlled with no recent exacerbations.  Preoperative deep breathing educational handout provided to patient.    ARISCAT:   26   points which is a intermediate (13.3%) risk of in-hospital post-op pulmonary complications     PRODIGY:  23  points which is a high risk of post op opioid induced respiratory depression episodes    STOP BANG:  3   points which is a intermediate risk for moderate to severe RICHARD. Patient to discuss risk factors with pcp post op.     Renal: urothelial carcinoma of the left kidney.  Patient " managed on oxybutynin (hold 24 hours).  BPH managed on tamsulosin (continue).    The patient is at increased risk of perioperative renal complications secondary to age>/= 56, male sex, active chf, HTN, and intraperitoneal surgery. Preventative measures include preoperative BP control.     Endocrine: managed on prednisone daily consider preoperative stress dosing.    Hematologic:   anemia managed on oral supplementation (ferrous sulfate and vitamin B12) hold day of surgery.  T/S obtained in [facility]. Preoperative DVT educational handout provided to patient.    Caprini Score:  14  points which is a highest risk of perioperative VTE    Gastrointestinal:   No diagnosis or significant findings on chart review or clinical presentation and evaluation.     EAT-10 score of  0 - self-perceived oropharyngeal dysphagia scale (0-40)     Apfel: 2 points 39%% risk for post operative N/V    Infectious disease:  No diagnosis or significant findings on chart review or clinical presentation and evaluation.     Musculoskeletal: Rheumatoid arthritis managed on methotrexate (continue) and prednisone (continue).    Labs ordered  Results for orders placed or performed in visit on 04/22/24 (from the past 96 hour(s))   Type And Screen   Result Value Ref Range    ABO TYPE AB     Rh TYPE POS     ANTIBODY SCREEN NEG    Basic metabolic panel   Result Value Ref Range    Glucose 85 74 - 99 mg/dL    Sodium 144 136 - 145 mmol/L    Potassium 4.2 3.5 - 5.3 mmol/L    Chloride 108 (H) 98 - 107 mmol/L    Bicarbonate 28 21 - 32 mmol/L    Anion Gap 12 10 - 20 mmol/L    Urea Nitrogen 21 6 - 23 mg/dL    Creatinine 0.94 0.50 - 1.30 mg/dL    eGFR 88 >60 mL/min/1.73m*2    Calcium 9.0 8.6 - 10.6 mg/dL   CBC and Auto Differential   Result Value Ref Range    WBC 12.6 (H) 4.4 - 11.3 x10*3/uL    nRBC 0.0 0.0 - 0.0 /100 WBCs    RBC 4.40 (L) 4.50 - 5.90 x10*6/uL    Hemoglobin 12.6 (L) 13.5 - 17.5 g/dL    Hematocrit 40.5 (L) 41.0 - 52.0 %    MCV 92 80 - 100 fL    MCH 28.6  26.0 - 34.0 pg    MCHC 31.1 (L) 32.0 - 36.0 g/dL    RDW 13.9 11.5 - 14.5 %    Platelets 294 150 - 450 x10*3/uL    Neutrophils % 73.5 40.0 - 80.0 %    Immature Granulocytes %, Automated 0.3 0.0 - 0.9 %    Lymphocytes % 17.0 13.0 - 44.0 %    Monocytes % 5.6 2.0 - 10.0 %    Eosinophils % 3.0 0.0 - 6.0 %    Basophils % 0.6 0.0 - 2.0 %    Neutrophils Absolute 9.25 (H) 1.20 - 7.70 x10*3/uL    Immature Granulocytes Absolute, Automated 0.04 0.00 - 0.70 x10*3/uL    Lymphocytes Absolute 2.14 1.20 - 4.80 x10*3/uL    Monocytes Absolute 0.71 0.10 - 1.00 x10*3/uL    Eosinophils Absolute 0.38 0.00 - 0.70 x10*3/uL    Basophils Absolute 0.07 0.00 - 0.10 x10*3/uL   Fibrinogen   Result Value Ref Range    Fibrinogen 395 200 - 400 mg/dL   Hemoglobin A1C   Result Value Ref Range    Hemoglobin A1C 5.3 see below %    Estimated Average Glucose 105 Not Established mg/dL   Staphylococcus aureus/MRSA colonization, Culture    Specimen: Nares/Axilla/Groin; Swab   Result Value Ref Range    Staph/MRSA Screen Culture (A)      Isolated: Methicillin Resistant Staphylococcus aureus (MRSA)   Urinalysis with Reflex Culture and Microscopic   Result Value Ref Range    Color, Urine Brown (N) Light-Yellow, Yellow, Dark-Yellow    Appearance, Urine Ex.Turbid (N) Clear    Specific Gravity, Urine 1.023 1.005 - 1.035    pH, Urine 5.5 5.0, 5.5, 6.0, 6.5, 7.0, 7.5, 8.0    Protein, Urine 100 (2+) (A) NEGATIVE, 10 (TRACE), 20 (TRACE) mg/dL    Glucose, Urine Normal Normal mg/dL    Blood, Urine OVER (3+) (A) NEGATIVE    Ketones, Urine NEGATIVE NEGATIVE mg/dL    Bilirubin, Urine NEGATIVE NEGATIVE    Urobilinogen, Urine Normal Normal mg/dL    Nitrite, Urine NEGATIVE NEGATIVE    Leukocyte Esterase, Urine 250 Jose Carlos/µL (A) NEGATIVE   Extra Urine Gray Tube   Result Value Ref Range    Extra Tube Hold for add-ons.    Microscopic Only, Urine   Result Value Ref Range    WBC, Urine >50 (A) 1-5, NONE /HPF    RBC, Urine >20 (A) NONE, 1-2, 3-5 /HPF    Mucus, Urine 2+ Reference range  not established. /LPF   Urine Culture    Specimen: Clean Catch/Voided; Urine   Result Value Ref Range    Urine Culture No growth

## 2024-04-23 LAB
BACTERIA UR CULT: NO GROWTH
HOLD SPECIMEN: NORMAL
STAPHYLOCOCCUS SPEC CULT: ABNORMAL

## 2024-04-25 ENCOUNTER — PREP FOR PROCEDURE (OUTPATIENT)
Dept: UROLOGY | Facility: HOSPITAL | Age: 69
End: 2024-04-25
Payer: COMMERCIAL

## 2024-04-25 DIAGNOSIS — C64.2 UROTHELIAL CARCINOMA OF KIDNEY, LEFT (MULTI): Primary | ICD-10-CM

## 2024-04-25 RX ORDER — SODIUM CHLORIDE 9 MG/ML
100 INJECTION, SOLUTION INTRAVENOUS CONTINUOUS
Status: CANCELLED | OUTPATIENT
Start: 2024-04-25

## 2024-04-29 ENCOUNTER — ANESTHESIA EVENT (OUTPATIENT)
Dept: OPERATING ROOM | Facility: HOSPITAL | Age: 69
DRG: 252 | End: 2024-04-29
Payer: COMMERCIAL

## 2024-04-29 NOTE — PROGRESS NOTES
Pharmacy Medication History Review    Yobany Small is a 69 y.o. male who is planned to be admitted for Type Ia endoleak of aortic graft (CMS-[facility]). Pharmacy called the patient prior to their scheduled procedure and reviewed the patient's [id number] medications for accuracy.    Medications ADDED:  none  Medications CHANGED:  none  Medications REMOVED:   Duplicate oxybutynin ER 10mg (listed 2x)  Oxycodone 5mg  Cyanocobalamin 5000mcg    Please review medication list and comments regarding how patient may be taking medications differently in the Admit Orders Activity.    Preferred pharmacy and allergies to be confirmed with patient by nursing the day of procedure.     Sources used to complete the med history include spoke with patient and wife, surescripts, oarrs, care everywhere    Below are additional concerns with the patient's PTA list.  Patient stopped aspirin a week ago in preparation of procedure    Patti Garcias    [facility]  Please reach out via Secure Chat for questions

## 2024-04-30 ENCOUNTER — ANESTHESIA (OUTPATIENT)
Dept: OPERATING ROOM | Facility: HOSPITAL | Age: 69
DRG: 252 | End: 2024-04-30
Payer: COMMERCIAL

## 2024-04-30 ENCOUNTER — APPOINTMENT (OUTPATIENT)
Dept: RADIOLOGY | Facility: HOSPITAL | Age: 69
DRG: 252 | End: 2024-04-30
Payer: COMMERCIAL

## 2024-04-30 ENCOUNTER — APPOINTMENT (OUTPATIENT)
Dept: CARDIOLOGY | Facility: HOSPITAL | Age: 69
DRG: 252 | End: 2024-04-30
Payer: COMMERCIAL

## 2024-04-30 ENCOUNTER — HOSPITAL ENCOUNTER (INPATIENT)
Facility: HOSPITAL | Age: 69
LOS: 10 days | Discharge: HOME HEALTH | DRG: 252 | End: 2024-05-10
Attending: SURGERY | Admitting: SURGERY
Payer: COMMERCIAL

## 2024-04-30 DIAGNOSIS — I25.10 CAD IN NATIVE ARTERY: ICD-10-CM

## 2024-04-30 DIAGNOSIS — I25.5 ISCHEMIC CARDIOMYOPATHY: ICD-10-CM

## 2024-04-30 DIAGNOSIS — R94.30 CARDIAC LV EJECTION FRACTION 21-30%: ICD-10-CM

## 2024-04-30 DIAGNOSIS — T82.310A TYPE IA ENDOLEAK OF AORTIC GRAFT (CMS-HCC): Primary | ICD-10-CM

## 2024-04-30 DIAGNOSIS — L40.50 PSORIATIC ARTHRITIS (MULTI): ICD-10-CM

## 2024-04-30 DIAGNOSIS — N13.39 OTHER HYDRONEPHROSIS: ICD-10-CM

## 2024-04-30 DIAGNOSIS — I10 PRIMARY HYPERTENSION: ICD-10-CM

## 2024-04-30 DIAGNOSIS — C66.2 MALIGNANT NEOPLASM OF LEFT URETER (MULTI): ICD-10-CM

## 2024-04-30 DIAGNOSIS — R06.02 SHORTNESS OF BREATH: ICD-10-CM

## 2024-04-30 DIAGNOSIS — Z95.810 CARDIAC DEFIBRILLATOR IN PLACE: ICD-10-CM

## 2024-04-30 DIAGNOSIS — I50.22 CHRONIC SYSTOLIC (CONGESTIVE) HEART FAILURE (MULTI): ICD-10-CM

## 2024-04-30 DIAGNOSIS — G89.18 POST-OPERATIVE PAIN: ICD-10-CM

## 2024-04-30 DIAGNOSIS — I71.40 ABDOMINAL AORTIC ANEURYSM (AAA) WITHOUT RUPTURE, UNSPECIFIED PART (CMS-HCC): ICD-10-CM

## 2024-04-30 DIAGNOSIS — C64.2 UROTHELIAL CARCINOMA OF KIDNEY, LEFT (MULTI): ICD-10-CM

## 2024-04-30 LAB
ABO GROUP (TYPE) IN BLOOD: NORMAL
ABO GROUP (TYPE) IN BLOOD: NORMAL
ACT BLD: 144 SEC (ref 83–199)
ACT BLD: 144 SEC (ref 83–199)
ACT BLD: 216 SEC (ref 83–199)
ACT BLD: 216 SEC (ref 83–199)
ACT BLD: 223 SEC (ref 83–199)
ACT BLD: 232 SEC (ref 83–199)
ACT BLD: 292 SEC (ref 83–199)
ALBUMIN SERPL BCP-MCNC: 3 G/DL (ref 3.4–5)
ALP SERPL-CCNC: 31 U/L (ref 33–136)
ALT SERPL W P-5'-P-CCNC: 9 U/L (ref 10–52)
ANION GAP BLDA CALCULATED.4IONS-SCNC: 0 MMO/L (ref 10–25)
ANION GAP BLDA CALCULATED.4IONS-SCNC: 10 MMO/L (ref 10–25)
ANION GAP BLDA CALCULATED.4IONS-SCNC: 11 MMO/L (ref 10–25)
ANION GAP BLDA CALCULATED.4IONS-SCNC: 11 MMO/L (ref 10–25)
ANION GAP BLDA CALCULATED.4IONS-SCNC: 14 MMO/L (ref 10–25)
ANION GAP BLDA CALCULATED.4IONS-SCNC: 7 MMO/L (ref 10–25)
ANION GAP BLDA CALCULATED.4IONS-SCNC: 8 MMO/L (ref 10–25)
ANION GAP BLDA CALCULATED.4IONS-SCNC: 8 MMO/L (ref 10–25)
ANION GAP BLDMV CALCULATED.4IONS-SCNC: 8 MMO/L (ref 10–25)
ANION GAP BLDMV CALCULATED.4IONS-SCNC: 9 MMO/L (ref 10–25)
ANION GAP BLDV CALCULATED.4IONS-SCNC: 12 MMOL/L (ref 10–25)
ANION GAP SERPL CALC-SCNC: 13 MMOL/L (ref 10–20)
ANTIBODY SCREEN: NORMAL
APTT PPP: 36 SECONDS (ref 27–38)
AST SERPL W P-5'-P-CCNC: 13 U/L (ref 9–39)
BASE EXCESS BLDA CALC-SCNC: -0.3 MMOL/L (ref -2–3)
BASE EXCESS BLDA CALC-SCNC: -1.1 MMOL/L (ref -2–3)
BASE EXCESS BLDA CALC-SCNC: -1.6 MMOL/L (ref -2–3)
BASE EXCESS BLDA CALC-SCNC: -2.9 MMOL/L (ref -2–3)
BASE EXCESS BLDA CALC-SCNC: -3.2 MMOL/L (ref -2–3)
BASE EXCESS BLDA CALC-SCNC: -3.2 MMOL/L (ref -2–3)
BASE EXCESS BLDA CALC-SCNC: -4.4 MMOL/L (ref -2–3)
BASE EXCESS BLDA CALC-SCNC: -4.8 MMOL/L (ref -2–3)
BASE EXCESS BLDA CALC-SCNC: -5 MMOL/L (ref -2–3)
BASE EXCESS BLDA CALC-SCNC: 1.5 MMOL/L (ref -2–3)
BASE EXCESS BLDA CALC-SCNC: 14.7 MMOL/L (ref -2–3)
BASE EXCESS BLDA CALC-SCNC: 4.5 MMOL/L (ref -2–3)
BASE EXCESS BLDMV CALC-SCNC: -1.2 MMOL/L (ref -2–3)
BASE EXCESS BLDMV CALC-SCNC: -4.1 MMOL/L (ref -2–3)
BASE EXCESS BLDV CALC-SCNC: -4.2 MMOL/L (ref -2–3)
BILIRUB DIRECT SERPL-MCNC: 0.3 MG/DL (ref 0–0.3)
BILIRUB SERPL-MCNC: 0.9 MG/DL (ref 0–1.2)
BLOOD EXPIRATION DATE: NORMAL
BODY TEMPERATURE: 37 DEGREES CELSIUS
BUN SERPL-MCNC: 18 MG/DL (ref 6–23)
CA-I BLD-SCNC: 1.1 MMOL/L (ref 1.1–1.33)
CA-I BLDA-SCNC: 0.8 MMOL/L (ref 1.1–1.33)
CA-I BLDA-SCNC: 0.85 MMOL/L (ref 1.1–1.33)
CA-I BLDA-SCNC: 0.87 MMOL/L (ref 1.1–1.33)
CA-I BLDA-SCNC: 1.07 MMOL/L (ref 1.1–1.33)
CA-I BLDA-SCNC: 1.07 MMOL/L (ref 1.1–1.33)
CA-I BLDA-SCNC: 1.1 MMOL/L (ref 1.1–1.33)
CA-I BLDA-SCNC: 1.11 MMOL/L (ref 1.1–1.33)
CA-I BLDA-SCNC: 1.11 MMOL/L (ref 1.1–1.33)
CA-I BLDA-SCNC: 1.15 MMOL/L (ref 1.1–1.33)
CA-I BLDA-SCNC: 1.18 MMOL/L (ref 1.1–1.33)
CA-I BLDA-SCNC: 1.27 MMOL/L (ref 1.1–1.33)
CA-I BLDA-SCNC: 1.49 MMOL/L (ref 1.1–1.33)
CA-I BLDMV-SCNC: 1.05 MMOL/L (ref 1.1–1.33)
CA-I BLDMV-SCNC: 1.12 MMOL/L (ref 1.1–1.33)
CA-I BLDV-SCNC: 1.43 MMOL/L (ref 1.1–1.33)
CALCIUM SERPL-MCNC: 7.5 MG/DL (ref 8.6–10.6)
CFT FORM KAOLIN IND BLD RES TEG: 1.6 MIN (ref 0.8–2.1)
CFT FORM KAOLIN IND BLD RES TEG: 1.7 MIN (ref 0.8–2.1)
CHLORIDE BLD-SCNC: 105 MMOL/L (ref 98–107)
CHLORIDE BLD-SCNC: 106 MMOL/L (ref 98–107)
CHLORIDE BLDA-SCNC: 105 MMOL/L (ref 98–107)
CHLORIDE BLDA-SCNC: 106 MMOL/L (ref 98–107)
CHLORIDE BLDA-SCNC: 107 MMOL/L (ref 98–107)
CHLORIDE BLDA-SCNC: 107 MMOL/L (ref 98–107)
CHLORIDE BLDA-SCNC: 108 MMOL/L (ref 98–107)
CHLORIDE BLDA-SCNC: 110 MMOL/L (ref 98–107)
CHLORIDE BLDA-SCNC: 110 MMOL/L (ref 98–107)
CHLORIDE BLDA-SCNC: 111 MMOL/L (ref 98–107)
CHLORIDE BLDA-SCNC: 112 MMOL/L (ref 98–107)
CHLORIDE BLDA-SCNC: 113 MMOL/L (ref 98–107)
CHLORIDE BLDV-SCNC: 106 MMOL/L (ref 98–107)
CHLORIDE SERPL-SCNC: 110 MMOL/L (ref 98–107)
CLOT ANGLE.KAOLIN INDUCED BLD RES TEG: 70.1 DEG (ref 63–78)
CLOT ANGLE.KAOLIN INDUCED BLD RES TEG: 70.4 DEG (ref 63–78)
CLOT INIT KAO IND P HEP NEUT BLD RES TEG: 5.7 MIN (ref 4.3–8.3)
CLOT INIT KAO IND P HEP NEUT BLD RES TEG: 6.2 MIN (ref 4.3–8.3)
CLOT INIT KAO IND P HEP NEUT BLD RES TEG: 6.7 MIN (ref 4.6–9.1)
CLOT INIT KAO IND P HEP NEUT BLD RES TEG: 6.7 MIN (ref 4.6–9.1)
CO2 SERPL-SCNC: 24 MMOL/L (ref 21–32)
COHGB MFR BLDA: 1.5 %
COHGB MFR BLDA: 1.9 %
COHGB MFR BLDA: 2.5 %
COHGB MFR BLDV: 2 %
CREAT SERPL-MCNC: 1.07 MG/DL (ref 0.5–1.3)
DISPENSE STATUS: NORMAL
DO-HGB MFR BLDA: 0.5 % (ref 0–5)
DO-HGB MFR BLDA: 0.7 % (ref 0–5)
DO-HGB MFR BLDA: 10.5 % (ref 0–5)
DO-HGB MFR BLDA: 2 % (ref 0–5)
DO-HGB MFR BLDA: 4.8 % (ref 0–5)
EGFRCR SERPLBLD CKD-EPI 2021: 76 ML/MIN/1.73M*2
ERYTHROCYTE [DISTWIDTH] IN BLOOD BY AUTOMATED COUNT: 13.5 % (ref 11.5–14.5)
ERYTHROCYTE [DISTWIDTH] IN BLOOD BY AUTOMATED COUNT: 13.8 % (ref 11.5–14.5)
FIBRINOGEN BLD CALC-MCNC: 259 MG/DL (ref 278–581)
FIBRINOGEN BLD CALC-MCNC: 268 MG/DL (ref 278–581)
FIBRINOGEN PPP-MCNC: 242 MG/DL (ref 200–400)
GLUCOSE BLD MANUAL STRIP-MCNC: 154 MG/DL (ref 74–99)
GLUCOSE BLD-MCNC: 140 MG/DL (ref 74–99)
GLUCOSE BLD-MCNC: 172 MG/DL (ref 74–99)
GLUCOSE BLDA-MCNC: 126 MG/DL (ref 74–99)
GLUCOSE BLDA-MCNC: 128 MG/DL (ref 74–99)
GLUCOSE BLDA-MCNC: 128 MG/DL (ref 74–99)
GLUCOSE BLDA-MCNC: 140 MG/DL (ref 74–99)
GLUCOSE BLDA-MCNC: 144 MG/DL (ref 74–99)
GLUCOSE BLDA-MCNC: 156 MG/DL (ref 74–99)
GLUCOSE BLDA-MCNC: 158 MG/DL (ref 74–99)
GLUCOSE BLDA-MCNC: 170 MG/DL (ref 74–99)
GLUCOSE BLDA-MCNC: 171 MG/DL (ref 74–99)
GLUCOSE BLDA-MCNC: 207 MG/DL (ref 74–99)
GLUCOSE BLDA-MCNC: 211 MG/DL (ref 74–99)
GLUCOSE BLDA-MCNC: 214 MG/DL (ref 74–99)
GLUCOSE BLDV-MCNC: 170 MG/DL (ref 74–99)
GLUCOSE SERPL-MCNC: 126 MG/DL (ref 74–99)
HCO3 BLDA-SCNC: 22 MMOL/L (ref 22–26)
HCO3 BLDA-SCNC: 22 MMOL/L (ref 22–26)
HCO3 BLDA-SCNC: 22.1 MMOL/L (ref 22–26)
HCO3 BLDA-SCNC: 22.3 MMOL/L (ref 22–26)
HCO3 BLDA-SCNC: 22.7 MMOL/L (ref 22–26)
HCO3 BLDA-SCNC: 23.1 MMOL/L (ref 22–26)
HCO3 BLDA-SCNC: 23.7 MMOL/L (ref 22–26)
HCO3 BLDA-SCNC: 24 MMOL/L (ref 22–26)
HCO3 BLDA-SCNC: 24.8 MMOL/L (ref 22–26)
HCO3 BLDA-SCNC: 26.6 MMOL/L (ref 22–26)
HCO3 BLDA-SCNC: 29.7 MMOL/L (ref 22–26)
HCO3 BLDA-SCNC: 39.5 MMOL/L (ref 22–26)
HCO3 BLDMV-SCNC: 23.8 MMOL/L (ref 22–26)
HCO3 BLDMV-SCNC: 25.2 MMOL/L (ref 22–26)
HCO3 BLDV-SCNC: 23.7 MMOL/L (ref 22–26)
HCT VFR BLD AUTO: 22.9 % (ref 41–52)
HCT VFR BLD AUTO: 27.3 % (ref 41–52)
HCT VFR BLD EST: 14 % (ref 41–52)
HCT VFR BLD EST: 16 % (ref 41–52)
HCT VFR BLD EST: 17 % (ref 41–52)
HCT VFR BLD EST: 21 % (ref 41–52)
HCT VFR BLD EST: 22 % (ref 41–52)
HCT VFR BLD EST: 24 % (ref 41–52)
HCT VFR BLD EST: 25 % (ref 41–52)
HCT VFR BLD EST: 26 % (ref 41–52)
HCT VFR BLD EST: 27 % (ref 41–52)
HCT VFR BLD EST: 28 % (ref 41–52)
HCT VFR BLD EST: 30 % (ref 41–52)
HCT VFR BLD EST: 31 % (ref 41–52)
HCT VFR BLD EST: 31 % (ref 41–52)
HCT VFR BLD EST: 33 % (ref 41–52)
HCT VFR BLD EST: 48 % (ref 41–52)
HGB BLD-MCNC: 7.9 G/DL (ref 13.5–17.5)
HGB BLD-MCNC: 9.8 G/DL (ref 13.5–17.5)
HGB BLDA-MCNC: 10.3 G/DL (ref 13.5–17.5)
HGB BLDA-MCNC: 10.4 G/DL (ref 13.5–17.5)
HGB BLDA-MCNC: 10.4 G/DL (ref 13.5–17.5)
HGB BLDA-MCNC: 11.1 G/DL (ref 13.5–17.5)
HGB BLDA-MCNC: 11.1 G/DL (ref 13.5–17.5)
HGB BLDA-MCNC: 4.5 G/DL (ref 13.5–17.5)
HGB BLDA-MCNC: 5.3 G/DL (ref 13.5–17.5)
HGB BLDA-MCNC: 5.5 G/DL (ref 13.5–17.5)
HGB BLDA-MCNC: 7.1 G/DL (ref 13.5–17.5)
HGB BLDA-MCNC: 7.2 G/DL (ref 13.5–17.5)
HGB BLDA-MCNC: 7.2 G/DL (ref 13.5–17.5)
HGB BLDA-MCNC: 8 G/DL (ref 13.5–17.5)
HGB BLDA-MCNC: 8 G/DL (ref 13.5–17.5)
HGB BLDA-MCNC: 8.3 G/DL (ref 13.5–17.5)
HGB BLDA-MCNC: 8.8 G/DL (ref 13.5–17.5)
HGB BLDA-MCNC: 9.1 G/DL (ref 13.5–17.5)
HGB BLDA-MCNC: 9.1 G/DL (ref 13.5–17.5)
HGB BLDMV-MCNC: 10.1 G/DL (ref 13.5–17.5)
HGB BLDMV-MCNC: 16 G/DL (ref 13.5–17.5)
HGB BLDV-MCNC: 9.2 G/DL (ref 13.5–17.5)
INHALED O2 CONCENTRATION: 100 %
INHALED O2 CONCENTRATION: 40 %
INHALED O2 CONCENTRATION: 50 %
INHALED O2 CONCENTRATION: 60 %
INHALED O2 CONCENTRATION: 70 %
INR PPP: 1.4 (ref 0.9–1.1)
LACTATE BLDA-SCNC: 0.7 MMOL/L (ref 0.4–2)
LACTATE BLDA-SCNC: 0.8 MMOL/L (ref 0.4–2)
LACTATE BLDA-SCNC: 0.9 MMOL/L (ref 0.4–2)
LACTATE BLDA-SCNC: 1.5 MMOL/L (ref 0.4–2)
LACTATE BLDA-SCNC: 2.1 MMOL/L (ref 0.4–2)
LACTATE BLDA-SCNC: 2.6 MMOL/L (ref 0.4–2)
LACTATE BLDA-SCNC: 2.7 MMOL/L (ref 0.4–2)
LACTATE BLDA-SCNC: 3.4 MMOL/L (ref 0.4–2)
LACTATE BLDA-SCNC: 3.7 MMOL/L (ref 0.4–2)
LACTATE BLDA-SCNC: 4.2 MMOL/L (ref 0.4–2)
LACTATE BLDA-SCNC: 4.6 MMOL/L (ref 0.4–2)
LACTATE BLDA-SCNC: 4.7 MMOL/L (ref 0.4–2)
LACTATE BLDMV-SCNC: 0.7 MMOL/L (ref 0.4–2)
LACTATE BLDMV-SCNC: 0.8 MMOL/L (ref 0.4–2)
LACTATE BLDV-SCNC: 4.1 MMOL/L (ref 0.4–2)
MA KAOLIN BLD RES TEG: 53.5 MM (ref 52–69)
MA KAOLIN BLD RES TEG: 53.6 MM (ref 52–69)
MA KAOLIN+TF BLD RES TEG: 51.9 MM (ref 52–70)
MA KAOLIN+TF BLD RES TEG: 52.1 MM (ref 52–70)
MA TF IND+IIB-IIIA INH BLD RES TEG: 14.2 MM (ref 15–32)
MA TF IND+IIB-IIIA INH BLD RES TEG: 14.7 MM (ref 15–32)
MAGNESIUM SERPL-MCNC: 1.37 MG/DL (ref 1.6–2.4)
MCH RBC QN AUTO: 29.9 PG (ref 26–34)
MCH RBC QN AUTO: 30.2 PG (ref 26–34)
MCHC RBC AUTO-ENTMCNC: 34.5 G/DL (ref 32–36)
MCHC RBC AUTO-ENTMCNC: 35.9 G/DL (ref 32–36)
MCV RBC AUTO: 83 FL (ref 80–100)
MCV RBC AUTO: 87 FL (ref 80–100)
METHGB MFR BLDA: 0.3 % (ref 0–1.5)
METHGB MFR BLDA: 0.6 % (ref 0–1.5)
METHGB MFR BLDA: 0.6 % (ref 0–1.5)
METHGB MFR BLDA: 0.8 % (ref 0–1.5)
METHGB MFR BLDA: 0.9 % (ref 0–1.5)
METHGB MFR BLDV: 0.8 % (ref 0–1.5)
NRBC BLD-RTO: 0 /100 WBCS (ref 0–0)
NRBC BLD-RTO: 0 /100 WBCS (ref 0–0)
OXYHGB MFR BLDA: 87.3 % (ref 94–98)
OXYHGB MFR BLDA: 87.3 % (ref 94–98)
OXYHGB MFR BLDA: 91.6 % (ref 94–98)
OXYHGB MFR BLDA: 92.8 % (ref 94–98)
OXYHGB MFR BLDA: 92.8 % (ref 94–98)
OXYHGB MFR BLDA: 93.8 % (ref 94–98)
OXYHGB MFR BLDA: 94.7 % (ref 94–98)
OXYHGB MFR BLDA: 94.7 % (ref 94–98)
OXYHGB MFR BLDA: 95.5 % (ref 94–98)
OXYHGB MFR BLDA: 95.9 % (ref 94–98)
OXYHGB MFR BLDA: 96.7 % (ref 94–98)
OXYHGB MFR BLDA: 96.8 % (ref 94–98)
OXYHGB MFR BLDA: 97 % (ref 94–98)
OXYHGB MFR BLDA: 97 % (ref 94–98)
OXYHGB MFR BLDA: 97.4 % (ref 94–98)
OXYHGB MFR BLDA: 97.5 % (ref 94–98)
OXYHGB MFR BLDA: 97.5 % (ref 94–98)
OXYHGB MFR BLDMV: 77.1 % (ref 45–75)
OXYHGB MFR BLDMV: 84.5 % (ref 45–75)
OXYHGB MFR BLDV: 86.4 % (ref 45–75)
PCO2 BLDA: 37 MM HG (ref 38–42)
PCO2 BLDA: 39 MM HG (ref 38–42)
PCO2 BLDA: 40 MM HG (ref 38–42)
PCO2 BLDA: 42 MM HG (ref 38–42)
PCO2 BLDA: 42 MM HG (ref 38–42)
PCO2 BLDA: 44 MM HG (ref 38–42)
PCO2 BLDA: 46 MM HG (ref 38–42)
PCO2 BLDA: 48 MM HG (ref 38–42)
PCO2 BLDA: 48 MM HG (ref 38–42)
PCO2 BLDA: 52 MM HG (ref 38–42)
PCO2 BLDA: 53 MM HG (ref 38–42)
PCO2 BLDA: 54 MM HG (ref 38–42)
PCO2 BLDMV: 49 MM HG (ref 41–51)
PCO2 BLDMV: 53 MM HG (ref 41–51)
PCO2 BLDV: 58 MM HG (ref 41–51)
PH BLDA: 7.24 PH (ref 7.38–7.42)
PH BLDA: 7.24 PH (ref 7.38–7.42)
PH BLDA: 7.27 PH (ref 7.38–7.42)
PH BLDA: 7.34 PH (ref 7.38–7.42)
PH BLDA: 7.34 PH (ref 7.38–7.42)
PH BLDA: 7.35 PH (ref 7.38–7.42)
PH BLDA: 7.36 PH (ref 7.38–7.42)
PH BLDA: 7.36 PH (ref 7.38–7.42)
PH BLDA: 7.39 PH (ref 7.38–7.42)
PH BLDA: 7.4 PH (ref 7.38–7.42)
PH BLDA: 7.42 PH (ref 7.38–7.42)
PH BLDA: 7.48 PH (ref 7.38–7.42)
PH BLDMV: 7.26 PH (ref 7.33–7.43)
PH BLDMV: 7.32 PH (ref 7.33–7.43)
PH BLDV: 7.22 PH (ref 7.33–7.43)
PHOSPHATE SERPL-MCNC: 3.8 MG/DL (ref 2.5–4.9)
PLATELET # BLD AUTO: 126 X10*3/UL (ref 150–450)
PLATELET # BLD AUTO: 192 X10*3/UL (ref 150–450)
PO2 BLDA: 106 MM HG (ref 85–95)
PO2 BLDA: 110 MM HG (ref 85–95)
PO2 BLDA: 114 MM HG (ref 85–95)
PO2 BLDA: 117 MM HG (ref 85–95)
PO2 BLDA: 121 MM HG (ref 85–95)
PO2 BLDA: 177 MM HG (ref 85–95)
PO2 BLDA: 203 MM HG (ref 85–95)
PO2 BLDA: 56 MM HG (ref 85–95)
PO2 BLDA: 63 MM HG (ref 85–95)
PO2 BLDA: 71 MM HG (ref 85–95)
PO2 BLDA: 73 MM HG (ref 85–95)
PO2 BLDA: 93 MM HG (ref 85–95)
PO2 BLDMV: 49 MM HG (ref 35–45)
PO2 BLDMV: 60 MM HG (ref 35–45)
PO2 BLDV: 57 MM HG (ref 35–45)
POTASSIUM BLDA-SCNC: 3 MMOL/L (ref 3.5–5.3)
POTASSIUM BLDA-SCNC: 3.4 MMOL/L (ref 3.5–5.3)
POTASSIUM BLDA-SCNC: 3.7 MMOL/L (ref 3.5–5.3)
POTASSIUM BLDA-SCNC: 3.9 MMOL/L (ref 3.5–5.3)
POTASSIUM BLDA-SCNC: 4 MMOL/L (ref 3.5–5.3)
POTASSIUM BLDA-SCNC: 4.1 MMOL/L (ref 3.5–5.3)
POTASSIUM BLDA-SCNC: 4.3 MMOL/L (ref 3.5–5.3)
POTASSIUM BLDA-SCNC: 4.3 MMOL/L (ref 3.5–5.3)
POTASSIUM BLDA-SCNC: 4.4 MMOL/L (ref 3.5–5.3)
POTASSIUM BLDMV-SCNC: 3.4 MMOL/L (ref 3.5–5.3)
POTASSIUM BLDMV-SCNC: 4.3 MMOL/L (ref 3.5–5.3)
POTASSIUM BLDV-SCNC: 4.5 MMOL/L (ref 3.5–5.3)
POTASSIUM SERPL-SCNC: 3.9 MMOL/L (ref 3.5–5.3)
PRODUCT BLOOD TYPE: 8400
PRODUCT CODE: NORMAL
PROT SERPL-MCNC: 4.3 G/DL (ref 6.4–8.2)
PROTHROMBIN TIME: 15.7 SECONDS (ref 9.8–12.8)
RBC # BLD AUTO: 2.62 X10*6/UL (ref 4.5–5.9)
RBC # BLD AUTO: 3.28 X10*6/UL (ref 4.5–5.9)
RH FACTOR (ANTIGEN D): NORMAL
RH FACTOR (ANTIGEN D): NORMAL
SAO2 % BLDA: 100 % (ref 94–100)
SAO2 % BLDA: 100 % (ref 94–100)
SAO2 % BLDA: 89 % (ref 94–100)
SAO2 % BLDA: 95 % (ref 94–100)
SAO2 % BLDA: 95 % (ref 94–100)
SAO2 % BLDA: 97 % (ref 94–100)
SAO2 % BLDA: 98 % (ref 94–100)
SAO2 % BLDA: 98 % (ref 94–100)
SAO2 % BLDA: 99 % (ref 94–100)
SAO2 % BLDMV: 80 % (ref 45–75)
SAO2 % BLDMV: 88 % (ref 45–75)
SAO2 % BLDV: 89 % (ref 45–75)
SODIUM BLDA-SCNC: 134 MMOL/L (ref 136–145)
SODIUM BLDA-SCNC: 136 MMOL/L (ref 136–145)
SODIUM BLDA-SCNC: 136 MMOL/L (ref 136–145)
SODIUM BLDA-SCNC: 137 MMOL/L (ref 136–145)
SODIUM BLDA-SCNC: 138 MMOL/L (ref 136–145)
SODIUM BLDA-SCNC: 139 MMOL/L (ref 136–145)
SODIUM BLDA-SCNC: 141 MMOL/L (ref 136–145)
SODIUM BLDA-SCNC: 141 MMOL/L (ref 136–145)
SODIUM BLDA-SCNC: 142 MMOL/L (ref 136–145)
SODIUM BLDA-SCNC: 142 MMOL/L (ref 136–145)
SODIUM BLDMV-SCNC: 134 MMOL/L (ref 136–145)
SODIUM BLDMV-SCNC: 135 MMOL/L (ref 136–145)
SODIUM BLDV-SCNC: 137 MMOL/L (ref 136–145)
SODIUM SERPL-SCNC: 143 MMOL/L (ref 136–145)
UNIT ABO: NORMAL
UNIT NUMBER: NORMAL
UNIT RH: NORMAL
UNIT VOLUME: 252
UNIT VOLUME: 261
UNIT VOLUME: 333
WBC # BLD AUTO: 10.7 X10*3/UL (ref 4.4–11.3)
WBC # BLD AUTO: 9.6 X10*3/UL (ref 4.4–11.3)

## 2024-04-30 PROCEDURE — 87070 CULTURE OTHR SPECIMN AEROBIC: CPT | Performed by: SURGERY

## 2024-04-30 PROCEDURE — 2500000004 HC RX 250 GENERAL PHARMACY W/ HCPCS (ALT 636 FOR OP/ED)

## 2024-04-30 PROCEDURE — 85384 FIBRINOGEN ACTIVITY: CPT

## 2024-04-30 PROCEDURE — 84132 ASSAY OF SERUM POTASSIUM: CPT

## 2024-04-30 PROCEDURE — 87102 FUNGUS ISOLATION CULTURE: CPT | Performed by: SURGERY

## 2024-04-30 PROCEDURE — 88302 TISSUE EXAM BY PATHOLOGIST: CPT | Performed by: STUDENT IN AN ORGANIZED HEALTH CARE EDUCATION/TRAINING PROGRAM

## 2024-04-30 PROCEDURE — 3700000001 HC GENERAL ANESTHESIA TIME - INITIAL BASE CHARGE: Performed by: SURGERY

## 2024-04-30 PROCEDURE — 84100 ASSAY OF PHOSPHORUS: CPT

## 2024-04-30 PROCEDURE — 99291 CRITICAL CARE FIRST HOUR: CPT

## 2024-04-30 PROCEDURE — 36620 INSERTION CATHETER ARTERY: CPT | Performed by: STUDENT IN AN ORGANIZED HEALTH CARE EDUCATION/TRAINING PROGRAM

## 2024-04-30 PROCEDURE — A35102 PR REANEURYSM/GRFT INS,ABD AORT W/ILIAC: Performed by: ANESTHESIOLOGY

## 2024-04-30 PROCEDURE — 35697 REIMPLANT ARTERY EACH: CPT | Performed by: SURGERY

## 2024-04-30 PROCEDURE — 0TT10ZZ RESECTION OF LEFT KIDNEY, OPEN APPROACH: ICD-10-PCS | Performed by: SURGERY

## 2024-04-30 PROCEDURE — 2020000001 HC ICU ROOM DAILY

## 2024-04-30 PROCEDURE — 85730 THROMBOPLASTIN TIME PARTIAL: CPT

## 2024-04-30 PROCEDURE — 93287 PERI-PX DEVICE EVAL & PRGR: CPT

## 2024-04-30 PROCEDURE — 86901 BLOOD TYPING SEROLOGIC RH(D): CPT

## 2024-04-30 PROCEDURE — 88302 TISSUE EXAM BY PATHOLOGIST: CPT | Mod: TC,SUR | Performed by: SURGERY

## 2024-04-30 PROCEDURE — 2500000004 HC RX 250 GENERAL PHARMACY W/ HCPCS (ALT 636 FOR OP/ED): Performed by: STUDENT IN AN ORGANIZED HEALTH CARE EDUCATION/TRAINING PROGRAM

## 2024-04-30 PROCEDURE — 5A0935A ASSISTANCE WITH RESPIRATORY VENTILATION, LESS THAN 24 CONSECUTIVE HOURS, HIGH NASAL FLOW/VELOCITY: ICD-10-PCS | Performed by: SURGERY

## 2024-04-30 PROCEDURE — 3600000018 HC OR TIME - INITIAL BASE CHARGE - PROCEDURE LEVEL SIX: Performed by: SURGERY

## 2024-04-30 PROCEDURE — P9017 PLASMA 1 DONOR FRZ W/IN 8 HR: HCPCS

## 2024-04-30 PROCEDURE — 94002 VENT MGMT INPAT INIT DAY: CPT

## 2024-04-30 PROCEDURE — 85347 COAGULATION TIME ACTIVATED: CPT

## 2024-04-30 PROCEDURE — 3600000017 HC OR TIME - EACH INCREMENTAL 1 MINUTE - PROCEDURE LEVEL SIX: Performed by: SURGERY

## 2024-04-30 PROCEDURE — 2500000004 HC RX 250 GENERAL PHARMACY W/ HCPCS (ALT 636 FOR OP/ED): Mod: JZ

## 2024-04-30 PROCEDURE — 82375 ASSAY CARBOXYHB QUANT: CPT

## 2024-04-30 PROCEDURE — 37799 UNLISTED PX VASCULAR SURGERY: CPT | Performed by: STUDENT IN AN ORGANIZED HEALTH CARE EDUCATION/TRAINING PROGRAM

## 2024-04-30 PROCEDURE — C9113 INJ PANTOPRAZOLE SODIUM, VIA: HCPCS

## 2024-04-30 PROCEDURE — 83735 ASSAY OF MAGNESIUM: CPT

## 2024-04-30 PROCEDURE — 36556 INSERT NON-TUNNEL CV CATH: CPT | Performed by: STUDENT IN AN ORGANIZED HEALTH CARE EDUCATION/TRAINING PROGRAM

## 2024-04-30 PROCEDURE — 50230 REMOVAL KIDNEY OPEN RADICAL: CPT | Performed by: STUDENT IN AN ORGANIZED HEALTH CARE EDUCATION/TRAINING PROGRAM

## 2024-04-30 PROCEDURE — P9016 RBC LEUKOCYTES REDUCED: HCPCS

## 2024-04-30 PROCEDURE — 2780000003 HC OR 278 NO HCPCS: Performed by: SURGERY

## 2024-04-30 PROCEDURE — 2720000007 HC OR 272 NO HCPCS: Performed by: SURGERY

## 2024-04-30 PROCEDURE — 84132 ASSAY OF SERUM POTASSIUM: CPT | Performed by: STUDENT IN AN ORGANIZED HEALTH CARE EDUCATION/TRAINING PROGRAM

## 2024-04-30 PROCEDURE — 87015 SPECIMEN INFECT AGNT CONCNTJ: CPT | Performed by: SURGERY

## 2024-04-30 PROCEDURE — 88307 TISSUE EXAM BY PATHOLOGIST: CPT | Performed by: STUDENT IN AN ORGANIZED HEALTH CARE EDUCATION/TRAINING PROGRAM

## 2024-04-30 PROCEDURE — 82330 ASSAY OF CALCIUM: CPT

## 2024-04-30 PROCEDURE — 2500000005 HC RX 250 GENERAL PHARMACY W/O HCPCS: Performed by: STUDENT IN AN ORGANIZED HEALTH CARE EDUCATION/TRAINING PROGRAM

## 2024-04-30 PROCEDURE — 04WY3DZ REVISION OF INTRALUMINAL DEVICE IN LOWER ARTERY, PERCUTANEOUS APPROACH: ICD-10-PCS | Performed by: SURGERY

## 2024-04-30 PROCEDURE — 36415 COLL VENOUS BLD VENIPUNCTURE: CPT | Performed by: ANESTHESIOLOGY

## 2024-04-30 PROCEDURE — P9045 ALBUMIN (HUMAN), 5%, 250 ML: HCPCS | Mod: JZ | Performed by: STUDENT IN AN ORGANIZED HEALTH CARE EDUCATION/TRAINING PROGRAM

## 2024-04-30 PROCEDURE — 2500000004 HC RX 250 GENERAL PHARMACY W/ HCPCS (ALT 636 FOR OP/ED): Performed by: NURSE PRACTITIONER

## 2024-04-30 PROCEDURE — C1757 CATH, THROMBECTOMY/EMBOLECT: HCPCS | Performed by: SURGERY

## 2024-04-30 PROCEDURE — 93287 PERI-PX DEVICE EVAL & PRGR: CPT | Performed by: STUDENT IN AN ORGANIZED HEALTH CARE EDUCATION/TRAINING PROGRAM

## 2024-04-30 PROCEDURE — 36430 TRANSFUSION BLD/BLD COMPNT: CPT

## 2024-04-30 PROCEDURE — 93503 INSERT/PLACE HEART CATHETER: CPT | Performed by: STUDENT IN AN ORGANIZED HEALTH CARE EDUCATION/TRAINING PROGRAM

## 2024-04-30 PROCEDURE — 35091 REPAIR DEFECT OF ARTERY: CPT | Performed by: SURGERY

## 2024-04-30 PROCEDURE — 82248 BILIRUBIN DIRECT: CPT

## 2024-04-30 PROCEDURE — 2500000004 HC RX 250 GENERAL PHARMACY W/ HCPCS (ALT 636 FOR OP/ED): Performed by: SURGERY

## 2024-04-30 PROCEDURE — 2500000002 HC RX 250 W HCPCS SELF ADMINISTERED DRUGS (ALT 637 FOR MEDICARE OP, ALT 636 FOR OP/ED): Performed by: STUDENT IN AN ORGANIZED HEALTH CARE EDUCATION/TRAINING PROGRAM

## 2024-04-30 PROCEDURE — 2500000005 HC RX 250 GENERAL PHARMACY W/O HCPCS: Performed by: SURGERY

## 2024-04-30 PROCEDURE — 3700000002 HC GENERAL ANESTHESIA TIME - EACH INCREMENTAL 1 MINUTE: Performed by: SURGERY

## 2024-04-30 PROCEDURE — A4312 CATH W/O BAG 2-WAY SILICONE: HCPCS | Performed by: SURGERY

## 2024-04-30 PROCEDURE — A4217 STERILE WATER/SALINE, 500 ML: HCPCS | Performed by: SURGERY

## 2024-04-30 PROCEDURE — 0TT70ZZ RESECTION OF LEFT URETER, OPEN APPROACH: ICD-10-PCS | Performed by: SURGERY

## 2024-04-30 PROCEDURE — 85384 FIBRINOGEN ACTIVITY: CPT | Performed by: STUDENT IN AN ORGANIZED HEALTH CARE EDUCATION/TRAINING PROGRAM

## 2024-04-30 PROCEDURE — 85027 COMPLETE CBC AUTOMATED: CPT

## 2024-04-30 PROCEDURE — 71045 X-RAY EXAM CHEST 1 VIEW: CPT

## 2024-04-30 PROCEDURE — 82947 ASSAY GLUCOSE BLOOD QUANT: CPT

## 2024-04-30 PROCEDURE — 36430 TRANSFUSION BLD/BLD COMPNT: CPT | Mod: GC | Performed by: STUDENT IN AN ORGANIZED HEALTH CARE EDUCATION/TRAINING PROGRAM

## 2024-04-30 PROCEDURE — 2500000002 HC RX 250 W HCPCS SELF ADMINISTERED DRUGS (ALT 637 FOR MEDICARE OP, ALT 636 FOR OP/ED)

## 2024-04-30 PROCEDURE — P9073 PLATELETS PHERESIS PATH REDU: HCPCS

## 2024-04-30 PROCEDURE — P9045 ALBUMIN (HUMAN), 5%, 250 ML: HCPCS | Mod: JZ

## 2024-04-30 DEVICE — GELWEAVE GELATIN IMPREGNATED WOVEN VASCULAR PROSTHESIS BIFURCATE
Type: IMPLANTABLE DEVICE | Status: NON-FUNCTIONAL
Brand: GELWEAVE™

## 2024-04-30 DEVICE — IMPLANTABLE DEVICE: Type: IMPLANTABLE DEVICE | Site: AORTA | Status: FUNCTIONAL

## 2024-04-30 RX ORDER — FENTANYL CITRATE-0.9 % NACL/PF 10 MCG/ML
25-50 PLASTIC BAG, INJECTION (ML) INTRAVENOUS CONTINUOUS
Status: DISCONTINUED | OUTPATIENT
Start: 2024-04-30 | End: 2024-04-30

## 2024-04-30 RX ORDER — VANCOMYCIN HYDROCHLORIDE 1 G/20ML
INJECTION, POWDER, LYOPHILIZED, FOR SOLUTION INTRAVENOUS AS NEEDED
Status: DISCONTINUED | OUTPATIENT
Start: 2024-04-30 | End: 2024-04-30

## 2024-04-30 RX ORDER — NITROGLYCERIN 20 MG/100ML
5-200 INJECTION INTRAVENOUS CONTINUOUS
Status: DISCONTINUED | OUTPATIENT
Start: 2024-04-30 | End: 2024-05-01

## 2024-04-30 RX ORDER — CEFAZOLIN 1 G/1
INJECTION, POWDER, FOR SOLUTION INTRAVENOUS AS NEEDED
Status: DISCONTINUED | OUTPATIENT
Start: 2024-04-30 | End: 2024-04-30

## 2024-04-30 RX ORDER — CALCIUM GLUCONATE 20 MG/ML
2 INJECTION, SOLUTION INTRAVENOUS EVERY 6 HOURS PRN
Status: DISCONTINUED | OUTPATIENT
Start: 2024-04-30 | End: 2024-05-02

## 2024-04-30 RX ORDER — MANNITOL 250 MG/ML
INJECTION, SOLUTION INTRAVENOUS AS NEEDED
Status: DISCONTINUED | OUTPATIENT
Start: 2024-04-30 | End: 2024-04-30

## 2024-04-30 RX ORDER — ALBUMIN HUMAN 50 G/1000ML
SOLUTION INTRAVENOUS
Status: COMPLETED
Start: 2024-04-30 | End: 2024-05-01

## 2024-04-30 RX ORDER — SODIUM CHLORIDE 0.9 G/100ML
IRRIGANT IRRIGATION AS NEEDED
Status: DISCONTINUED | OUTPATIENT
Start: 2024-04-30 | End: 2024-04-30 | Stop reason: HOSPADM

## 2024-04-30 RX ORDER — ALBUMIN HUMAN 50 G/1000ML
SOLUTION INTRAVENOUS AS NEEDED
Status: DISCONTINUED | OUTPATIENT
Start: 2024-04-30 | End: 2024-04-30

## 2024-04-30 RX ORDER — HYDROMORPHONE HYDROCHLORIDE 1 MG/ML
0.4 INJECTION, SOLUTION INTRAMUSCULAR; INTRAVENOUS; SUBCUTANEOUS EVERY 4 HOURS PRN
Status: DISCONTINUED | OUTPATIENT
Start: 2024-04-30 | End: 2024-05-01

## 2024-04-30 RX ORDER — SODIUM BICARBONATE 1 MEQ/ML
SYRINGE (ML) INTRAVENOUS AS NEEDED
Status: DISCONTINUED | OUTPATIENT
Start: 2024-04-30 | End: 2024-04-30

## 2024-04-30 RX ORDER — DOBUTAMINE HYDROCHLORIDE 400 MG/100ML
INJECTION INTRAVENOUS CONTINUOUS PRN
Status: DISCONTINUED | OUTPATIENT
Start: 2024-04-30 | End: 2024-04-30

## 2024-04-30 RX ORDER — INSULIN LISPRO 100 [IU]/ML
0-5 INJECTION, SOLUTION INTRAVENOUS; SUBCUTANEOUS EVERY 4 HOURS
Status: DISCONTINUED | OUTPATIENT
Start: 2024-04-30 | End: 2024-05-03

## 2024-04-30 RX ORDER — PROTAMINE SULFATE 10 MG/ML
INJECTION, SOLUTION INTRAVENOUS AS NEEDED
Status: DISCONTINUED | OUTPATIENT
Start: 2024-04-30 | End: 2024-04-30

## 2024-04-30 RX ORDER — NEOSTIGMINE METHYLSULFATE 1 MG/ML
INJECTION, SOLUTION INTRAVENOUS
Status: COMPLETED
Start: 2024-04-30 | End: 2024-04-30

## 2024-04-30 RX ORDER — HYDROMORPHONE HYDROCHLORIDE 1 MG/ML
0.2 INJECTION, SOLUTION INTRAMUSCULAR; INTRAVENOUS; SUBCUTANEOUS EVERY 4 HOURS PRN
Status: DISCONTINUED | OUTPATIENT
Start: 2024-04-30 | End: 2024-05-01

## 2024-04-30 RX ORDER — CALCIUM GLUCONATE 20 MG/ML
1 INJECTION, SOLUTION INTRAVENOUS EVERY 6 HOURS PRN
Status: DISCONTINUED | OUTPATIENT
Start: 2024-04-30 | End: 2024-05-02

## 2024-04-30 RX ORDER — NOREPINEPHRINE BITARTRATE/D5W 8 MG/250ML
.01-.5 PLASTIC BAG, INJECTION (ML) INTRAVENOUS CONTINUOUS
Status: DISCONTINUED | OUTPATIENT
Start: 2024-04-30 | End: 2024-05-01

## 2024-04-30 RX ORDER — TRANEXAMIC ACID 10 MG/ML
INJECTION, SOLUTION INTRAVENOUS AS NEEDED
Status: DISCONTINUED | OUTPATIENT
Start: 2024-04-30 | End: 2024-04-30

## 2024-04-30 RX ORDER — GLYCOPYRROLATE 0.2 MG/ML
INJECTION INTRAMUSCULAR; INTRAVENOUS
Status: COMPLETED
Start: 2024-04-30 | End: 2024-04-30

## 2024-04-30 RX ORDER — SODIUM CHLORIDE 9 MG/ML
100 INJECTION, SOLUTION INTRAVENOUS CONTINUOUS
Status: DISCONTINUED | OUTPATIENT
Start: 2024-04-30 | End: 2024-04-30

## 2024-04-30 RX ORDER — ROCURONIUM BROMIDE 10 MG/ML
INJECTION, SOLUTION INTRAVENOUS AS NEEDED
Status: DISCONTINUED | OUTPATIENT
Start: 2024-04-30 | End: 2024-04-30

## 2024-04-30 RX ORDER — MAGNESIUM SULFATE HEPTAHYDRATE 40 MG/ML
4 INJECTION, SOLUTION INTRAVENOUS EVERY 6 HOURS PRN
Status: DISCONTINUED | OUTPATIENT
Start: 2024-04-30 | End: 2024-05-02

## 2024-04-30 RX ORDER — CEFTRIAXONE 2 G/50ML
2 INJECTION, SOLUTION INTRAVENOUS EVERY 24 HOURS
Status: DISCONTINUED | OUTPATIENT
Start: 2024-04-30 | End: 2024-05-03

## 2024-04-30 RX ORDER — CEFAZOLIN SODIUM 2 G/100ML
2 INJECTION, SOLUTION INTRAVENOUS ONCE
Status: DISCONTINUED | OUTPATIENT
Start: 2024-04-30 | End: 2024-04-30 | Stop reason: HOSPADM

## 2024-04-30 RX ORDER — DEXTROSE 50 % IN WATER (D50W) INTRAVENOUS SYRINGE
25
Status: DISCONTINUED | OUTPATIENT
Start: 2024-04-30 | End: 2024-05-03

## 2024-04-30 RX ORDER — ALBUMIN HUMAN 50 G/1000ML
12.5 SOLUTION INTRAVENOUS ONCE
Status: COMPLETED | OUTPATIENT
Start: 2024-05-01 | End: 2024-05-01

## 2024-04-30 RX ORDER — FENTANYL CITRATE 50 UG/ML
INJECTION, SOLUTION INTRAMUSCULAR; INTRAVENOUS AS NEEDED
Status: DISCONTINUED | OUTPATIENT
Start: 2024-04-30 | End: 2024-04-30

## 2024-04-30 RX ORDER — PROPOFOL 10 MG/ML
INJECTION, EMULSION INTRAVENOUS CONTINUOUS PRN
Status: DISCONTINUED | OUTPATIENT
Start: 2024-04-30 | End: 2024-04-30

## 2024-04-30 RX ORDER — PANTOPRAZOLE SODIUM 40 MG/10ML
40 INJECTION, POWDER, LYOPHILIZED, FOR SOLUTION INTRAVENOUS 2 TIMES DAILY
Status: DISCONTINUED | OUTPATIENT
Start: 2024-04-30 | End: 2024-05-01

## 2024-04-30 RX ORDER — LIDOCAINE HYDROCHLORIDE 10 MG/ML
INJECTION INFILTRATION; PERINEURAL AS NEEDED
Status: DISCONTINUED | OUTPATIENT
Start: 2024-04-30 | End: 2024-04-30

## 2024-04-30 RX ORDER — METHADONE IN SOD CHLOR,ISO-OSM 10 MG/ML
SYRINGE (ML) INTRAVENOUS AS NEEDED
Status: DISCONTINUED | OUTPATIENT
Start: 2024-04-30 | End: 2024-04-30

## 2024-04-30 RX ORDER — PROPOFOL 10 MG/ML
5-50 INJECTION, EMULSION INTRAVENOUS CONTINUOUS
Status: DISCONTINUED | OUTPATIENT
Start: 2024-04-30 | End: 2024-05-01

## 2024-04-30 RX ORDER — LIDOCAINE HCL/PF 100 MG/5ML
SYRINGE (ML) INTRAVENOUS AS NEEDED
Status: DISCONTINUED | OUTPATIENT
Start: 2024-04-30 | End: 2024-04-30

## 2024-04-30 RX ORDER — SODIUM CHLORIDE, SODIUM LACTATE, POTASSIUM CHLORIDE, CALCIUM CHLORIDE 600; 310; 30; 20 MG/100ML; MG/100ML; MG/100ML; MG/100ML
0-1000 INJECTION, SOLUTION INTRAVENOUS CONTINUOUS
Status: DISCONTINUED | OUTPATIENT
Start: 2024-04-30 | End: 2024-05-01

## 2024-04-30 RX ORDER — SODIUM CHLORIDE, SODIUM LACTATE, POTASSIUM CHLORIDE, CALCIUM CHLORIDE 600; 310; 30; 20 MG/100ML; MG/100ML; MG/100ML; MG/100ML
5 INJECTION, SOLUTION INTRAVENOUS CONTINUOUS
Status: DISCONTINUED | OUTPATIENT
Start: 2024-04-30 | End: 2024-05-09

## 2024-04-30 RX ORDER — NITROGLYCERIN 20 MG/100ML
INJECTION INTRAVENOUS CONTINUOUS PRN
Status: DISCONTINUED | OUTPATIENT
Start: 2024-04-30 | End: 2024-04-30

## 2024-04-30 RX ORDER — DEXTROSE 50 % IN WATER (D50W) INTRAVENOUS SYRINGE
12.5
Status: DISCONTINUED | OUTPATIENT
Start: 2024-04-30 | End: 2024-05-03

## 2024-04-30 RX ORDER — NITROGLYCERIN 20 MG/100ML
INJECTION INTRAVENOUS AS NEEDED
Status: DISCONTINUED | OUTPATIENT
Start: 2024-04-30 | End: 2024-04-30

## 2024-04-30 RX ORDER — ETOMIDATE 2 MG/ML
INJECTION INTRAVENOUS AS NEEDED
Status: DISCONTINUED | OUTPATIENT
Start: 2024-04-30 | End: 2024-04-30

## 2024-04-30 RX ORDER — ACETAMINOPHEN 325 MG/1
975 TABLET ORAL 3 TIMES DAILY
Status: DISCONTINUED | OUTPATIENT
Start: 2024-04-30 | End: 2024-04-30

## 2024-04-30 RX ORDER — IPRATROPIUM BROMIDE AND ALBUTEROL SULFATE 2.5; .5 MG/3ML; MG/3ML
3 SOLUTION RESPIRATORY (INHALATION) EVERY 6 HOURS PRN
Status: DISCONTINUED | OUTPATIENT
Start: 2024-04-30 | End: 2024-05-01

## 2024-04-30 RX ORDER — DOBUTAMINE HYDROCHLORIDE 400 MG/100ML
2-4 INJECTION INTRAVENOUS CONTINUOUS
Status: DISCONTINUED | OUTPATIENT
Start: 2024-04-30 | End: 2024-04-30

## 2024-04-30 RX ORDER — MIDAZOLAM HYDROCHLORIDE 1 MG/ML
INJECTION INTRAMUSCULAR; INTRAVENOUS AS NEEDED
Status: DISCONTINUED | OUTPATIENT
Start: 2024-04-30 | End: 2024-04-30

## 2024-04-30 RX ORDER — NOREPINEPHRINE BITARTRATE 0.03 MG/ML
INJECTION, SOLUTION INTRAVENOUS CONTINUOUS PRN
Status: DISCONTINUED | OUTPATIENT
Start: 2024-04-30 | End: 2024-04-30

## 2024-04-30 RX ORDER — CALCIUM CHLORIDE INJECTION 100 MG/ML
INJECTION, SOLUTION INTRAVENOUS AS NEEDED
Status: DISCONTINUED | OUTPATIENT
Start: 2024-04-30 | End: 2024-04-30

## 2024-04-30 RX ORDER — HEPARIN SODIUM 1000 [USP'U]/ML
INJECTION, SOLUTION INTRAVENOUS; SUBCUTANEOUS AS NEEDED
Status: DISCONTINUED | OUTPATIENT
Start: 2024-04-30 | End: 2024-04-30

## 2024-04-30 RX ORDER — POTASSIUM CHLORIDE 29.8 MG/ML
40 INJECTION INTRAVENOUS EVERY 6 HOURS PRN
Status: DISCONTINUED | OUTPATIENT
Start: 2024-04-30 | End: 2024-05-02

## 2024-04-30 RX ORDER — MAGNESIUM SULFATE HEPTAHYDRATE 40 MG/ML
2 INJECTION, SOLUTION INTRAVENOUS EVERY 6 HOURS PRN
Status: DISCONTINUED | OUTPATIENT
Start: 2024-04-30 | End: 2024-05-02

## 2024-04-30 RX ORDER — EPINEPHRINE HCL IN DEXTROSE 5% 4MG/250ML
.01-1 PLASTIC BAG, INJECTION (ML) INTRAVENOUS CONTINUOUS
Status: DISCONTINUED | OUTPATIENT
Start: 2024-04-30 | End: 2024-05-01

## 2024-04-30 RX ADMIN — ROCURONIUM 50 MG: 50 INJECTION, SOLUTION INTRAVENOUS at 09:01

## 2024-04-30 RX ADMIN — NOREPINEPHRINE BITARTRATE 16 MCG: 1 INJECTION, SOLUTION, CONCENTRATE INTRAVENOUS at 13:32

## 2024-04-30 RX ADMIN — ROCURONIUM 30 MG: 50 INJECTION, SOLUTION INTRAVENOUS at 12:38

## 2024-04-30 RX ADMIN — NITROGLYCERIN 100 MCG: 20 INJECTION INTRAVENOUS at 09:01

## 2024-04-30 RX ADMIN — DOBUTAMINE IN DEXTROSE 4 MCG/KG/MIN: 400 INJECTION, SOLUTION INTRAVENOUS at 12:06

## 2024-04-30 RX ADMIN — ROCURONIUM 20 MG: 50 INJECTION, SOLUTION INTRAVENOUS at 11:06

## 2024-04-30 RX ADMIN — ALBUMIN HUMAN 12.5 G: 50 SOLUTION INTRAVENOUS at 23:39

## 2024-04-30 RX ADMIN — HEPARIN SODIUM 5000 UNITS: 1000 INJECTION INTRAVENOUS; SUBCUTANEOUS at 11:40

## 2024-04-30 RX ADMIN — PROPOFOL 40 MCG/KG/MIN: 10 INJECTION, EMULSION INTRAVENOUS at 17:14

## 2024-04-30 RX ADMIN — SODIUM BICARBONATE 50 MEQ: 84 INJECTION INTRAVENOUS at 12:15

## 2024-04-30 RX ADMIN — HYDROCORTISONE SODIUM SUCCINATE 50 MG: 100 INJECTION, POWDER, FOR SOLUTION INTRAMUSCULAR; INTRAVENOUS at 09:20

## 2024-04-30 RX ADMIN — NOREPINEPHRINE BITARTRATE 16 MCG: 1 INJECTION, SOLUTION, CONCENTRATE INTRAVENOUS at 15:29

## 2024-04-30 RX ADMIN — Medication 5 MG: at 08:11

## 2024-04-30 RX ADMIN — NITROGLYCERIN 15 MCG/MIN: 20 INJECTION INTRAVENOUS at 09:22

## 2024-04-30 RX ADMIN — VANCOMYCIN HYDROCHLORIDE 1000 MG: 1 INJECTION, POWDER, LYOPHILIZED, FOR SOLUTION INTRAVENOUS at 08:22

## 2024-04-30 RX ADMIN — FENTANYL CITRATE 100 MCG: 50 INJECTION, SOLUTION INTRAMUSCULAR; INTRAVENOUS at 12:14

## 2024-04-30 RX ADMIN — NITROGLYCERIN 100 MCG: 20 INJECTION INTRAVENOUS at 09:27

## 2024-04-30 RX ADMIN — ALBUMIN HUMAN 500 ML: 0.05 INJECTION, SOLUTION INTRAVENOUS at 11:56

## 2024-04-30 RX ADMIN — FENTANYL CITRATE 250 MCG: 50 INJECTION, SOLUTION INTRAMUSCULAR; INTRAVENOUS at 12:36

## 2024-04-30 RX ADMIN — Medication 25 MCG/HR: at 17:34

## 2024-04-30 RX ADMIN — ETOMIDATE INJECTION 20 MG: 2 SOLUTION INTRAVENOUS at 08:11

## 2024-04-30 RX ADMIN — PROTAMINE SULFATE 50 MG: 10 INJECTION, SOLUTION INTRAVENOUS at 13:32

## 2024-04-30 RX ADMIN — LIDOCAINE HYDROCHLORIDE 50 MG: 10 INJECTION, SOLUTION INFILTRATION; PERINEURAL at 08:13

## 2024-04-30 RX ADMIN — FENTANYL CITRATE 50 MCG: 50 INJECTION, SOLUTION INTRAMUSCULAR; INTRAVENOUS at 13:54

## 2024-04-30 RX ADMIN — Medication 5 MG: at 08:02

## 2024-04-30 RX ADMIN — EPINEPHRINE 8 MCG: 1 INJECTION INTRAMUSCULAR; INTRAVENOUS; SUBCUTANEOUS at 11:54

## 2024-04-30 RX ADMIN — NOREPINEPHRINE BITARTRATE 8 MCG: 1 INJECTION, SOLUTION, CONCENTRATE INTRAVENOUS at 11:36

## 2024-04-30 RX ADMIN — EPINEPHRINE 0.02 MCG/KG/MIN: 1 INJECTION INTRAMUSCULAR; INTRAVENOUS; SUBCUTANEOUS at 23:42

## 2024-04-30 RX ADMIN — NOREPINEPHRINE BITARTRATE 8 MCG: 1 INJECTION, SOLUTION, CONCENTRATE INTRAVENOUS at 14:43

## 2024-04-30 RX ADMIN — ALBUMIN HUMAN 250 ML: 0.05 INJECTION, SOLUTION INTRAVENOUS at 09:49

## 2024-04-30 RX ADMIN — MANNITOL 12.5 G: 250 INJECTION, SOLUTION INTRAVENOUS at 09:23

## 2024-04-30 RX ADMIN — ROCURONIUM 50 MG: 50 INJECTION, SOLUTION INTRAVENOUS at 14:10

## 2024-04-30 RX ADMIN — NOREPINEPHRINE BITARTRATE 16 MCG: 1 INJECTION, SOLUTION, CONCENTRATE INTRAVENOUS at 09:46

## 2024-04-30 RX ADMIN — ROCURONIUM 50 MG: 50 INJECTION, SOLUTION INTRAVENOUS at 08:12

## 2024-04-30 RX ADMIN — SODIUM CHLORIDE 1 MG/KG/HR: 9 INJECTION, SOLUTION INTRAVENOUS at 11:37

## 2024-04-30 RX ADMIN — ALBUMIN HUMAN 12.5 G: 0.05 INJECTION, SOLUTION INTRAVENOUS at 23:39

## 2024-04-30 RX ADMIN — NOREPINEPHRINE BITARTRATE 16 MCG: 1 INJECTION, SOLUTION, CONCENTRATE INTRAVENOUS at 09:36

## 2024-04-30 RX ADMIN — GLYCOPYRROLATE: 0.2 INJECTION INTRAMUSCULAR; INTRAVENOUS at 20:09

## 2024-04-30 RX ADMIN — FENTANYL CITRATE 100 MCG: 50 INJECTION, SOLUTION INTRAMUSCULAR; INTRAVENOUS at 11:28

## 2024-04-30 RX ADMIN — SODIUM CHLORIDE, SODIUM LACTATE, POTASSIUM CHLORIDE, AND CALCIUM CHLORIDE: 600; 310; 30; 20 INJECTION, SOLUTION INTRAVENOUS at 08:11

## 2024-04-30 RX ADMIN — PANTOPRAZOLE SODIUM 40 MG: 40 INJECTION, POWDER, FOR SOLUTION INTRAVENOUS at 21:58

## 2024-04-30 RX ADMIN — NOREPINEPHRINE BITARTRATE 16 MCG: 1 INJECTION, SOLUTION, CONCENTRATE INTRAVENOUS at 09:58

## 2024-04-30 RX ADMIN — MANNITOL 12.5 G: 250 INJECTION, SOLUTION INTRAVENOUS at 11:12

## 2024-04-30 RX ADMIN — Medication 0.02 MCG/KG/MIN: at 08:49

## 2024-04-30 RX ADMIN — FENTANYL CITRATE 250 MCG: 50 INJECTION, SOLUTION INTRAMUSCULAR; INTRAVENOUS at 14:20

## 2024-04-30 RX ADMIN — SODIUM BICARBONATE 50 MEQ: 84 INJECTION INTRAVENOUS at 11:01

## 2024-04-30 RX ADMIN — NOREPINEPHRINE BITARTRATE 32 MCG: 1 INJECTION, SOLUTION, CONCENTRATE INTRAVENOUS at 13:28

## 2024-04-30 RX ADMIN — NOREPINEPHRINE BITARTRATE 8 MCG: 1 INJECTION, SOLUTION, CONCENTRATE INTRAVENOUS at 11:49

## 2024-04-30 RX ADMIN — NITROGLYCERIN 100 MCG: 20 INJECTION INTRAVENOUS at 11:44

## 2024-04-30 RX ADMIN — NITROGLYCERIN 200 MCG: 20 INJECTION INTRAVENOUS at 14:19

## 2024-04-30 RX ADMIN — FENTANYL CITRATE 100 MCG: 50 INJECTION, SOLUTION INTRAMUSCULAR; INTRAVENOUS at 09:21

## 2024-04-30 RX ADMIN — NEOSTIGMINE METHYLSULFATE: 1 INJECTION INTRAVENOUS at 20:09

## 2024-04-30 RX ADMIN — SODIUM CHLORIDE: 9 INJECTION, SOLUTION INTRAVENOUS at 12:12

## 2024-04-30 RX ADMIN — PROPOFOL 30 MCG/KG/MIN: 10 INJECTION, EMULSION INTRAVENOUS at 15:47

## 2024-04-30 RX ADMIN — NOREPINEPHRINE BITARTRATE 8 MCG: 1 INJECTION, SOLUTION, CONCENTRATE INTRAVENOUS at 09:52

## 2024-04-30 RX ADMIN — INSULIN LISPRO 1 UNITS: 100 INJECTION, SOLUTION INTRAVENOUS; SUBCUTANEOUS at 21:58

## 2024-04-30 RX ADMIN — TRANEXAMIC ACID 825 MG: 10 INJECTION, SOLUTION INTRAVENOUS at 11:17

## 2024-04-30 RX ADMIN — CEFAZOLIN 2 G: 1 INJECTION, POWDER, FOR SOLUTION INTRAMUSCULAR; INTRAVENOUS at 12:49

## 2024-04-30 RX ADMIN — ALBUMIN HUMAN 500 ML: 0.05 INJECTION, SOLUTION INTRAVENOUS at 08:54

## 2024-04-30 RX ADMIN — HEPARIN SODIUM 2000 UNITS: 1000 INJECTION INTRAVENOUS; SUBCUTANEOUS at 11:46

## 2024-04-30 RX ADMIN — NOREPINEPHRINE BITARTRATE 16 MCG: 1 INJECTION, SOLUTION, CONCENTRATE INTRAVENOUS at 10:30

## 2024-04-30 RX ADMIN — CALCIUM CHLORIDE 0.5 G: 100 INJECTION INTRAVENOUS; INTRAVENTRICULAR at 13:11

## 2024-04-30 RX ADMIN — SODIUM CHLORIDE: 9 INJECTION, SOLUTION INTRAVENOUS at 08:21

## 2024-04-30 RX ADMIN — CEFAZOLIN 2 G: 1 INJECTION, POWDER, FOR SOLUTION INTRAMUSCULAR; INTRAVENOUS at 09:01

## 2024-04-30 RX ADMIN — CALCIUM CHLORIDE 0.5 G: 100 INJECTION INTRAVENOUS; INTRAVENTRICULAR at 13:35

## 2024-04-30 RX ADMIN — HYDROCORTISONE SODIUM SUCCINATE 50 MG: 100 INJECTION, POWDER, FOR SOLUTION INTRAMUSCULAR; INTRAVENOUS at 17:33

## 2024-04-30 RX ADMIN — EPINEPHRINE 8 MCG: 1 INJECTION INTRAMUSCULAR; INTRAVENOUS; SUBCUTANEOUS at 13:43

## 2024-04-30 RX ADMIN — HEPARIN SODIUM 3000 UNITS: 1000 INJECTION INTRAVENOUS; SUBCUTANEOUS at 12:24

## 2024-04-30 RX ADMIN — NOREPINEPHRINE BITARTRATE 16 MCG: 1 INJECTION, SOLUTION, CONCENTRATE INTRAVENOUS at 13:35

## 2024-04-30 RX ADMIN — HYDROCORTISONE SODIUM SUCCINATE 50 MG: 100 INJECTION, POWDER, FOR SOLUTION INTRAMUSCULAR; INTRAVENOUS at 23:15

## 2024-04-30 RX ADMIN — CEFTRIAXONE SODIUM 2 G: 2 INJECTION, SOLUTION INTRAVENOUS at 18:45

## 2024-04-30 RX ADMIN — SODIUM CHLORIDE 2 UNITS/HR: 9 INJECTION, SOLUTION INTRAVENOUS at 13:33

## 2024-04-30 RX ADMIN — MIDAZOLAM HYDROCHLORIDE 2 MG: 1 INJECTION, SOLUTION INTRAMUSCULAR; INTRAVENOUS at 07:59

## 2024-04-30 RX ADMIN — LIDOCAINE HYDROCHLORIDE 50 MG: 20 INJECTION, SOLUTION INTRAVENOUS at 08:11

## 2024-04-30 RX ADMIN — MAGNESIUM SULFATE HEPTAHYDRATE 4 G: 40 INJECTION, SOLUTION INTRAVENOUS at 19:21

## 2024-04-30 RX ADMIN — CALCIUM CHLORIDE 0.5 G: 100 INJECTION INTRAVENOUS; INTRAVENTRICULAR at 13:43

## 2024-04-30 RX ADMIN — FENTANYL CITRATE 250 MCG: 50 INJECTION, SOLUTION INTRAMUSCULAR; INTRAVENOUS at 09:36

## 2024-04-30 RX ADMIN — NOREPINEPHRINE BITARTRATE 16 MCG: 1 INJECTION, SOLUTION, CONCENTRATE INTRAVENOUS at 09:42

## 2024-04-30 RX ADMIN — NOREPINEPHRINE BITARTRATE 16 MCG: 1 INJECTION, SOLUTION, CONCENTRATE INTRAVENOUS at 09:47

## 2024-04-30 RX ADMIN — Medication 50 PERCENT: at 16:20

## 2024-04-30 RX ADMIN — ALBUMIN HUMAN 250 ML: 0.05 INJECTION, SOLUTION INTRAVENOUS at 15:07

## 2024-04-30 SDOH — HEALTH STABILITY: MENTAL HEALTH: CURRENT SMOKER: 0

## 2024-04-30 ASSESSMENT — COLUMBIA-SUICIDE SEVERITY RATING SCALE - C-SSRS
6. HAVE YOU EVER DONE ANYTHING, STARTED TO DO ANYTHING, OR PREPARED TO DO ANYTHING TO END YOUR LIFE?: NO
2. HAVE YOU ACTUALLY HAD ANY THOUGHTS OF KILLING YOURSELF?: NO
1. IN THE PAST MONTH, HAVE YOU WISHED YOU WERE DEAD OR WISHED YOU COULD GO TO SLEEP AND NOT WAKE UP?: NO

## 2024-04-30 ASSESSMENT — PAIN - FUNCTIONAL ASSESSMENT
PAIN_FUNCTIONAL_ASSESSMENT: CPOT (CRITICAL CARE PAIN OBSERVATION TOOL)
PAIN_FUNCTIONAL_ASSESSMENT: 0-10

## 2024-04-30 ASSESSMENT — PAIN SCALES - GENERAL
PAINLEVEL_OUTOF10: 0 - NO PAIN
PAINLEVEL_OUTOF10: 0 - NO PAIN

## 2024-04-30 NOTE — OP NOTE
OPERATIVE NOTE                  Preoperative Diagnosis  Perivisceral abdominal aortic aneurysm secondary to type Ia endoleak.   Left ureteral cancer    Postoperative Diagnosis                 Same as above    Procedures:  Perivisceral aortic aneurysm repair through the ninth interspace thoracoretroperitoneal incision  Left renal artery reimplantation  Left nephroureterectomy  Placement of a diaphragmatic pacing wires    Indications for Surgery:  This is a 69-year-old male who had undergone endovascular repair of ruptured abdominal aortic aneurysm last year.  He was noted to have a type Ia endoleak.  He also has left ureteral cancer.  Patient has COPD with a daily inhaler use but without oxygen.  Patient now submits for perivisceral aortic aneurysm repair via the ninth interspace thoracoretroperitoneal incision and left nephroureterectomy.    Surgeon(s):    MD Kar Baldwin MD    Assistant(s):  Aneudy Workman MD    Anesthesia: General  General endotracheal    Anesthesiologist(s):    Anesthesiologist: Brenden Liu MD  Anesthesia Resident: Martha Heaton DO     Blood Loss:   1500 mL    Complications:    * No complications entered in OR log *     Findings:  Type Ia endoleak    Postoperative Condition: stable.      Sponge, Needle, Instrument Count: were correct.      Specimen(s):   ID Type Source Tests Collected by Time   1 : LEFT KIDNEY, URETER, BLADDERT CUF Tissue KIDNEY EXPLANT SURGICAL PATHOLOGY EXAM Kar Street MD 4/30/2024 1228   2 : EXPLANT ENDOGRAFT Tissue VASCULAR GRAFT MATERIAL SURGICAL PATHOLOGY EXAM Kar Street MD 4/30/2024 1237   A : Aortic Contents Tissue AORTA AFB CULTURE/SMEAR, FUNGAL CULTURE/SMEAR, TISSUE/WOUND CULTURE/SMEAR Kar Street MD 4/30/2024 1223        Drains and/or Catheters:   Chest Tube Left 28 Fr (Active)   Function -20 cm H2O 04/30/24 1615   Chest Tube Air Leak Yes 04/30/24 1615   Patency Intervention Tip/tilt 04/30/24 1615   Drainage  Description Bright red 04/30/24 1615   Dressing Status Clean;Dry;Occlusive 04/30/24 1615   Site Assessment Clean;Dry;Intact 04/30/24 1615   Surrounding Skin Intact 04/30/24 1615   Output (mL) 20 mL 04/30/24 1615       Closed/Suction Drain Left LLQ Bulb (Active)   Site Description Unable to view 04/30/24 1615   Dressing Status Clean;Dry;Occlusive 04/30/24 1615   Drainage Appearance Bright red 04/30/24 1615   Status To bulb suction 04/30/24 1615   Output (mL) 100 mL 04/30/24 1615       Urethral Catheter Temperature probe (Active)   Site Assessment Clean;Skin intact 04/30/24 1615   Collection Container Standard drainage bag 04/30/24 1615   Securement Method Securing device (Describe) 04/30/24 1615       Operative Procedure Details:  After a plane of anesthesia was obtained the patient was prepared and draped in the usual sterile fashion in the traditional right lateral decubitus thoracoabdominal position.  A 9th interspace thoracoretroperitoneal incision was made and the dissection carried through the subcutaneous tissue.  The ninth interspace was entered and the diaphragm partially incised for approximately 10 cm to facilitate exposure.  The patient was given mannitol half a gram per kilogram.  Tranexamic acid acid infusion was also started.    The left kidney was mobilized including the adrenal gland.  The left ureter was also mobilized.  At this time Dr. Дмитрий Hobbs from urology service came in and proceeded with completion of the left nephroureterectomy.  It should be noted that the ureter was dissected all the way down into his confluence with the bladder.  There was a slight contamination of the field with urine and the long dwelling ureteral stent which has been colonized.  The portion of the operation shall be dictated by Dr. Hobbs's service in detail.    Dissection was continued to the supraceliac aorta.  The celiac axis and superior mesenteric artery were isolated and dissected free.  Patient had 2 right  renal arteries both of which were isolated and dissected free.  It was then noted that there was a segment of the supra mesenteric aorta that was amenable to clamping. Thus, the decision was made to clamp the supra-SMA aorta, rather than supra-celiac aorta. The celiac axis appeared to have a broad based orifice on preoperative CTA, but on direct visualization, it appeared relatively normal and did not warrant repair.    Dissection was continued distally and distal left common iliac artery was isolated and dissected free.  Because of the contamination with urine and the patient's preoperative body temperature of 38 degrees and a slight leukocytosis the decision was made to use cryopreserved graft.    Following systemic heparinization control was obtained of the supra-SMA aorta followed by left common iliac artery.  An aortotomy was made and Metronic graft identified.  The entire main portion of the endograft was completely excised including the left iliac limb.  The right limb was trimmed down to the origin of the right common iliac artery.  The right kidney was then perfused with 240 cc of cold perfusate and every 20 minutes with 120 cc.  The 2 renal arteries were  by about 1.5 cm.  The perivisceral aorta was significantly diseased for greater than 3/4 of its circumference at the perivisceral level and needed to be beveled to effect anastomosis to healthy, non diseased portion of the aorta.  However, because the 2 renal arteries were relatively far apart, as was the distance from the superior pole of renal artery and the SMA,  and would have required an excessively long beveled anastomosis. Thus, the decision was made to include only the upper pole renal artery in the anastomosis and to reimplant the lower pole renal artery as a separate a button.  The backbleeding from the right iliac limb was controlled with a #9 Bearden Inahara catheter.    The cryopreserved graft was then brought onto the field and  beveled so as to incorporate the right upper pole renal artery and the SMA.  The anastomosis was effected with a running 4-0 Prolene.  The anastomosis was hemostatic.  A hole was made on the right lateral aspect of the cryopreserved graft with a 6 mm aortic punch.  The right renal artery button was then reimplanted using a running 6-0 Prolene suture.  After proper de-airing flow was restored to the right renal artery.  Excellent Doppler signals were noted over the renal arteries, celiac axis and the superior mesenteric artery.    The right limb of the graft was then brought into approximation with the right iliac endograft limb.  The graft was sewn onto the graft incorporating the aortic wall with a running 4-0 Prolene suture.  After appropriate backbleeding and for bleeding were allowed flow was restored to the right pelvis and right lower extremity.  The anastomosis was hemostatic.    The left limb of the graft was then brought into approximation with the distal left common iliac artery.  The common iliac artery was transected and the cryopreserved graft was fashioned to fit and sewn end to end with a running 5-0 Prolene suture.  The anastomosis was hemostatic.    Heparin effects were reversed with protamine.  After hemostasis was assured the diaphragm was approximated using 2-0 Ethibond sutures in horizontal mattress fashion.  Diaphragmatic pacing wires were placed and brought out through separate stab wound.  #20 a chest tube was placed into the left pleural space and brought out through a separate stab wound.    The muscular layers were then approximated in multiple layers and the skin with staples.    The patient tolerated the procedure well without complications and was transferred to the recovery room in stable condition.  The patient made adequate amount of urine throughout the course of the operation.    Dr. Street and JIL operated together as co-surgeons as there were no adequately trained trainees to help  with the operation.    Josias Payne MD  Phone: [phone]

## 2024-04-30 NOTE — SIGNIFICANT EVENT
Vascular Surgery Post-Op Plan of Care:    69 y.o. male with type 1a endoleak from EVAR (originally placed for rupture) as well as left ureteral cancer, now s/p explant of EVAR, open repair of abdominal aortic aneurysm via thoracoretroperitoneal approach, and left nephroureterectomy    Post-op exam: Right foot palpable DP, Left foot palpable PT    Supramesenteric clamp  Renal ischemic time: 40 minutes    EBL: 1500 ml     5.5 L crystalloid  1500 ml 5% albumin  3 PRBC  4 FFP  5 plts    Plan:  - Need to perform neuro exam as soon as patient is reversed or awake  - Pain control per ICU  - Wean to extubate as tolerated  - Chest tube to -20 cmH2O   - Please notify Dr. Irvin team for diaphragm pacing  - Maintain ESTEBAN to bulb suction  - Spinal cord perfusion goal: MAP >80 mmHg  - NPO, NGT to LIWS  - Resuscitative IV fluids- 150cc/hr  - 1/2 to 1 replacement with LR for 12 hrs (mannitol given intra op); to end at 4am  - Send CBC, coags, RFP on arrival to ICU  - Maintain K>4, Mg>2, Hgb>10, Plts>100, INR<1.4  - Labs q12h for 24 hrs  - monitor UOP, continue juarez  - Rocephin until chest tube comes out  - SCDs  - hold [facility], VANE Workman MD  Vascular Surgery Fellow  Service Pager: [phone]

## 2024-04-30 NOTE — ANESTHESIA PROCEDURE NOTES
Peripheral IV  Date/Time: 4/30/2024 8:17 AM      Placement  Needle size: 14 G  Laterality: right  Location: forearm  Local anesthetic: none  Site prep: alcohol  Technique: anatomical landmarks  Attempts: 1

## 2024-04-30 NOTE — OP NOTE
Aortic Aneurysm Repair Retroperitoneal Operative Note     Date: 2024  OR Location: [facility] OR    Name: Yobany Small : 1955, Age: 69 y.o., MRN: [id number], Sex: male    Diagnosis  Pre-op Diagnosis     * Type Ia endoleak of aortic graft (CMS-[facility]) [T82.310A] Post-op Diagnosis     * Type Ia endoleak of aortic graft (CMS-[facility]) [T82.310A]     Procedures  Aortic Aneurysm Repair Retroperitoneal  34284 - MD DIR RPR ANEURYSM ABDOM AORTA W/ILIAC VESSELS    Nephrectomy  04285 - MD NEPHRECTOMY W/PRTL URETERECT OPEN RIB RESCJ RAD      Surgeons   Panel 1:     * Kar Street - Primary  Panel 2:     * Дмитрий Thompson - Primary    Resident/Fellow/Other Assistant:  Surgeons and Role:  Panel 1:     * Iván Ortiz MD - Resident - Assisting     * Aneudy Workman MD - Fellow  Panel 2:     * Kelsea Al MD - Resident - Assisting    Procedure Summary  Anesthesia: General  ASA: IV  Anesthesia Staff: Anesthesiologist: Brenden Liu MD  Anesthesia Resident: Martha Heaton DO  Estimated Blood Loss: 50 mL for my part  Intra-op Medications:   Administrations occurring from 0715 to 1835 on 24:   Medication Name Total Dose   gelatin absorbable (Gelfoam) 100 sponge 2 each   thrombin (recombinant) (Recothrom) topical solution 20,000 Units   heparin (porcine) 5,000 Units in sodium chloride 0.9% 500 mL irrigation 5,000 Units   sodium chloride 0.9 % irrigation solution 1,000 mL   heparin 2,000 Units, mannitol 25 g, methylPREDNISolone sod suc (PF) (SOLU-Medrol) 500 mg in lactated Ringer's 1,000 mL infusion Cannot be calculated   sodium chloride 0.9% infusion 4,093.33 mL              Anesthesia Record               Intraprocedure I/O Totals          Intake    PLASMA 900.00 mL    PLATELETS 250.00 mL    PRBC 700.00 mL    Norepinephrine Drip 0.00 mL    The total shown is the total volume documented since Anesthesia Start was filed.    Tranexamic Acid 0.00 mL    The total shown is the total volume documented since  Anesthesia Start was filed.    Nitroglycerin Drip 0.00 mL    The total shown is the total volume documented since Anesthesia Start was filed.    DOBUTamine Drip 0.00 mL    The total shown is the total volume documented since Anesthesia Start was filed.    Total Intake 1850 mL       Output    Urine 635 mL    Total Output 635 mL       Net    Net Volume 1215 mL          Specimen:   ID Type Source Tests Collected by Time   1 : LEFT KIDNEY, URETER, BLADDERT CUF Tissue KIDNEY EXPLANT SURGICAL PATHOLOGY EXAM Kar Street MD 4/30/2024 1228   2 : EXPLANT ENDOGRAFT Tissue VASCULAR GRAFT MATERIAL SURGICAL PATHOLOGY EXAM Kar Street MD 4/30/2024 1237   A : Aortic Contents Tissue AORTA AFB CULTURE/SMEAR, FUNGAL CULTURE/SMEAR, TISSUE/WOUND CULTURE/SMEAR Kar Street MD 4/30/2024 1223        Staff:   Circulator: Lucy Brown RN; Gloria Brito RN  Relief Circulator: Ned Alfred RN  Scrub Person: Ned Alfred RN; Kayla Molina RN; Mckenna Collazo         Drains and/or Catheters:   Urethral Catheter Temperature probe (Active)           Implants:  Implants       Type Name Action Serial No.      Implant GRAFT, BIFURCATE, GELWEAVE, 18 X 9 - [id number] - [id number] Implanted [id number]     Vascular Graft Aortoiliac artery Implanted [id number]              Findings: Large mid Left ureteral tumor.  Excised with stent in situ. Minimal if any spillage of urine.  Bladder closed in two layers.  Negative leak test to 180cc    Indications: Yobany Small is an 69 y.o. male who is having surgery for Type Ia endoleak of aortic graft (CMS-HCC) [T82.310A]. He also happened to have a large left mid ureteral tumor which was bx by an outside urologist as low grade.  He was recommended nephroureterectomy but referred here for tertiary care due to co morbidities.  He was seen by vascular surgery and AAA repair had a leak.  We decided to do this surgery on overlapping sites at the same time.  To minimize  complications and adequately manage the disease he was planned for a left nephroureterectomy.      The patient was seen in the preoperative area. The risks, benefits, complications, treatment options, non-operative alternatives, expected recovery and outcomes were discussed with the patient. The possibilities of reaction to medication, pulmonary aspiration, injury to surrounding structures, bleeding, recurrent infection, the need for additional procedures, failure to diagnose a condition, and creating a complication requiring transfusion or operation were discussed with the patient. The patient concurred with the proposed plan, giving informed consent.  The site of surgery was properly noted/marked if necessary per policy. The patient has been actively warmed in preoperative area. Preoperative antibiotics have been ordered and given within 1 hours of incision. Venous thrombosis prophylaxis have been ordered including bilateral sequential compression devices and chemical prophylaxis    Procedure Details: We were called into the OR after 2 hours or so.  The patient had a thoracoabdominal incision.  He had a medial visceral rotation.  The kidney was exposed and partially dissected.  We dissected around the hilum encircling the vein with a tie.  We then found two small caliber renal arteries.  These were dissected and encircled with 0 silk ties.  The arteries were ligated between 0 silk ties, a 2-0 silk ligature and clips.  They were then divided with a knife.  The vein was then similarly ligated between 0 silk ties, 2-0 silk ligature and clips.  This was divided with a knife.  The remaining attachments were divided with electrocautery and clips.  We then freed the kidney from its remaining attachments.  We divided the gonadal vein between ties.  We then mobilized the ureter inferiorly.  The ureter was clipped.      The incision was extended inferiorly.  We divided the initial bladder pedicles as the ureter went into  the pelvis with clips.  We further mobilized the ureter until it entered into the bladder.   It was a bit more stuck that expected inferiorly.  It was also an odd angle given the incision.  We opened the ureter at the bladder hiatus after placing a stay stitch in the bladder with 2-0 vicryl.  The stent was grasped and extracted.  The remaining ureter was cut.  The specimen was passed off.    We then closed the bladder in two layers with 2-0 vicyrl.  We tested this to 180 ml with no leak.  We then turned the case back over to vascular surgery.      Complications:  None; patient tolerated the procedure well.    Disposition: PACU - hemodynamically stable.  Condition: stable         Additional Details:     Attending Attestation:     Kar Street  Phone Number: [phone]

## 2024-04-30 NOTE — ANESTHESIA POSTPROCEDURE EVALUATION
Patient: Yobany Small    Procedure Summary       Date: 04/30/24 Room / Location: [facility] OR 16 / Virtual [facility]    Anesthesia Start: 0753 Anesthesia Stop: 1643    Procedures:       Aortic Aneurysm Repair Retroperitoneal      Nephrectomy (Left) Diagnosis:       Type Ia endoleak of aortic graft (CMS-HCC)      (Type Ia endoleak of aortic graft (CMS-HCC) [T82.310A])    Surgeons: Kar Street MD; Дмитрий Thompson MD MPH Responsible Provider: Brenden Liu MD    Anesthesia Type: general ASA Status: 4            Anesthesia Type: general    Vitals Value Taken Time   /85 04/30/24 1643   Temp 34.7 04/30/24 1643   Pulse 97 04/30/24 1642   Resp 16 04/30/24 1642   SpO2 99 % 04/30/24 1642   Vitals shown include unfiled device data.    Anesthesia Post Evaluation    Patient location during evaluation: ICU  Patient participation: complete - patient cannot participate  Level of consciousness: sedated  Pain management: adequate  Airway patency: patent  Cardiovascular status: hemodynamically stable  Respiratory status: airway suctioned, ETT, intubated and ventilator  Hydration status: acceptable  Postoperative Nausea and Vomiting: none        No notable events documented.

## 2024-04-30 NOTE — ANESTHESIA PROCEDURE NOTES
Airway  Date/Time: 4/30/2024 8:14 AM  Urgency: elective    Airway not difficult    Staffing  Performed: resident   Authorized by: Brenden Liu MD    Performed by: Martha Heaton DO  Patient location during procedure: OR    Indications and Patient Condition  Indications for airway management: anesthesia  Spontaneous Ventilation: absent  Sedation level: deep  Preoxygenated: yes  Patient position: sniffing  MILS not maintained throughout  Mask difficulty assessment: 2 - vent by mask + OA or adjuvant +/- NMBA  No planned trial extubation    Final Airway Details  Final airway type: endotracheal airway      Successful airway: ETT - double lumen left  Cuffed: yes   Successful intubation technique: direct laryngoscopy  Facilitating devices/methods: intubating stylet  Endotracheal tube insertion site: oral  Blade: Tessie  Blade size: #4  ETT DL size (fr): 39  Cormack-Lehane Classification: grade IIa - partial view of glottis  Placement verified by: chest auscultation, bronchoscopy and capnometry   Number of attempts at approach: 1  Ventilation between attempts: none

## 2024-04-30 NOTE — BRIEF OP NOTE
Date: 2024  OR Location: [facility] OR    Name: Yobany Small : 1955, Age: 69 y.o., MRN: [id number], Sex: male    Diagnosis  Pre-op Diagnosis     * Type Ia endoleak of aortic graft (CMS-HCC) [T82.310A] Post-op Diagnosis     * Type Ia endoleak of aortic graft (CMS-HCC) [T82.310A]     Procedures  Aortic Aneurysm Repair Retroperitoneal  86143 - [place] DIR RPR ANEURYSM ABDOM AORTA W/ILIAC VESSELS    Nephrectomy  90820 - [place] NEPHRECTOMY W/PRTL URETERECT OPEN RIB RESCJ RAD      Surgeons   Panel 1:     * Kar Street - Primary  Panel 2:     * Дмитрий Thompsno - Primary    Resident/Fellow/Other Assistant:  Surgeons and Role:  Panel 1:     * Iván Ortiz MD - Resident - Assisting     * Aneudy Workman MD - Fellow  Panel 2:     * Kelsea Al MD - Resident - Assisting    Procedure Summary  Anesthesia: General  ASA: IV  Anesthesia Staff: Anesthesiologist: Brenden Liu MD  Anesthesia Resident: Martha Heaton DO  Estimated Blood Loss: 10mL  Intra-op Medications:   Administrations occurring from 0715 to 1835 on 24:   Medication Name Total Dose   gelatin absorbable (Gelfoam) 100 sponge 2 each   thrombin (recombinant) (Recothrom) topical solution 20,000 Units   heparin (porcine) 5,000 Units in sodium chloride 0.9% 500 mL irrigation 5,000 Units   sodium chloride 0.9 % irrigation solution 1,000 mL   heparin 2,000 Units, mannitol 25 g, methylPREDNISolone sod suc (PF) (SOLU-Medrol) 500 mg in lactated Ringer's 1,000 mL infusion Cannot be calculated              Anesthesia Record               Intraprocedure I/O Totals          Intake    Norepinephrine Drip 0.00 mL    The total shown is the total volume documented since Anesthesia Start was filed.    Tranexamic Acid 0.00 mL    The total shown is the total volume documented since Anesthesia Start was filed.    Nitroglycerin Drip 0.00 mL    The total shown is the total volume documented since Anesthesia Start was filed.    Total Intake 0 mL       Output     Urine 575 mL    Total Output 575 mL       Net    Net Volume -575 mL          Specimen: No specimens collected     Staff:   Circulator: Lucy Brown RN; Glroia Brito, RN  Scrub Person: Ned Alfred RN; Kayla Molina RN; Mckenna Collazo          Findings: left mid ureteral tumor. Ureter with bladder cuff taken.     Complications:  None; patient tolerated the procedure well.     Disposition: PACU - hemodynamically stable.  Condition: stable  Specimens Collected: Left kidney and ureter with bladder cuff     Attending Attestation:  Dr. Thompson performed all critical portions of the procedure.       Kelsea Al MD

## 2024-04-30 NOTE — ANESTHESIA PROCEDURE NOTES
Central Venous Line:    Date/Time: 4/30/2024 8:37 AM    A central venous line was placed in the OR for the following indication(s): central venous access and CVP monitoring.  Staffing  Performed: resident   Authorized by: Brenden Liu MD    Performed by: Martha Heaton DO    Sterility preparation included the following: provider hand hygiene performed prior to central venous catheter insertion, all 5 sterile barriers used (gloves, gown, cap, mask, large sterile drape) during central venous catheter insertion, antiseptic used during central venous catheter insertion and skin prep agent completely dried prior to procedure.  Medical reason for not performing maximal sterile barrier technique: no  The patient was placed in Trendelenburg position.    Right internal jugular vein was prepped.  Size: 9 Fr   Length: 15  Catheter type: introducer   Number of Lumens: double lumen    This catheter was not an oximetric catheter.    During the procedure, the following specific steps were taken: target vein identified, needle advanced into vein and blood aspirated and guidewire advanced into vein.  Seldinger technique used.  Procedure performed using ultrasound guidance.  Sterile gel and probe cover used in ultrasound-guided central venous catheter insertion.    Intravenous verification was obtained by ultrasound, venous blood return, transducer and manometry.      Post insertion care included: all ports aspirated, all ports flushed easily, guidewire removed intact, Biopatch applied, line sutured in place and dressing applied.    During the procedure the patient experienced: patient tolerated procedure well with no complications.      A non-oximetric, 7 (size) Pulmonary Artery Catheter (PAC) was placed through the Introducer CVL in the right internal jugular vein.  The PAC placement was confirmed by pressure tracing changes.  The patient experienced the following events during the procedure: patient tolerated procedure well  with no complications.

## 2024-04-30 NOTE — ANESTHESIA PREPROCEDURE EVALUATION
Patient: Yobany Small    Procedure Information       Date/Time: 04/30/24 0715    Procedures:       Aortic Aneurysm Repair Retroperitoneal - Open repair AAA  ( Type 1A  endoleak)      Nephrectomy (Left)    Location: [facility] OR 16 / [facility] OR    Surgeons: Kar Street MD; Дмитрий Thompson MD MPH            Relevant Problems   Cardiac   (+) AAA (abdominal aortic aneurysm) (CMS-HCC)   (+) CAD in native artery   (+) Chronic systolic congestive heart failure (Multi)   (+) HTN (hypertension)      Pulmonary   (+) Aspiration pneumonia (Multi)   (+) Chronic obstructive pulmonary disease (COPD) (Multi)      Neuro   (+) Generalized anxiety disorder      /Renal   (+) Other hydronephrosis   (+) Urothelial carcinoma of kidney, left (Multi)      Hematology   (+) Anemia     Past Surgical History:   Procedure Laterality Date    CT ABDOMEN PELVIS ANGIOGRAM W AND/OR WO IV CONTRAST  11/7/2019    CT ABDOMEN PELVIS ANGIOGRAM W AND/OR WO IV CONTRAST 11/7/2019 PAR ANCILLARY LEGACY    CT ABDOMEN PELVIS ANGIOGRAM W AND/OR WO IV CONTRAST  7/8/2020    CT ABDOMEN PELVIS ANGIOGRAM W AND/OR WO IV CONTRAST 7/8/2020 PAR ANCILLARY LEGACY    OTHER SURGICAL HISTORY  05/30/2019    Salivary gland surgery    OTHER SURGICAL HISTORY  06/16/2020    Bronchoscopy    OTHER SURGICAL HISTORY  06/16/2020    Seroma incision and drainage    OTHER SURGICAL HISTORY  04/02/2021    Cardioverter defibrillator insertion    OTHER SURGICAL HISTORY  01/30/2020    Cardiac catheterization with stent placement    OTHER SURGICAL HISTORY  06/16/2020    Abdominal aortic aneurysm repair endovascular       No Known Allergies      Clinical information reviewed:   Tobacco  Allergies  Meds   Med Hx  Surg Hx   Fam Hx  Soc Hx        NPO Detail:  NPO/Void Status  Carbohydrate Drink Given Prior to Surgery? : N  Date of Last Liquid: 04/29/24  Time of Last Liquid: 0000  Date of Last Solid: 04/29/24  Time of Last Solid: 0000  Last Intake Type: Clear  fluids  Time of Last Void: 0545         Physical Exam    Airway  Mallampati: II  TM distance: >3 FB  Neck ROM: full     Cardiovascular - normal exam     Dental        Pulmonary - normal exam     Abdominal - normal exam             Anesthesia Plan    History of general anesthesia?: yes  History of complications of general anesthesia?: no    ASA 4     general     The patient is not a current smoker.    intravenous induction   Anesthetic plan and risks discussed with patient.  Use of blood products discussed with patient who consented to blood products.    Plan discussed with attending.    Plan general endotracheal anesthesia with large bore peripheral IV access, arterial line placement, Dilliner-Natividad catheter placement via central venous access, possible VIJAY, and ASA standard monitors.  Postoperative mechanical ventilation with ICU stay also detailed to the patient. The possibility of blood product transfusion was also described in detail.  Risks, benefits, alternatives of this plan were described in detail to the patient, who indicated understanding and agreed to proceed.    Brenden Liu MD

## 2024-04-30 NOTE — H&P
History Of Present Illness  Yobany Small is a 69 y.o. male with hx of AAA s/p EVAR (5/2020) c/b RTOR to r/o abdominal compartment syndrome and treat type II leak, requiring RTOR. 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 Recent hospitalization for LLQ pain and gross hematuria -- was found to have L upper pole renal mass w/ bx positive for urothelial carcinoma. Other PMH includes anemia, HTN, HLD, CAD s/p PCI w/ ELLY to Lcx (2020). HFrEF 25-30% 2/2 ischemic cardiomypoathy s/p ICD. Preop stress test w/ no inducible ischemia and stable LVEF on TTE. He has COPD w/ daily inhaler use. Preop PFTs s/f moderate obstructive disease.     Now s/p open AAA repair and explant with Dr. Street and left nephroureterectomy for urothelial carcinoma of L kidney w/ Dr. Thompson on 4/30.     AAA Repair  OR course:  EBL: 1500mL   UOP: 880 mL   Crystalloid: 5.5L   Colloid:1500 albumin   Products: 6 RBC, 5 FFP, 3 Plt   Intubation: 37 Fr DLETT   Reversed: No     Past Medical History  Past Medical History:   Diagnosis Date    Abnormal findings on diagnostic imaging of heart and coronary circulation     Abnormal echocardiogram    Abnormal findings on diagnostic imaging of other specified body structures     Abnormal ultrasound    Abnormal findings on diagnostic imaging of other specified body structures     Abnormal ultrasound    Abnormal findings on diagnostic imaging of other specified body structures     Abnormal chest x-ray    Abnormal findings on diagnostic imaging of other specified body structures     Abnormal CT of the chest    Abnormal findings on diagnostic imaging of other specified body structures     Abnormal computed tomography angiography (CTA)    Abnormal findings on diagnostic imaging of other specified body structures     Abnormal CT of the chest    BPH (benign prostatic hyperplasia)     COPD (chronic obstructive pulmonary disease) (Multi)     Coronary artery disease      Hyperlipidemia     Hypertension     Personal history of other medical treatment     History of echocardiogram     Medications Prior to Admission   Medication Sig Dispense Refill Last Dose    aspirin 81 mg EC tablet Take 1 tablet (81 mg) by mouth once daily.   Past Week    fluticasone-umeclidin-vilanter (Trelegy Ellipta) 100-62.5-25 mcg blister with device Inhale 1 puff once daily.   2024    methotrexate (Trexall) 2.5 mg tablet Take 6 tablets (15 mg total) by mouth 1 (one) time per week.  Follow directions carefully, and ask to explain any part you do not understand. Take exactly as directed.  6 tabs Saturday   Past Week    metoprolol succinate XL (Toprol-XL) 50 mg 24 hr tablet Take 1 tablet (50 mg) by mouth once daily. Do not crush or chew. 90 tablet 3 2024    oxybutynin XL (Ditropan-XL) 10 mg 24 hr tablet TAKE 1 TABLET BY MOUTH ONCE DAILY AS NEEDED FOR BLADDER SPASMS. DO NOT CRUSH, CHEW, OR SPLIT. 30 tablet 0 2024    oxybutynin XL (Ditropan-XL) 10 mg 24 hr tablet Take 1 tablet (10 mg) by mouth once daily as needed (take 1 tablet daily as needed for bladder spasms). Do not crush, chew, or split. 30 tablet 3 2024    oxyCODONE (Roxicodone) 5 mg immediate release tablet Take 1 tablet (5 mg) by mouth every 6 hours if needed for severe pain (7 - 10). 15 tablet 0 Past Week    predniSONE (Deltasone) 10 mg tablet Take 0.5 tablets (5 mg) by mouth once daily.   2024    sacubitriL-valsartan (Entresto) 24-26 mg tablet Take 1 tablet by mouth 2 times a day. 180 tablet 3 2024    simvastatin (Zocor) 40 mg tablet TAKE 1 TABLET BY MOUTH ONCE DAILY AT BEDTIME 90 tablet 0 2024    tamsulosin (Flomax) 0.4 mg 24 hr capsule 1 capsule (0.4 mg) once daily.   2024    [] chlorhexidine (Hibiclens) 4 % external liquid Apply topically 2 times a day for 5 days. 473 mL 0     chlorhexidine (Peridex) 0.12 % solution Swish and spit 15 mL night before surgery and morning of surgery (Patient not taking:  Reported on 4/30/2024) 473 mL 0 Not Taking    ferrous sulfate, 325 mg ferrous sulfate, (FeosoL) tablet Take 1 tablet by mouth once daily with breakfast.   More than a month       Surgical History  Past Surgical History:   Procedure Laterality Date    CT ABDOMEN PELVIS ANGIOGRAM W AND/OR WO IV CONTRAST  11/7/2019    CT ABDOMEN PELVIS ANGIOGRAM W AND/OR WO IV CONTRAST 11/7/2019 PAR ANCILLARY LEGACY    CT ABDOMEN PELVIS ANGIOGRAM W AND/OR WO IV CONTRAST  7/8/2020    CT ABDOMEN PELVIS ANGIOGRAM W AND/OR WO IV CONTRAST 7/8/2020 PAR ANCILLARY LEGACY    OTHER SURGICAL HISTORY  05/30/2019    Salivary gland surgery    OTHER SURGICAL HISTORY  06/16/2020    Bronchoscopy    OTHER SURGICAL HISTORY  06/16/2020    Seroma incision and drainage    OTHER SURGICAL HISTORY  04/02/2021    Cardioverter defibrillator insertion    OTHER SURGICAL HISTORY  01/30/2020    Cardiac catheterization with stent placement    OTHER SURGICAL HISTORY  06/16/2020    Abdominal aortic aneurysm repair endovascular        Social History  He reports that he quit smoking about 6 years ago. His smoking use included cigarettes. He has never used smokeless tobacco. He reports that he does not currently use alcohol. He reports that he does not use drugs.    Family History  Family History   Problem Relation Name Age of Onset    No Known Problems Mother          no relationship    No Known Problems Father          no relationship        Allergies  Patient has no known allergies.    Review of Systems  Unable to assess, as pt is sedated and intubated.     Physical Exam  Constitutional:       Comments: Intubted, sedated   HENT:      Head: Normocephalic and atraumatic.   Eyes:      Conjunctiva/sclera: Conjunctivae normal.      Pupils: Pupils are equal, round, and reactive to light.   Cardiovascular:      Rate and Rhythm: Normal rate and regular rhythm.      Heart sounds: Normal heart sounds. No murmur heard.     Comments: RLE: palpable DP and PT pulses  LLE: palpable  "PT pulse  Pulmonary:      Breath sounds: Normal breath sounds.   Abdominal:      General: Abdomen is flat. There is no distension.      Palpations: Abdomen is soft.   Skin:     General: Skin is warm and dry.   Neurological:      Comments: Sedated  PERRL          Last Recorded Vitals  Blood pressure 138/85, pulse (!) 115, temperature 37.6 °C (99.7 °F), resp. rate 16, height 1.803 m (5' 11\"), weight 55.7 kg (122 lb 12.7 oz), SpO2 92%.    Relevant Results  Nuclear stress test 1/23/2024  IMPRESSION  1. No electrocardiographic evidence for ischemia at a maximal infusion.     TTE 1/15/2024  IMPRESSION  1. Left ventricular systolic function is severely decreased with a 25-30% estimated ejection fraction.   2. Spectral Doppler shows an impaired relaxation pattern of left ventricular diastolic filling.   3. Mild to moderately elevated right ventricular systolic pressure.   4. There is global hypokinesis of the left ventricle with minor regional variations.   5. There is moderate dilatation of the aortic root.   6. There is moderate dilatation of the ascending aorta.    [facility] 1/17/2020  IMPRESSION  1. Luminal irregularities in left main 40% proximal LAD stenosis with luminal irregularities in the rest of LAD   95% mid left circumflex stenosis with a 70% stenosis more proximal to that and luminal irregularities in the rest of left circumflex system  A small nondominant RCA with no significant stenosis  Left main coronary artery system  LVEDP 31 mmHg with no significant gradient across aortic valve  LVEF 20%        Assessment/Plan   Principal Problem:    Type Ia endoleak of aortic graft (CMS-[facility])  Active Problems:    Urothelial carcinoma of kidney, left (Multi)      70 y/o male with hx of AAA s/p EVAR (5/2020) with type Ia endoleak now s/p open repair and explant with Dr. Street on 4/30. Also had left nephroureterectomy for urothelial carcinoma of L kidney performed at same time. PMH s/f CAD w/ ELLY to LCx (2020), HFrEF (25-30%) " 2/2 ischemic cardiomyopathy s/p ICD, COPD, HTN, psoriatic arthritis, and anemia.     Plan:  NEURO: No PMH. PERRL. Intubated, sedated with propofol.   - T 35.8 C on arrival. Neuro exam pending rewarming. Once adequately rewarmed, will plan for NMB reversal and wean sedation for neuro exam.   - Continue propofol infusion overnight. Wean in am for cpap trial  - fentanyl drip and PRN hydromorphone for pain   - ongoing neuro/neurovasc/pain assessments    CV: PMH ruptured AAA s/p EVAR (2020) which was c/b RTOR to r/o abdominal compartment syndrome and treat type II leak, requiring RTOR. Other PMH CAD s/p ELLY to LCx, 40% stenosis of proximal LAD (2020), HFrEF 25-30% 2/2 cardiomyopathy s/p ICD, HTN, HLD. Preop stress test not c/f ischemia, and VIJAY w/ stable EF. Now with enlargement of sac from 3.7 to 4.4 cm and type Ia endoleak s/p open repair and explant with Dr. Street 4/30. Arrived on Norepi and dobutamine. CI 2.6 SVR 1000 on arrival. Dobutamine weaned off. Palpable R DP, PT and L PT pulses.    - Goal MAP  per vascular   - Continue to titrate norepi/nitro to MAP goal   - L sided chest tube w/ air leak. Maintain at -20 cmH2O.   - continuous ekg/abp/pap monitoring  - goal C.I. >2.5; pa cath in appropriate position per post op CXR  - Home meds: asa, metoprolol succinate 50mg, entresto 24-26mg, simvastatin. Hold for now.     PULM: Hx of COPD. Preop PFTs indicative of moderate-severe obstructive disease FEV1/FVC (52% predicted). Intubated. 39 Fr DLETT. VC-AC 40% FiO2, peep 8. Post op PaO2 106. DLETT remained in place d/t c/f airway edema. Pt to remain intubated overnight, tentative extubation in AM.   - Remain intubated overnight d/t c/f airway edema   - Stress dose steroids should help with swelling   - PRN duoneb  - Q1h incentive spirometer after extubation  - F/U post op cxr  - Home meds: trelegy-ellipta      GI: No PMH. Strict NPO  - NG-> LIWS  - PPI for GI prophylaxis.  - f/u post op and daily amylase, lipase,  hepatic function panel.    : Baseline hematuria 2/2 urothelial carcinoma of L kidney. S/p left nephroureterectomy 4/30 w/ Dr. Thompson. Baseline Cr 0.94. Supra mesenteric cross clamp time 40 minutes   - Check renal function panel post op and q6h x2. Further frequency to be determined  - Maintenance IVF at 150mL/hr   - Replace 1ml urine loss with 0.5ml IVF for 12 hours   - Volume resuscitation as needed.    - Goal U/O >0.5ml/kg/hr.    - Replete electrolytes to goal K>4, Mg>2, Phos>2.5, ionized Ca>1.10 if creatinine remains WNL or <1.5 with adequate uop.  - Home meds: oxybutynin 10mg PRN, tamsulosin 0.4mg. Hold for now.     HEME: Hx anemia. Baseline Hgb 12.6.  Acute surgical blood loss anemia. 6 RBC, 5 FFP, 3 Plt in OR. 2 RBC, 1 plt on arrival. 2 RBC, 4 FFP, 1 Plt on hold.   - F/u TEG, CBC  - Goal Hgb >10, Plt>100, INR <1.4  - Check CBC, coags, fibrinogen post op, q6hx2 (2300, 0500). Further frequency to be determined  - scds for dvt prophylaxis.  - no subcutaneous heparin per vascular surgery at this time  - Home meds: Iron, B12. Hold for now.     ENDO: No hx. A1C 5.3.   - Q4h BG and SSI Lispro per ICU protocol.  - Daily pred 5 for unknown duration -- placed on stress dose steroids  - q4hr POCT BGs    ID: afebrile, no leukocytosis  - Ceftriaxone 2g for 24 hrs until chest tube removed.   - monitor for s/s infection    MSK: Hx of psoriatic arthritis.  - Home meds: MTX 15mg and prednisone 5mg. Hold for now.   - stress dose steroids    Skin: No active skin issues.  - preventative Mepilex dressings in place on sacrum and heels  - change preventative Mepilex weekly or more frequently as indicated (when moist/soiled)   - every shift skin assessment per nursing and weekly ICU skin rounds  - moisture barrier to be applied with rafael care  - active skin problems addressed with nursing on daily rounds    Lines:   RIJ MAC w/ PAC 4/30  R Radial A line 4/30  PIVs: 16G LFA, 14G RFA  [place] 4/30  LLQ ESTEBAN drain 4/30  L sided chest tube  4/30    Dispo: Continue ICU care. Reassess dispo in am.     Plan discussed with SICU attending, Dr. Stone.     Kimmie Puentes MD  Anesthesiology, PGY-1   SICU [id number]

## 2024-04-30 NOTE — ANESTHESIA PROCEDURE NOTES
Peripheral IV  Date/Time: 4/30/2024 7:15 AM      Placement  Needle size: 16 G  Laterality: left  Location: forearm  Local anesthetic: injectable  Site prep: alcohol  Technique: anatomical landmarks  Attempts: 1

## 2024-04-30 NOTE — ANESTHESIA PROCEDURE NOTES
Arterial Line:    Date/Time: 4/30/2024 8:05 AM    Staffing  Performed: resident   Authorized by: Brenden Liu MD    Performed by: Martha Heaton DO    An arterial line was placed. in the OR for the following indication(s): continuous blood pressure monitoring and blood sampling needed.    A 20 gauge (size), 5 cm (length), Angiocath (type) catheter was placed into the Right radial artery, secured by Tegaderm,   Seldinger technique not used.  Events:  patient tolerated procedure well with no complications.

## 2024-05-01 ENCOUNTER — APPOINTMENT (OUTPATIENT)
Dept: CARDIOLOGY | Facility: HOSPITAL | Age: 69
DRG: 252 | End: 2024-05-01
Payer: COMMERCIAL

## 2024-05-01 ENCOUNTER — APPOINTMENT (OUTPATIENT)
Dept: RADIOLOGY | Facility: HOSPITAL | Age: 69
DRG: 252 | End: 2024-05-01
Payer: COMMERCIAL

## 2024-05-01 LAB
ALBUMIN SERPL BCP-MCNC: 3 G/DL (ref 3.4–5)
ALBUMIN SERPL BCP-MCNC: 3 G/DL (ref 3.4–5)
ALBUMIN SERPL BCP-MCNC: 3.2 G/DL (ref 3.4–5)
ALP SERPL-CCNC: 35 U/L (ref 33–136)
ALT SERPL W P-5'-P-CCNC: 7 U/L (ref 10–52)
ANION GAP BLDA CALCULATED.4IONS-SCNC: 10 MMO/L (ref 10–25)
ANION GAP BLDA CALCULATED.4IONS-SCNC: 7 MMO/L (ref 10–25)
ANION GAP BLDA CALCULATED.4IONS-SCNC: 9 MMO/L (ref 10–25)
ANION GAP SERPL CALC-SCNC: 12 MMOL/L (ref 10–20)
ANION GAP SERPL CALC-SCNC: 14 MMOL/L (ref 10–20)
ANION GAP SERPL CALC-SCNC: 14 MMOL/L (ref 10–20)
AORTIC VALVE PEAK VELOCITY: 0.88 M/S
APTT PPP: 29 SECONDS (ref 27–38)
APTT PPP: 30 SECONDS (ref 27–38)
AST SERPL W P-5'-P-CCNC: 18 U/L (ref 9–39)
AV PEAK GRADIENT: 3.1 MMHG
AVA (PEAK VEL): 3.66 CM2
BASE EXCESS BLDA CALC-SCNC: -0.3 MMOL/L (ref -2–3)
BASE EXCESS BLDA CALC-SCNC: -0.8 MMOL/L (ref -2–3)
BASE EXCESS BLDA CALC-SCNC: 0.9 MMOL/L (ref -2–3)
BILIRUB DIRECT SERPL-MCNC: 0.2 MG/DL (ref 0–0.3)
BILIRUB SERPL-MCNC: 0.7 MG/DL (ref 0–1.2)
BLOOD EXPIRATION DATE: NORMAL
BODY TEMPERATURE: 37 DEGREES CELSIUS
BUN SERPL-MCNC: 22 MG/DL (ref 6–23)
BUN SERPL-MCNC: 24 MG/DL (ref 6–23)
BUN SERPL-MCNC: 26 MG/DL (ref 6–23)
CA-I BLD-SCNC: 1.09 MMOL/L (ref 1.1–1.33)
CA-I BLD-SCNC: 1.13 MMOL/L (ref 1.1–1.33)
CA-I BLD-SCNC: 1.13 MMOL/L (ref 1.1–1.33)
CA-I BLDA-SCNC: 1.13 MMOL/L (ref 1.1–1.33)
CA-I BLDA-SCNC: 1.15 MMOL/L (ref 1.1–1.33)
CA-I BLDA-SCNC: 1.17 MMOL/L (ref 1.1–1.33)
CALCIUM SERPL-MCNC: 7.5 MG/DL (ref 8.6–10.6)
CALCIUM SERPL-MCNC: 7.7 MG/DL (ref 8.6–10.6)
CALCIUM SERPL-MCNC: 7.8 MG/DL (ref 8.6–10.6)
CHLORIDE BLDA-SCNC: 108 MMOL/L (ref 98–107)
CHLORIDE BLDA-SCNC: 109 MMOL/L (ref 98–107)
CHLORIDE BLDA-SCNC: 110 MMOL/L (ref 98–107)
CHLORIDE SERPL-SCNC: 108 MMOL/L (ref 98–107)
CHLORIDE SERPL-SCNC: 108 MMOL/L (ref 98–107)
CHLORIDE SERPL-SCNC: 110 MMOL/L (ref 98–107)
CO2 SERPL-SCNC: 22 MMOL/L (ref 21–32)
CO2 SERPL-SCNC: 24 MMOL/L (ref 21–32)
CO2 SERPL-SCNC: 25 MMOL/L (ref 21–32)
CREAT SERPL-MCNC: 1.25 MG/DL (ref 0.5–1.3)
CREAT SERPL-MCNC: 1.32 MG/DL (ref 0.5–1.3)
CREAT SERPL-MCNC: 1.34 MG/DL (ref 0.5–1.3)
DISPENSE STATUS: NORMAL
EGFRCR SERPLBLD CKD-EPI 2021: 58 ML/MIN/1.73M*2
EGFRCR SERPLBLD CKD-EPI 2021: 59 ML/MIN/1.73M*2
EGFRCR SERPLBLD CKD-EPI 2021: 63 ML/MIN/1.73M*2
ERYTHROCYTE [DISTWIDTH] IN BLOOD BY AUTOMATED COUNT: 13.8 % (ref 11.5–14.5)
ERYTHROCYTE [DISTWIDTH] IN BLOOD BY AUTOMATED COUNT: 13.9 % (ref 11.5–14.5)
ERYTHROCYTE [DISTWIDTH] IN BLOOD BY AUTOMATED COUNT: 14.4 % (ref 11.5–14.5)
GLUCOSE BLD MANUAL STRIP-MCNC: 105 MG/DL (ref 74–99)
GLUCOSE BLD MANUAL STRIP-MCNC: 106 MG/DL (ref 74–99)
GLUCOSE BLD MANUAL STRIP-MCNC: 115 MG/DL (ref 74–99)
GLUCOSE BLD MANUAL STRIP-MCNC: 120 MG/DL (ref 74–99)
GLUCOSE BLD MANUAL STRIP-MCNC: 125 MG/DL (ref 74–99)
GLUCOSE BLD MANUAL STRIP-MCNC: 92 MG/DL (ref 74–99)
GLUCOSE BLDA-MCNC: 126 MG/DL (ref 74–99)
GLUCOSE BLDA-MCNC: 132 MG/DL (ref 74–99)
GLUCOSE BLDA-MCNC: 144 MG/DL (ref 74–99)
GLUCOSE SERPL-MCNC: 128 MG/DL (ref 74–99)
GLUCOSE SERPL-MCNC: 129 MG/DL (ref 74–99)
GLUCOSE SERPL-MCNC: 86 MG/DL (ref 74–99)
HCO3 BLDA-SCNC: 23.4 MMOL/L (ref 22–26)
HCO3 BLDA-SCNC: 24 MMOL/L (ref 22–26)
HCO3 BLDA-SCNC: 25.2 MMOL/L (ref 22–26)
HCT VFR BLD AUTO: 24.3 % (ref 41–52)
HCT VFR BLD AUTO: 25.2 % (ref 41–52)
HCT VFR BLD AUTO: 26.5 % (ref 41–52)
HCT VFR BLD EST: 27 % (ref 41–52)
HCT VFR BLD EST: 30 % (ref 41–52)
HCT VFR BLD EST: 31 % (ref 41–52)
HGB BLD-MCNC: 8.5 G/DL (ref 13.5–17.5)
HGB BLD-MCNC: 8.7 G/DL (ref 13.5–17.5)
HGB BLD-MCNC: 9.6 G/DL (ref 13.5–17.5)
HGB BLDA-MCNC: 10.1 G/DL (ref 13.5–17.5)
HGB BLDA-MCNC: 10.3 G/DL (ref 13.5–17.5)
HGB BLDA-MCNC: 9.1 G/DL (ref 13.5–17.5)
INHALED O2 CONCENTRATION: 40 %
INHALED O2 CONCENTRATION: 50 %
INHALED O2 CONCENTRATION: 60 %
INR PPP: 1.2 (ref 0.9–1.1)
INR PPP: 1.3 (ref 0.9–1.1)
LACTATE BLDA-SCNC: 0.8 MMOL/L (ref 0.4–2)
LACTATE BLDA-SCNC: 1.4 MMOL/L (ref 0.4–2)
LACTATE BLDA-SCNC: 1.6 MMOL/L (ref 0.4–2)
LEFT VENTRICULAR OUTFLOW TRACT DIAMETER: 2.5 CM
MAGNESIUM SERPL-MCNC: 2.32 MG/DL (ref 1.6–2.4)
MAGNESIUM SERPL-MCNC: 2.32 MG/DL (ref 1.6–2.4)
MAGNESIUM SERPL-MCNC: 2.53 MG/DL (ref 1.6–2.4)
MCH RBC QN AUTO: 29.2 PG (ref 26–34)
MCH RBC QN AUTO: 29.6 PG (ref 26–34)
MCH RBC QN AUTO: 29.8 PG (ref 26–34)
MCHC RBC AUTO-ENTMCNC: 34.5 G/DL (ref 32–36)
MCHC RBC AUTO-ENTMCNC: 35 G/DL (ref 32–36)
MCHC RBC AUTO-ENTMCNC: 36.2 G/DL (ref 32–36)
MCV RBC AUTO: 82 FL (ref 80–100)
MCV RBC AUTO: 85 FL (ref 80–100)
MCV RBC AUTO: 85 FL (ref 80–100)
MGC ASCENT PFT - FEV1 - POST: 1.52
MGC ASCENT PFT - FEV1 - PRE: 1.49
MGC ASCENT PFT - FEV1 - PREDICTED: 3.23
MGC ASCENT PFT - FVC - POST: 3.75
MGC ASCENT PFT - FVC - PRE: 3.68
MGC ASCENT PFT - FVC - PREDICTED: 4.27
MITRAL VALVE E/A RATIO: 0.8
NRBC BLD-RTO: 0 /100 WBCS (ref 0–0)
OXYHGB MFR BLDA: 93.8 % (ref 94–98)
OXYHGB MFR BLDA: 94.1 % (ref 94–98)
OXYHGB MFR BLDA: 95.4 % (ref 94–98)
PCO2 BLDA: 36 MM HG (ref 38–42)
PCO2 BLDA: 37 MM HG (ref 38–42)
PCO2 BLDA: 38 MM HG (ref 38–42)
PH BLDA: 7.42 PH (ref 7.38–7.42)
PH BLDA: 7.42 PH (ref 7.38–7.42)
PH BLDA: 7.43 PH (ref 7.38–7.42)
PHOSPHATE SERPL-MCNC: 2.9 MG/DL (ref 2.5–4.9)
PHOSPHATE SERPL-MCNC: 3.7 MG/DL (ref 2.5–4.9)
PHOSPHATE SERPL-MCNC: 4.1 MG/DL (ref 2.5–4.9)
PLATELET # BLD AUTO: 140 X10*3/UL (ref 150–450)
PLATELET # BLD AUTO: 146 X10*3/UL (ref 150–450)
PLATELET # BLD AUTO: 157 X10*3/UL (ref 150–450)
PO2 BLDA: 70 MM HG (ref 85–95)
PO2 BLDA: 71 MM HG (ref 85–95)
PO2 BLDA: 78 MM HG (ref 85–95)
POTASSIUM BLDA-SCNC: 4.1 MMOL/L (ref 3.5–5.3)
POTASSIUM BLDA-SCNC: 4.1 MMOL/L (ref 3.5–5.3)
POTASSIUM BLDA-SCNC: 4.2 MMOL/L (ref 3.5–5.3)
POTASSIUM SERPL-SCNC: 4 MMOL/L (ref 3.5–5.3)
POTASSIUM SERPL-SCNC: 4 MMOL/L (ref 3.5–5.3)
POTASSIUM SERPL-SCNC: 4.2 MMOL/L (ref 3.5–5.3)
PRODUCT BLOOD TYPE: 2800
PRODUCT BLOOD TYPE: 8400
PRODUCT CODE: NORMAL
PROT SERPL-MCNC: 4.6 G/DL (ref 6.4–8.2)
PROTHROMBIN TIME: 14 SECONDS (ref 9.8–12.8)
PROTHROMBIN TIME: 14.3 SECONDS (ref 9.8–12.8)
RBC # BLD AUTO: 2.87 X10*6/UL (ref 4.5–5.9)
RBC # BLD AUTO: 2.98 X10*6/UL (ref 4.5–5.9)
RBC # BLD AUTO: 3.22 X10*6/UL (ref 4.5–5.9)
RIGHT VENTRICLE PEAK SYSTOLIC PRESSURE: 32.2 MMHG
SAO2 % BLDA: 97 % (ref 94–100)
SAO2 % BLDA: 98 % (ref 94–100)
SAO2 % BLDA: 99 % (ref 94–100)
SODIUM BLDA-SCNC: 137 MMOL/L (ref 136–145)
SODIUM BLDA-SCNC: 138 MMOL/L (ref 136–145)
SODIUM BLDA-SCNC: 138 MMOL/L (ref 136–145)
SODIUM SERPL-SCNC: 141 MMOL/L (ref 136–145)
SODIUM SERPL-SCNC: 142 MMOL/L (ref 136–145)
SODIUM SERPL-SCNC: 142 MMOL/L (ref 136–145)
TRICUSPID ANNULAR PLANE SYSTOLIC EXCURSION: 2.7 CM
UNIT ABO: NORMAL
UNIT NUMBER: NORMAL
UNIT RH: NORMAL
UNIT VOLUME: 195
UNIT VOLUME: 197
UNIT VOLUME: 200
UNIT VOLUME: 211
UNIT VOLUME: 217
UNIT VOLUME: 308
UNIT VOLUME: 314
UNIT VOLUME: 317
UNIT VOLUME: 321
UNIT VOLUME: 341
WBC # BLD AUTO: 10.6 X10*3/UL (ref 4.4–11.3)
WBC # BLD AUTO: 11.7 X10*3/UL (ref 4.4–11.3)
WBC # BLD AUTO: 12.2 X10*3/UL (ref 4.4–11.3)

## 2024-05-01 PROCEDURE — 2500000004 HC RX 250 GENERAL PHARMACY W/ HCPCS (ALT 636 FOR OP/ED): Performed by: PHYSICIAN ASSISTANT

## 2024-05-01 PROCEDURE — 85027 COMPLETE CBC AUTOMATED: CPT

## 2024-05-01 PROCEDURE — 84132 ASSAY OF SERUM POTASSIUM: CPT

## 2024-05-01 PROCEDURE — 83690 ASSAY OF LIPASE: CPT

## 2024-05-01 PROCEDURE — 37799 UNLISTED PX VASCULAR SURGERY: CPT

## 2024-05-01 PROCEDURE — 99223 1ST HOSP IP/OBS HIGH 75: CPT | Performed by: INTERNAL MEDICINE

## 2024-05-01 PROCEDURE — 93306 TTE W/DOPPLER COMPLETE: CPT

## 2024-05-01 PROCEDURE — 2020000001 HC ICU ROOM DAILY

## 2024-05-01 PROCEDURE — 2500000004 HC RX 250 GENERAL PHARMACY W/ HCPCS (ALT 636 FOR OP/ED): Performed by: STUDENT IN AN ORGANIZED HEALTH CARE EDUCATION/TRAINING PROGRAM

## 2024-05-01 PROCEDURE — C9248 INJ, CLEVIDIPINE BUTYRATE: HCPCS | Performed by: NURSE PRACTITIONER

## 2024-05-01 PROCEDURE — 94003 VENT MGMT INPAT SUBQ DAY: CPT

## 2024-05-01 PROCEDURE — 99291 CRITICAL CARE FIRST HOUR: CPT | Performed by: NURSE PRACTITIONER

## 2024-05-01 PROCEDURE — 2500000005 HC RX 250 GENERAL PHARMACY W/O HCPCS: Performed by: STUDENT IN AN ORGANIZED HEALTH CARE EDUCATION/TRAINING PROGRAM

## 2024-05-01 PROCEDURE — 85610 PROTHROMBIN TIME: CPT

## 2024-05-01 PROCEDURE — 93306 TTE W/DOPPLER COMPLETE: CPT | Performed by: INTERNAL MEDICINE

## 2024-05-01 PROCEDURE — 99231 SBSQ HOSP IP/OBS SF/LOW 25: CPT | Performed by: SURGERY

## 2024-05-01 PROCEDURE — 82150 ASSAY OF AMYLASE: CPT

## 2024-05-01 PROCEDURE — 84075 ASSAY ALKALINE PHOSPHATASE: CPT

## 2024-05-01 PROCEDURE — 82947 ASSAY GLUCOSE BLOOD QUANT: CPT

## 2024-05-01 PROCEDURE — 84100 ASSAY OF PHOSPHORUS: CPT

## 2024-05-01 PROCEDURE — 71045 X-RAY EXAM CHEST 1 VIEW: CPT

## 2024-05-01 PROCEDURE — 71045 X-RAY EXAM CHEST 1 VIEW: CPT | Performed by: RADIOLOGY

## 2024-05-01 PROCEDURE — 83735 ASSAY OF MAGNESIUM: CPT

## 2024-05-01 PROCEDURE — 2500000004 HC RX 250 GENERAL PHARMACY W/ HCPCS (ALT 636 FOR OP/ED)

## 2024-05-01 PROCEDURE — 82040 ASSAY OF SERUM ALBUMIN: CPT

## 2024-05-01 PROCEDURE — 2500000004 HC RX 250 GENERAL PHARMACY W/ HCPCS (ALT 636 FOR OP/ED): Performed by: NURSE PRACTITIONER

## 2024-05-01 PROCEDURE — 99291 CRITICAL CARE FIRST HOUR: CPT | Performed by: ANESTHESIOLOGY

## 2024-05-01 PROCEDURE — 82330 ASSAY OF CALCIUM: CPT

## 2024-05-01 PROCEDURE — 99223 1ST HOSP IP/OBS HIGH 75: CPT | Performed by: STUDENT IN AN ORGANIZED HEALTH CARE EDUCATION/TRAINING PROGRAM

## 2024-05-01 RX ORDER — IPRATROPIUM BROMIDE AND ALBUTEROL SULFATE 2.5; .5 MG/3ML; MG/3ML
3 SOLUTION RESPIRATORY (INHALATION)
Status: DISCONTINUED | OUTPATIENT
Start: 2024-05-01 | End: 2024-05-01

## 2024-05-01 RX ORDER — HYDROMORPHONE HCL/0.9% NACL/PF 15 MG/30ML
PATIENT CONTROLLED ANALGESIA SYRINGE INTRAVENOUS CONTINUOUS
Status: DISPENSED | OUTPATIENT
Start: 2024-05-01 | End: 2024-05-06

## 2024-05-01 RX ORDER — DEXMEDETOMIDINE HYDROCHLORIDE 4 UG/ML
0.1 INJECTION, SOLUTION INTRAVENOUS CONTINUOUS
Status: DISCONTINUED | OUTPATIENT
Start: 2024-05-01 | End: 2024-05-02

## 2024-05-01 RX ORDER — NALOXONE HYDROCHLORIDE 0.4 MG/ML
0.2 INJECTION, SOLUTION INTRAMUSCULAR; INTRAVENOUS; SUBCUTANEOUS AS NEEDED
Status: DISCONTINUED | OUTPATIENT
Start: 2024-05-01 | End: 2024-05-10 | Stop reason: HOSPADM

## 2024-05-01 RX ORDER — ACETAMINOPHEN 10 MG/ML
1000 INJECTION, SOLUTION INTRAVENOUS EVERY 6 HOURS SCHEDULED
Status: COMPLETED | OUTPATIENT
Start: 2024-05-01 | End: 2024-05-01

## 2024-05-01 RX ORDER — DEXMEDETOMIDINE HYDROCHLORIDE 4 UG/ML
0.1 INJECTION, SOLUTION INTRAVENOUS CONTINUOUS
Status: DISCONTINUED | OUTPATIENT
Start: 2024-05-01 | End: 2024-05-01

## 2024-05-01 RX ORDER — FLUTICASONE FUROATE AND VILANTEROL 200; 25 UG/1; UG/1
1 POWDER RESPIRATORY (INHALATION) DAILY
Status: DISCONTINUED | OUTPATIENT
Start: 2024-05-01 | End: 2024-05-10 | Stop reason: HOSPADM

## 2024-05-01 RX ORDER — HYDROMORPHONE HYDROCHLORIDE 1 MG/ML
0.5 INJECTION, SOLUTION INTRAMUSCULAR; INTRAVENOUS; SUBCUTANEOUS ONCE
Status: COMPLETED | OUTPATIENT
Start: 2024-05-01 | End: 2024-05-01

## 2024-05-01 RX ORDER — HYDROMORPHONE HYDROCHLORIDE 1 MG/ML
0.4 INJECTION, SOLUTION INTRAMUSCULAR; INTRAVENOUS; SUBCUTANEOUS
Status: DISCONTINUED | OUTPATIENT
Start: 2024-05-01 | End: 2024-05-01

## 2024-05-01 RX ORDER — PANTOPRAZOLE SODIUM 40 MG/10ML
40 INJECTION, POWDER, LYOPHILIZED, FOR SOLUTION INTRAVENOUS DAILY
Status: DISCONTINUED | OUTPATIENT
Start: 2024-05-02 | End: 2024-05-03

## 2024-05-01 RX ORDER — IPRATROPIUM BROMIDE AND ALBUTEROL SULFATE 2.5; .5 MG/3ML; MG/3ML
3 SOLUTION RESPIRATORY (INHALATION) EVERY 6 HOURS PRN
Status: DISCONTINUED | OUTPATIENT
Start: 2024-05-01 | End: 2024-05-03

## 2024-05-01 RX ORDER — MAGNESIUM SULFATE HEPTAHYDRATE 40 MG/ML
2 INJECTION, SOLUTION INTRAVENOUS EVERY 8 HOURS
Status: DISCONTINUED | OUTPATIENT
Start: 2024-05-01 | End: 2024-05-02

## 2024-05-01 RX ORDER — HYDROMORPHONE HYDROCHLORIDE 1 MG/ML
0.2 INJECTION, SOLUTION INTRAMUSCULAR; INTRAVENOUS; SUBCUTANEOUS
Status: DISCONTINUED | OUTPATIENT
Start: 2024-05-01 | End: 2024-05-01 | Stop reason: SDUPTHER

## 2024-05-01 RX ORDER — HYDROMORPHONE HYDROCHLORIDE 1 MG/ML
0.6 INJECTION, SOLUTION INTRAMUSCULAR; INTRAVENOUS; SUBCUTANEOUS ONCE
Status: COMPLETED | OUTPATIENT
Start: 2024-05-01 | End: 2024-05-01

## 2024-05-01 RX ORDER — METHOCARBAMOL 100 MG/ML
1000 INJECTION, SOLUTION INTRAMUSCULAR; INTRAVENOUS EVERY 8 HOURS
Status: DISCONTINUED | OUTPATIENT
Start: 2024-05-01 | End: 2024-05-02

## 2024-05-01 RX ORDER — HYDROMORPHONE HYDROCHLORIDE 1 MG/ML
0.2 INJECTION, SOLUTION INTRAMUSCULAR; INTRAVENOUS; SUBCUTANEOUS
Status: DISCONTINUED | OUTPATIENT
Start: 2024-05-01 | End: 2024-05-01

## 2024-05-01 RX ADMIN — MAGNESIUM SULFATE HEPTAHYDRATE 2 G: 40 INJECTION, SOLUTION INTRAVENOUS at 20:52

## 2024-05-01 RX ADMIN — MAGNESIUM SULFATE HEPTAHYDRATE 2 G: 40 INJECTION, SOLUTION INTRAVENOUS at 13:30

## 2024-05-01 RX ADMIN — HYDROCORTISONE SODIUM SUCCINATE 50 MG: 100 INJECTION, POWDER, FOR SOLUTION INTRAMUSCULAR; INTRAVENOUS at 05:46

## 2024-05-01 RX ADMIN — PROPOFOL 35 MCG/KG/MIN: 10 INJECTION, EMULSION INTRAVENOUS at 02:01

## 2024-05-01 RX ADMIN — CLEVIPIDINE 2 MG/HR: 0.5 EMULSION INTRAVENOUS at 08:31

## 2024-05-01 RX ADMIN — ACETAMINOPHEN 1000 MG: 10 INJECTION INTRAVENOUS at 08:32

## 2024-05-01 RX ADMIN — ACETAMINOPHEN 1000 MG: 10 INJECTION INTRAVENOUS at 17:52

## 2024-05-01 RX ADMIN — SODIUM CHLORIDE, POTASSIUM CHLORIDE, SODIUM LACTATE AND CALCIUM CHLORIDE 150 ML/HR: 600; 310; 30; 20 INJECTION, SOLUTION INTRAVENOUS at 02:22

## 2024-05-01 RX ADMIN — NITROGLYCERIN 5 MCG/MIN: 20 INJECTION INTRAVENOUS at 03:21

## 2024-05-01 RX ADMIN — Medication: at 05:13

## 2024-05-01 RX ADMIN — PERFLUTREN 4 ML OF DILUTION: 6.52 INJECTION, SUSPENSION INTRAVENOUS at 11:51

## 2024-05-01 RX ADMIN — ACETAMINOPHEN 1000 MG: 10 INJECTION INTRAVENOUS at 13:05

## 2024-05-01 RX ADMIN — HYDROMORPHONE HYDROCHLORIDE 0.5 MG: 1 INJECTION, SOLUTION INTRAMUSCULAR; INTRAVENOUS; SUBCUTANEOUS at 04:37

## 2024-05-01 RX ADMIN — HYDROMORPHONE HYDROCHLORIDE 0.2 MG: 1 INJECTION, SOLUTION INTRAMUSCULAR; INTRAVENOUS; SUBCUTANEOUS at 04:38

## 2024-05-01 RX ADMIN — ACETAMINOPHEN 1000 MG: 10 INJECTION INTRAVENOUS at 23:51

## 2024-05-01 RX ADMIN — HYDROCORTISONE SODIUM SUCCINATE 20 MG: 100 INJECTION, POWDER, FOR SOLUTION INTRAMUSCULAR; INTRAVENOUS at 11:26

## 2024-05-01 RX ADMIN — HYDROMORPHONE HYDROCHLORIDE 0.4 MG: 1 INJECTION, SOLUTION INTRAMUSCULAR; INTRAVENOUS; SUBCUTANEOUS at 03:02

## 2024-05-01 RX ADMIN — DEXMEDETOMIDINE HYDROCHLORIDE 0.1 MCG/KG/HR: 400 INJECTION INTRAVENOUS at 13:29

## 2024-05-01 RX ADMIN — Medication 6 L/MIN: at 08:00

## 2024-05-01 RX ADMIN — CLEVIPIDINE 1 MG/HR: 0.5 EMULSION INTRAVENOUS at 15:02

## 2024-05-01 RX ADMIN — HYDROMORPHONE HYDROCHLORIDE 0.6 MG: 1 INJECTION, SOLUTION INTRAMUSCULAR; INTRAVENOUS; SUBCUTANEOUS at 04:21

## 2024-05-01 RX ADMIN — METHOCARBAMOL 1000 MG: 100 INJECTION, SOLUTION INTRAMUSCULAR; INTRAVENOUS at 13:30

## 2024-05-01 RX ADMIN — CALCIUM GLUCONATE 1 G: 20 INJECTION, SOLUTION INTRAVENOUS at 01:18

## 2024-05-01 RX ADMIN — CEFTRIAXONE SODIUM 2 G: 2 INJECTION, SOLUTION INTRAVENOUS at 17:52

## 2024-05-01 RX ADMIN — METHOCARBAMOL 1000 MG: 100 INJECTION, SOLUTION INTRAMUSCULAR; INTRAVENOUS at 20:34

## 2024-05-01 ASSESSMENT — PAIN SCALES - GENERAL
PAINLEVEL_OUTOF10: 8
PAINLEVEL_OUTOF10: 3
PAINLEVEL_OUTOF10: 10 - WORST POSSIBLE PAIN
PAINLEVEL_OUTOF10: 0 - NO PAIN
PAINLEVEL_OUTOF10: 7
PAINLEVEL_OUTOF10: 3
PAINLEVEL_OUTOF10: 7
PAINLEVEL_OUTOF10: 6
PAINLEVEL_OUTOF10: 10 - WORST POSSIBLE PAIN
PAINLEVEL_OUTOF10: 7
PAINLEVEL_OUTOF10: 5 - MODERATE PAIN

## 2024-05-01 ASSESSMENT — PAIN - FUNCTIONAL ASSESSMENT
PAIN_FUNCTIONAL_ASSESSMENT: 0-10
PAIN_FUNCTIONAL_ASSESSMENT: CPOT (CRITICAL CARE PAIN OBSERVATION TOOL)
PAIN_FUNCTIONAL_ASSESSMENT: 0-10

## 2024-05-01 ASSESSMENT — PAIN DESCRIPTION - ORIENTATION
ORIENTATION: LEFT
ORIENTATION: LEFT

## 2024-05-01 ASSESSMENT — PAIN DESCRIPTION - LOCATION
LOCATION: RIB CAGE
LOCATION: RIB CAGE

## 2024-05-01 NOTE — PROGRESS NOTES
Yobany Small is a 69 y.o. male on day 1 of admission presenting with Type Ia endoleak of aortic graft (CMS-[facility]).    Subjective   Successful extubation last night       Objective   Chest x ray shows no elevation of left diaphragm  Physical Exam  On diaphragm EMG analysis he has very good left diaphragm EMG activity           Assessment/Plan   Diaphragm Dysfunction status post thoracoabdominal operation. Excellent diaphragm EMG. Successful extubation. Will continue to monitor. Will not start diaphragm pacing    Moreno Irvin MD

## 2024-05-01 NOTE — CONSULTS
Yobany Small is a 69 y.o. year old male patient who is POD1 from open AAA repair and explant with Dr. Street and left nephroureterectomy for urothelial carcinoma of L kidney w/ Dr. Thompson on 4/30. Acute pain consulted for post-operative pain control. Acute pain initially consulted for pre-operative nerve block to which the patient refused. He rates his pain as 3/10 at rest and 9/10 when coughing. Endorses most of pain around L abdomen drain site.     PMH:  anemia, HTN, HLD, CAD s/p PCI w/ ELLY to Lcx (2020). HFrEF 25-30% 2/2 ischemic cardiomypoathy s/p ICD. Preop stress test w/ no inducible ischemia and stable LVEF on TTE     PSH:  Past Surgical History:   Procedure Laterality Date    CT ABDOMEN PELVIS ANGIOGRAM W AND/OR WO IV CONTRAST  11/7/2019    CT ABDOMEN PELVIS ANGIOGRAM W AND/OR WO IV CONTRAST 11/7/2019 [facility]ARY [facility]GAC[facility]    CT ABDOMEN PELVIS ANGIOGRAM W AND/OR WO IV CONTRAST  7/8/2020    CT ABDOMEN PELVIS ANGIOGRAM W AND/OR WO IV CONTRAST 7/8/2020 PAR ANCILLARY LEGACY    OTHER SURGICAL HISTORY  05/30/2019    Salivary gland surgery    OTHER SURGICAL HISTORY  06/16/2020    Bronchoscopy    OTHER SURGICAL HISTORY  06/16/2020    Seroma incision and drainage    OTHER SURGICAL HISTORY  04/02/2021    Cardioverter defibrillator insertion    OTHER SURGICAL HISTORY  01/30/2020    Cardiac catheterization with stent placement    OTHER SURGICAL HISTORY  06/16/2020    Abdominal aortic aneurysm repair endovascular        FHx:  Family History   Problem Relation Name Age of Onset    No Known Problems Mother          no relationship    No Known Problems Father          no relationship        SHx:  Social History     Socioeconomic History    Marital status:      Spouse name: Not on file    Number of children: Not on file    Years of education: Not on file    Highest education level: Not on file   Occupational History    Not on file   Tobacco Use    Smoking status: Former     Current packs/day: 0.00     Types:  Cigarettes     Quit date: 2018     Years since quittin.3    Smokeless tobacco: Never   Vaping Use    Vaping status: Never Used   Substance and Sexual Activity    Alcohol use: Not Currently    Drug use: Never    Sexual activity: Not on file   Other Topics Concern    Not on file   Social History Narrative    Not on file     Social Determinants of Health     Financial Resource Strain: Low Risk  (2023)    Overall Financial Resource Strain (CARDIA)     Difficulty of Paying Living Expenses: Not hard at all   Food Insecurity: Not on file   Transportation Needs: No Transportation Needs (2023)    PRAPARE - Transportation     Lack of Transportation (Medical): No     Lack of Transportation (Non-Medical): No   Physical Activity: Not on file   Stress: Not on file   Social Connections: Not on file   Intimate Partner Violence: Not on file   Housing Stability: Low Risk  (2023)    Housing Stability Vital Sign     Unable to Pay for Housing in the Last Year: No     Number of Places Lived in the Last Year: 1     Unstable Housing in the Last Year: No        Allergies:   No Known Allergies     Review of Systems  Gen: No fatigue, anorexia, insomnia, fever.   Eyes: No vision loss, double vision, drainage, eye pain.   ENT: No pharyngitis, dry mouth, no hearing changes or ear discharge  Cardiac: No chest pain, palpitations, syncope, near syncope.   Pulmonary: No shortness of breath, cough, hemoptysis.   Heme/lymph: No swollen glands, fever, bleeding.   GI: No abdominal pain, change in bowel habits, melena, hematemesis, hematochezia, nausea, vomiting, diarrhea.   : No discharge, dysuria, frequency, urgency, hematuria.  Endo: No polyuria or weight loss.   Musculoskeletal: Negative for any pain or loss of ROM/weakness  Skin: No rashes or lesions  Neuro: Normal speech, no numbness or weakness. No gait difficulties  Review of systems is otherwise negative unless stated above or in history of present illness.    Physical  Exam:  Constitutional:  no distress, alert and cooperative  Eyes: clear sclera  Head/Neck: No apparent injury, trachea midline  Respiratory/Thorax: Patent airways, thorax symmetric, breathing comfortably  Cardiovascular: no pitting edema  Gastrointestinal: Nondistended  Musculoskeletal: ROM intact  Extremities: no clubbing  Neurological: alert, jimenez x4  Psychological: Appropriate affect    Results for orders placed or performed during the hospital encounter of 04/30/24 (from the past 24 hour(s))   Blood Gas Arterial Full Panel Unsolicited   Result Value Ref Range    POCT pH, Arterial 7.39 7.38 - 7.42 pH    POCT pCO2, Arterial 44 (H) 38 - 42 mm Hg    POCT pO2, Arterial 117 (H) 85 - 95 mm Hg    POCT SO2, Arterial 99 94 - 100 %    POCT Oxy Hemoglobin, Arterial 97.5 94.0 - 98.0 %    POCT Hematocrit Calculated, Arterial 22.0 (L) 41.0 - 52.0 %    POCT Sodium, Arterial 136 136 - 145 mmol/L    POCT Potassium, Arterial 4.3 3.5 - 5.3 mmol/L    POCT Chloride, Arterial 107 98 - 107 mmol/L    POCT Ionized Calcium, Arterial 0.85 (L) 1.10 - 1.33 mmol/L    POCT Glucose, Arterial 211 (H) 74 - 99 mg/dL    POCT Lactate, Arterial 2.7 (H) 0.4 - 2.0 mmol/L    POCT Base Excess, Arterial 1.5 -2.0 - 3.0 mmol/L    POCT HCO3 Calculated, Arterial 26.6 (H) 22.0 - 26.0 mmol/L    POCT Hemoglobin, Arterial 7.2 (L) 13.5 - 17.5 g/dL    POCT Anion Gap, Arterial 7 (L) 10 - 25 mmo/L    Patient Temperature 37.0 degrees Celsius    FiO2 60 %   Coox Panel, Arterial Unsolicited   Result Value Ref Range    POCT Hemoglobin, Arterial 7.2 (L) 13.5 - 17.5 g/dL    POCT Oxy Hemoglobin, Arterial 97.5 94.0 - 98.0 %    POCT Carboxyhemoglobin, Arterial 1.5 %    POCT Methemoglobin, Arterial 0.3 0.0 - 1.5 %    POCT Deoxy Hemoglobin, Arterial 0.7 0.0 - 5.0 %   ACTIVATED CLOTTING TIME LOW   Result Value Ref Range    POCT Activated Clotting Time Low Range 223 (H) 83 - 199 sec   ACTIVATED CLOTTING TIME LOW   Result Value Ref Range    POCT Activated Clotting Time Low Range  144 83 - 199 sec   Blood Gas Arterial Full Panel Unsolicited   Result Value Ref Range    POCT pH, Arterial 7.24 (LL) 7.38 - 7.42 pH    POCT pCO2, Arterial 54 (H) 38 - 42 mm Hg    POCT pO2, Arterial 177 (H) 85 - 95 mm Hg    POCT SO2, Arterial 100 94 - 100 %    POCT Oxy Hemoglobin, Arterial 97.0 94.0 - 98.0 %    POCT Hematocrit Calculated, Arterial 27.0 (L) 41.0 - 52.0 %    POCT Sodium, Arterial 137 136 - 145 mmol/L    POCT Potassium, Arterial 4.4 3.5 - 5.3 mmol/L    POCT Chloride, Arterial 107 98 - 107 mmol/L    POCT Ionized Calcium, Arterial 1.49 (H) 1.10 - 1.33 mmol/L    POCT Glucose, Arterial 170 (H) 74 - 99 mg/dL    POCT Lactate, Arterial 4.2 (HH) 0.4 - 2.0 mmol/L    POCT Base Excess, Arterial -4.4 (L) -2.0 - 3.0 mmol/L    POCT HCO3 Calculated, Arterial 23.1 22.0 - 26.0 mmol/L    POCT Hemoglobin, Arterial 9.1 (L) 13.5 - 17.5 g/dL    POCT Anion Gap, Arterial 11 10 - 25 mmo/L    Patient Temperature 37.0 degrees Celsius    FiO2 70 %   Coox Panel, Arterial Unsolicited   Result Value Ref Range    POCT Hemoglobin, Arterial 9.1 (L) 13.5 - 17.5 g/dL    POCT Oxy Hemoglobin, Arterial 97.0 94.0 - 98.0 %    POCT Carboxyhemoglobin, Arterial 1.9 %    POCT Methemoglobin, Arterial 0.6 0.0 - 1.5 %    POCT Deoxy Hemoglobin, Arterial 0.5 0.0 - 5.0 %   Blood Gas Venous Full Panel Unsolicited   Result Value Ref Range    POCT pH, Venous 7.22 (LL) 7.33 - 7.43 pH    POCT pCO2, Venous 58 (H) 41 - 51 mm Hg    POCT pO2, Venous 57 (H) 35 - 45 mm Hg    POCT SO2, Venous 89 (H) 45 - 75 %    POCT Oxy Hemoglobin, Venous 86.4 (H) 45.0 - 75.0 %    POCT Hematocrit Calculated, Venous 28.0 (L) 41.0 - 52.0 %    POCT Sodium, Venous 137 136 - 145 mmol/L    POCT Potassium, Venous 4.5 3.5 - 5.3 mmol/L    POCT Chloride, Venous 106 98 - 107 mmol/L    POCT Ionized Calicum, Venous 1.43 (H) 1.10 - 1.33 mmol/L    POCT Glucose, Venous 170 (H) 74 - 99 mg/dL    POCT Lactate, Venous 4.1 (HH) 0.4 - 2.0 mmol/L    POCT Base Excess, Venous -4.2 (L) -2.0 - 3.0 mmol/L     POCT HCO3 Calculated, Venous 23.7 22.0 - 26.0 mmol/L    POCT Hemoglobin, Venous 9.2 (L) 13.5 - 17.5 g/dL    POCT Anion Gap, Venous 12.0 10.0 - 25.0 mmol/L    Patient Temperature 37.0 degrees Celsius    FiO2 60 %   Coox Panel, Venous Unsolicited   Result Value Ref Range    POCT Carboxyhemoglobin, Venous 2.0 %    POCT Methemoglobin, Venous 0.8 0.0 - 1.5 %   TEG Clot Global Profile   Result Value Ref Range    R (Reaction Time) K 6.7 4.6 - 9.1 min    K (Clot Kinetics) 1.7 0.8 - 2.1 min    ANGLE 70.1 63.0 - 78.0 deg    MA (Max Amplitude) K 53.5 52.0 - 69.0 mm    R (Reaction Time) KH 6.2 4.3 - 8.3 min    MA (Max Amplitude) RT 52.1 52.0 - 70.0 mm    MA ( [place]orene Amplitude) FF 14.2 (L) 15.0 - 32.0 mm    FLEV 259 (L) 278 - 581 mg/dL   Blood Gas Arterial Full Panel Unsolicited   Result Value Ref Range    POCT pH, Arterial 7.36 (L) 7.38 - 7.42 pH    POCT pCO2, Arterial 39 38 - 42 mm Hg    POCT pO2, Arterial 56 (L) 85 - 95 mm Hg    POCT SO2, Arterial 89 (L) 94 - 100 %    POCT Oxy Hemoglobin, Arterial 87.3 (L) 94.0 - 98.0 %    POCT Hematocrit Calculated, Arterial 31.0 (L) 41.0 - 52.0 %    POCT Sodium, Arterial 142 136 - 145 mmol/L    POCT Potassium, Arterial 3.0 (L) 3.5 - 5.3 mmol/L    POCT Chloride, Arterial 112 (H) 98 - 107 mmol/L    POCT Ionized Calcium, Arterial 1.18 1.10 - 1.33 mmol/L    POCT Glucose, Arterial 156 (H) 74 - 99 mg/dL    POCT Lactate, Arterial 4.6 (HH) 0.4 - 2.0 mmol/L    POCT Base Excess, Arterial -3.2 (L) -2.0 - 3.0 mmol/L    POCT HCO3 Calculated, Arterial 22.0 22.0 - 26.0 mmol/L    POCT Hemoglobin, Arterial 10.4 (L) 13.5 - 17.5 g/dL    POCT Anion Gap, Arterial 11 10 - 25 mmo/L    Patient Temperature 37.0 degrees Celsius    FiO2 60 %   Coox Panel, Arterial Unsolicited   Result Value Ref Range    POCT Hemoglobin, Arterial 10.4 (L) 13.5 - 17.5 g/dL    POCT Oxy Hemoglobin, Arterial 87.3 (L) 94.0 - 98.0 %    POCT Carboxyhemoglobin, Arterial 1.5 %    POCT Methemoglobin, Arterial 0.6 0.0 - 1.5 %    POCT Deoxy  Hemoglobin, Arterial 10.5 (H) 0.0 - 5.0 %   BLOOD GAS ARTERIAL FULL PANEL   Result Value Ref Range    POCT pH, Arterial 7.36 (L) 7.38 - 7.42 pH    POCT pCO2, Arterial 42 38 - 42 mm Hg    POCT pO2, Arterial 110 (H) 85 - 95 mm Hg    POCT SO2, Arterial 99 94 - 100 %    POCT Oxy Hemoglobin, Arterial 95.5 94.0 - 98.0 %    POCT Hematocrit Calculated, Arterial 14.0 (L) 41.0 - 52.0 %    POCT Sodium, Arterial 141 136 - 145 mmol/L    POCT Potassium, Arterial 3.7 3.5 - 5.3 mmol/L    POCT Chloride, Arterial 113 (H) 98 - 107 mmol/L    POCT Ionized Calcium, Arterial 1.27 1.10 - 1.33 mmol/L    POCT Glucose, Arterial 140 (H) 74 - 99 mg/dL    POCT Lactate, Arterial 3.7 (H) 0.4 - 2.0 mmol/L    POCT Base Excess, Arterial -1.6 -2.0 - 3.0 mmol/L    POCT HCO3 Calculated, Arterial 23.7 22.0 - 26.0 mmol/L    POCT Hemoglobin, Arterial 4.5 (LL) 13.5 - 17.5 g/dL    POCT Anion Gap, Arterial 8 (L) 10 - 25 mmo/L    Patient Temperature 37.0 degrees Celsius    FiO2 60 %   Blood Gas Arterial Full Panel Unsolicited   Result Value Ref Range    POCT pH, Arterial 7.35 (L) 7.38 - 7.42 pH    POCT pCO2, Arterial 40 38 - 42 mm Hg    POCT pO2, Arterial 71 (L) 85 - 95 mm Hg    POCT SO2, Arterial 95 94 - 100 %    POCT Oxy Hemoglobin, Arterial 92.8 (L) 94.0 - 98.0 %    POCT Hematocrit Calculated, Arterial 24.0 (L) 41.0 - 52.0 %    POCT Sodium, Arterial 142 136 - 145 mmol/L    POCT Potassium, Arterial 3.9 3.5 - 5.3 mmol/L    POCT Chloride, Arterial 110 (H) 98 - 107 mmol/L    POCT Ionized Calcium, Arterial 1.11 1.10 - 1.33 mmol/L    POCT Glucose, Arterial 158 (H) 74 - 99 mg/dL    POCT Lactate, Arterial 4.7 (HH) 0.4 - 2.0 mmol/L    POCT Base Excess, Arterial -3.2 (L) -2.0 - 3.0 mmol/L    POCT HCO3 Calculated, Arterial 22.1 22.0 - 26.0 mmol/L    POCT Hemoglobin, Arterial 8.0 (L) 13.5 - 17.5 g/dL    POCT Anion Gap, Arterial 14 10 - 25 mmo/L    Patient Temperature 37.0 degrees Celsius    FiO2 100 %   Coox Panel, Arterial Unsolicited   Result Value Ref Range    POCT  Hemoglobin, Arterial 8.0 (L) 13.5 - 17.5 g/dL    POCT Oxy Hemoglobin, Arterial 92.8 (L) 94.0 - 98.0 %    POCT Carboxyhemoglobin, Arterial 1.5 %    POCT Methemoglobin, Arterial 0.9 0.0 - 1.5 %    POCT Deoxy Hemoglobin, Arterial 4.8 0.0 - 5.0 %   Prepare RBC: 4 Units   Result Value Ref Range    PRODUCT CODE C5694C57     Unit Number [id number]     Unit ABO AB     Unit RH POS     XM INTEP COMP     Dispense Status XM     Blood Expiration Date May 22, 2024 23:59 EDT     PRODUCT BLOOD TYPE 8400     UNIT VOLUME 350    Prepare Plasma: 4 Units   Result Value Ref Range    PRODUCT CODE F9531G12     Unit Number [id number]     Unit ABO AB     Unit RH POS     Dispense Status RE     Blood Expiration Date May 05, 2024 17:00 EDT     PRODUCT BLOOD TYPE 8400     UNIT VOLUME 317     PRODUCT CODE O5441R28     Unit Number [id number]     Unit ABO AB     Unit RH POS     Dispense Status RE     Blood Expiration Date May 05, 2024 17:00 EDT     PRODUCT BLOOD TYPE 8400     UNIT VOLUME 217     PRODUCT CODE G8044D70     Unit Number [id number]     Unit ABO AB     Unit RH POS     Dispense Status RE     Blood Expiration Date May 05, 2024 17:00 EDT     PRODUCT BLOOD TYPE 8400     UNIT VOLUME 200     PRODUCT CODE W4590H37     Unit Number [id number]     Unit ABO AB     Unit RH POS     Dispense Status RE     Blood Expiration Date May 05, 2024 17:00 EDT     PRODUCT BLOOD TYPE 8400     UNIT VOLUME 308    Magnesium   Result Value Ref Range    Magnesium 1.37 (L) 1.60 - 2.40 mg/dL   Calcium, Ionized   Result Value Ref Range    POCT Calcium, Ionized 1.10 1.1 - 1.33 mmol/L   Phosphorus   Result Value Ref Range    Phosphorus 3.8 2.5 - 4.9 mg/dL   Coagulation Screen   Result Value Ref Range    Protime 15.7 (H) 9.8 - 12.8 seconds    INR 1.4 (H) 0.9 - 1.1    aPTT 36 27 - 38 seconds   CBC   Result Value Ref Range    WBC 9.6 4.4 - 11.3 x10*3/uL    nRBC 0.0 0.0 - 0.0 /100 WBCs    RBC 2.62 (L) 4.50 - 5.90 x10*6/uL    Hemoglobin 7.9 (L) 13.5 -  17.5 g/dL    Hematocrit 22.9 (L) 41.0 - 52.0 %    MCV 87 80 - 100 fL    MCH 30.2 26.0 - 34.0 pg    MCHC 34.5 32.0 - 36.0 g/dL    RDW 13.8 11.5 - 14.5 %    Platelets 192 150 - 450 x10*3/uL   Hepatic Function Panel   Result Value Ref Range    Albumin 3.0 (L) 3.4 - 5.0 g/dL    Bilirubin, Total 0.9 0.0 - 1.2 mg/dL    Bilirubin, Direct 0.3 0.0 - 0.3 mg/dL    Alkaline Phosphatase 31 (L) 33 - 136 U/L    ALT 9 (L) 10 - 52 U/L    AST 13 9 - 39 U/L    Total Protein 4.3 (L) 6.4 - 8.2 g/dL   Fibrinogen   Result Value Ref Range    Fibrinogen 242 200 - 400 mg/dL   Basic Metabolic Panel   Result Value Ref Range    Glucose 126 (H) 74 - 99 mg/dL    Sodium 143 136 - 145 mmol/L    Potassium 3.9 3.5 - 5.3 mmol/L    Chloride 110 (H) 98 - 107 mmol/L    Bicarbonate 24 21 - 32 mmol/L    Anion Gap 13 10 - 20 mmol/L    Urea Nitrogen 18 6 - 23 mg/dL    Creatinine 1.07 0.50 - 1.30 mg/dL    eGFR 76 >60 mL/min/1.73m*2    Calcium 7.5 (L) 8.6 - 10.6 mg/dL   Blood Gas Arterial Full Panel   Result Value Ref Range    POCT pH, Arterial 7.34 (L) 7.38 - 7.42 pH    POCT pCO2, Arterial 42 38 - 42 mm Hg    POCT pO2, Arterial 106 (H) 85 - 95 mm Hg    POCT SO2, Arterial 99 94 - 100 %    POCT Oxy Hemoglobin, Arterial 95.9 94.0 - 98.0 %    POCT Hematocrit Calculated, Arterial 25.0 (L) 41.0 - 52.0 %    POCT Sodium, Arterial 138 136 - 145 mmol/L    POCT Potassium, Arterial 3.9 3.5 - 5.3 mmol/L    POCT Chloride, Arterial 111 (H) 98 - 107 mmol/L    POCT Ionized Calcium, Arterial 1.15 1.10 - 1.33 mmol/L    POCT Glucose, Arterial 128 (H) 74 - 99 mg/dL    POCT Lactate, Arterial 3.4 (H) 0.4 - 2.0 mmol/L    POCT Base Excess, Arterial -2.9 (L) -2.0 - 3.0 mmol/L    POCT HCO3 Calculated, Arterial 22.7 22.0 - 26.0 mmol/L    POCT Hemoglobin, Arterial 8.3 (L) 13.5 - 17.5 g/dL    POCT Anion Gap, Arterial 8 (L) 10 - 25 mmo/L    Patient Temperature 37.0 degrees Celsius    FiO2 50 %   TEG Clot Global Profile   Result Value Ref Range    R (Reaction Time) K 6.7 4.6 - 9.1 min    K  (Clot Kinetics) 1.6 0.8 - 2.1 min    ANGLE 70.4 63.0 - 78.0 deg    MA (Max Amplitude) K 53.6 52.0 - 69.0 mm    R (Reaction Time) KH 5.7 4.3 - 8.3 min    MA (Max Amplitude) RT 51.9 (L) 52.0 - 70.0 mm    MA ( Lorene Amplitude) FF 14.7 (L) 15.0 - 32.0 mm    FLEV 268 (L) 278 - 581 mg/dL   Type and screen   Result Value Ref Range    ABO TYPE AB     Rh TYPE POS     ANTIBODY SCREEN NEG    POCT GLUCOSE   Result Value Ref Range    POCT Glucose 154 (H) 74 - 99 mg/dL   CBC   Result Value Ref Range    WBC 10.7 4.4 - 11.3 x10*3/uL    nRBC 0.0 0.0 - 0.0 /100 WBCs    RBC 3.28 (L) 4.50 - 5.90 x10*6/uL    Hemoglobin 9.8 (L) 13.5 - 17.5 g/dL    Hematocrit 27.3 (L) 41.0 - 52.0 %    MCV 83 80 - 100 fL    MCH 29.9 26.0 - 34.0 pg    MCHC 35.9 32.0 - 36.0 g/dL    RDW 13.5 11.5 - 14.5 %    Platelets 126 (L) 150 - 450 x10*3/uL   Blood Gas Arterial Full Panel   Result Value Ref Range    POCT pH, Arterial 7.42 7.38 - 7.42 pH    POCT pCO2, Arterial 37 (L) 38 - 42 mm Hg    POCT pO2, Arterial 63 (L) 85 - 95 mm Hg    POCT SO2, Arterial 95 94 - 100 %    POCT Oxy Hemoglobin, Arterial 91.6 (L) 94.0 - 98.0 %    POCT Hematocrit Calculated, Arterial 31.0 (L) 41.0 - 52.0 %    POCT Sodium, Arterial 137 136 - 145 mmol/L    POCT Potassium, Arterial 4.1 3.5 - 5.3 mmol/L    POCT Chloride, Arterial 110 (H) 98 - 107 mmol/L    POCT Ionized Calcium, Arterial 1.11 1.10 - 1.33 mmol/L    POCT Glucose, Arterial 126 (H) 74 - 99 mg/dL    POCT Lactate, Arterial 1.5 0.4 - 2.0 mmol/L    POCT Base Excess, Arterial -0.3 -2.0 - 3.0 mmol/L    POCT HCO3 Calculated, Arterial 24.0 22.0 - 26.0 mmol/L    POCT Hemoglobin, Arterial 10.3 (L) 13.5 - 17.5 g/dL    POCT Anion Gap, Arterial 7 (L) 10 - 25 mmo/L    Patient Temperature 37.0 degrees Celsius    FiO2 40 %   Blood Gas Arterial Full Panel   Result Value Ref Range    POCT pH, Arterial 7.43 (H) 7.38 - 7.42 pH    POCT pCO2, Arterial 38 38 - 42 mm Hg    POCT pO2, Arterial 71 (L) 85 - 95 mm Hg    POCT SO2, Arterial 97 94 - 100 %    POCT  Oxy Hemoglobin, Arterial 93.8 (L) 94.0 - 98.0 %    POCT Hematocrit Calculated, Arterial 31.0 (L) 41.0 - 52.0 %    POCT Sodium, Arterial 138 136 - 145 mmol/L    POCT Potassium, Arterial 4.2 3.5 - 5.3 mmol/L    POCT Chloride, Arterial 108 (H) 98 - 107 mmol/L    POCT Ionized Calcium, Arterial 1.13 1.10 - 1.33 mmol/L    POCT Glucose, Arterial 126 (H) 74 - 99 mg/dL    POCT Lactate, Arterial 0.8 0.4 - 2.0 mmol/L    POCT Base Excess, Arterial 0.9 -2.0 - 3.0 mmol/L    POCT HCO3 Calculated, Arterial 25.2 22.0 - 26.0 mmol/L    POCT Hemoglobin, Arterial 10.3 (L) 13.5 - 17.5 g/dL    POCT Anion Gap, Arterial 9 (L) 10 - 25 mmo/L    Patient Temperature 37.0 degrees Celsius    FiO2 50 %   Calcium, Ionized   Result Value Ref Range    POCT Calcium, Ionized 1.09 (L) 1.1 - 1.33 mmol/L   CBC   Result Value Ref Range    WBC 12.2 (H) 4.4 - 11.3 x10*3/uL    nRBC 0.0 0.0 - 0.0 /100 WBCs    RBC 3.22 (L) 4.50 - 5.90 x10*6/uL    Hemoglobin 9.6 (L) 13.5 - 17.5 g/dL    Hematocrit 26.5 (L) 41.0 - 52.0 %    MCV 82 80 - 100 fL    MCH 29.8 26.0 - 34.0 pg    MCHC 36.2 (H) 32.0 - 36.0 g/dL    RDW 13.8 11.5 - 14.5 %    Platelets 157 150 - 450 x10*3/uL   Coagulation Screen   Result Value Ref Range    Protime 14.0 (H) 9.8 - 12.8 seconds    INR 1.2 (H) 0.9 - 1.1    aPTT 30 27 - 38 seconds   Magnesium   Result Value Ref Range    Magnesium 2.53 (H) 1.60 - 2.40 mg/dL   Renal Function Panel   Result Value Ref Range    Glucose 128 (H) 74 - 99 mg/dL    Sodium 141 136 - 145 mmol/L    Potassium 4.0 3.5 - 5.3 mmol/L    Chloride 108 (H) 98 - 107 mmol/L    Bicarbonate 25 21 - 32 mmol/L    Anion Gap 12 10 - 20 mmol/L    Urea Nitrogen 22 6 - 23 mg/dL    Creatinine 1.25 0.50 - 1.30 mg/dL    eGFR 63 >60 mL/min/1.73m*2    Calcium 7.5 (L) 8.6 - 10.6 mg/dL    Phosphorus 2.9 2.5 - 4.9 mg/dL    Albumin 3.2 (L) 3.4 - 5.0 g/dL   POCT GLUCOSE   Result Value Ref Range    POCT Glucose 120 (H) 74 - 99 mg/dL   Blood Gas Arterial Full Panel   Result Value Ref Range    POCT pH,  Arterial 7.42 7.38 - 7.42 pH    POCT pCO2, Arterial 36 (L) 38 - 42 mm Hg    POCT pO2, Arterial 70 (L) 85 - 95 mm Hg    POCT SO2, Arterial 98 94 - 100 %    POCT Oxy Hemoglobin, Arterial 94.1 94.0 - 98.0 %    POCT Hematocrit Calculated, Arterial 30.0 (L) 41.0 - 52.0 %    POCT Sodium, Arterial 138 136 - 145 mmol/L    POCT Potassium, Arterial 4.1 3.5 - 5.3 mmol/L    POCT Chloride, Arterial 109 (H) 98 - 107 mmol/L    POCT Ionized Calcium, Arterial 1.17 1.10 - 1.33 mmol/L    POCT Glucose, Arterial 144 (H) 74 - 99 mg/dL    POCT Lactate, Arterial 1.6 0.4 - 2.0 mmol/L    POCT Base Excess, Arterial -0.8 -2.0 - 3.0 mmol/L    POCT HCO3 Calculated, Arterial 23.4 22.0 - 26.0 mmol/L    POCT Hemoglobin, Arterial 10.1 (L) 13.5 - 17.5 g/dL    POCT Anion Gap, Arterial 10 10 - 25 mmo/L    Patient Temperature 37.0 degrees Celsius    FiO2 40 %   POCT GLUCOSE   Result Value Ref Range    POCT Glucose 125 (H) 74 - 99 mg/dL   Blood Gas Arterial Full Panel   Result Value Ref Range    POCT pH, Arterial 7.42 7.38 - 7.42 pH    POCT pCO2, Arterial 37 (L) 38 - 42 mm Hg    POCT pO2, Arterial 78 (L) 85 - 95 mm Hg    POCT SO2, Arterial 99 94 - 100 %    POCT Oxy Hemoglobin, Arterial 95.4 94.0 - 98.0 %    POCT Hematocrit Calculated, Arterial 27.0 (L) 41.0 - 52.0 %    POCT Sodium, Arterial 137 136 - 145 mmol/L    POCT Potassium, Arterial 4.1 3.5 - 5.3 mmol/L    POCT Chloride, Arterial 110 (H) 98 - 107 mmol/L    POCT Ionized Calcium, Arterial 1.15 1.10 - 1.33 mmol/L    POCT Glucose, Arterial 132 (H) 74 - 99 mg/dL    POCT Lactate, Arterial 1.4 0.4 - 2.0 mmol/L    POCT Base Excess, Arterial -0.3 -2.0 - 3.0 mmol/L    POCT HCO3 Calculated, Arterial 24.0 22.0 - 26.0 mmol/L    POCT Hemoglobin, Arterial 9.1 (L) 13.5 - 17.5 g/dL    POCT Anion Gap, Arterial 7 (L) 10 - 25 mmo/L    Patient Temperature 37.0 degrees Celsius    FiO2 60 %   Calcium, Ionized   Result Value Ref Range    POCT Calcium, Ionized 1.13 1.1 - 1.33 mmol/L   CBC   Result Value Ref Range    WBC  10.6 4.4 - 11.3 x10*3/uL    nRBC 0.0 0.0 - 0.0 /100 WBCs    RBC 2.87 (L) 4.50 - 5.90 x10*6/uL    Hemoglobin 8.5 (L) 13.5 - 17.5 g/dL    Hematocrit 24.3 (L) 41.0 - 52.0 %    MCV 85 80 - 100 fL    MCH 29.6 26.0 - 34.0 pg    MCHC 35.0 32.0 - 36.0 g/dL    RDW 13.9 11.5 - 14.5 %    Platelets 140 (L) 150 - 450 x10*3/uL   Coagulation Screen   Result Value Ref Range    Protime 14.3 (H) 9.8 - 12.8 seconds    INR 1.3 (H) 0.9 - 1.1    aPTT 29 27 - 38 seconds   Magnesium   Result Value Ref Range    Magnesium 2.32 1.60 - 2.40 mg/dL   Renal Function Panel   Result Value Ref Range    Glucose 129 (H) 74 - 99 mg/dL    Sodium 142 136 - 145 mmol/L    Potassium 4.0 3.5 - 5.3 mmol/L    Chloride 110 (H) 98 - 107 mmol/L    Bicarbonate 22 21 - 32 mmol/L    Anion Gap 14 10 - 20 mmol/L    Urea Nitrogen 24 (H) 6 - 23 mg/dL    Creatinine 1.34 (H) 0.50 - 1.30 mg/dL    eGFR 58 (L) >60 mL/min/1.73m*2    Calcium 7.7 (L) 8.6 - 10.6 mg/dL    Phosphorus 3.7 2.5 - 4.9 mg/dL    Albumin 3.0 (L) 3.4 - 5.0 g/dL   POCT GLUCOSE   Result Value Ref Range    POCT Glucose 115 (H) 74 - 99 mg/dL   POCT GLUCOSE   Result Value Ref Range    POCT Glucose 106 (H) 74 - 99 mg/dL   Transthoracic Echo (TTE) Complete   Result Value Ref Range    AV pk eliot 0.88 m/s    LVOT diam 2.50 cm    MV E/A ratio 0.80     Tricuspid annular plane systolic excursion 2.7 cm    RVSP 32.2 mmHg    Aortic Valve Area by Continuity of Peak Velocity 3.66 cm2    AV pk grad 3.1 mmHg      Recommendations:    - IV Mg 2g Q8H x 3 doses  - IV Robaxin 1000mg QID  - Ketamine 5 mg/hr for 24hrs  - Lidocaine 50 mg/hr, check lidocaine level after 6 hrs   - Precedex 0.1 mcg/kg/hr for 24 hrs  - IV Toradol 30mg Q6H for 6 doses per surgical service  - Tylenol 975mg Q8H, time 3 hours between tylenol and toradol  - Continuous pulse ox    Acute Pain Team  pg [phone] ph [phone]

## 2024-05-01 NOTE — PROGRESS NOTES
"Yobany Small is a 69 y.o. 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    Subjective   Dobutamine stopped, Index 1.9 and so epi started. Sedation weaned and patient extubated. All pressors weaned off and was HTN so NTG infusion started.        Objective     Physical Exam  HENT:      Head: Normocephalic.      Mouth/Throat:      Mouth: Mucous membranes are moist.   Eyes:      Pupils: Pupils are equal, round, and reactive to light.   Cardiovascular:      Rate and Rhythm: Normal rate.   Pulmonary:      Effort: No respiratory distress.      Breath sounds: Normal breath sounds.      Comments: Left chest tube, serosanguinous drainage, no air leak noted  Abdominal:      Palpations: Abdomen is soft.      Comments: Tender to touch, ESTEBAN LLQ with minimal output   Skin:     General: Skin is warm and dry.      Capillary Refill: Capillary refill takes less than 2 seconds.   Neurological:      General: No focal deficit present.      Mental Status: He is alert and oriented to person, place, and time.         Last Recorded Vitals  Blood pressure 126/67, pulse 102, temperature 37 °C (98.6 °F), temperature source Core, resp. rate 21, height 1.803 m (5' 11\"), weight 55.7 kg (122 lb 12.7 oz), SpO2 91%.  Intake/Output last 3 Shifts:  I/O last 3 completed shifts:  In: 9103.9 (163.4 mL/kg) [I.V.:3071.9 (55.2 mL/kg); Blood:4032; IV Piggyback:2000]  Out: 2670 (47.9 mL/kg) [Urine:1030 (0.5 mL/kg/hr); Drains:100; Blood:1500; Chest Tube:40]  Weight: 55.7 kg     Relevant Results  Results for orders placed or performed during the hospital encounter of 04/30/24 (from the past 24 hour(s))   BLOOD GAS ARTERIAL FULL PANEL   Result Value Ref Range    POCT pH, Arterial 7.24 (LL) 7.38 - 7.42 pH    POCT pCO2, Arterial 52 (H) 38 - 42 mm Hg    POCT pO2, Arterial 203 (H) 85 - 95 mm Hg    POCT SO2, Arterial 100 94 - 100 %    POCT Oxy Hemoglobin, Arterial 97.4 94.0 - 98.0 %    POCT Hematocrit Calculated, Arterial " 21.0 (L) 41.0 - 52.0 %    POCT Sodium, Arterial 137 136 - 145 mmol/L    POCT Potassium, Arterial 3.4 (L) 3.5 - 5.3 mmol/L    POCT Chloride, Arterial 108 (H) 98 - 107 mmol/L    POCT Ionized Calcium, Arterial 1.07 (L) 1.10 - 1.33 mmol/L    POCT Glucose, Arterial 128 (H) 74 - 99 mg/dL    POCT Lactate, Arterial 0.8 0.4 - 2.0 mmol/L    POCT Base Excess, Arterial -4.8 (L) -2.0 - 3.0 mmol/L    POCT HCO3 Calculated, Arterial 22.3 22.0 - 26.0 mmol/L    POCT Hemoglobin, Arterial 7.1 (L) 13.5 - 17.5 g/dL    POCT Anion Gap, Arterial 10 10 - 25 mmo/L    Patient Temperature 37.0 degrees Celsius    FiO2 100 %   Prepare RBC: 6 Units   Result Value Ref Range    PRODUCT CODE D3279V08     Unit Number [id number]     Unit ABO AB     Unit RH POS     XM INTEP COMP     Dispense Status TR     Blood Expiration Date May 09, 2024 23:59 EDT     PRODUCT BLOOD TYPE 8400     UNIT VOLUME 350     PRODUCT CODE Y1782O72     Unit Number [id number]     Unit ABO AB     Unit RH POS     XM INTEP COMP     Dispense Status TR     Blood Expiration Date May 21, 2024 23:59 EDT     PRODUCT BLOOD TYPE 8400     UNIT VOLUME 350     PRODUCT CODE S1124P95     Unit Number [id number]     Unit ABO AB     Unit RH POS     XM INTEP COMP     Dispense Status TR     Blood Expiration Date May 19, 2024 23:59 EDT     PRODUCT BLOOD TYPE 8400     UNIT VOLUME 350     PRODUCT CODE I3117Y30     Unit Number [id number]     Unit ABO AB     Unit RH POS     XM INTEP COMP     Dispense Status TR     Blood Expiration Date May 19, 2024 23:59 EDT     PRODUCT BLOOD TYPE 8400     UNIT VOLUME 350     PRODUCT CODE B1526Q21     Unit Number [id number]     Unit ABO AB     Unit RH POS     XM INTEP COMP     Dispense Status TR     Blood Expiration Date May 19, 2024 23:59 EDT     PRODUCT BLOOD TYPE 8400     UNIT VOLUME 350     PRODUCT CODE M8915M53     Unit Number [id number]     Unit ABO AB     Unit RH POS     XM INTEP COMP     Dispense Status XM     Blood Expiration Date May  14, 2024 23:59 EDT     PRODUCT BLOOD TYPE 8400     UNIT VOLUME 350    Prepare Platelets: 2 Units   Result Value Ref Range    PRODUCT CODE H8024C30     Unit Number [id number]     Unit ABO AB     Unit RH POS     Dispense Status RE     Blood Expiration Date May 01, 2024 23:59 EDT     PRODUCT BLOOD TYPE 8400     UNIT VOLUME 252     PRODUCT CODE X8303I10     Unit Number [id number]     Unit ABO AB     Unit RH POS     Dispense Status TR     Blood Expiration Date May 01, 2024 23:59 EDT     PRODUCT BLOOD TYPE 8400     UNIT VOLUME 261    Prepare Plasma: 6 Units   Result Value Ref Range    PRODUCT CODE R7710E70     Unit Number [id number]     Unit ABO AB     Unit RH POS     Dispense Status TR     Blood Expiration Date May 02, 2024 18:00 EDT     PRODUCT BLOOD TYPE 8400     UNIT VOLUME 211     PRODUCT CODE A3947U42     Unit Number [id number]     Unit ABO AB     Unit RH POS     Dispense Status TR     Blood Expiration Date April 30, 2024 17:29 EDT     PRODUCT BLOOD TYPE 8400     UNIT VOLUME 197     PRODUCT CODE O4891P85     Unit Number [id number]     Unit ABO AB     Unit RH NEG     Dispense Status TR     Blood Expiration Date May 01, 2024 08:38 EDT     PRODUCT BLOOD TYPE 2800     UNIT VOLUME 341     PRODUCT CODE T7671H82     Unit Number [id number]     Unit ABO AB     Unit RH POS     Dispense Status XM     Blood Expiration Date May 05, 2024 08:36 EDT     PRODUCT BLOOD TYPE 8400     UNIT VOLUME 195     PRODUCT CODE Y2770R13     Unit Number [id number]     Unit ABO AB     Unit RH POS     Dispense Status TR     Blood Expiration Date May 05, 2024 08:36 EDT     PRODUCT BLOOD TYPE 8400     UNIT VOLUME 314     PRODUCT CODE A8096H01     Unit Number [id number]     Unit ABO AB     Unit RH POS     Dispense Status TR     Blood Expiration Date May 05, 2024 08:36 EDT     PRODUCT BLOOD TYPE 8400     UNIT VOLUME 321    Verify ABO/Rh Group Test (VERAB)   Result Value Ref Range    ABO TYPE AB     Rh TYPE POS     Prepare Platelets   Result Value Ref Range    PRODUCT CODE S7997L33     Unit Number [id number]     Unit ABO AB     Unit RH POS     Dispense Status TR     Blood Expiration Date May 03, 2024 23:59 EDT     PRODUCT BLOOD TYPE 8400     UNIT VOLUME 333    ACTIVATED CLOTTING TIME LOW   Result Value Ref Range    POCT Activated Clotting Time Low Range 144 83 - 199 sec   BLOOD GAS MIXED VENOUS FULL PANEL   Result Value Ref Range    POCT pH, Mixed 7.26 (L) 7.33 - 7.43 pH    POCT pCO2, Mixed 53 (H) 41 - 51 mm Hg    POCT pO2, Mixed 60 (H) 35 - 45 mm Hg    POCT SO2, Mixed 88 (H) 45 - 75 %    POCT Oxy Hemoglobin, Mixed 84.5 (H) 45.0 - 75.0 %    POCT Hematocrit Calculated, Mixed 48.0 41.0 - 52.0 %    POCT Sodium, Mixed 134 (L) 136 - 145 mmol/L    POCT Potassium, Mixed 3.4 (L) 3.5 - 5.3 mmol/L    POCT Chloride, Mixed 105 98 - 107 mmol/L    POCT Ionized Calcium, Mixed 1.12 1.10 - 1.33 mmol/L    POCT Glucose, Mixed 140 (H) 74 - 99 mg/dL    POCT Lactate, Mixed 0.7 0.4 - 2.0 mmol/L    POCT Base Excess, Mixed -4.1 (L) -2.0 - 3.0 mmol/L    POCT HCO3 Calculated, Mixed 23.8 22.0 - 26.0 mmol/L    POCT Hemoglobin, Mixed 16.0 13.5 - 17.5 g/dL    POCT Anion Gap, Mixed 9 (L) 10 - 25 mmo/L    Patient Temperature 37.0 degrees Celsius    FiO2 100 %   Blood Gas Arterial Full Panel Unsolicited   Result Value Ref Range    POCT pH, Arterial 7.27 (L) 7.38 - 7.42 pH    POCT pCO2, Arterial 48 (H) 38 - 42 mm Hg    POCT pO2, Arterial 93 85 - 95 mm Hg    POCT SO2, Arterial 98 94 - 100 %    POCT Oxy Hemoglobin, Arterial 94.7 94.0 - 98.0 %    POCT Hematocrit Calculated, Arterial 33.0 (L) 41.0 - 52.0 %    POCT Sodium, Arterial 134 (L) 136 - 145 mmol/L    POCT Potassium, Arterial 4.0 3.5 - 5.3 mmol/L    POCT Chloride, Arterial 106 98 - 107 mmol/L    POCT Ionized Calcium, Arterial 1.10 1.10 - 1.33 mmol/L    POCT Glucose, Arterial 144 (H) 74 - 99 mg/dL    POCT Lactate, Arterial 0.7 0.4 - 2.0 mmol/L    POCT Base Excess, Arterial -5.0 (L) -2.0 - 3.0 mmol/L     POCT HCO3 Calculated, Arterial 22.0 22.0 - 26.0 mmol/L    POCT Hemoglobin, Arterial 11.1 (L) 13.5 - 17.5 g/dL    POCT Anion Gap, Arterial 10 10 - 25 mmo/L    Patient Temperature 37.0 degrees Celsius    FiO2 60 %   Coox Panel, Arterial Unsolicited   Result Value Ref Range    POCT Hemoglobin, Arterial 11.1 (L) 13.5 - 17.5 g/dL    POCT Oxy Hemoglobin, Arterial 94.7 94.0 - 98.0 %    POCT Carboxyhemoglobin, Arterial 2.5 %    POCT Methemoglobin, Arterial 0.8 0.0 - 1.5 %    POCT Deoxy Hemoglobin, Arterial 2.0 0.0 - 5.0 %   BLOOD GAS ARTERIAL FULL PANEL   Result Value Ref Range    POCT pH, Arterial 7.34 (L) 7.38 - 7.42 pH    POCT pCO2, Arterial 46 (H) 38 - 42 mm Hg    POCT pO2, Arterial 73 (L) 85 - 95 mm Hg    POCT SO2, Arterial 97 94 - 100 %    POCT Oxy Hemoglobin, Arterial 93.8 (L) 94.0 - 98.0 %    POCT Hematocrit Calculated, Arterial 26.0 (L) 41.0 - 52.0 %    POCT Sodium, Arterial 136 136 - 145 mmol/L    POCT Potassium, Arterial 4.3 3.5 - 5.3 mmol/L    POCT Chloride, Arterial 106 98 - 107 mmol/L    POCT Ionized Calcium, Arterial 1.07 (L) 1.10 - 1.33 mmol/L    POCT Glucose, Arterial 171 (H) 74 - 99 mg/dL    POCT Lactate, Arterial 0.9 0.4 - 2.0 mmol/L    POCT Base Excess, Arterial -1.1 -2.0 - 3.0 mmol/L    POCT HCO3 Calculated, Arterial 24.8 22.0 - 26.0 mmol/L    POCT Hemoglobin, Arterial 8.8 (L) 13.5 - 17.5 g/dL    POCT Anion Gap, Arterial 10 10 - 25 mmo/L    Patient Temperature 37.0 degrees Celsius    FiO2 60 %   BLOOD GAS MIXED VENOUS FULL PANEL   Result Value Ref Range    POCT pH, Mixed 7.32 (L) 7.33 - 7.43 pH    POCT pCO2, Mixed 49 41 - 51 mm Hg    POCT pO2, Mixed 49 (H) 35 - 45 mm Hg    POCT SO2, Mixed 80 (H) 45 - 75 %    POCT Oxy Hemoglobin, Mixed 77.1 (H) 45.0 - 75.0 %    POCT Hematocrit Calculated, Mixed 30.0 (L) 41.0 - 52.0 %    POCT Sodium, Mixed 135 (L) 136 - 145 mmol/L    POCT Potassium, Mixed 4.3 3.5 - 5.3 mmol/L    POCT Chloride, Mixed 106 98 - 107 mmol/L    POCT Ionized Calcium, Mixed 1.05 (L) 1.10 - 1.33  mmol/L    POCT Glucose, Mixed 172 (H) 74 - 99 mg/dL    POCT Lactate, Mixed 0.8 0.4 - 2.0 mmol/L    POCT Base Excess, Mixed -1.2 -2.0 - 3.0 mmol/L    POCT HCO3 Calculated, Mixed 25.2 22.0 - 26.0 mmol/L    POCT Hemoglobin, Mixed 10.1 (L) 13.5 - 17.5 g/dL    POCT Anion Gap, Mixed 8 (L) 10 - 25 mmo/L    Patient Temperature 37.0 degrees Celsius    FiO2 60 %   ACTIVATED CLOTTING TIME LOW   Result Value Ref Range    POCT Activated Clotting Time Low Range 216 (H) 83 - 199 sec   ACTIVATED CLOTTING TIME LOW   Result Value Ref Range    POCT Activated Clotting Time Low Range 216 (H) 83 - 199 sec   ACTIVATED CLOTTING TIME LOW   Result Value Ref Range    POCT Activated Clotting Time Low Range 232 (H) 83 - 199 sec   Blood Gas Arterial Full Panel Unsolicited   Result Value Ref Range    POCT pH, Arterial 7.48 (H) 7.38 - 7.42 pH    POCT pCO2, Arterial 53 (H) 38 - 42 mm Hg    POCT pO2, Arterial 121 (H) 85 - 95 mm Hg    POCT SO2, Arterial 98 94 - 100 %    POCT Oxy Hemoglobin, Arterial 96.8 94.0 - 98.0 %    POCT Hematocrit Calculated, Arterial 17.0 (L) 41.0 - 52.0 %    POCT Sodium, Arterial 141 136 - 145 mmol/L    POCT Potassium, Arterial 3.9 3.5 - 5.3 mmol/L    POCT Chloride, Arterial 105 98 - 107 mmol/L    POCT Ionized Calcium, Arterial 0.80 (L) 1.10 - 1.33 mmol/L    POCT Glucose, Arterial 214 (H) 74 - 99 mg/dL    POCT Lactate, Arterial 2.1 (H) 0.4 - 2.0 mmol/L    POCT Base Excess, Arterial 14.7 (H) -2.0 - 3.0 mmol/L    POCT HCO3 Calculated, Arterial 39.5 (H) 22.0 - 26.0 mmol/L    POCT Hemoglobin, Arterial 5.5 (LL) 13.5 - 17.5 g/dL    POCT Anion Gap, Arterial 0 (L) 10 - 25 mmo/L    Patient Temperature 37.0 degrees Celsius   Blood Gas Arterial Full Panel Unsolicited   Result Value Ref Range    POCT pH, Arterial 7.40 7.38 - 7.42 pH    POCT pCO2, Arterial 48 (H) 38 - 42 mm Hg    POCT pO2, Arterial 114 (H) 85 - 95 mm Hg    POCT SO2, Arterial 99 94 - 100 %    POCT Oxy Hemoglobin, Arterial 96.7 94.0 - 98.0 %    POCT Hematocrit Calculated,  Arterial 16.0 (L) 41.0 - 52.0 %    POCT Sodium, Arterial 139 136 - 145 mmol/L    POCT Potassium, Arterial 3.9 3.5 - 5.3 mmol/L    POCT Chloride, Arterial 106 98 - 107 mmol/L    POCT Ionized Calcium, Arterial 0.87 (L) 1.10 - 1.33 mmol/L    POCT Glucose, Arterial 207 (H) 74 - 99 mg/dL    POCT Lactate, Arterial 2.6 (H) 0.4 - 2.0 mmol/L    POCT Base Excess, Arterial 4.5 (H) -2.0 - 3.0 mmol/L    POCT HCO3 Calculated, Arterial 29.7 (H) 22.0 - 26.0 mmol/L    POCT Hemoglobin, Arterial 5.3 (LL) 13.5 - 17.5 g/dL    POCT Anion Gap, Arterial 7 (L) 10 - 25 mmo/L    Patient Temperature 37.0 degrees Celsius   ACTIVATED CLOTTING TIME LOW   Result Value Ref Range    POCT Activated Clotting Time Low Range 292 (H) 83 - 199 sec   Blood Gas Arterial Full Panel Unsolicited   Result Value Ref Range    POCT pH, Arterial 7.39 7.38 - 7.42 pH    POCT pCO2, Arterial 44 (H) 38 - 42 mm Hg    POCT pO2, Arterial 117 (H) 85 - 95 mm Hg    POCT SO2, Arterial 99 94 - 100 %    POCT Oxy Hemoglobin, Arterial 97.5 94.0 - 98.0 %    POCT Hematocrit Calculated, Arterial 22.0 (L) 41.0 - 52.0 %    POCT Sodium, Arterial 136 136 - 145 mmol/L    POCT Potassium, Arterial 4.3 3.5 - 5.3 mmol/L    POCT Chloride, Arterial 107 98 - 107 mmol/L    POCT Ionized Calcium, Arterial 0.85 (L) 1.10 - 1.33 mmol/L    POCT Glucose, Arterial 211 (H) 74 - 99 mg/dL    POCT Lactate, Arterial 2.7 (H) 0.4 - 2.0 mmol/L    POCT Base Excess, Arterial 1.5 -2.0 - 3.0 mmol/L    POCT HCO3 Calculated, Arterial 26.6 (H) 22.0 - 26.0 mmol/L    POCT Hemoglobin, Arterial 7.2 (L) 13.5 - 17.5 g/dL    POCT Anion Gap, Arterial 7 (L) 10 - 25 mmo/L    Patient Temperature 37.0 degrees Celsius    FiO2 60 %   Coox Panel, Arterial Unsolicited   Result Value Ref Range    POCT Hemoglobin, Arterial 7.2 (L) 13.5 - 17.5 g/dL    POCT Oxy Hemoglobin, Arterial 97.5 94.0 - 98.0 %    POCT Carboxyhemoglobin, Arterial 1.5 %    POCT Methemoglobin, Arterial 0.3 0.0 - 1.5 %    POCT Deoxy Hemoglobin, Arterial 0.7 0.0 - 5.0 %    ACTIVATED CLOTTING TIME LOW   Result Value Ref Range    POCT Activated Clotting Time Low Range 223 (H) 83 - 199 sec   ACTIVATED CLOTTING TIME LOW   Result Value Ref Range    POCT Activated Clotting Time Low Range 144 83 - 199 sec   Blood Gas Arterial Full Panel Unsolicited   Result Value Ref Range    POCT pH, Arterial 7.24 (LL) 7.38 - 7.42 pH    POCT pCO2, Arterial 54 (H) 38 - 42 mm Hg    POCT pO2, Arterial 177 (H) 85 - 95 mm Hg    POCT SO2, Arterial 100 94 - 100 %    POCT Oxy Hemoglobin, Arterial 97.0 94.0 - 98.0 %    POCT Hematocrit Calculated, Arterial 27.0 (L) 41.0 - 52.0 %    POCT Sodium, Arterial 137 136 - 145 mmol/L    POCT Potassium, Arterial 4.4 3.5 - 5.3 mmol/L    POCT Chloride, Arterial 107 98 - 107 mmol/L    POCT Ionized Calcium, Arterial 1.49 (H) 1.10 - 1.33 mmol/L    POCT Glucose, Arterial 170 (H) 74 - 99 mg/dL    POCT Lactate, Arterial 4.2 (HH) 0.4 - 2.0 mmol/L    POCT Base Excess, Arterial -4.4 (L) -2.0 - 3.0 mmol/L    POCT HCO3 Calculated, Arterial 23.1 22.0 - 26.0 mmol/L    POCT Hemoglobin, Arterial 9.1 (L) 13.5 - 17.5 g/dL    POCT Anion Gap, Arterial 11 10 - 25 mmo/L    Patient Temperature 37.0 degrees Celsius    FiO2 70 %   Coox Panel, Arterial Unsolicited   Result Value Ref Range    POCT Hemoglobin, Arterial 9.1 (L) 13.5 - 17.5 g/dL    POCT Oxy Hemoglobin, Arterial 97.0 94.0 - 98.0 %    POCT Carboxyhemoglobin, Arterial 1.9 %    POCT Methemoglobin, Arterial 0.6 0.0 - 1.5 %    POCT Deoxy Hemoglobin, Arterial 0.5 0.0 - 5.0 %   Blood Gas Venous Full Panel Unsolicited   Result Value Ref Range    POCT pH, Venous 7.22 (LL) 7.33 - 7.43 pH    POCT pCO2, Venous 58 (H) 41 - 51 mm Hg    POCT pO2, Venous 57 (H) 35 - 45 mm Hg    POCT SO2, Venous 89 (H) 45 - 75 %    POCT Oxy Hemoglobin, Venous 86.4 (H) 45.0 - 75.0 %    POCT Hematocrit Calculated, Venous 28.0 (L) 41.0 - 52.0 %    POCT Sodium, Venous 137 136 - 145 mmol/L    POCT Potassium, Venous 4.5 3.5 - 5.3 mmol/L    POCT Chloride, Venous 106 98 - 107  mmol/L    POCT Ionized Calicum, Venous 1.43 (H) 1.10 - 1.33 mmol/L    POCT Glucose, Venous 170 (H) 74 - 99 mg/dL    POCT Lactate, Venous 4.1 (HH) 0.4 - 2.0 mmol/L    POCT Base Excess, Venous -4.2 (L) -2.0 - 3.0 mmol/L    POCT HCO3 Calculated, Venous 23.7 22.0 - 26.0 mmol/L    POCT Hemoglobin, Venous 9.2 (L) 13.5 - 17.5 g/dL    POCT Anion Gap, Venous 12.0 10.0 - 25.0 mmol/L    Patient Temperature 37.0 degrees Celsius    FiO2 60 %   Coox Panel, Venous Unsolicited   Result Value Ref Range    POCT Carboxyhemoglobin, Venous 2.0 %    POCT Methemoglobin, Venous 0.8 0.0 - 1.5 %   TEG Clot Global Profile   Result Value Ref Range    R (Reaction Time) K 6.7 4.6 - 9.1 min    K (Clot Kinetics) 1.7 0.8 - 2.1 min    ANGLE 70.1 63.0 - 78.0 deg    MA (Max Amplitude) K 53.5 52.0 - 69.0 mm    R (Reaction Time) KH 6.2 4.3 - 8.3 min    MA (Max Amplitude) RT 52.1 52.0 - 70.0 mm    MA ( [place] Amplitude) FF 14.2 (L) 15.0 - 32.0 mm    FLEV 259 (L) 278 - 581 mg/dL   Blood Gas Arterial Full Panel Unsolicited   Result Value Ref Range    POCT pH, Arterial 7.36 (L) 7.38 - 7.42 pH    POCT pCO2, Arterial 39 38 - 42 mm Hg    POCT pO2, Arterial 56 (L) 85 - 95 mm Hg    POCT SO2, Arterial 89 (L) 94 - 100 %    POCT Oxy Hemoglobin, Arterial 87.3 (L) 94.0 - 98.0 %    POCT Hematocrit Calculated, Arterial 31.0 (L) 41.0 - 52.0 %    POCT Sodium, Arterial 142 136 - 145 mmol/L    POCT Potassium, Arterial 3.0 (L) 3.5 - 5.3 mmol/L    POCT Chloride, Arterial 112 (H) 98 - 107 mmol/L    POCT Ionized Calcium, Arterial 1.18 1.10 - 1.33 mmol/L    POCT Glucose, Arterial 156 (H) 74 - 99 mg/dL    POCT Lactate, Arterial 4.6 (HH) 0.4 - 2.0 mmol/L    POCT Base Excess, Arterial -3.2 (L) -2.0 - 3.0 mmol/L    POCT HCO3 Calculated, Arterial 22.0 22.0 - 26.0 mmol/L    POCT Hemoglobin, Arterial 10.4 (L) 13.5 - 17.5 g/dL    POCT Anion Gap, Arterial 11 10 - 25 mmo/L    Patient Temperature 37.0 degrees Celsius    FiO2 60 %   Coox Panel, Arterial Unsolicited   Result Value Ref Range     POCT Hemoglobin, Arterial 10.4 (L) 13.5 - 17.5 g/dL    POCT Oxy Hemoglobin, Arterial 87.3 (L) 94.0 - 98.0 %    POCT Carboxyhemoglobin, Arterial 1.5 %    POCT Methemoglobin, Arterial 0.6 0.0 - 1.5 %    POCT Deoxy Hemoglobin, Arterial 10.5 (H) 0.0 - 5.0 %   BLOOD GAS ARTERIAL FULL PANEL   Result Value Ref Range    POCT pH, Arterial 7.36 (L) 7.38 - 7.42 pH    POCT pCO2, Arterial 42 38 - 42 mm Hg    POCT pO2, Arterial 110 (H) 85 - 95 mm Hg    POCT SO2, Arterial 99 94 - 100 %    POCT Oxy Hemoglobin, Arterial 95.5 94.0 - 98.0 %    POCT Hematocrit Calculated, Arterial 14.0 (L) 41.0 - 52.0 %    POCT Sodium, Arterial 141 136 - 145 mmol/L    POCT Potassium, Arterial 3.7 3.5 - 5.3 mmol/L    POCT Chloride, Arterial 113 (H) 98 - 107 mmol/L    POCT Ionized Calcium, Arterial 1.27 1.10 - 1.33 mmol/L    POCT Glucose, Arterial 140 (H) 74 - 99 mg/dL    POCT Lactate, Arterial 3.7 (H) 0.4 - 2.0 mmol/L    POCT Base Excess, Arterial -1.6 -2.0 - 3.0 mmol/L    POCT HCO3 Calculated, Arterial 23.7 22.0 - 26.0 mmol/L    POCT Hemoglobin, Arterial 4.5 (LL) 13.5 - 17.5 g/dL    POCT Anion Gap, Arterial 8 (L) 10 - 25 mmo/L    Patient Temperature 37.0 degrees Celsius    FiO2 60 %   Blood Gas Arterial Full Panel Unsolicited   Result Value Ref Range    POCT pH, Arterial 7.35 (L) 7.38 - 7.42 pH    POCT pCO2, Arterial 40 38 - 42 mm Hg    POCT pO2, Arterial 71 (L) 85 - 95 mm Hg    POCT SO2, Arterial 95 94 - 100 %    POCT Oxy Hemoglobin, Arterial 92.8 (L) 94.0 - 98.0 %    POCT Hematocrit Calculated, Arterial 24.0 (L) 41.0 - 52.0 %    POCT Sodium, Arterial 142 136 - 145 mmol/L    POCT Potassium, Arterial 3.9 3.5 - 5.3 mmol/L    POCT Chloride, Arterial 110 (H) 98 - 107 mmol/L    POCT Ionized Calcium, Arterial 1.11 1.10 - 1.33 mmol/L    POCT Glucose, Arterial 158 (H) 74 - 99 mg/dL    POCT Lactate, Arterial 4.7 (HH) 0.4 - 2.0 mmol/L    POCT Base Excess, Arterial -3.2 (L) -2.0 - 3.0 mmol/L    POCT HCO3 Calculated, Arterial 22.1 22.0 - 26.0 mmol/L    POCT  Hemoglobin, Arterial 8.0 (L) 13.5 - 17.5 g/dL    POCT Anion Gap, Arterial 14 10 - 25 mmo/L    Patient Temperature 37.0 degrees Celsius    FiO2 100 %   Coox Panel, Arterial Unsolicited   Result Value Ref Range    POCT Hemoglobin, Arterial 8.0 (L) 13.5 - 17.5 g/dL    POCT Oxy Hemoglobin, Arterial 92.8 (L) 94.0 - 98.0 %    POCT Carboxyhemoglobin, Arterial 1.5 %    POCT Methemoglobin, Arterial 0.9 0.0 - 1.5 %    POCT Deoxy Hemoglobin, Arterial 4.8 0.0 - 5.0 %   Prepare RBC: 4 Units   Result Value Ref Range    PRODUCT CODE Q7674M42     Unit Number [id number]     Unit ABO AB     Unit RH POS     XM INTEP COMP     Dispense Status XM     Blood Expiration Date May 22, 2024 23:59 EDT     PRODUCT BLOOD TYPE 8400     UNIT VOLUME 350    Prepare Plasma: 4 Units   Result Value Ref Range    PRODUCT CODE C8161K59     Unit Number [id number]     Unit ABO AB     Unit RH POS     Dispense Status XM     Blood Expiration Date May 05, 2024 17:00 EDT     PRODUCT BLOOD TYPE 8400     UNIT VOLUME 317     PRODUCT CODE S4277G34     Unit Number [id number]     Unit ABO AB     Unit RH POS     Dispense Status XM     Blood Expiration Date May 05, 2024 17:00 EDT     PRODUCT BLOOD TYPE 8400     UNIT VOLUME 217     PRODUCT CODE L3764C88     Unit Number [id number]     Unit ABO AB     Unit RH POS     Dispense Status XM     Blood Expiration Date May 05, 2024 17:00 EDT     PRODUCT BLOOD TYPE 8400     UNIT VOLUME 200     PRODUCT CODE C6767E19     Unit Number [id number]     Unit ABO AB     Unit RH POS     Dispense Status XM     Blood Expiration Date May 05, 2024 17:00 EDT     PRODUCT BLOOD TYPE 8400     UNIT VOLUME 308    Magnesium   Result Value Ref Range    Magnesium 1.37 (L) 1.60 - 2.40 mg/dL   Calcium, Ionized   Result Value Ref Range    POCT Calcium, Ionized 1.10 1.1 - 1.33 mmol/L   Phosphorus   Result Value Ref Range    Phosphorus 3.8 2.5 - 4.9 mg/dL   Coagulation Screen   Result Value Ref Range    Protime 15.7 (H) 9.8 - 12.8  seconds    INR 1.4 (H) 0.9 - 1.1    aPTT 36 27 - 38 seconds   CBC   Result Value Ref Range    WBC 9.6 4.4 - 11.3 x10*3/uL    nRBC 0.0 0.0 - 0.0 /100 WBCs    RBC 2.62 (L) 4.50 - 5.90 x10*6/uL    Hemoglobin 7.9 (L) 13.5 - 17.5 g/dL    Hematocrit 22.9 (L) 41.0 - 52.0 %    MCV 87 80 - 100 fL    MCH 30.2 26.0 - 34.0 pg    MCHC 34.5 32.0 - 36.0 g/dL    RDW 13.8 11.5 - 14.5 %    Platelets 192 150 - 450 x10*3/uL   Hepatic Function Panel   Result Value Ref Range    Albumin 3.0 (L) 3.4 - 5.0 g/dL    Bilirubin, Total 0.9 0.0 - 1.2 mg/dL    Bilirubin, Direct 0.3 0.0 - 0.3 mg/dL    Alkaline Phosphatase 31 (L) 33 - 136 U/L    ALT 9 (L) 10 - 52 U/L    AST 13 9 - 39 U/L    Total Protein 4.3 (L) 6.4 - 8.2 g/dL   Fibrinogen   Result Value Ref Range    Fibrinogen 242 200 - 400 mg/dL   Basic Metabolic Panel   Result Value Ref Range    Glucose 126 (H) 74 - 99 mg/dL    Sodium 143 136 - 145 mmol/L    Potassium 3.9 3.5 - 5.3 mmol/L    Chloride 110 (H) 98 - 107 mmol/L    Bicarbonate 24 21 - 32 mmol/L    Anion Gap 13 10 - 20 mmol/L    Urea Nitrogen 18 6 - 23 mg/dL    Creatinine 1.07 0.50 - 1.30 mg/dL    eGFR 76 >60 mL/min/1.73m*2    Calcium 7.5 (L) 8.6 - 10.6 mg/dL   Blood Gas Arterial Full Panel   Result Value Ref Range    POCT pH, Arterial 7.34 (L) 7.38 - 7.42 pH    POCT pCO2, Arterial 42 38 - 42 mm Hg    POCT pO2, Arterial 106 (H) 85 - 95 mm Hg    POCT SO2, Arterial 99 94 - 100 %    POCT Oxy Hemoglobin, Arterial 95.9 94.0 - 98.0 %    POCT Hematocrit Calculated, Arterial 25.0 (L) 41.0 - 52.0 %    POCT Sodium, Arterial 138 136 - 145 mmol/L    POCT Potassium, Arterial 3.9 3.5 - 5.3 mmol/L    POCT Chloride, Arterial 111 (H) 98 - 107 mmol/L    POCT Ionized Calcium, Arterial 1.15 1.10 - 1.33 mmol/L    POCT Glucose, Arterial 128 (H) 74 - 99 mg/dL    POCT Lactate, Arterial 3.4 (H) 0.4 - 2.0 mmol/L    POCT Base Excess, Arterial -2.9 (L) -2.0 - 3.0 mmol/L    POCT HCO3 Calculated, Arterial 22.7 22.0 - 26.0 mmol/L    POCT Hemoglobin, Arterial 8.3 (L)  13.5 - 17.5 g/dL    POCT Anion Gap, Arterial 8 (L) 10 - 25 mmo/L    Patient Temperature 37.0 degrees Celsius    FiO2 50 %   TEG Clot Global Profile   Result Value Ref Range    R (Reaction Time) K 6.7 4.6 - 9.1 min    K (Clot Kinetics) 1.6 0.8 - 2.1 min    ANGLE 70.4 63.0 - 78.0 deg    MA (Max Amplitude) K 53.6 52.0 - 69.0 mm    R (Reaction Time) KH 5.7 4.3 - 8.3 min    MA (Max Amplitude) RT 51.9 (L) 52.0 - 70.0 mm    MA ( [place] Amplitude) FF 14.7 (L) 15.0 - 32.0 mm    FLEV 268 (L) 278 - 581 mg/dL   Type and screen   Result Value Ref Range    ABO TYPE AB     Rh TYPE POS     ANTIBODY SCREEN NEG    POCT GLUCOSE   Result Value Ref Range    POCT Glucose 154 (H) 74 - 99 mg/dL   CBC   Result Value Ref Range    WBC 10.7 4.4 - 11.3 x10*3/uL    nRBC 0.0 0.0 - 0.0 /100 WBCs    RBC 3.28 (L) 4.50 - 5.90 x10*6/uL    Hemoglobin 9.8 (L) 13.5 - 17.5 g/dL    Hematocrit 27.3 (L) 41.0 - 52.0 %    MCV 83 80 - 100 fL    MCH 29.9 26.0 - 34.0 pg    MCHC 35.9 32.0 - 36.0 g/dL    RDW 13.5 11.5 - 14.5 %    Platelets 126 (L) 150 - 450 x10*3/uL   Blood Gas Arterial Full Panel   Result Value Ref Range    POCT pH, Arterial 7.42 7.38 - 7.42 pH    POCT pCO2, Arterial 37 (L) 38 - 42 mm Hg    POCT pO2, Arterial 63 (L) 85 - 95 mm Hg    POCT SO2, Arterial 95 94 - 100 %    POCT Oxy Hemoglobin, Arterial 91.6 (L) 94.0 - 98.0 %    POCT Hematocrit Calculated, Arterial 31.0 (L) 41.0 - 52.0 %    POCT Sodium, Arterial 137 136 - 145 mmol/L    POCT Potassium, Arterial 4.1 3.5 - 5.3 mmol/L    POCT Chloride, Arterial 110 (H) 98 - 107 mmol/L    POCT Ionized Calcium, Arterial 1.11 1.10 - 1.33 mmol/L    POCT Glucose, Arterial 126 (H) 74 - 99 mg/dL    POCT Lactate, Arterial 1.5 0.4 - 2.0 mmol/L    POCT Base Excess, Arterial -0.3 -2.0 - 3.0 mmol/L    POCT HCO3 Calculated, Arterial 24.0 22.0 - 26.0 mmol/L    POCT Hemoglobin, Arterial 10.3 (L) 13.5 - 17.5 g/dL    POCT Anion Gap, Arterial 7 (L) 10 - 25 mmo/L    Patient Temperature 37.0 degrees Celsius    FiO2 40 %   Blood  Gas Arterial Full Panel   Result Value Ref Range    POCT pH, Arterial 7.43 (H) 7.38 - 7.42 pH    POCT pCO2, Arterial 38 38 - 42 mm Hg    POCT pO2, Arterial 71 (L) 85 - 95 mm Hg    POCT SO2, Arterial 97 94 - 100 %    POCT Oxy Hemoglobin, Arterial 93.8 (L) 94.0 - 98.0 %    POCT Hematocrit Calculated, Arterial 31.0 (L) 41.0 - 52.0 %    POCT Sodium, Arterial 138 136 - 145 mmol/L    POCT Potassium, Arterial 4.2 3.5 - 5.3 mmol/L    POCT Chloride, Arterial 108 (H) 98 - 107 mmol/L    POCT Ionized Calcium, Arterial 1.13 1.10 - 1.33 mmol/L    POCT Glucose, Arterial 126 (H) 74 - 99 mg/dL    POCT Lactate, Arterial 0.8 0.4 - 2.0 mmol/L    POCT Base Excess, Arterial 0.9 -2.0 - 3.0 mmol/L    POCT HCO3 Calculated, Arterial 25.2 22.0 - 26.0 mmol/L    POCT Hemoglobin, Arterial 10.3 (L) 13.5 - 17.5 g/dL    POCT Anion Gap, Arterial 9 (L) 10 - 25 mmo/L    Patient Temperature 37.0 degrees Celsius    FiO2 50 %   Calcium, Ionized   Result Value Ref Range    POCT Calcium, Ionized 1.09 (L) 1.1 - 1.33 mmol/L   CBC   Result Value Ref Range    WBC 12.2 (H) 4.4 - 11.3 x10*3/uL    nRBC 0.0 0.0 - 0.0 /100 WBCs    RBC 3.22 (L) 4.50 - 5.90 x10*6/uL    Hemoglobin 9.6 (L) 13.5 - 17.5 g/dL    Hematocrit 26.5 (L) 41.0 - 52.0 %    MCV 82 80 - 100 fL    MCH 29.8 26.0 - 34.0 pg    MCHC 36.2 (H) 32.0 - 36.0 g/dL    RDW 13.8 11.5 - 14.5 %    Platelets 157 150 - 450 x10*3/uL   Coagulation Screen   Result Value Ref Range    Protime 14.0 (H) 9.8 - 12.8 seconds    INR 1.2 (H) 0.9 - 1.1    aPTT 30 27 - 38 seconds   Magnesium   Result Value Ref Range    Magnesium 2.53 (H) 1.60 - 2.40 mg/dL   Renal Function Panel   Result Value Ref Range    Glucose 128 (H) 74 - 99 mg/dL    Sodium 141 136 - 145 mmol/L    Potassium 4.0 3.5 - 5.3 mmol/L    Chloride 108 (H) 98 - 107 mmol/L    Bicarbonate 25 21 - 32 mmol/L    Anion Gap 12 10 - 20 mmol/L    Urea Nitrogen 22 6 - 23 mg/dL    Creatinine 1.25 0.50 - 1.30 mg/dL    eGFR 63 >60 mL/min/1.73m*2    Calcium 7.5 (L) 8.6 - 10.6 mg/dL     Phosphorus 2.9 2.5 - 4.9 mg/dL    Albumin 3.2 (L) 3.4 - 5.0 g/dL   POCT GLUCOSE   Result Value Ref Range    POCT Glucose 120 (H) 74 - 99 mg/dL   Blood Gas Arterial Full Panel   Result Value Ref Range    POCT pH, Arterial 7.42 7.38 - 7.42 pH    POCT pCO2, Arterial 36 (L) 38 - 42 mm Hg    POCT pO2, Arterial 70 (L) 85 - 95 mm Hg    POCT SO2, Arterial 98 94 - 100 %    POCT Oxy Hemoglobin, Arterial 94.1 94.0 - 98.0 %    POCT Hematocrit Calculated, Arterial 30.0 (L) 41.0 - 52.0 %    POCT Sodium, Arterial 138 136 - 145 mmol/L    POCT Potassium, Arterial 4.1 3.5 - 5.3 mmol/L    POCT Chloride, Arterial 109 (H) 98 - 107 mmol/L    POCT Ionized Calcium, Arterial 1.17 1.10 - 1.33 mmol/L    POCT Glucose, Arterial 144 (H) 74 - 99 mg/dL    POCT Lactate, Arterial 1.6 0.4 - 2.0 mmol/L    POCT Base Excess, Arterial -0.8 -2.0 - 3.0 mmol/L    POCT HCO3 Calculated, Arterial 23.4 22.0 - 26.0 mmol/L    POCT Hemoglobin, Arterial 10.1 (L) 13.5 - 17.5 g/dL    POCT Anion Gap, Arterial 10 10 - 25 mmo/L    Patient Temperature 37.0 degrees Celsius    FiO2 40 %   POCT GLUCOSE   Result Value Ref Range    POCT Glucose 125 (H) 74 - 99 mg/dL   Blood Gas Arterial Full Panel   Result Value Ref Range    POCT pH, Arterial 7.42 7.38 - 7.42 pH    POCT pCO2, Arterial 37 (L) 38 - 42 mm Hg    POCT pO2, Arterial 78 (L) 85 - 95 mm Hg    POCT SO2, Arterial 99 94 - 100 %    POCT Oxy Hemoglobin, Arterial 95.4 94.0 - 98.0 %    POCT Hematocrit Calculated, Arterial 27.0 (L) 41.0 - 52.0 %    POCT Sodium, Arterial 137 136 - 145 mmol/L    POCT Potassium, Arterial 4.1 3.5 - 5.3 mmol/L    POCT Chloride, Arterial 110 (H) 98 - 107 mmol/L    POCT Ionized Calcium, Arterial 1.15 1.10 - 1.33 mmol/L    POCT Glucose, Arterial 132 (H) 74 - 99 mg/dL    POCT Lactate, Arterial 1.4 0.4 - 2.0 mmol/L    POCT Base Excess, Arterial -0.3 -2.0 - 3.0 mmol/L    POCT HCO3 Calculated, Arterial 24.0 22.0 - 26.0 mmol/L    POCT Hemoglobin, Arterial 9.1 (L) 13.5 - 17.5 g/dL    POCT Anion Gap,  Arterial 7 (L) 10 - 25 mmo/L    Patient Temperature 37.0 degrees Celsius    FiO2 60 %       XR chest 1 view    Result Date: 4/30/2024  STUDY: XR CHEST 1 VIEW;  4/30/2024 5:30 pm   INDICATION: Signs/Symptoms:Intubated.   COMPARISON: Chest radiograph 11/11/2023, CTA chest abdomen and pelvis 11/11/2023   ACCESSION NUMBER(S): [id number]   ORDERING CLINICIAN: JOVANNY EDWARDS   FINDINGS: AP radiograph of the chest was provided.   MEDICAL DEVICES: Double-lumen endotracheal tube with the tip terminating 6.1 cm above the kavya and the 2nd tip terminating within the proximal left bronchi. Right Camden-Natividad catheter with the tip of the catheter projecting over the proximal pulmonary artery. Left-sided single lead pacemaker with lead in stable position compared to prior. Enteric tube overlies the esophagus and courses under the left hemidiaphragm with the tip out of the field of view. Left-sided chest tube.   CARDIOMEDIASTINAL SILHOUETTE: Cardiomediastinal silhouette is normal in size and configuration.   LUNGS: Redemonstration of bilateral emphysematous changes with linear scarring at the right upper lobe. Previously visualized 10 mm nodularity of the left upper lobe is not well visualized on this exam, likely due to overlying chest tube. Hazy opacity over the right lower lung lobe, which likely represents atelectasis. Prominent perihilar lung markings likely representing mild pulmonary edema. No focal pulmonary consolidations. No pleural effusions or pneumothorax seen.   ABDOMEN: No remarkable upper abdominal findings.   BONES: No acute osseous changes.       1.  Interval intubation with a dual lumen endotracheal tube with the 1st tip terminating 6.1 cm above the kavya and the 2nd tip terminating within the proximal left bronchi. 2. Diffuse emphysematous changes and mild pulmonary edema.   I personally reviewed the images/study and resident's interpretation and I agree with the findings as stated by Jessica Oconnor MD  (resident radiologist). This study was analyzed and interpreted at [facility], [place], [place].   MACRO: None     Dictation workstation:   [id number]     Scheduled medications  cefTRIAXone, 2 g, intravenous, q24h  hydrocortisone sodium succinate, 50 mg, intravenous, q6h  insulin lispro, 0-5 Units, subcutaneous, q4h  oxygen, , inhalation, Continuous - Inhalation  pantoprazole, 40 mg, intravenous, BID      Continuous medications  EPINEPHrine, 0.01-1 mcg/kg/min, Last Rate: Stopped (05/01/24 0255)  HYDROmorphone,   lactated Ringer's, 150 mL/hr, Last Rate: Stopped (05/01/24 6115)  nitroglycerin, 5-200 mcg/min, Last Rate: 100 mcg/min (05/01/24 0424)  norepinephrine, 0.01-0.5 mcg/kg/min, Last Rate: Stopped (05/01/24 3435)      PRN medications  PRN medications: calcium gluconate, calcium gluconate, dextrose, dextrose, glucagon, glucagon, ipratropium-albuteroL, magnesium sulfate, magnesium sulfate, naloxone, potassium chloride           .  Current Outpatient Medications   Medication Instructions    aspirin 81 mg, oral, Daily    chlorhexidine (Peridex) 0.12 % solution Swish and spit 15 mL night before surgery and morning of surgery    ferrous sulfate (325 mg ferrous sulfate) (FEOSOL) 325 mg, oral, Daily with breakfast    fluticasone-umeclidin-vilanter (Trelegy Ellipta) 100-62.5-25 mcg blister with device 1 puff, inhalation, Daily    methotrexate (Trexall) 2.5 mg tablet 6 tablets, oral, Once Weekly, Follow directions carefully, and ask to explain any part you do not understand. Take exactly as directed.<BR>6 tabs Saturday    metoprolol succinate XL (TOPROL-XL) 50 mg, oral, Daily, Do not crush or chew.    oxybutynin XL (Ditropan-XL) 10 mg 24 hr tablet TAKE 1 TABLET BY MOUTH ONCE DAILY AS NEEDED FOR BLADDER SPASMS. DO NOT CRUSH, CHEW, OR SPLIT.    oxybutynin XL (DITROPAN-XL) 10 mg, oral, Daily PRN, Do not crush, chew, or split.    oxyCODONE (ROXICODONE) 5 mg, oral, Every 6 hours PRN     predniSONE (DELTASONE) 5 mg, oral, Daily    sacubitriL-valsartan (Entresto) 24-26 mg tablet 1 tablet, oral, 2 times daily    simvastatin (ZOCOR) 40 mg, oral, Nightly    tamsulosin (Flomax) 0.4 mg 24 hr capsule 1 capsule (0.4 mg) once daily.         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 Street on 4/30. Also had left nephroureterectomy for urothelial carcinoma of L kidney performed at same time. PMH s/f CAD w/ ELLY to LCx (2020), HFrEF (25-30%) 2/2 ischemic cardiomyopathy s/p ICD, COPD, HTN, psoriatic arthritis, and anemia.      Plan:  NEURO: No PMH. PERRL.   - ongoing neuro/neurovasc/pain assessments  - scheduled tylenol  - PCA  - consult acute pain, refused block yesterday     CV: PMH ruptured AAA s/p EVAR (2020) which was c/b RTOR to r/o abdominal compartment syndrome and treat type II leak, requiring RTOR. Other PMH CAD s/p ELLY to LCx, 40% stenosis of proximal LAD (2020), HFrEF 25-30% 2/2 cardiomyopathy s/p ICD, HTN, HLD. Preop stress test not c/f ischemia, and VIJAY w/ stable EF. Now with enlargement of sac from 3.7 to 4.4 cm and type Ia endoleak s/p open repair and explant with Dr. Street 4/30. Arrived on Norepi and dobutamine. CI 2.6 SVR 1000 on arrival. Dobutamine weaned off. Palpable R DP, PT and L PT pulses.    - Goal MAP  per vascular   - L sided chest tube w/ air leak. Maintain at -20 cmH2O.   - continuous ekg/abp/pap monitoring  - Home meds: asa, metoprolol succinate 50mg, entresto 24-26mg, simvastatin. Hold for now.   - Transition from NTG to Cleviprex to maintain MAP goals above, introduce home meds once able and no longer NPO  - now that off pressors will dose hydrocortisone to 20 mg daily which is home prednisone (5 mg daily) equivalent  - consult heart failure    PULM: Hx of  COPD. Preop PFTs indicative of moderate-severe obstructive disease FEV1/FVC (52% predicted). Intubated. 39 Fr DLETT. VC-AC 40% FiO2, peep 8. Post op PaO2 106. DLETT remained in place d/t c/f airway edema. Extubated overnight.   - Remain intubated overnight d/t c/f airway edema   - PRN duoneb  - Q1h incentive spirometer after extubation  - F/U post op cxr  - Home meds: trelegy-ellipta       GI: No PMH. Strict NPO  - NG-> LIWS  - PPI for GI prophylaxis.  - f/u post op and daily amylase, lipase, hepatic function panel.  - pantoprazole, 40 mg, intravenous, BID --> change to daily     : Baseline hematuria 2/2 urothelial carcinoma of L kidney. S/p left nephroureterectomy 4/30 w/ Dr. Thompson. Baseline Cr 0.94. Supra mesenteric cross clamp time 40 minutes. +9L past 24 hrs. Cr 1.34 today. K is 4, Mg 2.3, Ph is 3.7.   - Check renal function panel daily and PRN  - Stop Maintenance IVF  - Repeat ECHO  - Volume resuscitation as needed.    - Goal U/O >0.5ml/kg/hr.    - Replete electrolytes to goal K>4, Mg>2, Phos>2.5, ionized Ca>1.10 if creatinine remains WNL or <1.5 with adequate uop.  - Home meds: oxybutynin 10mg PRN, tamsulosin 0.4mg. Hold for now.      HEME: Hx anemia. Baseline Hgb 12.6.  Acute surgical blood loss anemia. 6 RBC, 5 FFP, 3 Plt in OR. 2 RBC, 1 plt on arrival. 2 RBC, 4 FFP, 1 Plt on hold. No transfusions thus far in ICU, HGB 8.5 this AM.   - CBC daily and PRN  - Check CBC, coags, fibrinogen daily and PRN  - scds for dvt prophylaxis.  - no subcutaneous heparin per vascular surgery at this time  - Home meds: Iron, B12. Hold for now.      ENDO: No hx. A1C 5.3.   - Q4h BG and SSI Lispro per ICU protocol.  - Daily pred 5 for unknown duration -- placed on stress dose steroids  - q4hr POCT BGs     ID: afebrile, no leukocytosis  - Ceftriaxone 2g until chest tube removed.   - monitor for s/s infection     MSK: Hx of psoriatic arthritis.  - Home meds: MTX 15mg and prednisone 5mg. Hold for now.   - Hydrocortisone  equivalent while NPO       Skin: No active skin issues.  - preventative Mepilex dressings in place on sacrum and heels  - change preventative Mepilex weekly or more frequently as indicated (when moist/soiled)   - every shift skin assessment per nursing and weekly ICU skin rounds  - moisture barrier to be applied with rafael care  - active skin problems addressed with nursing on daily rounds     Lines:   RIJ MAC w/ PAC 4/30 - remove PAC if no changes on repeat ECHO today and keep MAC  R Radial A line 4/30  PIVs: 16G LFA, 14G RFA  [place] 4/30  LLQ ESTEBAN drain 4/30  L sided chest tube 4/30     Dispo: Continue ICU care. Plan discussed with SICU attending, Dr. Stone.   Juan A Saldaña, APRN-CNP, DNP

## 2024-05-01 NOTE — OP NOTE
Operative Note     Date: 24  Name: Yobany Small   : 1955  MRN: [id number]     Diagnosis  Perivisceral AAA with prior EVAR and type Ia endoleak  Left ureteral cancer     Procedures  Extent IV thoracoabdominal aneurysm repair with reimplantation of right renal artery   Left nephroureterectomy (Dr. Hobbs)     Surgeon      * Kar Street - Primary     * Josias Payne MD - Co-Surgeon    Resident/Fellow/Other Assistant:  Aneudy Workman MD    Procedure Summary  Anesthesia: GETA   Estimated Blood Loss: 500cc    Specimen: No specimens collected       Findings: Type Ia endoleak    Indications: Yobany Small is an 69 y.o.  y.o. male  who presents with type Ia endoleak with expanding perivisceral aneurysm. He had an EVAR placed about two years ago for rupture. He also has been managed by Dr. Hobbs for left ureteral cancer and decision has been made to proceed with open repair of his aneurysm, and left nephroureterectomy with curative intent for the cancer.   Dr. Payne and I performed this procedure as co-surgeons due to the high complexity of the repair being a thoracoabdominal type repair with possibility of needing to go on pump for which Dr. Payne has privileges, as well as the patients frail state with diminished EF and requiring two surgeons to perform an expeditious repair.    Procedure Details:     The patient was brought to the OR and placed supine on the table. GETA was induced and lines were placed for resuscitation and monitoring. He was then placed in the right lateral decubitus position and the chest abdomen and groins were prepped and draped in sterile fashion.  A 9th interspace thoracoretroperitoneal incision was made and the dissection carried through the subcutaneous tissue.  The ninth interspace was entered and the diaphragm partially incised for approximately 10 cm to facilitate exposure.  The patient was given mannitol half a gram per kilogram.  Tranexamic acid acid infusion was also  started.     The left kidney was mobilized including the adrenal gland.  The left ureter was also mobilized.  At this time Dr. Дмитрий Hobbs from urology service came in and proceeded with completion of the left nephroureterectomy. Please see his dictation for those details. We did note the ureteral stent was removed and appeared to be contaminated and as such the decision was made to use cryopreserved human allograft aortic graft to perform the reconstruction.     Dissection was continued to the supraceliac aorta.  The celiac axis and superior mesenteric artery were isolated and dissected free.  Patient had 2 right renal arteries both of which were isolated and dissected free.  It was then noted that there was a segment of the supra mesenteric aorta that was amenable to clamping. Thus, the decision was made to clamp the supra-SMA aorta, rather than supra-celiac aorta. The celiac axis appeared to have a broad based orifice on preoperative CTA, but on direct visualization, it appeared relatively normal and did not warrant repair.     Dissection was continued distally and distal left common iliac artery was isolated and dissected free.    Following systemic heparinization control was obtained of the supra-SMA aorta followed by left common iliac artery.  An aortotomy was made and [employer] graft identified.  The entire main portion of the endograft was completely excised including the left iliac limb.  The right limb was trimmed down to the origin of the right common iliac artery.  The right kidney was then perfused with 240 cc of cold perfusate and every 20 minutes with 120 cc.  The 2 renal arteries were  by about 1.5 cm.  The perivisceral aorta was significantly diseased for greater than 3/4 of its circumference at the perivisceral level and needed to be beveled to effect anastomosis to healthy, non diseased portion of the aorta.  However, because the 2 renal arteries were relatively far apart, as was the  distance from the superior pole of renal artery and the SMA,  and would have required an excessively long beveled anastomosis. Thus, the decision was made to include only the upper pole renal artery in the anastomosis and to reimplant the lower pole renal artery as a separate a button.  The backbleeding from the right iliac limb was controlled with a #9 Bearden Inahara catheter.     The cryopreserved graft was then brought onto the field and beveled so as to incorporate the right upper pole renal artery and the SMA.  The anastomosis was effected with a running 4-0 Prolene.  The anastomosis was hemostatic.  A hole was made on the right lateral aspect of the cryopreserved graft with a 6 mm aortic punch.  The right renal artery button was then reimplanted using a running 6-0 Prolene suture.  After proper de-airing flow was restored to the right renal artery.  Excellent Doppler signals were noted over the renal arteries, celiac axis and the superior mesenteric artery.     The right limb of the graft was then brought into approximation with the right iliac endograft limb.  The graft was sewn onto the graft incorporating the aortic wall with a running 4-0 Prolene suture.  After appropriate backbleeding and for bleeding were allowed flow was restored to the right pelvis and right lower extremity.  The anastomosis was hemostatic.     The left limb of the graft was then brought into approximation with the distal left common iliac artery.  The common iliac artery was transected and the cryopreserved graft was fashioned to fit and sewn end to end with a running 5-0 Prolene suture.  The anastomosis was hemostatic.     Heparin effects were reversed with protamine.  After hemostasis was assured the diaphragm was approximated using 2-0 Ethibond sutures in horizontal mattress fashion.  Diaphragmatic pacing wires were placed and brought out through separate stab wound.  #20 a chest tube was placed into the left pleural space and  brought out through a separate stab wound.     The muscular layers were then approximated in multiple layers and the skin with staples.     The patient tolerated the procedure well without complications and was transferred to the recovery room in stable condition.  The patient made adequate amount of urine throughout the course of the operation.           Attending Attestation: I was present and scrubbed for the entire procedure.    Kar Street

## 2024-05-01 NOTE — PROGRESS NOTES
Physical Therapy                 Therapy Communication Note    Patient Name: Yobany Small  MRN: [id number]  Today's Date: 5/1/2024     Discipline: Physical Therapy    Missed Visit Reason: Missed Visit Reason:  (Pt refused PT at this time.  Pt stated he is in too much pain and tired.  Educated importance of mobility and sitting in chair, however pt continued to decline.  Will re-attempt later if time allows.)    Missed Time: Attempt

## 2024-05-01 NOTE — CONSULTS
Inpatient consult to Heart Failure  Consult performed by: Ko Ivey MD  Consult ordered by: Vianey Mcgill PA-C  Reason for consult: GDMT regimen for HF with improved EF      History Of Present Illness:    Yobany Small is a 69 y.o. male with HTN, CAD, s/p PCI with ELLY to Lcx (2020), s/p ICD, HFrEF (LVEF 25-30% in 01/2024), hx of endovascular repair of ruptured AAA, type Ia endoleak from EVAR, left ureteral CA, COPD, presented for aortic aneurysm repair. S/p aortic aneurysm repair, left nephroureterectomy for urothelial CA of left kidney (04/30/2024).     Patient has been extubated and currently off vasopressors. He is on Clevidipine gtt to maintain MAP  (spinal cord perfusion goal). Patient denies chest pain, dyspnea, orthopnea, PND, abdominal distention, leg swelling. His home meds includes Toprol-XL 50 mg PO daily and Entresto 24/26 mg BID.     Repeat 2-D echo (05/01/24) revealed LVEF 55%, normal RV systolic function, RVSP 32, dilated aortic root (4.13 cm).    Latest hemodynamics via [place] (5/1/24): CVP 13, PAP 39/20 (27), CO/CI 5.2 (3.10). His BP trend is maintained at 120-130s/60-70s mmHg while on Clevidipine gtt at 2 mg/hr.      Last Recorded Vitals:  Vitals:    05/01/24 0930 05/01/24 1000 05/01/24 1100 05/01/24 1200   BP:       Pulse:  84 83 71   Resp: 14 16 14 15   Temp:       TempSrc:       SpO2:  94% 93% 95%   Weight:       Height:         Last Labs:  CBC - 5/1/2024:  6:01 AM  10.6 8.5 140    24.3      CMP - 5/1/2024:  6:01 AM  7.7 4.3 13 --- 0.9   3.7 3.0 9 31      PTT - 5/1/2024:  6:01 AM  1.3   14.3 29     Troponin I, High Sensitivity   Date/Time Value Ref Range Status   11/11/2023 09:44 PM 3 0 - 20 ng/L Final   11/11/2023 08:41 PM 7 0 - 20 ng/L Final     Troponin I   Date/Time Value Ref Range Status   03/14/2023 03:37 AM 8 0 - 20 ng/L Final     Comment:     .  Less than 99th percentile of normal range cutoff-  Female and children under 18 years old <14 ng/L; Male <21 ng/L:  Negative  Repeat testing should be performed if clinically indicated.   .  Female and children under 18 years old 14-50 ng/L; Male 21-50 ng/L:  Consistent with possible cardiac damage and possible increased clinical   risk. Serial measurements may help to assess extent of myocardial damage.   .  >50 ng/L: Consistent with cardiac damage, increased clinical risk and  myocardial infarction. Serial measurements may help assess extent of   myocardial damage.   .   NOTE: Children less than 1 year old may have higher baseline troponin   levels and results should be interpreted in conjunction with the overall   clinical context.   .  NOTE: Troponin I testing is performed using a different   testing methodology at [facility] than at other   [facility]. Direct result comparisons should only   be made within the same method.       BNP   Date/Time Value Ref Range Status   03/14/2023 03:37 AM 44 0 - 99 pg/mL Final     Comment:     .  <100 pg/mL - Heart failure unlikely  100-299 pg/mL - Intermediate probability of acute heart  .               failure exacerbation. Correlate with clinical  .               context and patient history.    >=300 pg/mL - Heart Failure likely. Correlate with clinical  .               context and patient history.  BNP testing is performed using different testing   methodology at [facility] than at other   [facility]. Direct result comparisons should   only be made within the same method.     05/11/2020 08:06 AM 2,067 (H) 0 - 99 pg/mL Final     Comment:     .  <100 pg/mL - Heart failure unlikely  100-299 pg/mL - Intermediate probability of acute heart  .               failure exacerbation. Correlate with clinical  .               context and patient history.    >=300 pg/mL - Heart Failure likely. Correlate with clinical  .               context and patient history.  BNP testing is performed using different testing   methodology at [facility] than at  other   system hospitals. Direct result comparisons should   only be made within the same method.       Hemoglobin A1C   Date/Time Value Ref Range Status   04/22/2024 09:56 AM 5.3 see below % Final   05/22/2023 12:09 PM 5.4 % Final     Comment:          Diagnosis of Diabetes-Adults   Non-Diabetic: < or = 5.6%   Increased risk for developing diabetes: 5.7-6.4%   Diagnostic of diabetes: > or = 6.5%  .       Monitoring of Diabetes                Age (y)     Therapeutic Goal (%)   Adults:          >18           <7.0   Pediatrics:    13-18           <7.5                   7-12           <8.0                   0- 6            7.5-8.5   American Diabetes Association. Diabetes Care 33(S1), Jan 2010.     01/17/2020 06:20 AM 5.8 % Final     Comment:          Diagnosis of Diabetes-Adults   Non-Diabetic: < or = 5.6%   Increased risk for developing diabetes: 5.7-6.4%   Diagnostic of diabetes: > or = 6.5%  .       Monitoring of Diabetes                Age (y)     Therapeutic Goal (%)   Adults:          >18           <7.0   Pediatrics:    13-18           <7.5                   7-12           <8.0                   0- 6            7.5-8.5   American Diabetes Association. Diabetes Care 33(S1), Jan 2010.       VLDL   Date/Time Value Ref Range Status   05/22/2023 12:09 PM 17 0 - 40 mg/dL Final      Last I/O:  I/O last 3 completed shifts:  In: 48857.6 (223 mL/kg) [I.V.:5486.6 (98.5 mL/kg); Blood:4632; IV Piggyback:2300]  Out: 3370 (60.5 mL/kg) [Urine:1425 (0.7 mL/kg/hr); Drains:335; Blood:1500; Chest Tube:110]  Weight: 55.7 kg     Past Cardiology Tests (Last 3 Years):  Echo:  Transthoracic Echo (TTE) Complete 05/01/2024  Left Ventricle: The left ventricular systolic function is low normal, with an estimated ejection fraction of 55%. The left ventricular cavity size is normal. Abnormal (paradoxical) septal motion, consistent with an intraventricular conduction delay. Left ventricular diastolic filling was indeterminate. LV not well  visualized.  Left Atrium: The left atrium was not well visualized.  Right Ventricle: The right ventricle is normal in size. There is normal right ventricular global systolic function. A device is visualized in the right ventricle.  Right Atrium: The right atrium was not well visualized.  Aortic Valve: The aortic valve was not well visualized. Suspect trileaflet. There is mild aortic valve cusp calcification. There is trivial aortic valve regurgitation. The peak instantaneous gradient of the aortic valve is 3.1 mmHg.  Mitral Valve: The mitral valve is normal in structure. There is trace mitral valve regurgitation.  Tricuspid Valve: The tricuspid valve is structurally normal. There is trace to mild tricuspid regurgitation.  Pulmonic Valve: The pulmonic valve is not well visualized. There is trace pulmonic valve regurgitation.  Pericardium: There is a trivial pericardial effusion. There is an anterior clear space.  Aorta: The aortic root is abnormal. The Ao Sinus is 4.20 cm. There is moderate dilatation the aortic root.  Pulmonary Artery: The tricuspid regurgitant velocity is 2.61 m/s, and with an estimated right atrial pressure of 5 mmHg, the estimated pulmonary artery pressure is borderline elevated with the RVSP at 32.2 mmHg.  Systemic Veins: The inferior vena cava appears to be of upper normal size.  In comparison to the previous echocardiogram(s): Compared with study from 1/23/2024, EF appears improved.     Transthoracic echo (TTE) complete 01/23/2024  Left Ventricle: The left ventricular systolic function is severely decreased, with an estimated ejection fraction of 25-30%. There is global hypokinesis of the left ventricle with minor regional variations. The left ventricular cavity size is normal. Spectral Doppler shows an impaired relaxation pattern of left ventricular diastolic filling.  Left Atrium: The left atrium is normal in size.  Right Ventricle: The right ventricle is normal in size. There is normal  right ventricular global systolic function.  Right Atrium: The right atrium is normal in size.  Aortic Valve: The aortic valve is trileaflet. There is trace to mild aortic valve regurgitation. The peak instantaneous gradient of the aortic valve is 4.3 mmHg. The mean gradient of the aortic valve is 2.4 mmHg.  Mitral Valve: The mitral valve is normal in structure. There is trace to mild mitral valve regurgitation.  Tricuspid Valve: The tricuspid valve is structurally normal. There is mild tricuspid regurgitation. The Doppler estimated RVSP is mild to moderately elevated at 35.9 mmHg.  Pulmonic Valve: The pulmonic valve is structurally normal. There is trace pulmonic valve regurgitation.  Pericardium: There is no pericardial effusion noted.  Aorta: The aortic root is abnormal. There is moderate dilatation of the ascending aorta. There is moderate dilatation the aortic root.     Ejection Fractions:  EF   Date/Time Value Ref Range Status   01/23/2024 10:06 AM 25 %      Cath:  [facility] (01/2020)  Luminal irregularities in left main 40% proximal LAD stenosis with luminal irregularities in the rest of LAD; 95% mid left circumflex is stenosis with a 70% stenosis more proximal to that and luminal irregularities in the rest of left circumflex system. A small nondominant RCA with no significant stenosis  LVEDP 31 mmHg with no significant gradient across aortic valve  EF 20% with an echocardiogram.    Stress Test:  Nuclear Stress Test 01/23/2024  Normal stress myocardial perfusion imaging in response to  pharmacologic stress. Severely reduced left ventricular systolic  function with a gated ejection fraction of 37%.    Cardiac Imaging:  No results found for this or any previous visit from the past 1095 days.    Past Medical History:  He has a past medical history of Abnormal findings on diagnostic imaging of heart and coronary circulation, Abnormal findings on diagnostic imaging of other specified body structures, Abnormal findings  on diagnostic imaging of other specified body structures, Abnormal findings on diagnostic imaging of other specified body structures, Abnormal findings on diagnostic imaging of other specified body structures, Abnormal findings on diagnostic imaging of other specified body structures, Abnormal findings on diagnostic imaging of other specified body structures, BPH (benign prostatic hyperplasia), COPD (chronic obstructive pulmonary disease) (Multi), Coronary artery disease, Hyperlipidemia, Hypertension, and Personal history of other medical treatment.    Past Surgical History:  He has a past surgical history that includes Other surgical history (05/30/2019); Other surgical history (06/16/2020); Other surgical history (06/16/2020); Other surgical history (04/02/2021); Other surgical history (01/30/2020); Other surgical history (06/16/2020); CT angio abdomen pelvis w and or wo IV IV contrast (11/7/2019); and CT angio abdomen pelvis w and or wo IV IV contrast (7/8/2020).      Social History:  He reports that he quit smoking about 6 years ago. His smoking use included cigarettes. He has never used smokeless tobacco. He reports that he does not currently use alcohol. He reports that he does not use drugs.    Family History:  Family History   Problem Relation Name Age of Onset    No Known Problems Mother          no relationship    No Known Problems Father          no relationship     Allergies:  Patient has no known allergies.    Inpatient Medications:  Scheduled medications   Medication Dose Route Frequency    acetaminophen  1,000 mg intravenous q6h MARIO ALBERTO    cefTRIAXone  2 g intravenous q24h    hydrocortisone sodium succinate  20 mg intravenous q6h    insulin lispro  0-5 Units subcutaneous q4h    ipratropium-albuteroL  3 mL nebulization q6h    magnesium sulfate  2 g intravenous q8h    methocarbamol  1,000 mg intravenous q8h    oxygen   inhalation Continuous - Inhalation    [START ON 5/2/2024] pantoprazole  40 mg intravenous  Daily    perflutren protein A microsphere  0.5 mL intravenous Once in imaging    sulfur hexafluoride microsphr  2 mL intravenous Once in imaging     PRN medications   Medication    calcium gluconate    calcium gluconate    dextrose    dextrose    glucagon    glucagon    magnesium sulfate    magnesium sulfate    naloxone    potassium chloride     Continuous Medications   Medication Dose Last Rate    clevidipine  1-16 mg/hr 2 mg/hr (05/01/24 0831)    dexmedeTOMIDine  0.1 mcg/kg/hr 0.1 mcg/kg/hr (05/01/24 1329)    HYDROmorphone        lactated Ringer's  5 mL/hr 5 mL/hr (05/01/24 1002)    nitroglycerin  5-200 mcg/min Stopped (05/01/24 0840)     Outpatient Medications:  Current Outpatient Medications   Medication Instructions    aspirin 81 mg, oral, Daily    chlorhexidine (Peridex) 0.12 % solution Swish and spit 15 mL night before surgery and morning of surgery    ferrous sulfate (325 mg ferrous sulfate) (FEOSOL) 325 mg, oral, Daily with breakfast    fluticasone-umeclidin-vilanter (Trelegy Ellipta) 100-62.5-25 mcg blister with device 1 puff, inhalation, Daily    methotrexate (Trexall) 2.5 mg tablet 6 tablets, oral, Once Weekly, Follow directions carefully, and ask to explain any part you do not understand. Take exactly as directed.<BR>6 tabs Saturday    metoprolol succinate XL (TOPROL-XL) 50 mg, oral, Daily, Do not crush or chew.    oxybutynin XL (Ditropan-XL) 10 mg 24 hr tablet TAKE 1 TABLET BY MOUTH ONCE DAILY AS NEEDED FOR BLADDER SPASMS. DO NOT CRUSH, CHEW, OR SPLIT.    oxybutynin XL (DITROPAN-XL) 10 mg, oral, Daily PRN, Do not crush, chew, or split.    oxyCODONE (ROXICODONE) 5 mg, oral, Every 6 hours PRN    predniSONE (DELTASONE) 5 mg, oral, Daily    sacubitriL-valsartan (Entresto) 24-26 mg tablet 1 tablet, oral, 2 times daily    simvastatin (ZOCOR) 40 mg, oral, Nightly    tamsulosin (Flomax) 0.4 mg 24 hr capsule 1 capsule (0.4 mg) once daily.     Physical Exam:  Constitutional:       General: Well developed adult  without acute distress      Appearance: Normal appearance.   HENT:      Head: Normocephalic and atraumatic. No JVD. Cordis on the right internal jugular vein.   Eyes:      Conjunctiva/sclera: Conjunctivae normal.   Cardiovascular:      Rate and Rhythm: Normal rate and regular rhythm.      Heart sounds: Normal heart sounds. No murmurs or gallups.      Extremities: 2+ pulses in bilateral radial, DP and PT arteries. Capillary refill normal in all digits. No bipedal edema  Pulmonary:      Effort: Pulmonary effort is normal. No respiratory distress.      Breath sounds: Normal breath sounds bilaterally. No wheezing or rales.  Abdominal:      General: There is no distension. Active bowel sounds.      Palpations: Abdomen is soft and non-tender.  Skin:     General: Skin is warm and dry. +multiple dry rough plaques on the anterior chest  Neurological:      Mental Status: He is alert and oriented to person, place, and time. Mental status is at baseline.   Psychiatric:         Mood and Affect: Mood normal.         Behavior: Behavior normal.       Assessment/Plan   68 y/o Male with PMHx of HTN, CAD, s/p PCI with ELLY to Lcx (2020), s/p ICD, HFrEF (LVEF 25-30% in 01/2024), hx of endovascular repair of ruptured AAA, type Ia endoleak from EVAR, left ureteral CA, COPD, presented for aortic aneurysm repair. S/p aortic aneurysm repair, left nephroureterectomy for urothelial CA of left kidney (04/30/2024). HF service was consulted for GDMT optimization for HF with improved EF.     2-D echo (05/01/24) revealed LVEF 55%, normal RV systolic function, RVSP 32, dilated aortic root (4.13 cm).     Recommendations:  Once off spinal cord protocol (to maintain MAP) for 48 hours, recommending to start Toprol 12.5 mg daily. We will gradually introduce other oral GDMT regimen (Entresto 24/26 mg bid) and increase Toprol-XL based on hemodynamics.     Patient was seen and examined at the bedside with HF Cardiology attending, Dr. MADRIGAL  Kanwal.    Signed,    Ko Ivey MD

## 2024-05-01 NOTE — PROGRESS NOTES
[facility] [place]  Consult Note      Patient: Yobany Small  Age/Sex: 69 y.o., male  MRN: [id number]  Date of surgery: 4/30/2024  Admit Date: 4/30/2024   Code Status: Full Code  Length of Stay: 1      Interval History/Overnight Events:   Extubated without complication  Reports pain well controlled  Received 1U pRBCs overnight  No pressors currently  CYU  ESTEBAN: SS  [place] in place      Objective  04/29 1900 - 05/01 0659  In: 09821.6 [I.V.:5486.6]  Out: 3370 [Urine:1425; Drains:335]    Medications:  Current Facility-Administered Medications   Medication Dose Route Frequency Provider Last Rate Last Admin    acetaminophen (Ofirmev) injection 1,000 mg  1,000 mg intravenous q6h [facility] PATY Avery DNP   1,000 mg at 05/01/24 0832    calcium gluconate in NS IV 1 g  1 g intravenous q6h PRN Kimmie Puentes MD   Stopped at 05/01/24 0148    calcium gluconate in NS IV 2 g  2 g intravenous q6h PRN Kimmie Puentes MD        cefTRIAXone (Rocephin) 2 g in dextrose 5% in water (D5W) 50 mL  2 g intravenous q24h Kimmie Puentes MD   Stopped at 04/30/24 1915    clevidipine (Cleviprex) infusion 0.5 mg/mL  1-16 mg/hr intravenous Continuous PATY Avery DNP 4 mL/hr at 05/01/24 0831 2 mg/hr at 05/01/24 0831    dextrose 50 % injection 12.5 g  12.5 g intravenous q15 min PRN Kimmie Puentes MD        dextrose 50 % injection 25 g  25 g intravenous q15 min PRN Kimmie Puentes MD        glucagon (Glucagen) injection 1 mg  1 mg intramuscular q15 min PRN Kimmie Puentes MD        glucagon (Glucagen) injection 1 mg  1 mg intramuscular q15 min PRN Kimmie Puentes MD        hydrocortisone sod succ (PF) (Solu-CORTEF) injection 20 mg  20 mg intravenous q6h PATY Avery DNP        hydromorphone PCA 0.5 mg/mL in NS opioid naive   intravenous Continuous Huber Baumann MD   New Syringe/Cartridge at 05/01/24 0513    insulin lispro (HumaLOG) injection 0-5 Units  0-5 Units subcutaneous q4h Kimmie Puentes  MD   1 Units at 04/30/24 2158    ipratropium-albuteroL (Duo-Neb) 0.5-2.5 mg/3 mL nebulizer solution 3 mL  3 mL nebulization q6h PRN Kimmie Puentes MD        lactated Ringer's infusion  50 mL/hr intravenous Continuous PATY Avery, DANAY 50 mL/hr at 05/01/24 0818 50 mL/hr at 05/01/24 0818    magnesium sulfate IV 2 g  2 g intravenous q6h PRN Kimmie Puentes MD        magnesium sulfate IV 4 g  4 g intravenous q6h PRN Kimmie Puentes MD   Stopped at 04/30/24 2321    naloxone (Narcan) injection 0.2 mg  0.2 mg intravenous PRN Huber Baumann MD        nitroglycerin (Tridil) 50 mg in dextrose 5% 250 mL (0.2 mg/mL) infusion (premix)  5-200 mcg/min intravenous Continuous Kimmie Puentes MD 30 mL/hr at 05/01/24 0424 100 mcg/min at 05/01/24 0424    oxygen (O2) therapy   inhalation Continuous - Inhalation Alexys Ortiz MD   6 L/min at 05/01/24 0800    [START ON 5/2/2024] pantoprazole (ProtoNix) injection 40 mg  40 mg intravenous Daily PATY Avery DNP        potassium chloride 40 mEq in 100 mL IV premix  40 mEq intravenous q6h PRN Kimmie Puentes MD           Physical Exam                                                                                                                         Gen -  No acute distress     Neuro - Alert, oriented, conversant     CV -  Regular rate     Pulm - Symmetric chest rise, non-labored breathing, CT: SS on suction     Abd - Soft, non-tender, non-distended     Ext - Warm, well perfused     Skin - without jaunice       Psych - appropriate tone, affect      - juarez in place draining CYU      Imaging  2/23/2024 CTU:  IMPRESSION:  Left double-J ureteral stent in good position.      Solid enhancing mass within the mid left ureter just posterior to the  stent near the lumbosacral junction level measuring up to 3.4 cm in  craniocaudal dimension most compatible with urothelial neoplasm.      Distal left ureteral segment is mildly dilated and there  is  indeterminate mild wall thickening of the distal left ureteral  segment. Neoplasm not excluded.      Heterogeneous enlargement of the prostate. Lobulated mass protruding  into the base of the urinary bladder could represent a portion of the  enlarged prostate. Separate adjacent urothelial mass/neoplasm is not  excluded.      Mild left hydronephrosis and dilation of the left extrarenal pelvis.      Right renal interpolar and lower pole small probable cysts. No right  hydroureteronephrosis.      Abdominal aortic aneurysm with aorto bi-iliac stent graft similar to  prior. Stable size and configuration of the excluded sac as well as  redemonstration of heterogeneous enhancement near the right lateral  margin of the proximal stent graft compatible with endoleak.      Colonic diverticulosis without appreciable acute diverticulitis. No  bowel obstruction.    Assessment & Plan  69 y.o. male with a history of left mid ureteral tumor, now s/p explant of EVAR, open repair of abdominal aortic aneurysm via thoracoretroperitoneal approach (Vascular, and left nephroureterectomy (Dr. Thompson) on 4/30/2024.     5/1: In ICU, extubated and off pressors. Cr 1.25    -Maintain juarez; will stay in till POD7 (5/6) and will need cystogram and ESTEBAN drain Cr prior to removal (can be done in outpatient setting, if needed)  -Maintain ESTEBAN  -Diet per primary; no restrictions from urologic standpoint  -Strict I/Os  -Trend Hb, Cr  -Rest of care per ICU/ Vascular surgery    Assessment and plan discussed with chief resident Dr. Denny and attending Dr. Donna Crespo MD   Urology  Pager: [phone]

## 2024-05-01 NOTE — SIGNIFICANT EVENT
05/01/24 0259   Daily Screen   Total RSBI 20   Weaning Parameters   Weaning Vital Capacity 891 mL   Negative Inspiratory Force (NIF) -27   Spontaneous Minute Volume (MV) 13.4   Weaning Tidal Volume 651 mL   Respiratory Depth/Rhythm Regular  (RR 18)   Respiratory Effort Unlabored   Weaning Tolerance Good

## 2024-05-01 NOTE — PROGRESS NOTES
Vascular Surgery Progress Note    Subjective  POD 1 following open AAA explant and repair and left nephroureterectomy  Extubated, endorses significant pain, PCA pump intermittently helping    Current Meds  Scheduled medications  acetaminophen, 1,000 mg, intravenous, q6h MARIO ALBERTO  cefTRIAXone, 2 g, intravenous, q24h  hydrocortisone sodium succinate, 20 mg, intravenous, q6h  insulin lispro, 0-5 Units, subcutaneous, q4h  oxygen, , inhalation, Continuous - Inhalation  [START ON 5/2/2024] pantoprazole, 40 mg, intravenous, Daily      Continuous medications  clevidipine, 1-16 mg/hr, Last Rate: 2 mg/hr (05/01/24 0831)  HYDROmorphone,   lactated Ringer's, 5 mL/hr, Last Rate: 5 mL/hr (05/01/24 1002)  nitroglycerin, 5-200 mcg/min, Last Rate: Stopped (05/01/24 0840)      PRN medications  PRN medications: calcium gluconate, calcium gluconate, dextrose, dextrose, glucagon, glucagon, ipratropium-albuteroL, magnesium sulfate, magnesium sulfate, naloxone, potassium chloride    Objective    Vitals:   Temp:  [34.6 °C (94.3 °F)-37 °C (98.6 °F)] 36.7 °C (98.1 °F)  Heart Rate:  [] 83  Resp:  [11-27] 14  BP: ()/(60-67) 126/67  Arterial Line BP 1: ()/(53-94) 135/64  FiO2 (%):  [40 %-60 %] 60 %      I/O  I/O last 3 completed shifts:  In: 24639.6 (223 mL/kg) [I.V.:5486.6 (98.5 mL/kg); Blood:4632; IV Piggyback:2300]  Out: 3370 (60.5 mL/kg) [Urine:1425 (0.7 mL/kg/hr); Drains:335; Blood:1500; Chest Tube:110]  Weight: 55.7 kg       Physical Exam  General: laying in bed, NAD  Head: normocephalic, atraumatic  ENT: mucosa are moist   Respiratory: nonlabored breathing on nasal cannula   CV: RRR  GI: abdomen is soft, nontender, nondistended. NGT in place with gastric output. ESTEBAN drain with bloody output. Midline incision with island dressing in place, mild strikethrough  MSK: SHAYLA  Extremities: no swelling or deformity of b/l Les. Palpable R DP/PT, L PT   Neuro: CN II-XII grossly intact. Sensation to light touch intact    Labs:  Lab  Results   Component Value Date    WBC 10.6 05/01/2024    HGB 8.5 (L) 05/01/2024    HCT 24.3 (L) 05/01/2024    MCV 85 05/01/2024     (L) 05/01/2024     Lab Results   Component Value Date    GLUCOSE 129 (H) 05/01/2024    CALCIUM 7.7 (L) 05/01/2024     05/01/2024    K 4.0 05/01/2024    CO2 22 05/01/2024     (H) 05/01/2024    BUN 24 (H) 05/01/2024    CREATININE 1.34 (H) 05/01/2024     Lab Results   Component Value Date    ALT 9 (L) 04/30/2024    AST 13 04/30/2024    ALKPHOS 31 (L) 04/30/2024    BILITOT 0.9 04/30/2024     Results from last 7 days   Lab Units 05/01/24  0601   APTT seconds 29   INR  1.3*     Results from last 7 days   Lab Units 05/01/24  0601   POCT PH, ARTERIAL pH 7.42   POCT PCO2, ARTERIAL mm Hg 37*   POCT PO2, ARTERIAL mm Hg 78*   POCT HCO3 CALCULATED, ARTERIAL mmol/L 24.0   POCT BASE EXCESS, ARTERIAL mmol/L -0.3         Imaging  XR chest 1 view    Result Date: 5/1/2024  Interpreted By:  Cristiano Kc and Hofer Lindsay STUDY: XR CHEST 1 VIEW;  4/30/2024 5:30 pm   INDICATION: Signs/Symptoms:Intubated.   COMPARISON: Chest radiograph 11/11/2023, CTA chest abdomen and pelvis 11/11/2023   ACCESSION NUMBER(S): [id number]   ORDERING CLINICIAN: JOVANNY EDWARDS   FINDINGS: AP radiograph of the chest was provided.   MEDICAL DEVICES: Double-lumen endotracheal tube with the tip terminating 6.1 cm above the kavya and the 2nd tip terminating within the proximal left bronchi. Right Buras-Natividad catheter with the tip of the catheter projecting over the proximal pulmonary artery. Left-sided single lead pacemaker with lead in stable position compared to prior. Enteric tube overlies the esophagus and courses under the left hemidiaphragm with the tip out of the field of view. Left-sided chest tube.   CARDIOMEDIASTINAL SILHOUETTE: Cardiomediastinal silhouette is normal in size and configuration.   LUNGS: Redemonstration of bilateral emphysematous changes with linear scarring at the right upper lobe.  Previously visualized 10 mm nodularity of the left upper lobe is not well visualized on this exam, likely due to overlying chest tube. Hazy opacity over the right lower lung lobe, which likely represents atelectasis. Prominent perihilar lung markings likely representing mild pulmonary edema. No focal pulmonary consolidations. No pleural effusions or pneumothorax seen.   ABDOMEN: No remarkable upper abdominal findings.   BONES: No acute osseous changes.       1.  Interval intubation with a dual lumen endotracheal tube with the 1st tip terminating 6.1 cm above the kavya and the 2nd tip terminating within the proximal left bronchi. 2. Diffuse emphysematous changes and mild pulmonary edema.   I personally reviewed the images/study and resident's interpretation and I agree with the findings as stated by Jessica Oconnor MD (resident radiologist). This study was analyzed and interpreted at [facility], [place], [place].   MACRO: None   Signed by: Cristiano Kc 5/1/2024 8:12 AM Dictation workstation:   [id number]     Assessment:  69 y.o. 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 Patient is recovering in ICU.     Plan:   - Pain control per ICU   - Chest tube to -20 cm H2O  - Maintain ESTEBAN to bulb suction  - Spinal cord perfusion goal: MAP >80 mmHg  - NPO, NGT to LIWS  - Maintain K>4, Mg>2, Hgb>10, Plts>100, INR<1.4  - Labs q12h for 24 hrs  - monitor UOP, continue juarez  - Rocephin until chest tube comes out  - SCDs  - hold SQH, ASA    Discussed with attending physician, Dr. Charanjit Farr,   Department of Surgery / IR Integrated PGY1  Vascular [id number]

## 2024-05-01 NOTE — PROGRESS NOTES
Occupational Therapy                 Therapy Communication Note    Patient Name: Yobany Small  MRN: [id number]  Today's Date: 5/1/2024     Discipline: Occupational Therapy    Missed Visit Reason: Missed Visit Reason: Patient refused (Pt refused OT at this time.  Pt stated he is in too much pain and tired. Provided education on benefits of OOB activity. Remains unwilling, will reattempt as appropriate)    Missed Time: Attempt    Comment:

## 2024-05-01 NOTE — SIGNIFICANT EVENT
Post-Operative Check    S: Patient is s/p explant of EVAR, open repair of abdominal aortic aneurysm via thoracoretroperitoneal approach, and left nephroureterectomy, admitted to ICU extubated and sedated. Patient sedation weaned for neurologic exam. Patient given 2u pRBC in ICU with appropriate incrementation from 7.9 to 9.8.     O:  Temp:  [34.6 °C (94.3 °F)-37.6 °C (99.7 °F)] 36.5 °C (97.7 °F)  Heart Rate:  [] 65  Resp:  [14-23] 16  BP: ()/(60-85) 126/67  Arterial Line BP 1: ()/(53-80) 120/64  FiO2 (%):  [40 %-50 %] 50 %  General: Difficult to rouse, non-toxic appearing  Pulm: Intubated VC-AC  Cardio: On 0.03 Levo with MAPs in 70s.   Abd: NG in place to LIWS with gastric output. Drain with bloody output.   : Juarez in place with light blood-tinged urine  MSK: BL UE and LE strength intact. Palpable R DP/PT, L PT on doppler  Neuro: Opens eyes to voice, follows commands    A/P:  69 y.o. 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 Patient is recovering in ICU.    Plan:  - Need to perform neuro exam as soon as patient is reversed or awake  - Pain control per ICU  - Wean to extubate as tolerated  - Chest tube to -20 cmH2O   - Please notify Dr. Irvin team for diaphragm pacing  - Maintain ESTEBAN to bulb suction  - Spinal cord perfusion goal: MAP >80 mmHg  - NPO, NGT to LIWS  - Resuscitative IV fluids- 150cc/hr  - 1/2 to 1 replacement with LR for 12 hrs (mannitol given intra op); to end at 4am  - Maintain K>4, Mg>2, Hgb>10, Plts>100, INR<1.4  - Labs q12h for 24 hrs  - monitor UOP, continue juarez  - Rocephin until chest tube comes out  - SCDs  - hold SQH, ASA    Oralia Murray MD  General Surgery  Vascular [id number]

## 2024-05-01 NOTE — SIGNIFICANT EVENT
Urology - Post Op Check Note    69M with L mid ureter mass bx with LG pap UC s/p open L NU with Dr. Thompson and aortic aneurysm repair with Dr. Payne 4/30.    LG papillary urothelial carcinoma     S: still intubated but off sedation. Received one unit RBC for hg of 7.9 that incremented appropriately to 10.3. Lactate was 3.4 after surgery down to 1.5.     O:  T 36.5 / HR 80 / /67 / RR 16 / Sat 95% MV  Gen: NAD  Neuro: intubated off sedation  Card: RRR  Pulm: Non-labored breathing on mechanical ventilation. Chest tube in place.  Abd: Soft, non-distended.   Inc: c/d/I ESTEBAN with serous output.  : juarez with clear yellow urine  Ext: Warm, well perfused, MAEE, SCDs in place    A/P  POD#0 s/p open L NU. Still intubated. Off sedation. On 0.03 levophed. [id number] and ESTEBAN output 100. Cr 1.07.    - Maintain juarez and ESTEBAN drain  - Trend output and cr  -Appreciate the CTICU care    Altagracia Woods MD  Urology PGY-2  Pager: [phone]

## 2024-05-01 NOTE — NURSING NOTE
Pt c/o surgical pain and declined repositioning; use of PCA encouraged.  Pt refused physical and occupational therapy despite repeated education on the importance of early mobilization to reduce postoperative complication.  Pt stated he refuses activity until NPO status is changed and he is allowed to drink.  Educated patient on importance of maintenance of NPO status in immediate postoperative period.  Lips moistened, oral rinse refused.  FAYE Ortiz MD notified.

## 2024-05-02 ENCOUNTER — APPOINTMENT (OUTPATIENT)
Dept: RADIOLOGY | Facility: HOSPITAL | Age: 69
DRG: 252 | End: 2024-05-02
Payer: COMMERCIAL

## 2024-05-02 LAB
ALBUMIN SERPL BCP-MCNC: 3.1 G/DL (ref 3.4–5)
ALBUMIN SERPL BCP-MCNC: 3.1 G/DL (ref 3.4–5)
ALBUMIN SERPL BCP-MCNC: 3.4 G/DL (ref 3.4–5)
ALP SERPL-CCNC: 37 U/L (ref 33–136)
ALT SERPL W P-5'-P-CCNC: 7 U/L (ref 10–52)
AMYLASE SERPL-CCNC: 32 U/L (ref 29–103)
ANION GAP SERPL CALC-SCNC: 14 MMOL/L (ref 10–20)
ANION GAP SERPL CALC-SCNC: 9 MMOL/L (ref 10–20)
AST SERPL W P-5'-P-CCNC: 18 U/L (ref 9–39)
BACTERIA SPEC CULT: NORMAL
BILIRUB DIRECT SERPL-MCNC: 0 MG/DL (ref 0–0.3)
BILIRUB SERPL-MCNC: 0.4 MG/DL (ref 0–1.2)
BLOOD EXPIRATION DATE: NORMAL
BUN SERPL-MCNC: 28 MG/DL (ref 6–23)
BUN SERPL-MCNC: 31 MG/DL (ref 6–23)
CA-I BLD-SCNC: 1.1 MMOL/L (ref 1.1–1.33)
CA-I BLD-SCNC: 1.1 MMOL/L (ref 1.1–1.33)
CALCIUM SERPL-MCNC: 7.7 MG/DL (ref 8.6–10.6)
CALCIUM SERPL-MCNC: 8 MG/DL (ref 8.6–10.6)
CHLORIDE SERPL-SCNC: 105 MMOL/L (ref 98–107)
CHLORIDE SERPL-SCNC: 108 MMOL/L (ref 98–107)
CO2 SERPL-SCNC: 24 MMOL/L (ref 21–32)
CO2 SERPL-SCNC: 28 MMOL/L (ref 21–32)
CREAT SERPL-MCNC: 1.27 MG/DL (ref 0.5–1.3)
CREAT SERPL-MCNC: 1.34 MG/DL (ref 0.5–1.3)
DISPENSE STATUS: NORMAL
EGFRCR SERPLBLD CKD-EPI 2021: 58 ML/MIN/1.73M*2
EGFRCR SERPLBLD CKD-EPI 2021: 62 ML/MIN/1.73M*2
ERYTHROCYTE [DISTWIDTH] IN BLOOD BY AUTOMATED COUNT: 14.1 % (ref 11.5–14.5)
ERYTHROCYTE [DISTWIDTH] IN BLOOD BY AUTOMATED COUNT: 14.3 % (ref 11.5–14.5)
ERYTHROCYTE [DISTWIDTH] IN BLOOD BY AUTOMATED COUNT: 14.3 % (ref 11.5–14.5)
GLUCOSE BLD MANUAL STRIP-MCNC: 85 MG/DL (ref 74–99)
GLUCOSE BLD MANUAL STRIP-MCNC: 91 MG/DL (ref 74–99)
GLUCOSE BLD MANUAL STRIP-MCNC: 94 MG/DL (ref 74–99)
GLUCOSE SERPL-MCNC: 90 MG/DL (ref 74–99)
GLUCOSE SERPL-MCNC: 93 MG/DL (ref 74–99)
GRAM STN SPEC: NORMAL
GRAM STN SPEC: NORMAL
HCT VFR BLD AUTO: 24.5 % (ref 41–52)
HCT VFR BLD AUTO: 32 % (ref 41–52)
HCT VFR BLD AUTO: 33.5 % (ref 41–52)
HGB BLD-MCNC: 11.2 G/DL (ref 13.5–17.5)
HGB BLD-MCNC: 11.6 G/DL (ref 13.5–17.5)
HGB BLD-MCNC: 8.4 G/DL (ref 13.5–17.5)
LIPASE SERPL-CCNC: 9 U/L (ref 9–82)
MAGNESIUM SERPL-MCNC: 3.05 MG/DL (ref 1.6–2.4)
MAGNESIUM SERPL-MCNC: 4.33 MG/DL (ref 1.6–2.4)
MCH RBC QN AUTO: 29.6 PG (ref 26–34)
MCH RBC QN AUTO: 30.3 PG (ref 26–34)
MCH RBC QN AUTO: 30.4 PG (ref 26–34)
MCHC RBC AUTO-ENTMCNC: 34.3 G/DL (ref 32–36)
MCHC RBC AUTO-ENTMCNC: 34.6 G/DL (ref 32–36)
MCHC RBC AUTO-ENTMCNC: 35 G/DL (ref 32–36)
MCV RBC AUTO: 86 FL (ref 80–100)
MCV RBC AUTO: 87 FL (ref 80–100)
MCV RBC AUTO: 88 FL (ref 80–100)
NRBC BLD-RTO: 0 /100 WBCS (ref 0–0)
PHOSPHATE SERPL-MCNC: 3.6 MG/DL (ref 2.5–4.9)
PHOSPHATE SERPL-MCNC: 4.4 MG/DL (ref 2.5–4.9)
PLATELET # BLD AUTO: 155 X10*3/UL (ref 150–450)
PLATELET # BLD AUTO: 168 X10*3/UL (ref 150–450)
PLATELET # BLD AUTO: 176 X10*3/UL (ref 150–450)
POTASSIUM SERPL-SCNC: 4 MMOL/L (ref 3.5–5.3)
POTASSIUM SERPL-SCNC: 4.3 MMOL/L (ref 3.5–5.3)
PRODUCT BLOOD TYPE: 8400
PRODUCT CODE: NORMAL
PROT SERPL-MCNC: 5.1 G/DL (ref 6.4–8.2)
RBC # BLD AUTO: 2.84 X10*6/UL (ref 4.5–5.9)
RBC # BLD AUTO: 3.7 X10*6/UL (ref 4.5–5.9)
RBC # BLD AUTO: 3.81 X10*6/UL (ref 4.5–5.9)
SODIUM SERPL-SCNC: 139 MMOL/L (ref 136–145)
SODIUM SERPL-SCNC: 141 MMOL/L (ref 136–145)
UNIT ABO: NORMAL
UNIT NUMBER: NORMAL
UNIT RH: NORMAL
UNIT VOLUME: 350
WBC # BLD AUTO: 12.5 X10*3/UL (ref 4.4–11.3)
WBC # BLD AUTO: 13.7 X10*3/UL (ref 4.4–11.3)
WBC # BLD AUTO: 15.3 X10*3/UL (ref 4.4–11.3)
XM INTEP: NORMAL

## 2024-05-02 PROCEDURE — 2500000004 HC RX 250 GENERAL PHARMACY W/ HCPCS (ALT 636 FOR OP/ED): Performed by: STUDENT IN AN ORGANIZED HEALTH CARE EDUCATION/TRAINING PROGRAM

## 2024-05-02 PROCEDURE — 2500000004 HC RX 250 GENERAL PHARMACY W/ HCPCS (ALT 636 FOR OP/ED)

## 2024-05-02 PROCEDURE — 2500000004 HC RX 250 GENERAL PHARMACY W/ HCPCS (ALT 636 FOR OP/ED): Performed by: NURSE PRACTITIONER

## 2024-05-02 PROCEDURE — 2060000001 HC INTERMEDIATE ICU ROOM DAILY

## 2024-05-02 PROCEDURE — 97161 PT EVAL LOW COMPLEX 20 MIN: CPT | Mod: GP

## 2024-05-02 PROCEDURE — P9016 RBC LEUKOCYTES REDUCED: HCPCS

## 2024-05-02 PROCEDURE — 99233 SBSQ HOSP IP/OBS HIGH 50: CPT | Performed by: STUDENT IN AN ORGANIZED HEALTH CARE EDUCATION/TRAINING PROGRAM

## 2024-05-02 PROCEDURE — 82330 ASSAY OF CALCIUM: CPT

## 2024-05-02 PROCEDURE — 2500000001 HC RX 250 WO HCPCS SELF ADMINISTERED DRUGS (ALT 637 FOR MEDICARE OP): Performed by: STUDENT IN AN ORGANIZED HEALTH CARE EDUCATION/TRAINING PROGRAM

## 2024-05-02 PROCEDURE — 2500000004 HC RX 250 GENERAL PHARMACY W/ HCPCS (ALT 636 FOR OP/ED): Performed by: PHYSICIAN ASSISTANT

## 2024-05-02 PROCEDURE — C9113 INJ PANTOPRAZOLE SODIUM, VIA: HCPCS | Performed by: NURSE PRACTITIONER

## 2024-05-02 PROCEDURE — 36430 TRANSFUSION BLD/BLD COMPNT: CPT

## 2024-05-02 PROCEDURE — 99291 CRITICAL CARE FIRST HOUR: CPT

## 2024-05-02 PROCEDURE — 37799 UNLISTED PX VASCULAR SURGERY: CPT

## 2024-05-02 PROCEDURE — 80069 RENAL FUNCTION PANEL: CPT

## 2024-05-02 PROCEDURE — 85027 COMPLETE CBC AUTOMATED: CPT

## 2024-05-02 PROCEDURE — 71045 X-RAY EXAM CHEST 1 VIEW: CPT | Performed by: RADIOLOGY

## 2024-05-02 PROCEDURE — 97530 THERAPEUTIC ACTIVITIES: CPT | Mod: GP

## 2024-05-02 PROCEDURE — 82947 ASSAY GLUCOSE BLOOD QUANT: CPT

## 2024-05-02 PROCEDURE — 99231 SBSQ HOSP IP/OBS SF/LOW 25: CPT | Performed by: STUDENT IN AN ORGANIZED HEALTH CARE EDUCATION/TRAINING PROGRAM

## 2024-05-02 PROCEDURE — 2500000001 HC RX 250 WO HCPCS SELF ADMINISTERED DRUGS (ALT 637 FOR MEDICARE OP)

## 2024-05-02 PROCEDURE — 71045 X-RAY EXAM CHEST 1 VIEW: CPT

## 2024-05-02 PROCEDURE — 83735 ASSAY OF MAGNESIUM: CPT

## 2024-05-02 RX ORDER — METHOCARBAMOL 100 MG/ML
1000 INJECTION, SOLUTION INTRAMUSCULAR; INTRAVENOUS EVERY 8 HOURS
Qty: 60 ML | Refills: 0 | Status: COMPLETED | OUTPATIENT
Start: 2024-05-02 | End: 2024-05-04

## 2024-05-02 RX ORDER — NAPROXEN SODIUM 220 MG/1
81 TABLET, FILM COATED ORAL DAILY
Status: DISCONTINUED | OUTPATIENT
Start: 2024-05-02 | End: 2024-05-10 | Stop reason: HOSPADM

## 2024-05-02 RX ORDER — LIDOCAINE 560 MG/1
1 PATCH PERCUTANEOUS; TOPICAL; TRANSDERMAL DAILY
Status: DISCONTINUED | OUTPATIENT
Start: 2024-05-02 | End: 2024-05-03

## 2024-05-02 RX ORDER — ACETAMINOPHEN 10 MG/ML
1000 INJECTION, SOLUTION INTRAVENOUS EVERY 6 HOURS
Qty: 400 ML | Refills: 0 | Status: COMPLETED | OUTPATIENT
Start: 2024-05-02 | End: 2024-05-03

## 2024-05-02 RX ORDER — METOPROLOL TARTRATE 25 MG/1
25 TABLET, FILM COATED ORAL 2 TIMES DAILY
Status: DISCONTINUED | OUTPATIENT
Start: 2024-05-02 | End: 2024-05-10 | Stop reason: HOSPADM

## 2024-05-02 RX ORDER — HYDRALAZINE HYDROCHLORIDE 20 MG/ML
10 INJECTION INTRAMUSCULAR; INTRAVENOUS EVERY 4 HOURS PRN
Status: DISCONTINUED | OUTPATIENT
Start: 2024-05-02 | End: 2024-05-09

## 2024-05-02 RX ORDER — FUROSEMIDE 10 MG/ML
40 INJECTION INTRAMUSCULAR; INTRAVENOUS ONCE
Status: COMPLETED | OUTPATIENT
Start: 2024-05-02 | End: 2024-05-02

## 2024-05-02 RX ORDER — HEPARIN SODIUM 5000 [USP'U]/ML
5000 INJECTION, SOLUTION INTRAVENOUS; SUBCUTANEOUS EVERY 8 HOURS
Status: DISCONTINUED | OUTPATIENT
Start: 2024-05-02 | End: 2024-05-10 | Stop reason: HOSPADM

## 2024-05-02 RX ORDER — NAPROXEN SODIUM 220 MG/1
81 TABLET, FILM COATED ORAL DAILY
Status: DISCONTINUED | OUTPATIENT
Start: 2024-05-02 | End: 2024-05-02

## 2024-05-02 RX ORDER — PREDNISONE 5 MG/1
5 TABLET ORAL DAILY
Status: DISCONTINUED | OUTPATIENT
Start: 2024-05-02 | End: 2024-05-10 | Stop reason: HOSPADM

## 2024-05-02 RX ORDER — HYDRALAZINE HYDROCHLORIDE 20 MG/ML
5 INJECTION INTRAMUSCULAR; INTRAVENOUS EVERY 4 HOURS PRN
Status: DISCONTINUED | OUTPATIENT
Start: 2024-05-02 | End: 2024-05-02

## 2024-05-02 RX ADMIN — CEFTRIAXONE SODIUM 2 G: 2 INJECTION, SOLUTION INTRAVENOUS at 17:10

## 2024-05-02 RX ADMIN — HYDRALAZINE HYDROCHLORIDE 10 MG: 20 INJECTION INTRAMUSCULAR; INTRAVENOUS at 14:58

## 2024-05-02 RX ADMIN — PANTOPRAZOLE SODIUM 40 MG: 40 INJECTION, POWDER, FOR SOLUTION INTRAVENOUS at 08:26

## 2024-05-02 RX ADMIN — HYDRALAZINE HYDROCHLORIDE 5 MG: 20 INJECTION INTRAMUSCULAR; INTRAVENOUS at 14:00

## 2024-05-02 RX ADMIN — SACUBITRIL AND VALSARTAN 1 TABLET: 24; 26 TABLET, FILM COATED ORAL at 20:10

## 2024-05-02 RX ADMIN — HEPARIN SODIUM 5000 UNITS: 5000 INJECTION INTRAVENOUS; SUBCUTANEOUS at 23:09

## 2024-05-02 RX ADMIN — ASPIRIN 81 MG 81 MG: 81 TABLET ORAL at 13:03

## 2024-05-02 RX ADMIN — FUROSEMIDE 40 MG: 10 INJECTION, SOLUTION INTRAMUSCULAR; INTRAVENOUS at 12:18

## 2024-05-02 RX ADMIN — Medication: at 12:16

## 2024-05-02 RX ADMIN — SACUBITRIL AND VALSARTAN 1 TABLET: 24; 26 TABLET, FILM COATED ORAL at 13:03

## 2024-05-02 RX ADMIN — ACETAMINOPHEN 1000 MG: 10 INJECTION INTRAVENOUS at 12:18

## 2024-05-02 RX ADMIN — PREDNISONE 5 MG: 10 TABLET ORAL at 12:19

## 2024-05-02 RX ADMIN — ACETAMINOPHEN 1000 MG: 10 INJECTION INTRAVENOUS at 18:32

## 2024-05-02 RX ADMIN — METHOCARBAMOL 1000 MG: 100 INJECTION, SOLUTION INTRAMUSCULAR; INTRAVENOUS at 05:11

## 2024-05-02 RX ADMIN — METHOCARBAMOL 1000 MG: 100 INJECTION, SOLUTION INTRAMUSCULAR; INTRAVENOUS at 13:04

## 2024-05-02 RX ADMIN — METOPROLOL TARTRATE 25 MG: 25 TABLET, FILM COATED ORAL at 20:10

## 2024-05-02 RX ADMIN — HYDROCORTISONE SODIUM SUCCINATE 20 MG: 100 INJECTION, POWDER, FOR SOLUTION INTRAMUSCULAR; INTRAVENOUS at 10:59

## 2024-05-02 RX ADMIN — METOPROLOL TARTRATE 25 MG: 25 TABLET, FILM COATED ORAL at 12:19

## 2024-05-02 RX ADMIN — HEPARIN SODIUM 5000 UNITS: 5000 INJECTION INTRAVENOUS; SUBCUTANEOUS at 13:04

## 2024-05-02 ASSESSMENT — COGNITIVE AND FUNCTIONAL STATUS - GENERAL
CLIMB 3 TO 5 STEPS WITH RAILING: A LOT
STANDING UP FROM CHAIR USING ARMS: A LOT
TOILETING: A LITTLE
MOVING TO AND FROM BED TO CHAIR: A LOT
EATING MEALS: A LITTLE
MOVING FROM LYING ON BACK TO SITTING ON SIDE OF FLAT BED WITH BEDRAILS: A LITTLE
WALKING IN HOSPITAL ROOM: A LOT
TURNING FROM BACK TO SIDE WHILE IN FLAT BAD: A LITTLE
MOBILITY SCORE: 17
HELP NEEDED FOR BATHING: A LOT
MOBILITY SCORE: 14
MOVING FROM LYING ON BACK TO SITTING ON SIDE OF FLAT BED WITH BEDRAILS: A LITTLE
CLIMB 3 TO 5 STEPS WITH RAILING: A LOT
WALKING IN HOSPITAL ROOM: A LITTLE
TURNING FROM BACK TO SIDE WHILE IN FLAT BAD: A LITTLE
PERSONAL GROOMING: A LITTLE
DAILY ACTIVITIY SCORE: 16
STANDING UP FROM CHAIR USING ARMS: A LITTLE
MOVING TO AND FROM BED TO CHAIR: A LITTLE
DRESSING REGULAR LOWER BODY CLOTHING: A LOT
DRESSING REGULAR UPPER BODY CLOTHING: A LITTLE

## 2024-05-02 ASSESSMENT — PAIN SCALES - GENERAL
PAINLEVEL_OUTOF10: 3
PAINLEVEL_OUTOF10: 4
PAINLEVEL_OUTOF10: 2
PAINLEVEL_OUTOF10: 6
PAINLEVEL_OUTOF10: 4
PAINLEVEL_OUTOF10: 6
PAINLEVEL_OUTOF10: 4
PAINLEVEL_OUTOF10: 3
PAINLEVEL_OUTOF10: 4

## 2024-05-02 ASSESSMENT — ACTIVITIES OF DAILY LIVING (ADL)
ADL_ASSISTANCE: INDEPENDENT
ADLS_ADDRESSED: NO

## 2024-05-02 ASSESSMENT — PAIN - FUNCTIONAL ASSESSMENT
PAIN_FUNCTIONAL_ASSESSMENT: 0-10

## 2024-05-02 ASSESSMENT — PAIN DESCRIPTION - DESCRIPTORS
DESCRIPTORS: DISCOMFORT

## 2024-05-02 NOTE — PROGRESS NOTES
"Yobany Small is a 69 y.o. male on day 2 of admission presenting with Type Ia endoleak of aortic graft (CMS-HCC).    Subjective   Dobutamine stopped, Index 1.9 and so epi started. Sedation weaned and patient extubated. All pressors weaned off and was HTN so NTG infusion started.        Objective     Physical Exam  HENT:      Head: Normocephalic.      Mouth/Throat:      Mouth: Mucous membranes are moist.   Eyes:      Pupils: Pupils are equal, round, and reactive to light.   Cardiovascular:      Rate and Rhythm: Normal rate.   Pulmonary:      Effort: No respiratory distress.      Breath sounds: Normal breath sounds.      Comments: Left chest tube, serosanguinous drainage, no air leak noted  Abdominal:      Palpations: Abdomen is soft.      Comments: Tender to touch, ESTEBAN LLQ with minimal output   Skin:     General: Skin is warm and dry.      Capillary Refill: Capillary refill takes less than 2 seconds.   Neurological:      General: No focal deficit present.      Mental Status: He is alert and oriented to person, place, and time.         Last Recorded Vitals  Blood pressure 126/67, pulse 103, temperature 36.4 °C (97.5 °F), temperature source Temporal, resp. rate (!) 27, height 1.803 m (5' 11\"), weight 55.7 kg (122 lb 12.7 oz), SpO2 97%.  Intake/Output last 3 Shifts:  I/O last 3 completed shifts:  In: 3987.4 (71.6 mL/kg) [I.V.:2687.4 (48.2 mL/kg); Blood:600; IV Piggyback:700]  Out: 1800 (32.3 mL/kg) [Urine:1310 (0.7 mL/kg/hr); Drains:235; Chest Tube:255]  Weight: 55.7 kg     Relevant Results  Results for orders placed or performed during the hospital encounter of 04/30/24 (from the past 24 hour(s))   POCT GLUCOSE   Result Value Ref Range    POCT Glucose 115 (H) 74 - 99 mg/dL   POCT GLUCOSE   Result Value Ref Range    POCT Glucose 106 (H) 74 - 99 mg/dL   Transthoracic Echo (TTE) Complete   Result Value Ref Range    AV pk eliot 0.88 m/s    LVOT diam 2.50 cm    MV E/A ratio 0.80     Tricuspid annular plane systolic " excursion 2.7 cm    RVSP 32.2 mmHg    Aortic Valve Area by Continuity of Peak Velocity 3.66 cm2    AV pk grad 3.1 mmHg   CBC   Result Value Ref Range    WBC 11.7 (H) 4.4 - 11.3 x10*3/uL    nRBC 0.0 0.0 - 0.0 /100 WBCs    RBC 2.98 (L) 4.50 - 5.90 x10*6/uL    Hemoglobin 8.7 (L) 13.5 - 17.5 g/dL    Hematocrit 25.2 (L) 41.0 - 52.0 %    MCV 85 80 - 100 fL    MCH 29.2 26.0 - 34.0 pg    MCHC 34.5 32.0 - 36.0 g/dL    RDW 14.4 11.5 - 14.5 %    Platelets 146 (L) 150 - 450 x10*3/uL   Phosphorus   Result Value Ref Range    Phosphorus 4.1 2.5 - 4.9 mg/dL   Calcium, Ionized   Result Value Ref Range    POCT Calcium, Ionized 1.13 1.1 - 1.33 mmol/L   Magnesium   Result Value Ref Range    Magnesium 2.32 1.60 - 2.40 mg/dL   Hepatic Function Panel   Result Value Ref Range    Albumin 3.0 (L) 3.4 - 5.0 g/dL    Bilirubin, Total 0.7 0.0 - 1.2 mg/dL    Bilirubin, Direct 0.2 0.0 - 0.3 mg/dL    Alkaline Phosphatase 35 33 - 136 U/L    ALT 7 (L) 10 - 52 U/L    AST 18 9 - 39 U/L    Total Protein 4.6 (L) 6.4 - 8.2 g/dL   Basic Metabolic Panel   Result Value Ref Range    Glucose 86 74 - 99 mg/dL    Sodium 142 136 - 145 mmol/L    Potassium 4.2 3.5 - 5.3 mmol/L    Chloride 108 (H) 98 - 107 mmol/L    Bicarbonate 24 21 - 32 mmol/L    Anion Gap 14 10 - 20 mmol/L    Urea Nitrogen 26 (H) 6 - 23 mg/dL    Creatinine 1.32 (H) 0.50 - 1.30 mg/dL    eGFR 59 (L) >60 mL/min/1.73m*2    Calcium 7.8 (L) 8.6 - 10.6 mg/dL   POCT GLUCOSE   Result Value Ref Range    POCT Glucose 105 (H) 74 - 99 mg/dL   POCT GLUCOSE   Result Value Ref Range    POCT Glucose 92 74 - 99 mg/dL   Calcium, Ionized   Result Value Ref Range    POCT Calcium, Ionized 1.10 1.1 - 1.33 mmol/L   CBC   Result Value Ref Range    WBC 12.5 (H) 4.4 - 11.3 x10*3/uL    nRBC 0.0 0.0 - 0.0 /100 WBCs    RBC 2.84 (L) 4.50 - 5.90 x10*6/uL    Hemoglobin 8.4 (L) 13.5 - 17.5 g/dL    Hematocrit 24.5 (L) 41.0 - 52.0 %    MCV 86 80 - 100 fL    MCH 29.6 26.0 - 34.0 pg    MCHC 34.3 32.0 - 36.0 g/dL    RDW 14.3 11.5 -  14.5 %    Platelets 155 150 - 450 x10*3/uL   Magnesium   Result Value Ref Range    Magnesium 4.33 (H) 1.60 - 2.40 mg/dL   Renal Function Panel   Result Value Ref Range    Glucose 93 74 - 99 mg/dL    Sodium 141 136 - 145 mmol/L    Potassium 4.3 3.5 - 5.3 mmol/L    Chloride 108 (H) 98 - 107 mmol/L    Bicarbonate 28 21 - 32 mmol/L    Anion Gap 9 (L) 10 - 20 mmol/L    Urea Nitrogen 28 (H) 6 - 23 mg/dL    Creatinine 1.34 (H) 0.50 - 1.30 mg/dL    eGFR 58 (L) >60 mL/min/1.73m*2    Calcium 8.0 (L) 8.6 - 10.6 mg/dL    Phosphorus 4.4 2.5 - 4.9 mg/dL    Albumin 3.1 (L) 3.4 - 5.0 g/dL       XR chest 1 view    Result Date: 5/1/2024  Interpreted By:  Cristiano Kc  and Gladis Christiansen STUDY: XR CHEST 1 VIEW;  5/1/2024 4:29 am   INDICATION: Signs/Symptoms:Post op intubated.   COMPARISON: Chest radiograph 04/30/2024   ACCESSION NUMBER(S): [id number]   ORDERING CLINICIAN: JOVANNY EDWARDS   FINDINGS: AP radiograph of the chest was provided.   Dual-lumen endotracheal tube noted with tip of 1st lumen projecting approximately 4.0 cm above the kavya. The tip of the 2nd lumen of the endotracheal tube continues to project within the proximal left main bronchus.   Left chest tube is unchanged in position. AICD remains on the left with the tip of the lead at the apex of the right ventricle. [place]-Natividad catheter remains with tip projecting over the main pulmonary artery.   CARDIOMEDIASTINAL SILHOUETTE: Cardiomediastinal silhouette is stable in size and configuration.   LUNGS: The left costophrenic angle is outside the field of view. Hyperlucency of the lungs is noted along with evidence of bleb and bullous formation bilaterally.   There is right basilar hazy opacity that has improved slightly since the previous study. Mild perihilar and interstitial thickening has also improved. There is no apparent pleural fluid collection or pneumothorax.   ABDOMEN: No remarkable upper abdominal findings.   BONES: No acute osseous changes.       1. Slight  improvement in the appearance of the chest as detailed above. Underlying changes chronic lung disease.     I personally reviewed the images/study and I agree with the findings as stated by Kuldip Griffith MD. This study was interpreted at [facility], [place], [place].   MACRO: None   Signed by: Cristiano Kc 5/1/2024 12:55 PM Dictation workstation:   [id number]    Transthoracic Echo (TTE) Complete    Result Date: 5/1/2024   [facility], [address] [place] [address]                Tel [phone] and Fax [phone] TRANSTHORACIC ECHOCARDIOGRAM REPORT  Patient Name:      PRICE LEVIN    Reading Physician:    [id number] Miky Tong MD Study Date:        5/1/2024             Ordering Provider:    [id number] IGNACIO MRN/PID:           [id number]             Fellow: Accession#:        [id number]         Nurse: Date of Birth/Age: 1955 / 69 years Sonographer:          Roxy Irvin [facility] Gender:            M                    Additional Staff: Height:            180.34 cm            Admit Date: Weight:            55.34 kg             Admission Status:     Inpatient - STAT BSA / BMI:         1.71 m2 / 17.02      Encounter#:           [id number]                    kg/m2                                         Department Location:  [facility] Blood Pressure: 140 /71 mmHg Study Type:    TRANSTHORACIC ECHO (TTE) COMPLETE Diagnosis/ICD: Chronic systolic (congestive) heart failure (CHF)-I50.22 Indication:    CHF, CAD CPT Code:      Echo Complete w Full Doppler-[id number] Patient History: Pertinent History: CAD, HTN, COPD and Dyspnea. Ischemic crdiomyopathy, Endo leak                    of aortic graft, s/p AAA repair. Study Detail: The following Echo studies were performed: 2D, M-Mode, Doppler and               color flow. Technically challenging study due to patient lying in               supine  position, postoperative dressings, chest tubes and poor               acoustic windows. Definity used as a contrast agent for               endocardial border definition. Total contrast used for this               procedure was 4 mL via IV push. Unable to obtain suprasternal               notch view.  PHYSICIAN INTERPRETATION: Left Ventricle: The left ventricular systolic function is low normal, with an estimated ejection fraction of 55%. The left ventricular cavity size is normal. Abnormal (paradoxical) septal motion, consistent with an intraventricular conduction delay. Left ventricular diastolic filling was indeterminate. LV not well visualized. Left Atrium: The left atrium was not well visualized. Right Ventricle: The right ventricle is normal in size. There is normal right ventricular global systolic function. A device is visualized in the right ventricle. Right Atrium: The right atrium was not well visualized. Aortic Valve: The aortic valve was not well visualized. Suspect trileaflet. There is mild aortic valve cusp calcification. There is trivial aortic valve regurgitation. The peak instantaneous gradient of the aortic valve is 3.1 mmHg. Mitral Valve: The mitral valve is normal in structure. There is trace mitral valve regurgitation. Tricuspid Valve: The tricuspid valve is structurally normal. There is trace to mild tricuspid regurgitation. Pulmonic Valve: The pulmonic valve is not well visualized. There is trace pulmonic valve regurgitation. Pericardium: There is a trivial pericardial effusion. There is an anterior clear space. Aorta: The aortic root is abnormal. The Ao Sinus is 4.20 cm. There is moderate dilatation the aortic root. Pulmonary Artery: The tricuspid regurgitant velocity is 2.61 m/s, and with an estimated right atrial pressure of 5 mmHg, the estimated pulmonary artery pressure is borderline elevated with the RVSP at 32.2 mmHg. Systemic Veins: The inferior vena cava appears to be of upper normal  size. In comparison to the previous echocardiogram(s): Compared with study from 2024, EF appears improved.  CONCLUSIONS:  1. Poorly visualized anatomical structures due to suboptimal image quality.  2. Left ventricular systolic function appears to be low normal (EF 55%)  3. There is moderate dilatation of the aortic root. QUANTITATIVE DATA SUMMARY: 2D MEASUREMENTS:                    Normal Ranges: Ao Root d: 4.13 cm (2.0-3.7cm) AORTA MEASUREMENTS:                      Normal Ranges: Ao Sinus, d: 4.20 cm (2.1-3.5cm) LV DIASTOLIC FUNCTION:                        Normal Ranges: MV Peak E: 0.40 m/s    (0.7-1.2 m/s) MV Peak A: 0.51 m/s    (0.42-0.7 m/s) E/A Ratio: 0.80        (1.0-2.2) MV A Dur:  119.00 msec MITRAL VALVE:                 Normal Ranges: MV DT: 161 msec (150-240msec) AORTIC VALVE:                         Normal Ranges: AoV Vmax:      0.88 m/s (<=1.7m/s) AoV Peak PG:   3.1 mmHg (<20mmHg) LVOT Max Billy:  0.66 m/s (<=1.1m/s) LVOT VTI:      12.50 cm LVOT Diameter: 2.50 cm  (1.8-2.4cm) AoV Area,Vmax: 3.66 cm2 (2.5-4.5cm2)  RIGHT VENTRICLE: TAPSE: 26.9 mm TRICUSPID VALVE/RVSP:                             Normal Ranges: Peak TR Velocity: 2.61 m/s RV Syst Pressure: 32.2 mmHg (< 30mmHg) IVC Diam:         2.00 cm PULMONIC VALVE:                         Normal Ranges: PV Accel Time: 98 msec  (>120ms) PV Max Billy:    0.8 m/s  (0.6-0.9m/s) PV Max P.3 mmHg  [id number] Miky Tong MD Electronically signed on 2024 at 12:38:40 PM  ** Final **     XR chest 1 view    Result Date: 2024  Interpreted By:  Cristiano Kc  and Elvin Lopez STUDY: XR CHEST 1 VIEW;  2024 5:30 pm   INDICATION: Signs/Symptoms:Intubated.   COMPARISON: Chest radiograph 2023, CTA chest abdomen and pelvis 2023   ACCESSION NUMBER(S): [id number]   ORDERING CLINICIAN: JOVANNY EDWARDS   FINDINGS: AP radiograph of the chest was provided.   MEDICAL DEVICES: Double-lumen endotracheal tube with the tip terminating 6.1 cm  above the kavya and the 2nd tip terminating within the proximal left bronchi. Right [place]-Natividad catheter with the tip of the catheter projecting over the proximal pulmonary artery. Left-sided single lead pacemaker with lead in stable position compared to prior. Enteric tube overlies the esophagus and courses under the left hemidiaphragm with the tip out of the field of view. Left-sided chest tube.   CARDIOMEDIASTINAL SILHOUETTE: Cardiomediastinal silhouette is normal in size and configuration.   LUNGS: Redemonstration of bilateral emphysematous changes with linear scarring at the right upper lobe. Previously visualized 10 mm nodularity of the left upper lobe is not well visualized on this exam, likely due to overlying chest tube. Hazy opacity over the right lower lung lobe, which likely represents atelectasis. Prominent perihilar lung markings likely representing mild pulmonary edema. No focal pulmonary consolidations. No pleural effusions or pneumothorax seen.   ABDOMEN: No remarkable upper abdominal findings.   BONES: No acute osseous changes.       1.  Interval intubation with a dual lumen endotracheal tube with the 1st tip terminating 6.1 cm above the kavya and the 2nd tip terminating within the proximal left bronchi. 2. Diffuse emphysematous changes and mild pulmonary edema.   I personally reviewed the images/study and resident's interpretation and I agree with the findings as stated by Jessica Oconnor MD (resident radiologist). This study was analyzed and interpreted at [facility], [place], [place].   MACRO: None   Signed by: Cristiano Kc 5/1/2024 8:12 AM Dictation workstation:   [id number]     Scheduled medications  cefTRIAXone, 2 g, intravenous, q24h  fluticasone furoate-vilanteroL, 1 puff, inhalation, Daily  hydrocortisone sodium succinate, 20 mg, intravenous, q24h  insulin lispro, 0-5 Units, subcutaneous, q4h  magnesium sulfate, 2 g, intravenous, q8h  methocarbamol, 1,000  mg, intravenous, q8h  pantoprazole, 40 mg, intravenous, Daily  perflutren protein A microsphere, 0.5 mL, intravenous, Once in imaging  sulfur hexafluoride microsphr, 2 mL, intravenous, Once in imaging  tiotropium, 2 puff, inhalation, Daily      Continuous medications  clevidipine, 1-16 mg/hr, Last Rate: Stopped (05/01/24 1830)  dexmedeTOMIDine, 0.1 mcg/kg/hr, Last Rate: Stopped (05/02/24 0540)  HYDROmorphone,   lactated Ringer's, 5 mL/hr, Last Rate: 5 mL/hr (05/02/24 0700)      PRN medications  PRN medications: calcium gluconate, calcium gluconate, dextrose, dextrose, glucagon, glucagon, ipratropium-albuteroL, magnesium sulfate, magnesium sulfate, naloxone, oxygen, potassium chloride           .  Current Outpatient Medications   Medication Instructions    aspirin 81 mg, oral, Daily    chlorhexidine (Peridex) 0.12 % solution Swish and spit 15 mL night before surgery and morning of surgery    ferrous sulfate (325 mg ferrous sulfate) (FEOSOL) 325 mg, oral, Daily with breakfast    fluticasone-umeclidin-vilanter (Trelegy Ellipta) 100-62.5-25 mcg blister with device 1 puff, inhalation, Daily    methotrexate (Trexall) 2.5 mg tablet 6 tablets, oral, Once Weekly, Follow directions carefully, and ask to explain any part you do not understand. Take exactly as directed.<BR>6 tabs Saturday    metoprolol succinate XL (TOPROL-XL) 50 mg, oral, Daily, Do not crush or chew.    oxybutynin XL (Ditropan-XL) 10 mg 24 hr tablet TAKE 1 TABLET BY MOUTH ONCE DAILY AS NEEDED FOR BLADDER SPASMS. DO NOT CRUSH, CHEW, OR SPLIT.    oxybutynin XL (DITROPAN-XL) 10 mg, oral, Daily PRN, Do not crush, chew, or split.    oxyCODONE (ROXICODONE) 5 mg, oral, Every 6 hours PRN    predniSONE (DELTASONE) 5 mg, oral, Daily    sacubitriL-valsartan (Entresto) 24-26 mg tablet 1 tablet, oral, 2 times daily    simvastatin (ZOCOR) 40 mg, oral, Nightly    tamsulosin (Flomax) 0.4 mg 24 hr capsule 1 capsule (0.4 mg) once daily.         Assessment/Plan   Principal  Problem:    Type Ia endoleak of aortic graft (CMS-[facility])  Active Problems:    Urothelial carcinoma of kidney, left (Multi)    70 y/o male with hx of AAA s/p EVAR (5/2020) with type Ia endoleak now s/p open repair and explant with Dr. Street on 4/30. Also had left nephroureterectomy for urothelial carcinoma of L kidney performed at same time. PMH s/f CAD w/ ELLY to LCx (2020), HFrEF (25-30%) 2/2 ischemic cardiomyopathy s/p ICD, COPD, HTN, psoriatic arthritis, and anemia.      Plan:  NEURO: No PMH. PERRL.   - ongoing neuro/neurovasc/pain assessments  - scheduled tylenol  - PCA  - consult acute pain, refused block yesterday     CV: PMH ruptured AAA s/p EVAR (2020) which was c/b RTOR to r/o abdominal compartment syndrome and treat type II leak, requiring RTOR. Other PMH CAD s/p ELLY to LCx, 40% stenosis of proximal LAD (2020), HFrEF 25-30% 2/2 cardiomyopathy s/p ICD, HTN, HLD. Preop stress test not c/f ischemia, and VIJAY w/ stable EF. Now with enlargement of sac from 3.7 to 4.4 cm and type Ia endoleak s/p open repair and explant with Dr. Street 4/30. Arrived on Norepi and dobutamine. CI 2.6 SVR 1000 on arrival. Dobutamine weaned off. Palpable R DP, PT and L PT pulses.    - Goal MAP  per vascular   - L sided chest tube w/ air leak. Maintain at -20 cmH2O.   - continuous ekg/abp/pap monitoring  - Home meds: asa, metoprolol succinate 50mg, entresto 24-26mg, simvastatin. Hold for now.   - Transition from NTG to Cleviprex to maintain MAP goals above, introduce home meds once able and no longer NPO  - now that off pressors will dose hydrocortisone to 20 mg daily which is home prednisone (5 mg daily) equivalent  - consult heart failure    PULM: Hx of COPD. Preop PFTs indicative of moderate-severe obstructive disease FEV1/FVC (52% predicted). Intubated. 39 Fr DLETT. VC-AC 40% FiO2, peep 8. Post op PaO2 106. DLETT remained in place d/t c/f airway edema. Extubated overnight.   - Remain intubated overnight d/t c/f airway edema    - PRN duoneb  - Q1h incentive spirometer after extubation  - F/U post op cxr  - Home meds: trelegy-ellipta       GI: No PMH. Strict NPO  - NG-> LIWS  - PPI for GI prophylaxis.  - f/u post op and daily amylase, lipase, hepatic function panel.  - pantoprazole, 40 mg, intravenous, BID --> change to daily     : Baseline hematuria 2/2 urothelial carcinoma of L kidney. S/p left nephroureterectomy 4/30 w/ Dr. Thompson. Baseline Cr 0.94. Supra mesenteric cross clamp time 40 minutes. +9L past 24 hrs. Cr 1.34 today. K is 4, Mg 2.3, Ph is 3.7.   - Check renal function panel daily and PRN  - Stop Maintenance IVF  - Repeat ECHO  - Volume resuscitation as needed.    - Goal U/O >0.5ml/kg/hr.    - Replete electrolytes to goal K>4, Mg>2, Phos>2.5, ionized Ca>1.10 if creatinine remains WNL or <1.5 with adequate uop.  - Home meds: oxybutynin 10mg PRN, tamsulosin 0.4mg. Hold for now.      HEME: Hx anemia. Baseline Hgb 12.6.  Acute surgical blood loss anemia. 6 RBC, 5 FFP, 3 Plt in OR. 2 RBC, 1 plt on arrival. 2 RBC, 4 FFP, 1 Plt on hold. No transfusions thus far in ICU, HGB 8.5 this AM.   - CBC daily and PRN  - Check CBC, coags, fibrinogen daily and PRN  - scds for dvt prophylaxis.  - no subcutaneous heparin per vascular surgery at this time  - Home meds: Iron, B12. Hold for now.      ENDO: No hx. A1C 5.3.   - Q4h BG and SSI Lispro per ICU protocol.  - Daily pred 5 for unknown duration -- placed on stress dose steroids  - q4hr POCT BGs     ID: afebrile, no leukocytosis  - Ceftriaxone 2g until chest tube removed.   - monitor for s/s infection     MSK: Hx of psoriatic arthritis.  - Home meds: MTX 15mg and prednisone 5mg. Hold for now.   - Hydrocortisone equivalent while NPO       Skin: No active skin issues.  - preventative Mepilex dressings in place on sacrum and heels  - change preventative Mepilex weekly or more frequently as indicated (when moist/soiled)   - every shift skin assessment per nursing and weekly ICU skin rounds  -  moisture barrier to be applied with rafael care  - active skin problems addressed with nursing on daily rounds     Lines:   RIJ MAC w/ PAC 4/30 - remove PAC if no changes on repeat ECHO today and keep MAC  R Radial A line 4/30  PIVs: 16G LFA, 14G RFA  [place] 4/30  LLQ ESTEBAN drain 4/30  L sided chest tube 4/30     I have discussed the case with the resident/advanced practice provider. I have personally performed a history, physical exam, and my own medical decision making. I have reviewed the note and agree with the findings and plan with the following exceptions as identified below. I have personally provided 34 minutes of critical care time exclusive of time spent on separately billable procedures. Time includes review of laboratory data, radiology results, discussion with consultants, family and monitoring for potential decompensation. Interventions were performed as documented above.      Family/Surrogate updated with plan of care.  Code status addressed/up to date.

## 2024-05-02 NOTE — PROGRESS NOTES
Overnight Events:    No major overnight events. Pt up in chair and appears in pain due to left abdomen/flank. Bleeding noted from chest tube on that side. Wife at bedside and answered all questions.      Objective Data:  Last Recorded Vitals:  Vitals:    05/02/24 1515 05/02/24 1530 05/02/24 1545 05/02/24 1600   BP:       BP Location:       Pulse: 93 78 82 88   Resp: (!) 27 12 17 20   Temp:       TempSrc:       SpO2: (!) 81% 97% 95% 96%   Weight:       Height:           Last Labs:  CBC - 5/2/2024:  1:13 PM  13.7 11.2 168    32.0      CMP - 5/2/2024:  1:13 PM  7.7 5.1 18 --- 0.4   3.6 3.4 7 37      PTT - 5/1/2024:  6:01 AM  1.3   14.3 29     TROPHS   Date/Time Value Ref Range Status   11/11/2023 09:44 PM 3 0 - 20 ng/L Final   11/11/2023 08:41 PM 7 0 - 20 ng/L Final   03/14/2023 03:37 AM 8 0 - 20 ng/L Final     Comment:     .  Less than 99th percentile of normal range cutoff-  Female and children under 18 years old <14 ng/L; Male <21 ng/L: Negative  Repeat testing should be performed if clinically indicated.   .  Female and children under 18 years old 14-50 ng/L; Male 21-50 ng/L:  Consistent with possible cardiac damage and possible increased clinical   risk. Serial measurements may help to assess extent of myocardial damage.   .  >50 ng/L: Consistent with cardiac damage, increased clinical risk and  myocardial infarction. Serial measurements may help assess extent of   myocardial damage.   .   NOTE: Children less than 1 year old may have higher baseline troponin   levels and results should be interpreted in conjunction with the overall   clinical context.   .  NOTE: Troponin I testing is performed using a different   testing methodology at [facility] [place] than at other   [facility] hospitals. Direct result comparisons should only   be made within the same method.       BNP   Date/Time Value Ref Range Status   03/14/2023 03:37 AM 44 0 - 99 pg/mL Final     Comment:     .  <100 pg/mL - Heart failure  unlikely  100-299 pg/mL - Intermediate probability of acute heart  .               failure exacerbation. Correlate with clinical  .               context and patient history.    >=300 pg/mL - Heart Failure likely. Correlate with clinical  .               context and patient history.  BNP testing is performed using different testing   methodology at [facility] than at other   system [facility]. Direct result comparisons should   only be made within the same method.     05/11/2020 08:06 AM 2,067 0 - 99 pg/mL Final     Comment:     .  <100 pg/mL - Heart failure unlikely  100-299 pg/mL - Intermediate probability of acute heart  .               failure exacerbation. Correlate with clinical  .               context and patient history.    >=300 pg/mL - Heart Failure likely. Correlate with clinical  .               context and patient history.  BNP testing is performed using different testing   methodology at [facility] than at other   [facility] hospitals. Direct result comparisons should   only be made within the same method.       HGBA1C   Date/Time Value Ref Range Status   04/22/2024 09:56 AM 5.3 see below % Final   05/22/2023 12:09 PM 5.4 % Final     Comment:          Diagnosis of Diabetes-Adults   Non-Diabetic: < or = 5.6%   Increased risk for developing diabetes: 5.7-6.4%   Diagnostic of diabetes: > or = 6.5%  .       Monitoring of Diabetes                Age (y)     Therapeutic Goal (%)   Adults:          >18           <7.0   Pediatrics:    13-18           <7.5                   7-12           <8.0                   0- 6            7.5-8.5   American Diabetes Association. Diabetes Care 33(S1), Jan 2010.     01/17/2020 06:20 AM 5.8 % Final     Comment:          Diagnosis of Diabetes-Adults   Non-Diabetic: < or = 5.6%   Increased risk for developing diabetes: 5.7-6.4%   Diagnostic of diabetes: > or = 6.5%  .       Monitoring of Diabetes                Age (y)     Therapeutic Goal (%)   Adults:           >18           <7.0   Pediatrics:    13-18           <7.5                   7-12           <8.0                   0- 6            7.5-8.5   American Diabetes Association. Diabetes Care 33(S1), Jan 2010.       VLDL   Date/Time Value Ref Range Status   05/22/2023 12:09 PM 17 0 - 40 mg/dL Final      Last I/O:  I/O last 3 completed shifts:  In: 3987.4 (71.6 mL/kg) [I.V.:2687.4 (48.2 mL/kg); Blood:600; IV Piggyback:700]  Out: 1800 (32.3 mL/kg) [Urine:1310 (0.7 mL/kg/hr); Drains:235; Chest Tube:255]  Weight: 55.7 kg     Past Cardiology Tests (Last 3 Years):  Echo:  Transthoracic Echo (TTE) Complete 05/01/2024  Left Ventricle: The left ventricular systolic function is low normal, with an estimated ejection fraction of 55%. The left ventricular cavity size is normal. Abnormal (paradoxical) septal motion, consistent with an intraventricular conduction delay. Left ventricular diastolic filling was indeterminate. LV not well visualized.  Left Atrium: The left atrium was not well visualized.  Right Ventricle: The right ventricle is normal in size. There is normal right ventricular global systolic function. A device is visualized in the right ventricle.  Right Atrium: The right atrium was not well visualized.  Aortic Valve: The aortic valve was not well visualized. Suspect trileaflet. There is mild aortic valve cusp calcification. There is trivial aortic valve regurgitation. The peak instantaneous gradient of the aortic valve is 3.1 mmHg.  Mitral Valve: The mitral valve is normal in structure. There is trace mitral valve regurgitation.  Tricuspid Valve: The tricuspid valve is structurally normal. There is trace to mild tricuspid regurgitation.  Pulmonic Valve: The pulmonic valve is not well visualized. There is trace pulmonic valve regurgitation.  Pericardium: There is a trivial pericardial effusion. There is an anterior clear space.  Aorta: The aortic root is abnormal. The Ao Sinus is 4.20 cm. There is moderate  dilatation the aortic root.  Pulmonary Artery: The tricuspid regurgitant velocity is 2.61 m/s, and with an estimated right atrial pressure of 5 mmHg, the estimated pulmonary artery pressure is borderline elevated with the RVSP at 32.2 mmHg.  Systemic Veins: The inferior vena cava appears to be of upper normal size.  In comparison to the previous echocardiogram(s): Compared with study from 1/23/2024, EF appears improved.     Transthoracic echo (TTE) complete 01/23/2024  Left Ventricle: The left ventricular systolic function is severely decreased, with an estimated ejection fraction of 25-30%. There is global hypokinesis of the left ventricle with minor regional variations. The left ventricular cavity size is normal. Spectral Doppler shows an impaired relaxation pattern of left ventricular diastolic filling.  Left Atrium: The left atrium is normal in size.  Right Ventricle: The right ventricle is normal in size. There is normal right ventricular global systolic function.  Right Atrium: The right atrium is normal in size.  Aortic Valve: The aortic valve is trileaflet. There is trace to mild aortic valve regurgitation. The peak instantaneous gradient of the aortic valve is 4.3 mmHg. The mean gradient of the aortic valve is 2.4 mmHg.  Mitral Valve: The mitral valve is normal in structure. There is trace to mild mitral valve regurgitation.  Tricuspid Valve: The tricuspid valve is structurally normal. There is mild tricuspid regurgitation. The Doppler estimated RVSP is mild to moderately elevated at 35.9 mmHg.  Pulmonic Valve: The pulmonic valve is structurally normal. There is trace pulmonic valve regurgitation.  Pericardium: There is no pericardial effusion noted.  Aorta: The aortic root is abnormal. There is moderate dilatation of the ascending aorta. There is moderate dilatation the aortic root.     Ejection Fractions:        EF   Date/Time Value Ref Range Status   01/23/2024 10:06 AM 25 %        Cath:  [facility]  (01/2020)  Luminal irregularities in left main 40% proximal LAD stenosis with luminal irregularities in the rest of LAD; 95% mid left circumflex is stenosis with a 70% stenosis more proximal to that and luminal irregularities in the rest of left circumflex system. A small nondominant RCA with no significant stenosis  LVEDP 31 mmHg with no significant gradient across aortic valve  EF 20% with an echocardiogram.     Stress Test:  Nuclear Stress Test 01/23/2024  Normal stress myocardial perfusion imaging in response to  pharmacologic stress. Severely reduced left ventricular systolic  function with a gated ejection fraction of 37%.      Inpatient Medications:  Scheduled medications   Medication Dose Route Frequency    acetaminophen  1,000 mg intravenous q6h    aspirin  81 mg oral Daily    cefTRIAXone  2 g intravenous q24h    fluticasone furoate-vilanteroL  1 puff inhalation Daily    heparin (porcine)  5,000 Units subcutaneous q8h    insulin lispro  0-5 Units subcutaneous q4h    lidocaine  1 patch transdermal Daily    methocarbamol  1,000 mg intravenous q8h    metoprolol tartrate  25 mg oral BID    pantoprazole  40 mg intravenous Daily    perflutren protein A microsphere  0.5 mL intravenous Once in imaging    predniSONE  5 mg oral Daily    sacubitriL-valsartan  1 tablet oral BID    sulfur hexafluoride microsphr  2 mL intravenous Once in imaging    tiotropium  2 puff inhalation Daily     PRN medications   Medication    calcium gluconate    calcium gluconate    dextrose    dextrose    glucagon    glucagon    hydrALAZINE    ipratropium-albuteroL    magnesium sulfate    magnesium sulfate    naloxone    oxygen    potassium chloride     Continuous Medications   Medication Dose Last Rate    [Held by provider] clevidipine  1-16 mg/hr Stopped (05/02/24 1325)    [Held by provider] dexmedeTOMIDine  0.1 mcg/kg/hr Stopped (05/02/24 0540)    HYDROmorphone        lactated Ringer's  5 mL/hr 5 mL/hr (05/02/24 1100)       Physical  Exam:  Constitutional:       General: Well developed adult without acute distress      Appearance: Normal appearance.   HENT:      Head: Normocephalic and atraumatic. No JVD. Cordis on the right internal jugular vein.   Eyes:      Conjunctiva/sclera: Conjunctivae normal.   Cardiovascular:      Rate and Rhythm: Normal rate and regular rhythm.      Heart sounds: Normal heart sounds. No murmurs or gallups.      Extremities: 2+ pulses in bilateral radial, DP and PT arteries. Capillary refill normal in all digits. No bipedal edema  Pulmonary:      Effort: Pulmonary effort is normal. No respiratory distress.      Breath sounds: Normal breath sounds bilaterally. No wheezing or rales.  Abdominal:      General: There is no distension. Active bowel sounds. Bleeding noted from left chest tube drain site.      Palpations: Abdomen is soft and TTP in Left upper and Left lower quadrant, and left flank.   Skin:     General: Skin is warm and dry. +multiple dry rough plaques on the anterior chest  Neurological:      Mental Status: He is alert and oriented to person, place, and time. Mental status is at baseline.   Psychiatric:         Mood and Affect: Mood normal.         Behavior: Behavior normal.       Assessment/Plan   68 y/o Male with PMHx of HTN, CAD, s/p PCI with ELLY to Lcx (2020), s/p ICD, HFrEF (LVEF 25-30% in 01/2024), hx of endovascular repair of ruptured AAA, type Ia endoleak from EVAR, left ureteral CA, COPD, presented for aortic aneurysm repair. S/p aortic aneurysm repair, left nephroureterectomy for urothelial CA of left kidney (04/30/2024). HF service was consulted for GDMT optimization for HF with improved EF.      2-D echo (05/01/24) revealed LVEF 55%, normal RV systolic function, RVSP 32, dilated aortic root (4.13 cm).     Recommendations:  Remains on clevidipine this morning. Please reach out to advanced HF service once patient completes BP maintenance with spinal protocol. We will make recommendations on  initiating GDMT at that time.      Patient was discussed with HF Cardiology attending, Dr. ROHAN Schofield.     Signed,    Ramandeep Hope DO  Advanced Heart Failure & Transplant Fellow    CVC 04/30/24 Double lumen Right Internal jugular (Active)   Site Assessment Clean;Dry;Intact 05/02/24 0811   Dressing Status Clean;Dry;Occlusive 05/02/24 0811   Number of days: 2       Peripheral IV 04/30/24 14 G  Right Forearm (Active)   Site Assessment Clean;Intact;Dry 05/02/24 0811   Dressing Status Clean;Dry;Occlusive 05/02/24 0811   Number of days: 2       Arterial Line 04/30/24 Right Radial (Active)   Site Assessment Clean;Dry;Intact 05/02/24 0810   Dressing Status Clean;Dry;Occlusive 05/02/24 0810   Number of days: 2       Urethral Catheter Temperature probe (Active)   Output (mL) 125 mL 05/02/24 1600   Number of days: 2       Code Status:  Full Code    I spent 20 minutes in the professional and overall care of this patient.        Ramandeep Hope DO

## 2024-05-02 NOTE — PROGRESS NOTES
Physical Therapy    Physical Therapy Evaluation & Treatment    Patient Name: Yobany Small  MRN: [id number]  Today's Date: 5/2/2024   Time Calculation  Start Time: 1403  Stop Time: 1434  Time Calculation (min): 31 min    Assessment/Plan   PT Assessment  PT Assessment Results: Decreased strength, Decreased endurance, Impaired balance, Decreased mobility, Pain  Rehab Prognosis: Good  Medical Staff Made Aware: Yes  End of Session Communication: Bedside nurse  Assessment Comment: Pt demonstrated ability to complete sit<>stand transfer, ambulation and bed mobility.  CGA provided with functional mobility this date.  Increased encouragement provided to complete session, pt in high levels of pain with mobility.  End of Session Patient Position: Bed, 3 rail up, Alarm off, not on at start of session   IP OR SWING BED PT PLAN  Inpatient or Swing Bed: Inpatient  PT Plan  Treatment/Interventions: Bed mobility, Transfer training, Gait training, Stair training, Balance training, Neuromuscular re-education, Strengthening, Endurance training, Therapeutic exercise, Therapeutic activity  PT Plan: Skilled PT  PT Frequency: 3 times per week  PT Discharge Recommendations: Low intensity level of continued care (Anticipate with pain management)  PT Recommended Transfer Status: Assist x1, Assistive device  PT - OK to Discharge: Yes      Subjective     General Visit Information:  General  Reason for Referral: explant of EVAR, open repair of abdominal aortic aneurysm via thoracoretroperitoneal approach, and left nephroureterectomy.  Past Medical History Relevant to Rehab: AAA s/p EVAR (5/2020) c/b RTOR to r/o abdominal compartment syndrome and treat type II leak, requiring RTOR, anemia, HTN, HLD, CAD s/p PCI w/ ELLY to Lcx (2020). HFrEF 25-30% 2/2 ischemic cardiomypoathy s/p ICD.  Missed Visit: No  Family/Caregiver Present: Yes  Caregiver Feedback: Wife present throughout session  Prior to Session Communication: Bedside nurse  Patient  Position Received: Up in chair, Alarm off, not on at start of session  Preferred Learning Style: auditory, verbal  General Comment: Pt awake, alert and willing to participate in PT session  Home Living:  Home Living  Type of Home: House  Lives With: Spouse  Home Adaptive Equipment: Walker rolling or standard, Cane (rollator, BSC)  Home Layout:  (4 MARYLOU, bed/bath - 1st floor, walk in shower)  Prior Level of Function:  Prior Function Per Pt/Caregiver Report  Level of [place]: Independent with ADLs and functional transfers, Independent with homemaking with ambulation  ADL Assistance: Independent  Homemaking Assistance: Independent  Ambulatory Assistance: Independent  Vocational: Retired  Prior Function Comments: (+) drives  Precautions:  Precautions  Hearing/Visual Limitations: WFL  Medical Precautions: Fall precautions, Oxygen therapy device and L/min, Chest tube  Precautions Comment: MAP >80  Vital Signs:  Vital Signs  Heart Rate: 95 (Post: 104)  Heart Rate Source: Monitor  Resp: 24 (Post: 29)  SpO2: 97 % (Post: 92)  BP: 144/80 (Post: 167/83)  BP Location: Right arm  BP Method: Arterial line  Patient Position:  (Supine start, sitting end of session)    Objective   Pain:  Pain Assessment  Pain Assessment:  (Pt reported pain on L side with functional mobility however unrated)  Cognition:  Cognition  Overall Cognitive Status: Within Functional Limits  Orientation Level: Oriented X4    General Assessments:  General Observation  General Observation: PCA, juarez, 6L, chest tube x1, ESTEBAN, tele     Activity Tolerance  Endurance: Tolerates 10 - 20 min exercise with multiple rests  Early Mobility/Exercise Safety Screen: Proceed with mobilization - No exclusion criteria met    Sensation  Light Touch: No apparent deficits    Strength  Strength Comments: Not formally assessed however at least 3/5 shown through functional mobility  Postural Control  Postural Control: Within Functional Limits    Static Sitting Balance  Static  Sitting-Balance Support: Bilateral upper extremity supported, Feet supported  Static Sitting-Level of Assistance: Close supervision    Static Standing Balance  Static Standing-Balance Support: Bilateral upper extremity supported  Static Standing-Level of Assistance: Contact guard  Static Standing-Comment/Number of Minutes: FWW  Functional Assessments:  ADL  ADL's Addressed: No    Bed Mobility  Bed Mobility: Yes  Bed Mobility 1  Bed Mobility 1: Sitting to supine  Level of Assistance 1: Contact guard  Bed Mobility Comments 1: HOB elevated    Transfers  Transfer: Yes  Transfer 1  Transfer From 1: Sit to, Stand to  Transfer to 1: Sit, Stand  Technique 1: Sit to stand, Stand to sit  Transfer Device 1: Walker  Transfer Level of Assistance 1: Contact guard  Trials/Comments 1: Cues for hand placement and proper use of AD    Ambulation/Gait Training  Ambulation/Gait Training Performed: Yes  Ambulation/Gait Training 1  Surface 1: Level tile  Device 1: Rolling walker  Assistance 1: Contact guard  Comments/Distance (ft) 1: ~25ft x1    Stairs  Stairs: No  Extremity/Trunk Assessments:  RUE   RUE : Within Functional Limits  LUE   LUE: Within Functional Limits  RLE   RLE : Within Functional Limits  LLE   LLE : Within Functional Limits  Treatments:  Therapeutic Activity  Therapeutic Activity Performed: Yes  Therapeutic Activity 1: Increased time for skilled ICU line tube management for safe functional mobility  Therapeutic Activity 2: Pt completed supine>sit transfer however with elevated pain during completion.  Pt requesting to return upright d/t pain.  Pt complete supine>sit transfer again and returned to sitting EOB.  Therapeutic Activity 3: Pt sat EOB, leaning to R side d/t L sided pain.  Pt sat EOB ~8 min d/t pain levels.  No instability noted.  Outcome Measures:  [facility] Basic Mobility  Turning from your back to your side while in a flat bed without using bedrails: A little  Moving from lying on your back to sitting on the side  of a flat bed without using bedrails: A little  Moving to and from bed to chair (including a wheelchair): A little  Standing up from a chair using your arms (e.g. wheelchair or bedside chair): A little  To walk in hospital room: A little  Climbing 3-5 steps with railing: A lot  Basic Mobility - Total Score: 17    FSS-ICU  Ambulation: Walks <50 feet with any assistance x1 or walks any distance with assistance x2 people  Rolling: Supervision or set-up only  Sitting: Supervision or set-up only  Transfer Sit-to-Stand: Supervision or set-up only  Transfer Supine-to-Sit: Supervision or set-up only  Total Score: 21    Encounter Problems       Encounter Problems (Active)       Balance       Pt will demonstrated ability to score at least 24/28 on the Tinetti balance assessment tool to ensure safety upon D/C.  (Progressing)       Start:  05/02/24    Expected End:  05/16/24               Mobility       Pt will demonstrated ability to ambulate >/=250ft with proper form and no balance deficits for safe home going.   (Progressing)       Start:  05/02/24    Expected End:  05/16/24            Pt will demonstrate ability to ascend/descend 4 stairs with unilateral rail and no balance deficits for safe home going.  (Progressing)       Start:  05/02/24    Expected End:  05/16/24               PT Transfers       Pt will demonstrated ability to complete bed mobility and sit<>stand transfers without assistance and assistive device to safely return home.  (Progressing)       Start:  05/02/24    Expected End:  05/16/24                   Education Documentation  Body Mechanics, taught by Katlin Cassidy PT at 5/2/2024  3:38 PM.  Learner: Patient  Readiness: Acceptance  Method: Explanation  Response: Demonstrated Understanding    Mobility Training, taught by Katlin Cassidy PT at 5/2/2024  3:38 PM.  Learner: Patient  Readiness: Acceptance  Method: Explanation  Response: Demonstrated Understanding    Education Comments  No comments  found.

## 2024-05-02 NOTE — PROGRESS NOTES
Acute Pain Service    Expand All Collapse All    Yobany Small is a 69 y.o. year old male patient who is POD1 from open AAA repair and explant with Dr. Street and left nephroureterectomy for urothelial carcinoma of L kidney w/ Dr. Thompson on 4/30. Acute pain consulted for post-operative pain control. Acute pain initially consulted for pre-operative nerve block to which the patient refused. Endorses most of pain around L abdomen drain site.        Postop Pain HPI -   Palliative: relieved with IV analgesics and regional local anesthetics  Provocative: movement  Quality:  burning and aching  Radiation:  none  Severity:  8/10  Timing: constant    24-HOUR OPIOID CONSUMPTION:  Dilaudid PCA 0.8mg     Radha Robaxin 1000mg q8h  Precedex 0.1mcg/kg/hr overnight  Mg 2g IV x2 doses    Scheduled medications  cefTRIAXone, 2 g, intravenous, q24h  fluticasone furoate-vilanteroL, 1 puff, inhalation, Daily  hydrocortisone sodium succinate, 20 mg, intravenous, q24h  insulin lispro, 0-5 Units, subcutaneous, q4h  magnesium sulfate, 2 g, intravenous, q8h  methocarbamol, 1,000 mg, intravenous, q8h  pantoprazole, 40 mg, intravenous, Daily  perflutren protein A microsphere, 0.5 mL, intravenous, Once in imaging  sulfur hexafluoride microsphr, 2 mL, intravenous, Once in imaging  tiotropium, 2 puff, inhalation, Daily      Continuous medications  clevidipine, 1-16 mg/hr, Last Rate: 4 mg/hr (05/02/24 1100)  [Held by provider] dexmedeTOMIDine, 0.1 mcg/kg/hr, Last Rate: Stopped (05/02/24 0540)  HYDROmorphone,   lactated Ringer's, 5 mL/hr, Last Rate: 5 mL/hr (05/02/24 1100)      PRN medications  PRN medications: calcium gluconate, calcium gluconate, dextrose, dextrose, glucagon, glucagon, ipratropium-albuteroL, magnesium sulfate, magnesium sulfate, naloxone, oxygen, potassium chloride     Physical Exam:  Constitutional:  no distress, alert and cooperative  Eyes: clear sclera  Head/Neck: No apparent injury, trachea midline  Respiratory/Thorax:  Patent airways, thorax symmetric, breathing comfortably  Cardiovascular: no pitting edema  Gastrointestinal: Nondistended  Musculoskeletal: ROM intact  Extremities: no clubbing  Neurological: alert, jimenez x4  Psychological: Appropriate affect    Results for orders placed or performed during the hospital encounter of 04/30/24 (from the past 24 hour(s))   POCT GLUCOSE   Result Value Ref Range    POCT Glucose 106 (H) 74 - 99 mg/dL   Transthoracic Echo (TTE) Complete   Result Value Ref Range    AV pk eliot 0.88 m/s    LVOT diam 2.50 cm    MV E/A ratio 0.80     Tricuspid annular plane systolic excursion 2.7 cm    RVSP 32.2 mmHg    Aortic Valve Area by Continuity of Peak Velocity 3.66 cm2    AV pk grad 3.1 mmHg   CBC   Result Value Ref Range    WBC 11.7 (H) 4.4 - 11.3 x10*3/uL    nRBC 0.0 0.0 - 0.0 /100 WBCs    RBC 2.98 (L) 4.50 - 5.90 x10*6/uL    Hemoglobin 8.7 (L) 13.5 - 17.5 g/dL    Hematocrit 25.2 (L) 41.0 - 52.0 %    MCV 85 80 - 100 fL    MCH 29.2 26.0 - 34.0 pg    MCHC 34.5 32.0 - 36.0 g/dL    RDW 14.4 11.5 - 14.5 %    Platelets 146 (L) 150 - 450 x10*3/uL   Phosphorus   Result Value Ref Range    Phosphorus 4.1 2.5 - 4.9 mg/dL   Calcium, Ionized   Result Value Ref Range    POCT Calcium, Ionized 1.13 1.1 - 1.33 mmol/L   Magnesium   Result Value Ref Range    Magnesium 2.32 1.60 - 2.40 mg/dL   Hepatic Function Panel   Result Value Ref Range    Albumin 3.0 (L) 3.4 - 5.0 g/dL    Bilirubin, Total 0.7 0.0 - 1.2 mg/dL    Bilirubin, Direct 0.2 0.0 - 0.3 mg/dL    Alkaline Phosphatase 35 33 - 136 U/L    ALT 7 (L) 10 - 52 U/L    AST 18 9 - 39 U/L    Total Protein 4.6 (L) 6.4 - 8.2 g/dL   Basic Metabolic Panel   Result Value Ref Range    Glucose 86 74 - 99 mg/dL    Sodium 142 136 - 145 mmol/L    Potassium 4.2 3.5 - 5.3 mmol/L    Chloride 108 (H) 98 - 107 mmol/L    Bicarbonate 24 21 - 32 mmol/L    Anion Gap 14 10 - 20 mmol/L    Urea Nitrogen 26 (H) 6 - 23 mg/dL    Creatinine 1.32 (H) 0.50 - 1.30 mg/dL    eGFR 59 (L) >60 mL/min/1.73m*2     Calcium 7.8 (L) 8.6 - 10.6 mg/dL   POCT GLUCOSE   Result Value Ref Range    POCT Glucose 105 (H) 74 - 99 mg/dL   POCT GLUCOSE   Result Value Ref Range    POCT Glucose 92 74 - 99 mg/dL   Calcium, Ionized   Result Value Ref Range    POCT Calcium, Ionized 1.10 1.1 - 1.33 mmol/L   CBC   Result Value Ref Range    WBC 12.5 (H) 4.4 - 11.3 x10*3/uL    nRBC 0.0 0.0 - 0.0 /100 WBCs    RBC 2.84 (L) 4.50 - 5.90 x10*6/uL    Hemoglobin 8.4 (L) 13.5 - 17.5 g/dL    Hematocrit 24.5 (L) 41.0 - 52.0 %    MCV 86 80 - 100 fL    MCH 29.6 26.0 - 34.0 pg    MCHC 34.3 32.0 - 36.0 g/dL    RDW 14.3 11.5 - 14.5 %    Platelets 155 150 - 450 x10*3/uL   Magnesium   Result Value Ref Range    Magnesium 4.33 (H) 1.60 - 2.40 mg/dL   Renal Function Panel   Result Value Ref Range    Glucose 93 74 - 99 mg/dL    Sodium 141 136 - 145 mmol/L    Potassium 4.3 3.5 - 5.3 mmol/L    Chloride 108 (H) 98 - 107 mmol/L    Bicarbonate 28 21 - 32 mmol/L    Anion Gap 9 (L) 10 - 20 mmol/L    Urea Nitrogen 28 (H) 6 - 23 mg/dL    Creatinine 1.34 (H) 0.50 - 1.30 mg/dL    eGFR 58 (L) >60 mL/min/1.73m*2    Calcium 8.0 (L) 8.6 - 10.6 mg/dL    Phosphorus 4.4 2.5 - 4.9 mg/dL    Albumin 3.1 (L) 3.4 - 5.0 g/dL   Hepatic function panel   Result Value Ref Range    Albumin 3.1 (L) 3.4 - 5.0 g/dL    Bilirubin, Total 0.4 0.0 - 1.2 mg/dL    Bilirubin, Direct 0.0 0.0 - 0.3 mg/dL    Alkaline Phosphatase 37 33 - 136 U/L    ALT 7 (L) 10 - 52 U/L    AST 18 9 - 39 U/L    Total Protein 5.1 (L) 6.4 - 8.2 g/dL   Lipase   Result Value Ref Range    Lipase 9 9 - 82 U/L   Amylase   Result Value Ref Range    Amylase 32 29 - 103 U/L   POCT GLUCOSE   Result Value Ref Range    POCT Glucose 94 74 - 99 mg/dL   CBC   Result Value Ref Range    WBC 15.3 (H) 4.4 - 11.3 x10*3/uL    nRBC 0.0 0.0 - 0.0 /100 WBCs    RBC 3.81 (L) 4.50 - 5.90 x10*6/uL    Hemoglobin 11.6 (L) 13.5 - 17.5 g/dL    Hematocrit 33.5 (L) 41.0 - 52.0 %    MCV 88 80 - 100 fL    MCH 30.4 26.0 - 34.0 pg    MCHC 34.6 32.0 - 36.0 g/dL     RDW 14.3 11.5 - 14.5 %    Platelets 176 150 - 450 x10*3/uL      Yobany Small is a 69 y.o. year old male patient who is POD1 from open AAA repair and explant with Dr. Street and left nephroureterectomy for urothelial carcinoma of L kidney w/ Dr. Thompson on 4/30. Acute pain consulted for post-operative pain control. Acute pain initially consulted for pre-operative nerve block to which the patient refused.     Recommendations:  - IV Mg 2g Q8H x 3 doses  - Increase frequency to IV Robaxin 1000mg QID  - Ketamine 5 mg/hr for 24hrs  - Lidocaine 50 mg/hr, check lidocaine level after 6 hrs   - Precedex 0.1 mcg/kg/hr for 24 hrs  - IV Toradol 30mg Q6H for 6 doses per surgical service  - Tylenol 975mg Q8H or IV Tylenol, time 3 hours between tylenol and toradol  - Continuous pulse ox  - APS will sign off    Acute Pain Team  pg [phone] ph [phone]

## 2024-05-02 NOTE — PROGRESS NOTES
Vascular Surgery Progress Note    Subjective  No acute events overnight, pain much improved since yesterday  Minimal output from ESTEBAN drain  Progressing appropriately    Current Meds  Scheduled medications  cefTRIAXone, 2 g, intravenous, q24h  fluticasone furoate-vilanteroL, 1 puff, inhalation, Daily  hydrocortisone sodium succinate, 20 mg, intravenous, q24h  insulin lispro, 0-5 Units, subcutaneous, q4h  magnesium sulfate, 2 g, intravenous, q8h  methocarbamol, 1,000 mg, intravenous, q8h  pantoprazole, 40 mg, intravenous, Daily  perflutren protein A microsphere, 0.5 mL, intravenous, Once in imaging  sulfur hexafluoride microsphr, 2 mL, intravenous, Once in imaging  tiotropium, 2 puff, inhalation, Daily      Continuous medications  clevidipine, 1-16 mg/hr, Last Rate: 3 mg/hr (05/02/24 0700)  dexmedeTOMIDine, 0.1 mcg/kg/hr, Last Rate: Stopped (05/02/24 0540)  HYDROmorphone,   lactated Ringer's, 5 mL/hr, Last Rate: 5 mL/hr (05/02/24 0700)      PRN medications  PRN medications: calcium gluconate, calcium gluconate, dextrose, dextrose, glucagon, glucagon, ipratropium-albuteroL, magnesium sulfate, magnesium sulfate, naloxone, oxygen, potassium chloride    Objective    Vitals:   Temp:  [36.1 °C (97 °F)-36.4 °C (97.5 °F)] 36.2 °C (97.2 °F)  Heart Rate:  [] 89  Resp:  [11-27] 12  BP: (132)/(72) 132/72  Arterial Line BP 1: (119-161)/(56-91) 135/91      I/O  I/O last 3 completed shifts:  In: 3987.4 (71.6 mL/kg) [I.V.:2687.4 (48.2 mL/kg); Blood:600; IV Piggyback:700]  Out: 1800 (32.3 mL/kg) [Urine:1310 (0.7 mL/kg/hr); Drains:235; Chest Tube:255]  Weight: 55.7 kg       Physical Exam  General: laying in bed, NAD  Head: normocephalic, atraumatic  ENT: mucosa are moist   Respiratory: nonlabored breathing on nasal cannula   CV: RRR  GI: abdomen is soft, nontender, nondistended. ESTEBAN drain with bloody output. Midline incision with island dressing in place, mild strikethrough  MSK: SHAYLA  Extremities: no swelling or deformity of b/l  Les. Palpable R DP/PT, L PT   Neuro: CN II-XII grossly intact. Sensation to light touch intact    Labs:  Lab Results   Component Value Date    WBC 12.5 (H) 05/01/2024    HGB 8.4 (L) 05/01/2024    HCT 24.5 (L) 05/01/2024    MCV 86 05/01/2024     05/01/2024     Lab Results   Component Value Date    GLUCOSE 93 05/01/2024    CALCIUM 8.0 (L) 05/01/2024     05/01/2024    K 4.3 05/01/2024    CO2 28 05/01/2024     (H) 05/01/2024    BUN 28 (H) 05/01/2024    CREATININE 1.34 (H) 05/01/2024     Lab Results   Component Value Date    ALT 7 (L) 05/01/2024    AST 18 05/01/2024    ALKPHOS 37 05/01/2024    BILITOT 0.4 05/01/2024     Results from last 7 days   Lab Units 05/01/24  0601   APTT seconds 29   INR  1.3*     Results from last 7 days   Lab Units 05/01/24  0601   POCT PH, ARTERIAL pH 7.42   POCT PCO2, ARTERIAL mm Hg 37*   POCT PO2, ARTERIAL mm Hg 78*   POCT HCO3 CALCULATED, ARTERIAL mmol/L 24.0   POCT BASE EXCESS, ARTERIAL mmol/L -0.3         Imaging  XR chest 1 view    Result Date: 5/1/2024  Interpreted By:  Cristiano Kc  and Gladis Christiansen STUDY: XR CHEST 1 VIEW;  5/1/2024 4:29 am   INDICATION: Signs/Symptoms:Post op intubated.   COMPARISON: Chest radiograph 04/30/2024   ACCESSION NUMBER(S): [id number]   ORDERING CLINICIAN: JOVANNY EDWARDS   FINDINGS: AP radiograph of the chest was provided.   Dual-lumen endotracheal tube noted with tip of 1st lumen projecting approximately 4.0 cm above the kavya. The tip of the 2nd lumen of the endotracheal tube continues to project within the proximal left main bronchus.   Left chest tube is unchanged in position. AICD remains on the left with the tip of the lead at the apex of the right ventricle. [place]-Natividad catheter remains with tip projecting over the main pulmonary artery.   CARDIOMEDIASTINAL SILHOUETTE: Cardiomediastinal silhouette is stable in size and configuration.   LUNGS: The left costophrenic angle is outside the field of view. Hyperlucency of the lungs is  noted along with evidence of bleb and bullous formation bilaterally.   There is right basilar hazy opacity that has improved slightly since the previous study. Mild perihilar and interstitial thickening has also improved. There is no apparent pleural fluid collection or pneumothorax.   ABDOMEN: No remarkable upper abdominal findings.   BONES: No acute osseous changes.       1. Slight improvement in the appearance of the chest as detailed above. Underlying changes chronic lung disease.     I personally reviewed the images/study and I agree with the findings as stated by Kuldip Griffith MD. This study was interpreted at [facility], [place], [place].   MACRO: None   Signed by: Cristiano Kc 5/1/2024 12:55 PM Dictation workstation:   [id number]    Transthoracic Echo (TTE) Complete    Result Date: 5/1/2024   [facility], [address] [place] [address]                Tel [phone] and Fax [phone] TRANSTHORACIC ECHOCARDIOGRAM REPORT  Patient Name:      PRICE LEVIN    Reading Physician:    [id number] Miky Tong MD Study Date:        5/1/2024             Ordering Provider:    [id number] IGNACIO MRN/PID:           [id number]             Fellow: Accession#:        [id number]         Nurse: Date of Birth/Age: 1955 / 69 years Sonographer:          Roxy Irvin RDCS Gender:            M                    Additional Staff: Height:            180.34 cm            Admit Date: Weight:            55.34 kg             Admission Status:     Inpatient - STAT BSA / BMI:         1.71 m2 / 17.02      Encounter#:           [id number]                    kg/m2                                         Department Location:  [facility] Blood Pressure: 140 /71 mmHg Study Type:    TRANSTHORACIC ECHO (TTE) COMPLETE Diagnosis/ICD: Chronic systolic (congestive) heart failure (CHF)-I50.22 Indication:    CHF, CAD  CPT Code:      Echo Complete w Full [id number] Patient History: Pertinent History: CAD, HTN, COPD and Dyspnea. Ischemic crdiomyopathy, Endo leak                    of aortic graft, s/p AAA repair. Study Detail: The following Echo studies were performed: 2D, M-Mode, Doppler and               color flow. Technically challenging study due to patient lying in               supine position, postoperative dressings, chest tubes and poor               acoustic windows. Definity used as a contrast agent for               endocardial border definition. Total contrast used for this               procedure was 4 mL via IV push. Unable to obtain suprasternal               notch view.  PHYSICIAN INTERPRETATION: Left Ventricle: The left ventricular systolic function is low normal, with an estimated ejection fraction of 55%. The left ventricular cavity size is normal. Abnormal (paradoxical) septal motion, consistent with an intraventricular conduction delay. Left ventricular diastolic filling was indeterminate. LV not well visualized. Left Atrium: The left atrium was not well visualized. Right Ventricle: The right ventricle is normal in size. There is normal right ventricular global systolic function. A device is visualized in the right ventricle. Right Atrium: The right atrium was not well visualized. Aortic Valve: The aortic valve was not well visualized. Suspect trileaflet. There is mild aortic valve cusp calcification. There is trivial aortic valve regurgitation. The peak instantaneous gradient of the aortic valve is 3.1 mmHg. Mitral Valve: The mitral valve is normal in structure. There is trace mitral valve regurgitation. Tricuspid Valve: The tricuspid valve is structurally normal. There is trace to mild tricuspid regurgitation. Pulmonic Valve: The pulmonic valve is not well visualized. There is trace pulmonic valve regurgitation. Pericardium: There is a trivial pericardial effusion. There is an anterior clear space.  Aorta: The aortic root is abnormal. The Ao Sinus is 4.20 cm. There is moderate dilatation the aortic root. Pulmonary Artery: The tricuspid regurgitant velocity is 2.61 m/s, and with an estimated right atrial pressure of 5 mmHg, the estimated pulmonary artery pressure is borderline elevated with the RVSP at 32.2 mmHg. Systemic Veins: The inferior vena cava appears to be of upper normal size. In comparison to the previous echocardiogram(s): Compared with study from 2024, EF appears improved.  CONCLUSIONS:  1. Poorly visualized anatomical structures due to suboptimal image quality.  2. Left ventricular systolic function appears to be low normal (EF 55%)  3. There is moderate dilatation of the aortic root. QUANTITATIVE DATA SUMMARY: 2D MEASUREMENTS:                    Normal Ranges: Ao Root d: 4.13 cm (2.0-3.7cm) AORTA MEASUREMENTS:                      Normal Ranges: Ao Sinus, d: 4.20 cm (2.1-3.5cm) LV DIASTOLIC FUNCTION:                        Normal Ranges: MV Peak E: 0.40 m/s    (0.7-1.2 m/s) MV Peak A: 0.51 m/s    (0.42-0.7 m/s) E/A Ratio: 0.80        (1.0-2.2) MV A Dur:  119.00 msec MITRAL VALVE:                 Normal Ranges: MV DT: 161 msec (150-240msec) AORTIC VALVE:                         Normal Ranges: AoV Vmax:      0.88 m/s (<=1.7m/s) AoV Peak PG:   3.1 mmHg (<20mmHg) LVOT Max Billy:  0.66 m/s (<=1.1m/s) LVOT VTI:      12.50 cm LVOT Diameter: 2.50 cm  (1.8-2.4cm) AoV Area,Vmax: 3.66 cm2 (2.5-4.5cm2)  RIGHT VENTRICLE: TAPSE: 26.9 mm TRICUSPID VALVE/RVSP:                             Normal Ranges: Peak TR Velocity: 2.61 m/s RV Syst Pressure: 32.2 mmHg (< 30mmHg) IVC Diam:         2.00 cm PULMONIC VALVE:                         Normal Ranges: PV Accel Time: 98 msec  (>120ms) PV Max Billy:    0.8 m/s  (0.6-0.9m/s) PV Max P.3 mmHg  [id number] Miky Tong MD Electronically signed on 2024 at 12:38:40 PM  ** Final **     XR chest 1 view    Result Date: 2024  Interpreted By:  Cristiano Kc and Hofer  Jessica STUDY: XR CHEST 1 VIEW;  4/30/2024 5:30 pm   INDICATION: Signs/Symptoms:Intubated.   COMPARISON: Chest radiograph 11/11/2023, CTA chest abdomen and pelvis 11/11/2023   ACCESSION NUMBER(S): [id number]   ORDERING CLINICIAN: JOVANNY EDWARDS   FINDINGS: AP radiograph of the chest was provided.   MEDICAL DEVICES: Double-lumen endotracheal tube with the tip terminating 6.1 cm above the kavya and the 2nd tip terminating within the proximal left bronchi. Right [place]-Natividad catheter with the tip of the catheter projecting over the proximal pulmonary artery. Left-sided single lead pacemaker with lead in stable position compared to prior. Enteric tube overlies the esophagus and courses under the left hemidiaphragm with the tip out of the field of view. Left-sided chest tube.   CARDIOMEDIASTINAL SILHOUETTE: Cardiomediastinal silhouette is normal in size and configuration.   LUNGS: Redemonstration of bilateral emphysematous changes with linear scarring at the right upper lobe. Previously visualized 10 mm nodularity of the left upper lobe is not well visualized on this exam, likely due to overlying chest tube. Hazy opacity over the right lower lung lobe, which likely represents atelectasis. Prominent perihilar lung markings likely representing mild pulmonary edema. No focal pulmonary consolidations. No pleural effusions or pneumothorax seen.   ABDOMEN: No remarkable upper abdominal findings.   BONES: No acute osseous changes.       1.  Interval intubation with a dual lumen endotracheal tube with the 1st tip terminating 6.1 cm above the kavya and the 2nd tip terminating within the proximal left bronchi. 2. Diffuse emphysematous changes and mild pulmonary edema.   I personally reviewed the images/study and resident's interpretation and I agree with the findings as stated by Jessica Oconnor MD (resident radiologist). This study was analyzed and interpreted at [facility], [place], [place].    MACRO: None   Signed by: Cristiano Kc 5/1/2024 8:12 AM Dictation workstation:   [id number]     Assessment:  69 y.o. 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 Patient is recovering in ICU.     Plan:   - Pain control per ICU, Acute pain following, appreciate recs  - Chest tube to -20 cm H2O  - Maintain ESTEBAN to bulb suction, assess for removal this afternoon given minimal output and causing patient pain at insertion site.  - Spinal cord perfusion goal: MAP >80 mmHg  - Okay for limited clears, okay to transition to PO meds  - Maintain K>4, Mg>2, Hgb>10, Plts>100, INR<1.4  - Labs q12h for 24 hrs  - monitor UOP, continue juarez  - Rocephin until chest tube comes out  - SCDs  - PT/OT, OOB as able  - Okay to restart DVT prophylaxis and SQH    Discussed with attending physician, Dr. Charanjit Farr DO  Department of Surgery / IR Integrated PGY1  Vascular [id number]

## 2024-05-02 NOTE — PROGRESS NOTES
[facility] [place]  Consult Note      Patient: Yobany Small  Age/Sex: 69 y.o., male  MRN: [id number]  Date of surgery: 4/30/2024  Admit Date: 4/30/2024   Code Status: Full Code  Length of Stay: 2      Interval History/Overnight Events:   Complaining of left-sided abdominal pain  Able to get up to chair but endorsing difficulty with movement 2/2 pain  Hays draining clear yellow urine  Remains hemodynamically stable      Objective  04/30 1900 - 05/02 0659  In: 3987.4 [I.V.:2687.4]  Out: 1800 [Urine:1310; Drains:235]    Medications:  Current Facility-Administered Medications   Medication Dose Route Frequency Provider Last Rate Last Admin    calcium gluconate in NS IV 1 g  1 g intravenous q6h PRN Kimmie Puentes MD   Stopped at 05/01/24 0148    calcium gluconate in NS IV 2 g  2 g intravenous q6h PRN Kimmie Puentes MD        cefTRIAXone (Rocephin) 2 g in dextrose 5% in water (D5W) 50 mL  2 g intravenous q24h Kimmie Puentes MD   Stopped at 05/01/24 1822    clevidipine (Cleviprex) infusion 0.5 mg/mL  1-16 mg/hr intravenous Continuous Juan A Saldaña, APRN-CNP, DNP 6 mL/hr at 05/02/24 0700 3 mg/hr at 05/02/24 0700    dexmedeTOMIDine (Precedex) 400 mcg in 100 mL (4 mcg/mL) sodium chloride 0.9% infusion  0.1 mcg/kg/hr intravenous Continuous Alexys Ortiz MD   Stopped at 05/02/24 0540    dextrose 50 % injection 12.5 g  12.5 g intravenous q15 min PRN Kimmie Puentes MD        dextrose 50 % injection 25 g  25 g intravenous q15 min PRN Kimmie Puentes MD        fluticasone furoate-vilanteroL (Breo Ellipta) 200-25 mcg/dose inhaler 1 puff  1 puff inhalation Daily Vianey Mcgill PA-C        glucagon (Glucagen) injection 1 mg  1 mg intramuscular q15 min PRN Kimmie Puentes MD        glucagon (Glucagen) injection 1 mg  1 mg intramuscular q15 min PRN Kimmie Puentes MD        hydrocortisone sod succ (PF) (Solu-CORTEF) injection 20 mg  20 mg intravenous q24h Vianey Mcgill PA-C        hydromorphone PCA 0.5 mg/mL in NS  opioid naive   intravenous Continuous Huber Baumann MD   Rate Verify at 05/01/24 2000    insulin lispro (HumaLOG) injection 0-5 Units  0-5 Units subcutaneous q4h Kimmie Puentes MD   1 Units at 04/30/24 2158    ipratropium-albuteroL (Duo-Neb) 0.5-2.5 mg/3 mL nebulizer solution 3 mL  3 mL nebulization q6h PRN Vianey Mcgill PA-C        lactated Ringer's infusion  5 mL/hr intravenous Continuous Vianey Mcgill PA-C 5 mL/hr at 05/02/24 0700 5 mL/hr at 05/02/24 0700    magnesium sulfate IV 2 g  2 g intravenous q6h PRN Kimmie Puentes MD        magnesium sulfate IV 2 g  2 g intravenous q8h PATY Avery DNP   Stopped at 05/01/24 2252    magnesium sulfate IV 4 g  4 g intravenous q6h PRN Kimmie Puentes MD   Stopped at 04/30/24 2321    methocarbamol (Robaxin) injection 1,000 mg  1,000 mg intravenous q8h PATY Avery DNP   1,000 mg at 05/02/24 0511    naloxone (Narcan) injection 0.2 mg  0.2 mg intravenous PRN Huber Baumann MD        oxygen (O2) therapy   inhalation Continuous PRN - O2/gases Fermín Kauffman MD        pantoprazole (ProtoNix) injection 40 mg  40 mg intravenous Daily PATY Avery DNP        perflutren protein A microsphere (Optison) injection 0.5 mL  0.5 mL intravenous Once in imaging Vianey Mcgill PA-C        potassium chloride 40 mEq in 100 mL IV premix  40 mEq intravenous q6h PRN Kimmie Puentes MD        sulfur hexafluoride microsphr (Lumason) injection 24.28 mg  2 mL intravenous Once in imaging Vianey Mcgill PA-C        tiotropium (Spiriva Respimat) 2.5 mcg/actuation inhaler 2 puff  2 puff inhalation Daily Vianey Mcgill PA-C           Physical Exam                                                                                                                         Gen -  No acute distress, up in chair     Neuro - Alert, oriented, conversant     CV -  Regular rate and rhythm on continuous monitor     Pulm - Symmetric chest rise, non-labored breathing,  CT: SS on suction     Abd - Soft, appropriately tender, non-distended     Ext - Warm, well perfused     Skin - without jaundice       Psych - appropriate tone, affect      - juarez in place draining CYU      Imaging  None new    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 Thompson) on 4/30/2024. From a urologic standpoint his recovery has been uncomplicated, though he is still experiencing bothersome pain.    5/1: In ICU, extubated and off pressors. Cr 1.25  5/2: Remains in ICU, hemodynamically stable, getting 1U pRBC. Cr 1.34 yesterday PM    -Maintain juarez until POD7 (5/6), will need cystogram and ESTEBAN drain Cr prior to removal (can be done in outpatient setting, if needed)  -Maintain ESTEBAN  -Diet per primary; no restrictions from urologic standpoint  -Strict I/Os  -Trend Hb, Cr  -Rest of care per ICU/ Vascular surgery    Seen with chief resident Dr. Denny and discussed with attending Dr. Donna Goins MD  PGY-1 Urology [place]  Adult Urology Pager: [phone]  Pediatric Urology Pager: [phone]

## 2024-05-02 NOTE — PROGRESS NOTES
"Yobany Small is a 69 y.o. male on day 2 of admission presenting with Type Ia endoleak of aortic graft (CMS-[facility]).    Subjective   No acute events overnight. C/o 7/10 pain, however improved from yesterday. Precedex turned off at ~5:00 this am.        Objective     Physical Exam  Constitutional:       General: He is not in acute distress.     Appearance: Normal appearance.   HENT:      Head: Normocephalic and atraumatic.      Mouth/Throat:      Mouth: Mucous membranes are moist.   Eyes:      Extraocular Movements: Extraocular movements intact.      Conjunctiva/sclera: Conjunctivae normal.   Cardiovascular:      Rate and Rhythm: Regular rhythm. Tachycardia present.      Pulses: Normal pulses.      Heart sounds: Normal heart sounds.      Comments: L side chest tube. No air leak.   Pulmonary:      Breath sounds: Normal breath sounds. No wheezing.   Abdominal:      General: Abdomen is flat.      Palpations: Abdomen is soft.      Comments: LLQ w/ ESTEBAN drain. TTP.    Skin:     General: Skin is warm and dry.   Neurological:      Mental Status: He is alert and oriented to person, place, and time.   Psychiatric:         Mood and Affect: Mood normal.         Behavior: Behavior normal.       Last Recorded Vitals  Blood pressure 126/67, pulse 103, temperature 36.4 °C (97.5 °F), temperature source Temporal, resp. rate (!) 27, height 1.803 m (5' 11\"), weight 55.7 kg (122 lb 12.7 oz), SpO2 97%.  Intake/Output last 3 Shifts:  I/O last 3 completed shifts:  In: 3987.4 (71.6 mL/kg) [I.V.:2687.4 (48.2 mL/kg); Blood:600; IV Piggyback:700]  Out: 1800 (32.3 mL/kg) [Urine:1310 (0.7 mL/kg/hr); Drains:235; Chest Tube:255]  Weight: 55.7 kg     Relevant Results  Scheduled medications  cefTRIAXone, 2 g, intravenous, q24h  fluticasone furoate-vilanteroL, 1 puff, inhalation, Daily  hydrocortisone sodium succinate, 20 mg, intravenous, q24h  insulin lispro, 0-5 Units, subcutaneous, q4h  magnesium sulfate, 2 g, intravenous, q8h  methocarbamol, 1,000 " mg, intravenous, q8h  pantoprazole, 40 mg, intravenous, Daily  perflutren protein A microsphere, 0.5 mL, intravenous, Once in imaging  sulfur hexafluoride microsphr, 2 mL, intravenous, Once in imaging  tiotropium, 2 puff, inhalation, Daily      Continuous medications  clevidipine, 1-16 mg/hr, Last Rate: 4 mg/hr (05/02/24 0830)  [Held by provider] dexmedeTOMIDine, 0.1 mcg/kg/hr, Last Rate: Stopped (05/02/24 0540)  HYDROmorphone,   lactated Ringer's, 5 mL/hr, Last Rate: 5 mL/hr (05/02/24 0700)      PRN medications  PRN medications: calcium gluconate, calcium gluconate, dextrose, dextrose, glucagon, glucagon, ipratropium-albuteroL, magnesium sulfate, magnesium sulfate, naloxone, oxygen, potassium chloride      Results for orders placed or performed during the hospital encounter of 04/30/24 (from the past 24 hour(s))   POCT GLUCOSE   Result Value Ref Range    POCT Glucose 106 (H) 74 - 99 mg/dL   Transthoracic Echo (TTE) Complete   Result Value Ref Range    AV pk eliot 0.88 m/s    LVOT diam 2.50 cm    MV E/A ratio 0.80     Tricuspid annular plane systolic excursion 2.7 cm    RVSP 32.2 mmHg    Aortic Valve Area by Continuity of Peak Velocity 3.66 cm2    AV pk grad 3.1 mmHg   CBC   Result Value Ref Range    WBC 11.7 (H) 4.4 - 11.3 x10*3/uL    nRBC 0.0 0.0 - 0.0 /100 WBCs    RBC 2.98 (L) 4.50 - 5.90 x10*6/uL    Hemoglobin 8.7 (L) 13.5 - 17.5 g/dL    Hematocrit 25.2 (L) 41.0 - 52.0 %    MCV 85 80 - 100 fL    MCH 29.2 26.0 - 34.0 pg    MCHC 34.5 32.0 - 36.0 g/dL    RDW 14.4 11.5 - 14.5 %    Platelets 146 (L) 150 - 450 x10*3/uL   Phosphorus   Result Value Ref Range    Phosphorus 4.1 2.5 - 4.9 mg/dL   Calcium, Ionized   Result Value Ref Range    POCT Calcium, Ionized 1.13 1.1 - 1.33 mmol/L   Magnesium   Result Value Ref Range    Magnesium 2.32 1.60 - 2.40 mg/dL   Hepatic Function Panel   Result Value Ref Range    Albumin 3.0 (L) 3.4 - 5.0 g/dL    Bilirubin, Total 0.7 0.0 - 1.2 mg/dL    Bilirubin, Direct 0.2 0.0 - 0.3 mg/dL     Alkaline Phosphatase 35 33 - 136 U/L    ALT 7 (L) 10 - 52 U/L    AST 18 9 - 39 U/L    Total Protein 4.6 (L) 6.4 - 8.2 g/dL   Basic Metabolic Panel   Result Value Ref Range    Glucose 86 74 - 99 mg/dL    Sodium 142 136 - 145 mmol/L    Potassium 4.2 3.5 - 5.3 mmol/L    Chloride 108 (H) 98 - 107 mmol/L    Bicarbonate 24 21 - 32 mmol/L    Anion Gap 14 10 - 20 mmol/L    Urea Nitrogen 26 (H) 6 - 23 mg/dL    Creatinine 1.32 (H) 0.50 - 1.30 mg/dL    eGFR 59 (L) >60 mL/min/1.73m*2    Calcium 7.8 (L) 8.6 - 10.6 mg/dL   POCT GLUCOSE   Result Value Ref Range    POCT Glucose 105 (H) 74 - 99 mg/dL   POCT GLUCOSE   Result Value Ref Range    POCT Glucose 92 74 - 99 mg/dL   Calcium, Ionized   Result Value Ref Range    POCT Calcium, Ionized 1.10 1.1 - 1.33 mmol/L   CBC   Result Value Ref Range    WBC 12.5 (H) 4.4 - 11.3 x10*3/uL    nRBC 0.0 0.0 - 0.0 /100 WBCs    RBC 2.84 (L) 4.50 - 5.90 x10*6/uL    Hemoglobin 8.4 (L) 13.5 - 17.5 g/dL    Hematocrit 24.5 (L) 41.0 - 52.0 %    MCV 86 80 - 100 fL    MCH 29.6 26.0 - 34.0 pg    MCHC 34.3 32.0 - 36.0 g/dL    RDW 14.3 11.5 - 14.5 %    Platelets 155 150 - 450 x10*3/uL   Magnesium   Result Value Ref Range    Magnesium 4.33 (H) 1.60 - 2.40 mg/dL   Renal Function Panel   Result Value Ref Range    Glucose 93 74 - 99 mg/dL    Sodium 141 136 - 145 mmol/L    Potassium 4.3 3.5 - 5.3 mmol/L    Chloride 108 (H) 98 - 107 mmol/L    Bicarbonate 28 21 - 32 mmol/L    Anion Gap 9 (L) 10 - 20 mmol/L    Urea Nitrogen 28 (H) 6 - 23 mg/dL    Creatinine 1.34 (H) 0.50 - 1.30 mg/dL    eGFR 58 (L) >60 mL/min/1.73m*2    Calcium 8.0 (L) 8.6 - 10.6 mg/dL    Phosphorus 4.4 2.5 - 4.9 mg/dL    Albumin 3.1 (L) 3.4 - 5.0 g/dL   Hepatic function panel   Result Value Ref Range    Albumin 3.1 (L) 3.4 - 5.0 g/dL    Bilirubin, Total 0.4 0.0 - 1.2 mg/dL    Bilirubin, Direct 0.0 0.0 - 0.3 mg/dL    Alkaline Phosphatase 37 33 - 136 U/L    ALT 7 (L) 10 - 52 U/L    AST 18 9 - 39 U/L    Total Protein 5.1 (L) 6.4 - 8.2 g/dL   Lipase    Result Value Ref Range    Lipase 9 9 - 82 U/L   Amylase   Result Value Ref Range    Amylase 32 29 - 103 U/L   POCT GLUCOSE   Result Value Ref Range    POCT Glucose 94 74 - 99 mg/dL            Assessment/Plan   Principal Problem:    Type Ia endoleak of aortic graft (CMS-HCC)  Active Problems:    Urothelial carcinoma of kidney, left (Multi)    70 y/o male with hx of AAA s/p EVAR (5/2020) with type Ia endoleak now s/p open repair and explant with Dr. Street on 4/30. Also had left nephroureterectomy for urothelial carcinoma of L kidney performed at same time. PMH s/f CAD w/ ELLY to LCx (2020), HFrEF (25-30%) 2/2 ischemic cardiomyopathy s/p ICD, COPD, HTN, psoriatic arthritis, and anemia.     Plan:  NEURO: No PMH. PERRL. 7/10 pain this AM.   - ongoing neuro/neurovasc/pain assessments  - precedex infusion overnight. Turned off this am.   - increase robaxin to q6hr  - scheduled IV tylenol   - lidocaine patch   - hydromorphone PCA     CV: PMH ruptured AAA s/p EVAR (2020) which was c/b RTOR to r/o abdominal compartment syndrome and treat type II leak, requiring RTOR. Other PMH CAD s/p ELLY to LCx, 40% stenosis of proximal LAD (2020), HFrEF 25-30% 2/2 cardiomyopathy s/p ICD, HTN, HLD. Preop stress test not c/f ischemia, and VIJAY w/ stable EF. Now with enlargement of sac from 3.7 to 4.4 cm and type Ia endoleak s/p open repair and explant with Dr. Street 4/30. Baseline palpable R DP, PT and L PT pulses. Off of pressors. Started on clevidipine ggt 5/1 for HTN. MAPs overnight 80s-100. Repeat echo 5/1 w/ LVEF 55%, moderate dilation of aortic root.   - Will wean off clevidipine ggt. Begin Metop tartrate 25mg, home entresto.  - PRN hydralazine 10mg q4hr for MAP >90  - per vascular ok to resume asa   - 40mg IV lasix   - Goal MAPs  per vascular  - L sided chest tube w/o airleak. 185mL of output over last 24hr. Maintain at -20 cmH2O.   - continuous ekg/abp/pap monitoring  - Home meds: Continue to hold simvastatin.     PULM: Hx  of COPD. Preop PFTs indicative of moderate-severe obstructive disease FEV1/FVC (52% predicted). Extubated overnight. Stable CXR this AM. On 5L NC O2. SpO2 overnight hi 80s-90s likely 2/2 pain.   - PRN duoneb  - Continue to wean O2 as tolerated   - Q1h incentive spirometer after extubation  - Daily CXR   - Scheduled Spiriva and breo-ellipta   - Home meds: trelegy-ellipta       GI: No PMH.  - Per vascular, ok to advance diet to limited clears and ice chips  - NG-> LIWS  - PPI QD  - f/u post op and daily amylase, lipase, hepatic function panel.  - pantoprazole, 40 mg, IV, daily       : Baseline hematuria 2/2 urothelial carcinoma of L kidney. S/p left nephroureterectomy 4/30 w/ Dr. Thompson. Baseline Cr 0.94. Supra mesenteric cross clamp time 40 minutes. Net neg 420mL over last 24 hr. Cr stable at 1.34. BUN 28.   -K 4.3, Mg UT 4.33, phos 4.4. UOP 0.7mL/kg/hr.   - diurese per vasc   - Maintain juarez until POD7 (5/6), will need cystogram and ESTEBAN drain Cr prior to removal   - Check renal function panel daily and PRN  - Volume resuscitation as needed.    - Goal U/O >0.5ml/kg/hr.    - Replete electrolytes to goal K>4, Mg>2, Phos>2.5, ionized Ca>1.10 if creatinine remains WNL or <1.5 with adequate uop.  - Home meds: oxybutynin 10mg PRN, tamsulosin 0.4mg. Hold for now.      HEME: Hx anemia. Acute surgical blood loss anemia. 6 RBC, 5 FFP, 3 Plt in OR. Hgb from 9.6 to 8.5. 1u PRBC overnight -- incremented to 11.4.   - CBC daily and PRN  - Check CBC, coags, fibrinogen daily and PRN  - SQH for DVT prophylaxis ok per vascular  - no subcutaneous heparin per vascular surgery at this time  - Home meds: Iron, B12. Hold for now.      ENDO: No hx. A1C 5.3.   - Q4h BG and SSI Lispro per ICU protocol.  - Continue home pred   - q4hr POCT BGs     ID: Afebrile. WBC UT 11.7 to 15.3.   - Ceftriaxone 2g until chest tube removed.   - monitor for s/s infection     MSK: Hx of psoriatic arthritis.  - Resume home prednisone 5mg   - Continue to hold  home MTX      Skin: No active skin issues.  - preventative Mepilex dressings in place on sacrum and heels  - change preventative Mepilex weekly or more frequently as indicated (when moist/soiled)   - every shift skin assessment per nursing and weekly ICU skin rounds  - moisture barrier to be applied with rafael care  - active skin problems addressed with nursing on daily rounds     Lines:   RIJ MAC 4/30   R Radial A line 4/30  PIVs: 16G LFA, 14G RFA  Hays 4/30 (will stay in until POD 7 (5/6)   LLQ ESTEBAN drain 4/30  L sided chest tube 4/30     Pt discussed with SICU attending, Dr. Bertha Puentes MD  Anesthesiology PGY-1   SICU [id number]

## 2024-05-02 NOTE — PROGRESS NOTES
1/1 CTICU    VASCULAR SURGERY   04/30 open AAA repair and explant with Dr. Street and left nephroureterectomy for urothelial carcinoma of L kidney w/ Dr. Thompson     DC PLAN  TBD - Care Transitions is following to develop a safe & supportive discharge plan in collaboration with multidisciplinary team (&) patient/family/significant others.      PAYOR   [facility]     PT/OT   ( ) Awaiting    COMPLETED  (X) Daily ongoing review of patient via chart and/or (M-F) IDT rounds    Kait Veras (LSW, MSW)

## 2024-05-03 ENCOUNTER — APPOINTMENT (OUTPATIENT)
Dept: RADIOLOGY | Facility: HOSPITAL | Age: 69
DRG: 252 | End: 2024-05-03
Payer: COMMERCIAL

## 2024-05-03 LAB
ALBUMIN SERPL BCP-MCNC: 2.9 G/DL (ref 3.4–5)
ALP SERPL-CCNC: 41 U/L (ref 33–136)
ALT SERPL W P-5'-P-CCNC: 6 U/L (ref 10–52)
ANION GAP SERPL CALC-SCNC: 12 MMOL/L (ref 10–20)
APTT PPP: 28 SECONDS (ref 27–38)
AST SERPL W P-5'-P-CCNC: 20 U/L (ref 9–39)
BILIRUB DIRECT SERPL-MCNC: 0.1 MG/DL (ref 0–0.3)
BILIRUB SERPL-MCNC: 0.4 MG/DL (ref 0–1.2)
BUN SERPL-MCNC: 32 MG/DL (ref 6–23)
CALCIUM SERPL-MCNC: 7.7 MG/DL (ref 8.6–10.6)
CHLORIDE SERPL-SCNC: 105 MMOL/L (ref 98–107)
CO2 SERPL-SCNC: 26 MMOL/L (ref 21–32)
CREAT FLD-MCNC: 1.43 MG/DL
CREAT SERPL-MCNC: 1.46 MG/DL (ref 0.5–1.3)
EGFRCR SERPLBLD CKD-EPI 2021: 52 ML/MIN/1.73M*2
ERYTHROCYTE [DISTWIDTH] IN BLOOD BY AUTOMATED COUNT: 14.7 % (ref 11.5–14.5)
GLUCOSE BLD MANUAL STRIP-MCNC: 105 MG/DL (ref 74–99)
GLUCOSE BLD MANUAL STRIP-MCNC: 75 MG/DL (ref 74–99)
GLUCOSE BLD MANUAL STRIP-MCNC: 76 MG/DL (ref 74–99)
GLUCOSE BLD MANUAL STRIP-MCNC: 80 MG/DL (ref 74–99)
GLUCOSE SERPL-MCNC: 65 MG/DL (ref 74–99)
HCT VFR BLD AUTO: 32 % (ref 41–52)
HGB BLD-MCNC: 10.4 G/DL (ref 13.5–17.5)
INR PPP: 1 (ref 0.9–1.1)
MAGNESIUM SERPL-MCNC: 2.74 MG/DL (ref 1.6–2.4)
MCH RBC QN AUTO: 30.1 PG (ref 26–34)
MCHC RBC AUTO-ENTMCNC: 32.5 G/DL (ref 32–36)
MCV RBC AUTO: 93 FL (ref 80–100)
NRBC BLD-RTO: 0 /100 WBCS (ref 0–0)
PHOSPHATE SERPL-MCNC: 3.5 MG/DL (ref 2.5–4.9)
PLATELET # BLD AUTO: 171 X10*3/UL (ref 150–450)
POTASSIUM SERPL-SCNC: 4.2 MMOL/L (ref 3.5–5.3)
PROT SERPL-MCNC: 5 G/DL (ref 6.4–8.2)
PROTHROMBIN TIME: 11.6 SECONDS (ref 9.8–12.8)
RBC # BLD AUTO: 3.45 X10*6/UL (ref 4.5–5.9)
SODIUM SERPL-SCNC: 139 MMOL/L (ref 136–145)
WBC # BLD AUTO: 12.4 X10*3/UL (ref 4.4–11.3)

## 2024-05-03 PROCEDURE — 36415 COLL VENOUS BLD VENIPUNCTURE: CPT

## 2024-05-03 PROCEDURE — 71045 X-RAY EXAM CHEST 1 VIEW: CPT

## 2024-05-03 PROCEDURE — 2500000001 HC RX 250 WO HCPCS SELF ADMINISTERED DRUGS (ALT 637 FOR MEDICARE OP)

## 2024-05-03 PROCEDURE — 97116 GAIT TRAINING THERAPY: CPT | Mod: GP

## 2024-05-03 PROCEDURE — 2500000004 HC RX 250 GENERAL PHARMACY W/ HCPCS (ALT 636 FOR OP/ED)

## 2024-05-03 PROCEDURE — 83735 ASSAY OF MAGNESIUM: CPT

## 2024-05-03 PROCEDURE — 71045 X-RAY EXAM CHEST 1 VIEW: CPT | Performed by: RADIOLOGY

## 2024-05-03 PROCEDURE — 2500000005 HC RX 250 GENERAL PHARMACY W/O HCPCS

## 2024-05-03 PROCEDURE — 99291 CRITICAL CARE FIRST HOUR: CPT | Performed by: NURSE PRACTITIONER

## 2024-05-03 PROCEDURE — 82947 ASSAY GLUCOSE BLOOD QUANT: CPT

## 2024-05-03 PROCEDURE — 99233 SBSQ HOSP IP/OBS HIGH 50: CPT | Performed by: NURSE PRACTITIONER

## 2024-05-03 PROCEDURE — 84075 ASSAY ALKALINE PHOSPHATASE: CPT

## 2024-05-03 PROCEDURE — 97535 SELF CARE MNGMENT TRAINING: CPT | Mod: GO

## 2024-05-03 PROCEDURE — 97166 OT EVAL MOD COMPLEX 45 MIN: CPT | Mod: GO

## 2024-05-03 PROCEDURE — C9113 INJ PANTOPRAZOLE SODIUM, VIA: HCPCS

## 2024-05-03 PROCEDURE — 99221 1ST HOSP IP/OBS SF/LOW 40: CPT | Performed by: STUDENT IN AN ORGANIZED HEALTH CARE EDUCATION/TRAINING PROGRAM

## 2024-05-03 PROCEDURE — 2500000001 HC RX 250 WO HCPCS SELF ADMINISTERED DRUGS (ALT 637 FOR MEDICARE OP): Performed by: NURSE PRACTITIONER

## 2024-05-03 PROCEDURE — 94640 AIRWAY INHALATION TREATMENT: CPT

## 2024-05-03 PROCEDURE — 85610 PROTHROMBIN TIME: CPT

## 2024-05-03 PROCEDURE — 2500000002 HC RX 250 W HCPCS SELF ADMINISTERED DRUGS (ALT 637 FOR MEDICARE OP, ALT 636 FOR OP/ED)

## 2024-05-03 PROCEDURE — 97530 THERAPEUTIC ACTIVITIES: CPT | Mod: GP

## 2024-05-03 PROCEDURE — 2020000001 HC ICU ROOM DAILY

## 2024-05-03 PROCEDURE — 85027 COMPLETE CBC AUTOMATED: CPT

## 2024-05-03 PROCEDURE — 82570 ASSAY OF URINE CREATININE: CPT

## 2024-05-03 PROCEDURE — 84100 ASSAY OF PHOSPHORUS: CPT

## 2024-05-03 RX ORDER — AMOXICILLIN 250 MG
2 CAPSULE ORAL 2 TIMES DAILY
Status: DISCONTINUED | OUTPATIENT
Start: 2024-05-03 | End: 2024-05-10 | Stop reason: HOSPADM

## 2024-05-03 RX ORDER — PANTOPRAZOLE SODIUM 40 MG/1
40 TABLET, DELAYED RELEASE ORAL
Status: DISCONTINUED | OUTPATIENT
Start: 2024-05-04 | End: 2024-05-10 | Stop reason: HOSPADM

## 2024-05-03 RX ORDER — SODIUM CHLORIDE FOR INHALATION 3 %
3 VIAL, NEBULIZER (ML) INHALATION EVERY 6 HOURS SCHEDULED
Status: DISCONTINUED | OUTPATIENT
Start: 2024-05-03 | End: 2024-05-04

## 2024-05-03 RX ORDER — LIDOCAINE HYDROCHLORIDE 10 MG/ML
INJECTION INFILTRATION; PERINEURAL
Status: COMPLETED
Start: 2024-05-03 | End: 2024-05-03

## 2024-05-03 RX ORDER — LIDOCAINE HYDROCHLORIDE 10 MG/ML
5 INJECTION INFILTRATION; PERINEURAL ONCE
Status: COMPLETED | OUTPATIENT
Start: 2024-05-03 | End: 2024-05-03

## 2024-05-03 RX ORDER — IPRATROPIUM BROMIDE AND ALBUTEROL SULFATE 2.5; .5 MG/3ML; MG/3ML
3 SOLUTION RESPIRATORY (INHALATION) ONCE
Status: DISCONTINUED | OUTPATIENT
Start: 2024-05-03 | End: 2024-05-03

## 2024-05-03 RX ORDER — IPRATROPIUM BROMIDE AND ALBUTEROL SULFATE 2.5; .5 MG/3ML; MG/3ML
3 SOLUTION RESPIRATORY (INHALATION)
Status: DISCONTINUED | OUTPATIENT
Start: 2024-05-03 | End: 2024-05-04

## 2024-05-03 RX ADMIN — LIDOCAINE HYDROCHLORIDE 50 MG: 10 INJECTION INFILTRATION; PERINEURAL at 09:03

## 2024-05-03 RX ADMIN — IPRATROPIUM BROMIDE AND ALBUTEROL SULFATE 3 ML: .5; 3 SOLUTION RESPIRATORY (INHALATION) at 16:23

## 2024-05-03 RX ADMIN — PANTOPRAZOLE SODIUM 40 MG: 40 INJECTION, POWDER, FOR SOLUTION INTRAVENOUS at 08:51

## 2024-05-03 RX ADMIN — METOPROLOL TARTRATE 25 MG: 25 TABLET, FILM COATED ORAL at 21:43

## 2024-05-03 RX ADMIN — SACUBITRIL AND VALSARTAN 1 TABLET: 24; 26 TABLET, FILM COATED ORAL at 21:43

## 2024-05-03 RX ADMIN — PREDNISONE 5 MG: 10 TABLET ORAL at 09:16

## 2024-05-03 RX ADMIN — SENNOSIDES AND DOCUSATE SODIUM 2 TABLET: 50; 8.6 TABLET ORAL at 21:43

## 2024-05-03 RX ADMIN — ACETAMINOPHEN 1000 MG: 10 INJECTION INTRAVENOUS at 00:15

## 2024-05-03 RX ADMIN — ACETAMINOPHEN 1000 MG: 10 INJECTION INTRAVENOUS at 05:17

## 2024-05-03 RX ADMIN — Medication: at 16:24

## 2024-05-03 RX ADMIN — METHOCARBAMOL 1000 MG: 100 INJECTION, SOLUTION INTRAMUSCULAR; INTRAVENOUS at 05:17

## 2024-05-03 RX ADMIN — METHOCARBAMOL 1000 MG: 100 INJECTION, SOLUTION INTRAMUSCULAR; INTRAVENOUS at 14:28

## 2024-05-03 RX ADMIN — ASPIRIN 81 MG 81 MG: 81 TABLET ORAL at 08:51

## 2024-05-03 RX ADMIN — SACUBITRIL AND VALSARTAN 1 TABLET: 24; 26 TABLET, FILM COATED ORAL at 08:51

## 2024-05-03 RX ADMIN — HEPARIN SODIUM 5000 UNITS: 5000 INJECTION INTRAVENOUS; SUBCUTANEOUS at 21:43

## 2024-05-03 RX ADMIN — HEPARIN SODIUM 5000 UNITS: 5000 INJECTION INTRAVENOUS; SUBCUTANEOUS at 05:16

## 2024-05-03 RX ADMIN — METHOCARBAMOL 1000 MG: 100 INJECTION, SOLUTION INTRAMUSCULAR; INTRAVENOUS at 21:43

## 2024-05-03 RX ADMIN — ACETAMINOPHEN 1000 MG: 10 INJECTION INTRAVENOUS at 12:36

## 2024-05-03 RX ADMIN — METHOCARBAMOL 1000 MG: 100 INJECTION, SOLUTION INTRAMUSCULAR; INTRAVENOUS at 00:16

## 2024-05-03 RX ADMIN — ACETAMINOPHEN 1000 MG: 10 INJECTION INTRAVENOUS at 18:33

## 2024-05-03 RX ADMIN — CEFTRIAXONE SODIUM 2 G: 2 INJECTION, SOLUTION INTRAVENOUS at 17:29

## 2024-05-03 RX ADMIN — LIDOCAINE HYDROCHLORIDE 50 MG: 10 INJECTION, SOLUTION INFILTRATION; PERINEURAL at 09:03

## 2024-05-03 RX ADMIN — HEPARIN SODIUM 5000 UNITS: 5000 INJECTION INTRAVENOUS; SUBCUTANEOUS at 14:28

## 2024-05-03 RX ADMIN — METOPROLOL TARTRATE 25 MG: 25 TABLET, FILM COATED ORAL at 08:51

## 2024-05-03 ASSESSMENT — COGNITIVE AND FUNCTIONAL STATUS - GENERAL
DRESSING REGULAR UPPER BODY CLOTHING: A LITTLE
DRESSING REGULAR LOWER BODY CLOTHING: A LOT
MOVING FROM LYING ON BACK TO SITTING ON SIDE OF FLAT BED WITH BEDRAILS: A LITTLE
TURNING FROM BACK TO SIDE WHILE IN FLAT BAD: A LOT
STANDING UP FROM CHAIR USING ARMS: A LITTLE
EATING MEALS: A LITTLE
CLIMB 3 TO 5 STEPS WITH RAILING: A LOT
MOVING TO AND FROM BED TO CHAIR: A LOT
CLIMB 3 TO 5 STEPS WITH RAILING: A LOT
WALKING IN HOSPITAL ROOM: A LOT
TOILETING: A LOT
DRESSING REGULAR UPPER BODY CLOTHING: A LOT
HELP NEEDED FOR BATHING: A LOT
PERSONAL GROOMING: A LITTLE
WALKING IN HOSPITAL ROOM: A LITTLE
DAILY ACTIVITIY SCORE: 14
TOILETING: A LITTLE
TURNING FROM BACK TO SIDE WHILE IN FLAT BAD: A LITTLE
MOBILITY SCORE: 17
MOVING TO AND FROM BED TO CHAIR: A LITTLE
STANDING UP FROM CHAIR USING ARMS: A LOT
DAILY ACTIVITIY SCORE: 19
DRESSING REGULAR LOWER BODY CLOTHING: A LITTLE
PERSONAL GROOMING: A LITTLE
MOVING FROM LYING ON BACK TO SITTING ON SIDE OF FLAT BED WITH BEDRAILS: A LITTLE
MOBILITY SCORE: 13
HELP NEEDED FOR BATHING: A LITTLE

## 2024-05-03 ASSESSMENT — PAIN - FUNCTIONAL ASSESSMENT
PAIN_FUNCTIONAL_ASSESSMENT: 0-10

## 2024-05-03 ASSESSMENT — PAIN SCALES - GENERAL
PAINLEVEL_OUTOF10: 1
PAINLEVEL_OUTOF10: 8
PAINLEVEL_OUTOF10: 3
PAINLEVEL_OUTOF10: 5 - MODERATE PAIN
PAINLEVEL_OUTOF10: 8
PAINLEVEL_OUTOF10: 6
PAINLEVEL_OUTOF10: 4
PAINLEVEL_OUTOF10: 5 - MODERATE PAIN
PAINLEVEL_OUTOF10: 5 - MODERATE PAIN
PAINLEVEL_OUTOF10: 0 - NO PAIN

## 2024-05-03 ASSESSMENT — PAIN DESCRIPTION - DESCRIPTORS: DESCRIPTORS: ACHING;PRESSURE;SHARP

## 2024-05-03 ASSESSMENT — ACTIVITIES OF DAILY LIVING (ADL)
ADL_ASSISTANCE: INDEPENDENT
HOME_MANAGEMENT_TIME_ENTRY: 10
BATHING_ASSISTANCE: MODERATE

## 2024-05-03 NOTE — PROGRESS NOTES
Yobany Small is a 69 y.o. male on day 3 of admission presenting with Type Ia endoleak of aortic graft (CMS-[facility]).    Subjective   Continues to maintain his respiratory status.       Objective     Physical Exam  He has very good diaphragm EMG activity.  I removed his diaphragm pacing electrodes with out any difficulty.      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  He had very good respiratory status.  He has good diaphragm EMG activity.  I removed his diaphragm pacing electrodes.  Please call if any problems.ofessional and overall care of this patient.      Moreno Irvin MD

## 2024-05-03 NOTE — CARE PLAN
The patient's goals for the shift include      The clinical goals for the shift include pt will remain hemodynamically stable throughout the shift    Over the shift, the patient did make progress toward the following goals.    Problem: Pain  Goal: My pain/discomfort is manageable  Outcome: Progressing     Problem: Safety  Goal: Patient will be injury free during hospitalization  Outcome: Progressing  Goal: I will remain free of falls  Outcome: Progressing     Problem: Daily Care  Goal: Daily care needs are met  Outcome: Progressing     Problem: Psychosocial Needs  Goal: Demonstrates ability to cope with hospitalization/illness  Outcome: Progressing  Goal: Collaborate with me, my family, and caregiver to identify my specific goals  Outcome: Progressing     Problem: Discharge Barriers  Goal: My discharge needs are met  Outcome: Progressing     Problem: Fall/Injury  Goal: Not fall by end of shift  Outcome: Progressing  Goal: Be free from injury by end of the shift  Outcome: Progressing  Goal: Verbalize understanding of personal risk factors for fall in the hospital  Outcome: Progressing  Goal: Verbalize understanding of risk factor reduction measures to prevent injury from fall in the home  Outcome: Progressing  Goal: Use assistive devices by end of the shift  Outcome: Progressing  Goal: Pace activities to prevent fatigue by end of the shift  Outcome: Progressing     Problem: Respiratory  Goal: Clear secretions with interventions this shift  Outcome: Progressing  Goal: Minimize anxiety/maximize coping throughout shift  Outcome: Progressing  Goal: Minimal/no exertional discomfort or dyspnea this shift  Outcome: Progressing  Goal: No signs of respiratory distress (eg. Use of accessory muscles. Peds grunting)  Outcome: Progressing  Goal: Patent airway maintained this shift  Outcome: Progressing  Goal: Tolerate mechanical ventilation evidenced by VS/agitation level this shift  Outcome: Progressing  Goal: Tolerate pulmonary  toileting this shift  Outcome: Progressing  Goal: Verbalize decreased shortness of breath this shift  Outcome: Progressing  Goal: Wean oxygen to maintain O2 saturation per order/standard this shift  Outcome: Progressing  Goal: Increase self care and/or family involvement in next 24 hours  Outcome: Progressing

## 2024-05-03 NOTE — PROGRESS NOTES
Transitional Care Coordination Progress Note:  PLAN: Heart failure team to see patient. CT removal today.    PAYOR: [facility]    DISPO: ADOD Monday with home PT.    SUPPORT/CONTACT: Spouse, Kristen, [phone]    Destinee Mccabe RN, TCC

## 2024-05-03 NOTE — PROGRESS NOTES
[facility]  Consult Note      Patient: Yobany Small  Age/Sex: 69 y.o., male  MRN: [id number]  Date of surgery: 4/30/2024  Admit Date: 4/30/2024   Code Status: Full Code  Length of Stay: 3      Interval History/Overnight Events:   [place] draining clear yellow urine  Remains hemodynamically stable  Pain well controlled on PCA      Objective  05/01 1900 - 05/03 0659  In: 873.7 [P.O.:80; I.V.:343.7]  Out: 3550 [Urine:3265; Drains:65]    Medications:  Current Facility-Administered Medications   Medication Dose Route Frequency Provider Last Rate Last Admin    acetaminophen (Ofirmev) injection 1,000 mg  1,000 mg intravenous q6h Oralia Murray MD   1,000 mg at 05/03/24 0517    aspirin chewable tablet 81 mg  81 mg oral Daily Oralia Murray MD   81 mg at 05/02/24 1303    cefTRIAXone (Rocephin) 2 g in dextrose 5% in water (D5W) 50 mL  2 g intravenous q24h Oralia Murray  mL/hr at 05/02/24 1710 2 g at 05/02/24 1710    dextrose 50 % injection 12.5 g  12.5 g intravenous q15 min PRN Oralia Murray MD        dextrose 50 % injection 25 g  25 g intravenous q15 min PRN Oralia Murray MD        fluticasone furoate-vilanteroL (Breo Ellipta) 200-25 mcg/dose inhaler 1 puff  1 puff inhalation Daily Oralia Murray MD        glucagon (Glucagen) injection 1 mg  1 mg intramuscular q15 min PRN Oralia Murray MD        glucagon (Glucagen) injection 1 mg  1 mg intramuscular q15 min PRN Oralia Murray MD        heparin (porcine) injection 5,000 Units  5,000 Units subcutaneous q8h Oralia Murray MD   5,000 Units at 05/03/24 0516    hydrALAZINE (Apresoline) injection 10 mg  10 mg intravenous q4h PRN Oralia Murray MD   10 mg at 05/02/24 1458    hydromorphone PCA 0.5 mg/mL in NS opioid naive   intravenous Continuous Oralia Murray MD   New Syringe/Cartridge at 05/02/24 1216    insulin lispro (HumaLOG) injection 0-5 Units  0-5 Units subcutaneous q4h Oralia Murray MD   1 Units at 04/30/24 8795     ipratropium-albuteroL (Duo-Neb) 0.5-2.5 mg/3 mL nebulizer solution 3 mL  3 mL nebulization q6h PRN Oralia Murray MD        lactated Ringer's infusion  5 mL/hr intravenous Continuous Oralia Murray MD 5 mL/hr at 05/02/24 1900 5 mL/hr at 05/02/24 1900    lidocaine 4 % patch 1 patch  1 patch transdermal Daily Oralia Murray MD        methocarbamol (Robaxin) injection 1,000 mg  1,000 mg intravenous q8h Oralia Murray MD   1,000 mg at 05/03/24 0517    metoprolol tartrate (Lopressor) tablet 25 mg  25 mg oral BID Oralia Murray MD   25 mg at 05/02/24 2010    naloxone (Narcan) injection 0.2 mg  0.2 mg intravenous PRN Oralia Murrya MD        oxygen (O2) therapy   inhalation Continuous PRN - O2/gases Oralia Murray MD        pantoprazole (ProtoNix) injection 40 mg  40 mg intravenous Daily Oralia Murray MD   40 mg at 05/02/24 0826    perflutren protein A microsphere (Optison) injection 0.5 mL  0.5 mL intravenous Once in imaging Oralia Murray MD        predniSONE (Deltasone) tablet 5 mg  5 mg oral Daily Oralia Murray MD   5 mg at 05/02/24 1219    sacubitriL-valsartan (Entresto) 24-26 mg per tablet 1 tablet  1 tablet oral BID Oralia Murray MD   1 tablet at 05/02/24 2010    sulfur hexafluoride microsphr (Lumason) injection 24.28 mg  2 mL intravenous Once in imaging Oralia Murray MD        tiotropium (Spiriva Respimat) 2.5 mcg/actuation inhaler 2 puff  2 puff inhalation Daily Oralia Murray MD           Physical Exam                                                                                                                         Gen -  No acute distress, up in chair     Neuro - Alert, oriented, conversant     CV -  Regular rate and rhythm on continuous monitor     Pulm - Symmetric chest rise, non-labored breathing, CT: SS on suction     Abd - Soft, appropriately tender, non-distended     Ext - Warm, well perfused     Skin - without jaundice       Psych - appropriate tone,  affect      - juarez in place draining CYU      Imaging  None new    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 Thompson) on 4/30/2024. From a urologic standpoint his recovery has been uncomplicated.    5/1: In ICU, extubated and off pressors. Cr 1.25  5/2: Remains in ICU, hemodynamically stable, getting 1U pRBC. Cr 1.34 yesterday PM  5/3: RNF. Labs pending    -Maintain juarez until POD7 (5/6), will need cystogram and ESTEBAN drain Cr prior to removal (can be done in outpatient setting, if needed)  -Maintain ESTEBAN  -Diet per primary; no restrictions from urologic standpoint  -Strict I/Os  -Trend Hb, Cr  -Rest of care per ICU/ Vascular surgery    Seen with chief resident Dr. Al and discussed with attending Dr. Donna Crespo MD   Urology  Pager: [phone]

## 2024-05-03 NOTE — PROGRESS NOTES
VASCULAR SURGERY PROGRESS NOTE  Subjective   Reports a lot of pain near ESTEBAN site.     Objective   Vitals:    05/03/24 0832   BP:    Pulse:    Resp:    Temp: 36.6 °C (97.9 °F)   SpO2:       Exam:  Physical Exam  Constitutional: No acute distress, resting comfortably  Neuro:  AOx3, grossly intact  ENMT: moist mucous membranes  CV: no tachycardia, S1S2 no murmur  Pulm: non-labored on RA, breath sounds clear, right CT with thin serosang output (130 x 24 hrs)  GI: soft, non-tender, non-distended, +BS, Midline incision with island dressing in place, mild strikethrough,  ETSEBAN drain with bloody output.   Skin: warm and dry  Musculoskeletal: moving all extremities  Extremities: no swelling or deformity of b/l Les. Palpable R DP/PT, L PT     Relevant Results  Medications:  Scheduled Meds:acetaminophen, 1,000 mg, intravenous, q6h  aspirin, 81 mg, oral, Daily  cefTRIAXone, 2 g, intravenous, q24h  fluticasone furoate-vilanteroL, 1 puff, inhalation, Daily  heparin (porcine), 5,000 Units, subcutaneous, q8h  insulin lispro, 0-5 Units, subcutaneous, q4h  lidocaine, 1 patch, transdermal, Daily  methocarbamol, 1,000 mg, intravenous, q8h  metoprolol tartrate, 25 mg, oral, BID  pantoprazole, 40 mg, intravenous, Daily  perflutren protein A microsphere, 0.5 mL, intravenous, Once in imaging  predniSONE, 5 mg, oral, Daily  sacubitriL-valsartan, 1 tablet, oral, BID  sulfur hexafluoride microsphr, 2 mL, intravenous, Once in imaging  tiotropium, 2 puff, inhalation, Daily      Continuous Infusions:HYDROmorphone,   lactated Ringer's, 5 mL/hr, Last Rate: 5 mL/hr (05/02/24 1900)      PRN Meds:.PRN medications: dextrose, dextrose, glucagon, glucagon, hydrALAZINE, ipratropium-albuteroL, naloxone, oxygen    Labs:  Results from last 7 days   Lab Units 05/03/24  0845 05/02/24  1313 05/02/24  0929   WBC AUTO x10*3/uL 12.4* 13.7* 15.3*   HEMOGLOBIN g/dL 10.4* 11.2* 11.6*   PLATELETS AUTO x10*3/uL 171 168 176      Results from last 7 days   Lab Units  05/03/24  0845 05/02/24  1313 05/01/24  2353   SODIUM mmol/L 139 139 141   POTASSIUM mmol/L 4.2 4.0 4.3   CHLORIDE mmol/L 105 105 108*   CO2 mmol/L 26 24 28   BUN mg/dL 32* 31* 28*   CREATININE mg/dL 1.46* 1.27 1.34*   GLUCOSE mg/dL 65* 90 93   MAGNESIUM mg/dL 2.74* 3.05* 4.33*   PHOSPHORUS mg/dL 3.5 3.6 4.4      Results from last 7 days   Lab Units 05/03/24  0845 05/01/24  0601 05/01/24  0020   INR  1.0 1.3* 1.2*   PROTIME seconds 11.6 14.3* 14.0*   APTT seconds 28 29 30     Assessment/Plan   Yobany Small is 69 y.o. 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     Plan:   Assessment/Plan:  Neuro: acute postoperative pain   - continue tylenol/ Dilaudid PCA for pain control  - continue lidoderm patch, robaxin     CV: Hx dilated cardiomyopathy  - maintain blood pressure control, metoprolol, sacrubitril-valsartan  - continue ASA     Pulm:   - continue to encourage IS hourly while awake  - OOB to chair and increase ambulation as tolerated  - CXR today, plan to pul CT later today     FENGI: uretural cancer S/P eft nephroureterectomy on 4/30/24.   - tolerating clears, advance to regular diet  - continue bowel regimen to prevent OIC   - continue PPI  - monitor and replete electrolytes  - maintain juarez until POD7 (5/6)and ESTEBAN per urology: will need cystogram and ESTEBAN drain Cr prior to removal (can be done in outpatient setting, if needed)     Endo: no issues, no Hx  - monitor glucose- after resumption of diet if WNL discontinue SSI      Heme: acute blood loss anemia   - no s/sx of bleeding  - monitor H/H, transfuse for Hgb <7     ID:    - monitor s/s of infection  - WBC  - continue Rocephin until CT  removed    DVT prophylaxis:  - SQH     Dispo-   -regular floor status  - PT rec home with home PT on discharge    Brianna Paulson, APRN-CNP

## 2024-05-03 NOTE — PROGRESS NOTES
Occupational Therapy    Evaluation/Treatment    Patient Name: Yobany Small  MRN: [id number]  : 1955  Today's Date: 24  Time Calculation  Start Time: 1002  Stop Time:   Time Calculation (min): 25 min       Assessment:  OT Assessment: Mr. Small is a 68 y/o s/p open repair of abdominal aortic aneurysm via thoracoretroperitoneal approach and left nephroureterectomy (Dr. Thompson) on 2024 who presents with decreased ADL status and with high aggitation 2/2 pain.  Barriers to Discharge: Other (Comment) (Pain)  Evaluation/Treatment Tolerance: Patient limited by pain  Medical Staff Made Aware: Yes  End of Session Communication: Bedside nurse  End of Session Patient Position: Bed, 3 rail up, Alarm off, not on at start of session  OT Assessment Results: Decreased ADL status, Decreased upper extremity strength, Decreased safe judgment during ADL, Decreased endurance, Decreased IADLs  Barriers to Discharge: Other (Comment) (Pain)  Evaluation/Treatment Tolerance: Patient limited by pain  Medical Staff Made Aware: Yes  Strengths: Housing layout, Support of Caregivers  Barriers to Participation: Attitude of self, Support of Caregivers  Plan:  Treatment Interventions: ADL retraining, Functional transfer training, UE strengthening/ROM, Endurance training, Patient/family training  OT Frequency: 3 times per week  OT Discharge Recommendations: Low intensity level of continued care  Equipment Recommended upon Discharge: Bedside commode (shower chair, grab bars)  OT Recommended Transfer Status: Maximum assist  OT - OK to Discharge: Yes (Pt ok to discharge once cleared by the medical team.)  Treatment Interventions: ADL retraining, Functional transfer training, UE strengthening/ROM, Endurance training, Patient/family training    Subjective   Current Problem:  1. Type Ia endoleak of aortic graft (CMS-[facility])  Surgical Pathology Exam    Surgical Pathology Exam    AFB Culture/Smear    AFB Culture/Smear    Fungal  Culture/Smear    Fungal Culture/Smear    Tissue/Wound Culture/Smear    Tissue/Wound Culture/Smear      2. Urothelial carcinoma of kidney, left (Multi)        3. Ischemic cardiomyopathy  Cardiac Device Check - Surgery    Cardiac Device Check - Surgery    Cardiac Device Check - Surgery    Cardiac Device Check - Surgery      4. Cardiac defibrillator in place  Cardiac Device Check - Surgery    Cardiac Device Check - Surgery      5. Cardiac LV ejection fraction 21-30%  Transthoracic Echo (TTE) Complete    Transthoracic Echo (TTE) Complete      6. CAD in native artery  Transthoracic Echo (TTE) Complete    Transthoracic Echo (TTE) Complete      7. Chronic systolic (congestive) heart failure (Multi)  Transthoracic Echo (TTE) Complete        General:      General  Reason for Referral: explant of EVAR, open repair of abdominal aortic aneurysm via thoracoretroperitoneal approach, and left nephroureterectomy.  Past Medical History Relevant to Rehab: AAA s/p EVAR (5/2020) c/b RTOR to r/o abdominal compartment syndrome and treat type II leak, requiring RTOR, anemia, HTN, HLD, CAD s/p PCI w/ ELLY to Lcx (2020). HFrEF 25-30% 2/2 ischemic cardiomypoathy s/p ICD.  Missed Visit: No  Family/Caregiver Present: No  Prior to Session Communication: Bedside nurse  Patient Position Received: Alarm off, not on at start of session (Pt seated at EOB upon OT entry.)  Preferred Learning Style: auditory, verbal  General Comment: Pt seated at EOB with decreased frustration tolerance and required max encouragement to participate in OT eval.  Precautions:  Hearing/Visual Limitations: WFL  Medical Precautions: Fall precautions, Oxygen therapy device and L/min, Chest tube, Abdominal precautions (5L)     Pain:  Pain Assessment  Pain Assessment: 0-10  Pain Score: 8  Pain Type: Surgical pain  Pain Location: Abdomen  Pain Orientation: Left  Pain Descriptors: Aching, Pressure, Sharp  Pain Frequency: Constant/continuous (exacerbated with frequent  coughing)  Pain Onset: Ongoing  Pain Interventions: Distraction, Emotional support  Response to Interventions: Pt short tempered and demos a decreased frustration tolerance throughout OT eval.    Objective   Cognition:  Orientation Level: Oriented X4  Attention: Exceptions to WFL  Selective Attention: Impaired  Sustained Attention: Impaired  Memory: Within Funtional Limits  Problem Solving: Within Functional Limits  Numeric Reasoning: Within Functional Limits  Impulsive: Mildly     Confusion Assessment Method (CAM)  Acute Onset and Fluctuating Course (1A): No     Home Living:  Type of Home: House  Lives With: Spouse  Home Adaptive Equipment:  (Pt reports no home DME.)  Bathroom Shower/Tub: Curtain, Tub/shower unit  Bathroom Toilet: Standard  Bathroom Equipment: None  Home Living Comments: 3 [address], tub shower with a curtain, spouse's bed room is on 2nd floor.  Prior Function:  Level of [place]: Independent with ADLs and functional transfers, Independent with homemaking with ambulation  ADL Assistance: Independent  Homemaking Assistance: Needs assistance (Pt reports that spouse completes cooking, shopping, laundry (in basement ~ 11 steps), cleaning and share finances.)  Ambulatory Assistance: Independent  Vocational: Retired ()  Hand Dominance: Right  Prior Function Comments: (+) drives       ADL:  Eating Assistance: Stand by  Eating Deficit: Setup (anticipate)  Grooming Assistance: Stand by  Grooming Deficit: Setup  Bathing Assistance: Moderate (anticipate. Pt limited by lines, drains, tubes, surgical incision and pain.)  UE Dressing Assistance: Minimal (anticipate)  UE Dressing Deficit: Thread LUE, Pull over head  LE Dressing Assistance: Maximal (anticipate)  Toileting Assistance with Device: Maximal (anticipate)  ADL Comments: Pt presents with significant decreased frustration tolerance pain and discomfort over surgical area of L trunk.    Activity Tolerance:  Endurance: Endurance does not limit  participation in activity     Vision:Vision - Basic Assessment  Current Vision: Wears contacts (driving only)  Sensation:  Light Touch:  (denies paresthesia)  Strength:  Strength Comments: Grossly observed to be 4/5       Coordination:  Movements are Fluid and Coordinated: Yes (grossly observed)   Hand Function:  Hand Function  Gross Grasp: Functional  Coordination: Functional  Extremities: RUE   RUE : Within Functional Limits and LUE   LUE: Within Functional Limits      Outcome Measures: [facility] Daily Activity  Putting on and taking off regular lower body clothing: A lot  Bathing (including washing, rinsing, drying): A lot  Putting on and taking off regular upper body clothing: A lot  Toileting, which includes using toilet, bedpan or urinal: A lot  Taking care of personal grooming such as brushing teeth: A little  Eating Meals: A little  Daily Activity - Total Score: 14        Education Documentation  Body Mechanics, taught by Jennifer Aquino OT at 5/3/2024 11:49 AM.  Learner: Patient  Readiness: Acceptance  Method: Explanation, Demonstration  Response: Needs Reinforcement    Precautions, taught by Jennifer Aquino OT at 5/3/2024 11:49 AM.  Learner: Patient  Readiness: Acceptance  Method: Explanation, Demonstration  Response: Needs Reinforcement    Education Comments  No comments found.        OP EDUCATION:  Education  Individual(s) Educated: Patient  Education Provided: Other, Risk and benefits of OT discussed with patient or other (OT educated pt on breathing and core bracing with pillow in order to assist in pain mitigation during pt coughing spells. Pt would benefit from further education.)  Diagnosis and Precautions: Falls, Abdominal  Risk and Benefits Discussed with Patient/Caregiver/Other: yes  Patient Response to Education:  (Pt would benefit from contiued education on ADL performance.)    Goals:  Encounter Problems       Encounter Problems (Active)       ADLs       Patient will perform UB and LB bathing with  modified independent level of assistance and PRN AE.       Start:  05/03/24    Expected End:  05/17/24            Patient with complete upper body dressing with independent level of assistance donning and doffing all UE clothes with no adaptive equipment while edge of bed        Start:  05/03/24    Expected End:  05/17/24            Patient with complete lower body dressing with modified independent level of assistance donning and doffing all LE clothes  with PRN adaptive equipment while seated at EOB and standing for clothing mgmt.       Start:  05/03/24    Expected End:  05/17/24            Patient will complete daily grooming tasks brushing teeth and washing face/hair with independent level of assistance and PRN adaptive equipment while standing.       Start:  05/03/24    Expected End:  05/17/24            Patient will complete toileting including hygiene clothing management/hygiene with independent level of assistance and prn AE.       Start:  05/03/24    Expected End:  05/17/24               MOBILITY          TRANSFERS       Patient will perform bed mobility independent level of assistance and PRN AE in order to improve safety and independence with mobility       Start:  05/03/24    Expected End:  05/17/24            Patient will complete sit to stand transfer with modified independent level of assistance and least restrictive device in order to improve safety and prepare for out of bed mobility.       Start:  05/03/24    Expected End:  05/17/24

## 2024-05-03 NOTE — H&P
History Of Present Illness  Yobany Small is a 69 y.o. male presenting back to SICU with acute hypoxic respiratory distress.    HPI: 69 y.o. male with hx of AAA s/p EVAR (5/2020) c/b RTOR to r/o abdominal compartment syndrome and treat type II leak, requiring RTOR. 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 Recent hospitalization for LLQ pain and gross hematuria -- was found to have L upper pole renal mass w/ bx positive for urothelial carcinoma. Other PMH includes anemia, HTN, HLD, CAD s/p PCI w/ ELLY to Lcx (2020). HFrEF 25-30% 2/2 ischemic cardiomypoathy s/p ICD. Preop stress test w/ no inducible ischemia and stable LVEF on TTE. He has COPD w/ daily inhaler use. Preop PFTs s/f moderate obstructive disease.      Now s/p open AAA repair and explant with Dr. Street and left nephroureterectomy for urothelial carcinoma of L kidney w/ Dr. Thompson on 4/30. He was in the ICU immediately post op until 5/2. Post op course complicated by difficult to control acute post op pain, acute pain team following. Heart failure consulted for continued management of his known HFrEF. He was transitioned from cleviprex infusion to po metoprolol and entresto. He was transferred to [facility] with telemetry without event.    Today, he became acutely hypoxic approximately 30 min after L chest tube removal. He had increased FiO2 requirement from 2L to 10L HF NC. His SpO2 increased from 86% to 91%. A stat CXR was obtained which was unremarkable. He was transferred back to ICU for continuous monitoring and chest physiotherapy. A stat CT angio chest was ordered by request of vascular surgery team.     Past Medical History  Past Medical History:   Diagnosis Date    Abnormal findings on diagnostic imaging of heart and coronary circulation     Abnormal echocardiogram    Abnormal findings on diagnostic imaging of other specified body structures     Abnormal ultrasound    Abnormal findings on diagnostic  imaging of other specified body structures     Abnormal ultrasound    Abnormal findings on diagnostic imaging of other specified body structures     Abnormal chest x-ray    Abnormal findings on diagnostic imaging of other specified body structures     Abnormal CT of the chest    Abnormal findings on diagnostic imaging of other specified body structures     Abnormal computed tomography angiography (CTA)    Abnormal findings on diagnostic imaging of other specified body structures     Abnormal CT of the chest    BPH (benign prostatic hyperplasia)     COPD (chronic obstructive pulmonary disease) (Multi)     Coronary artery disease     Hyperlipidemia     Hypertension     Personal history of other medical treatment     History of echocardiogram       Surgical History  Past Surgical History:   Procedure Laterality Date    CT ABDOMEN PELVIS ANGIOGRAM W AND/OR WO IV CONTRAST  11/7/2019    CT ABDOMEN PELVIS ANGIOGRAM W AND/OR WO IV CONTRAST 11/7/2019 PAR ANCILLARY LEGACY    CT ABDOMEN PELVIS ANGIOGRAM W AND/OR WO IV CONTRAST  7/8/2020    CT ABDOMEN PELVIS ANGIOGRAM W AND/OR WO IV CONTRAST 7/8/2020 PAR ANCILLARY LEGACY    OTHER SURGICAL HISTORY  05/30/2019    Salivary gland surgery    OTHER SURGICAL HISTORY  06/16/2020    Bronchoscopy    OTHER SURGICAL HISTORY  06/16/2020    Seroma incision and drainage    OTHER SURGICAL HISTORY  04/02/2021    Cardioverter defibrillator insertion    OTHER SURGICAL HISTORY  01/30/2020    Cardiac catheterization with stent placement    OTHER SURGICAL HISTORY  06/16/2020    Abdominal aortic aneurysm repair endovascular        Social History  He reports that he quit smoking about 6 years ago. His smoking use included cigarettes. He has never used smokeless tobacco. He reports that he does not currently use alcohol. He reports that he does not use drugs.    Family History  Family History   Problem Relation Name Age of Onset    No Known Problems Mother          no relationship    No Known Problems  "Father          no relationship        Allergies  Patient has no known allergies.    Review of Systems   All other systems reviewed and are negative.       Physical Exam  Constitutional:       Appearance: Normal appearance.   HENT:      Head: Normocephalic.      Nose: Nose normal.      Mouth/Throat:      Mouth: Mucous membranes are moist.   Eyes:      Pupils: Pupils are equal, round, and reactive to light.   Cardiovascular:      Rate and Rhythm: Normal rate and regular rhythm.      Pulses:           Dorsalis pedis pulses are 1+ on the right side and 0 on the left side.        Posterior tibial pulses are 1+ on the right side and 1+ on the left side.   Pulmonary:      Effort: Pulmonary effort is normal.      Breath sounds: Normal breath sounds.      Comments: 10L HF NC  Abdominal:      General: There is distension.      Palpations: Abdomen is soft.      Tenderness: There is generalized abdominal tenderness.      Comments: L transverse abdominal incision stapled, RIVAS. Some areas of ecchymosis around inferior aspect. ESTEBAN drain with serosang drainage   Genitourinary:     Comments: Hays with clear yellow drainage  Musculoskeletal:      Cervical back: Normal range of motion.   Skin:     General: Skin is warm and dry.      Capillary Refill: Capillary refill takes less than 2 seconds.   Neurological:      General: No focal deficit present.      Mental Status: He is alert and oriented to person, place, and time.   Psychiatric:         Attention and Perception: Attention normal.         Mood and Affect: Mood is anxious.          Last Recorded Vitals  Blood pressure 115/63, pulse 68, temperature 36.8 °C (98.2 °F), resp. rate 16, height 1.803 m (5' 11\"), weight 55.7 kg (122 lb 12.7 oz), SpO2 (!) 86%.    VS over last 24h  Heart Rate:  [57-92]   Temp:  [36.2 °C (97.2 °F)-36.8 °C (98.2 °F)]   Resp:  [11-20]   BP: ()/()   SpO2:  [86 %-98 %]      Relevant Results  Results for orders placed or performed during the hospital " encounter of 04/30/24 (from the past 24 hour(s))   POCT GLUCOSE   Result Value Ref Range    POCT Glucose 91 74 - 99 mg/dL   POCT GLUCOSE   Result Value Ref Range    POCT Glucose 85 74 - 99 mg/dL   POCT GLUCOSE   Result Value Ref Range    POCT Glucose 75 74 - 99 mg/dL   POCT GLUCOSE   Result Value Ref Range    POCT Glucose 76 74 - 99 mg/dL   CBC   Result Value Ref Range    WBC 12.4 (H) 4.4 - 11.3 x10*3/uL    nRBC 0.0 0.0 - 0.0 /100 WBCs    RBC 3.45 (L) 4.50 - 5.90 x10*6/uL    Hemoglobin 10.4 (L) 13.5 - 17.5 g/dL    Hematocrit 32.0 (L) 41.0 - 52.0 %    MCV 93 80 - 100 fL    MCH 30.1 26.0 - 34.0 pg    MCHC 32.5 32.0 - 36.0 g/dL    RDW 14.7 (H) 11.5 - 14.5 %    Platelets 171 150 - 450 x10*3/uL   Coagulation Screen   Result Value Ref Range    Protime 11.6 9.8 - 12.8 seconds    INR 1.0 0.9 - 1.1    aPTT 28 27 - 38 seconds   Hepatic Function Panel   Result Value Ref Range    Albumin 2.9 (L) 3.4 - 5.0 g/dL    Bilirubin, Total 0.4 0.0 - 1.2 mg/dL    Bilirubin, Direct 0.1 0.0 - 0.3 mg/dL    Alkaline Phosphatase 41 33 - 136 U/L    ALT 6 (L) 10 - 52 U/L    AST 20 9 - 39 U/L    Total Protein 5.0 (L) 6.4 - 8.2 g/dL   Magnesium   Result Value Ref Range    Magnesium 2.74 (H) 1.60 - 2.40 mg/dL   Phosphorus   Result Value Ref Range    Phosphorus 3.5 2.5 - 4.9 mg/dL   Basic Metabolic Panel   Result Value Ref Range    Glucose 65 (L) 74 - 99 mg/dL    Sodium 139 136 - 145 mmol/L    Potassium 4.2 3.5 - 5.3 mmol/L    Chloride 105 98 - 107 mmol/L    Bicarbonate 26 21 - 32 mmol/L    Anion Gap 12 10 - 20 mmol/L    Urea Nitrogen 32 (H) 6 - 23 mg/dL    Creatinine 1.46 (H) 0.50 - 1.30 mg/dL    eGFR 52 (L) >60 mL/min/1.73m*2    Calcium 7.7 (L) 8.6 - 10.6 mg/dL   Creatinine, Fluid   Result Value Ref Range    Creatinine,  Fluid 1.43 Not established mg/dL   POCT GLUCOSE   Result Value Ref Range    POCT Glucose 80 74 - 99 mg/dL          Assessment/Plan   Yobany Small is a 69 y.o. male presenting back to SICU with acute hypoxic respiratory  distress.    HPI: 69 y.o. male with hx of AAA s/p EVAR (5/2020) c/b RTOR to r/o abdominal compartment syndrome and treat type II leak, requiring RTOR. 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 Recent hospitalization for LLQ pain and gross hematuria -- was found to have L upper pole renal mass w/ bx positive for urothelial carcinoma. Other PMH includes anemia, HTN, HLD, CAD s/p PCI w/ ELLY to Lcx (2020). HFrEF 25-30% 2/2 ischemic cardiomypoathy s/p ICD. Preop stress test w/ no inducible ischemia and stable LVEF on TTE. He has COPD w/ daily inhaler use. Preop PFTs s/f moderate obstructive disease.      Now s/p open AAA repair and explant with Dr. Street and left nephroureterectomy for urothelial carcinoma of L kidney w/ Dr. Thompson on 4/30. He was in the ICU immediately post op until 5/2. Post op course complicated by difficult to control acute post op pain, acute pain team following. Heart failure consulted for continued management of his known HFrEF. He was transitioned from cleviprex infusion to po metoprolol and entresto. He was transferred to [facility] with telemetry without event.    Today, he became acutely hypoxic approximately 30 min after L chest tube removal. He had increased FiO2 requirement from 2L to 10L HF NC. His SpO2 increased from 86% to 91%. A stat CXR was obtained which was unremarkable. He was transferred back to ICU for continuous monitoring and chest physiotherapy. A stat CT angio chest was ordered by request of vascular surgery team, pending placement on call.    Plan:  NEURO: No PMH. Acute post op pain improved, APS signed off 5/2. Some situational anxiety present  - ongoing neuro/neurovasc/pain assessments  - continue hydromorphone pca  - scheduled IV tylenol completes today  - PT/OT -> adv activity as tolerated     CV: PMH ruptured AAA s/p EVAR (2020) which was c/b RTOR to r/o abdominal compartment syndrome and treat type II leak, requiring RTOR.  Other PMH CAD s/p ELLY to LCx, 40% stenosis of proximal LAD (2020), HFrEF 25-30% 2/2 cardiomyopathy s/p ICD, HTN, HLD. Preop stress test not c/f ischemia, and VIJAY w/ stable EF. Now with enlargement of sac from 3.7 to 4.4 cm and type Ia endoleak s/p open repair and explant with Dr. Street 4/30. Post-op baseline palpable R DP, PT and L PT pulses. Repeat echo 5/1 w/ LVEF 55%, moderate dilation of aortic root. Currently hemodynamically intact without vasoactive support.  - continue metoprolol and entresto.  - PRN hydralazine 10mg q4hr for MAP >90  - continue asa   - continuous ekg/hourly and prn NIBP monitoring  - Home meds: Continue to hold simvastatin.      PULM: Hx of COPD. Preop PFTs indicative of moderate-severe obstructive disease FEV1/FVC (52% predicted). Acute hypoxic respiratory insufficiency, desaturation to 86%, increased FiO2 requirement from 2L NC to 10 HF NC. No acute distress, able to speak full sentences. L pleural chest tube removed 30min prior to desaturation. CXR without evidence of obvious PTX, overall unchanged from previous CXR. Notable thick sounding mucus with cough, nonproductive  - PRN duoneb -> switch to scheduled q6h  - add 3% saline nebs q6h  - vpep with oxy-jet  - wean O2 as tolerated   - Q1h incentive spirometer while awake  - Daily and CXR   - continue scheduled Spiriva and breo-ellipta   - CT angio chest ordered, pending placement on call  - Home meds: trelegy-ellipta       GI: No PMH. Supra mesenteric cross clamp time 40 minutes.   - regular diet  - PPI QD  - bowel regimen  - LFTS, amylase and lipase prn  - serial abdominal exams, continued assessment of abdominal drains     : Baseline hematuria 2/2 urothelial carcinoma of L kidney. Baseline Cr 0.94. S/p left nephroureterectomy 4/30 w/ Dr. Thompson. LAWRENCE worsening,  - Maintain juarez until POD7 (5/6), will need cystogram and ESTEBAN drain Cr prior to removal   - ESTEBAN creatinine level unremarkable, not indicative of leak 5/3  - Check renal  function panel daily and PRN  - hold off on diuresis with worsening dimas  - Goal U/O >0.5ml/kg/hr.    - Replete electrolytes judiciously  - Home meds: oxybutynin 10mg PRN, tamsulosin 0.4mg. Hold for now.      HEME: Hx anemia. Acute surgical blood loss anemia. Last transfused 1 RBC 5/2  - CBC daily and PRN  - coags prn  - SQH and scds  - ongoing monitoring for bleeding  - Home meds: Iron, B12. Hold for now.      ENDO: No hx. A1C 5.3 4/2024, TSH WNL 5/2023  - Q4h BG and SSI Lispro per ICU protocol.  - Continue home pred   - q4hr POCT BGs     ID: Afebrile. Mild leukocytosis. Ceftriaxone discontinued today  - monitor for s/s infection  - q4h temps, wbc trend daily     MSK: Hx of psoriatic arthritis. Rheumatology consulted  - continue home prednisone 5mg   - Consider dosing with home MTX with next due dosing if no signs/symptoms of severe infection/post-op complication      Skin: No known active skin issues.  - preventative Mepilex dressings in place on sacrum and heels  - change preventative Mepilex weekly or more frequently as indicated (when moist/soiled)   - every shift skin assessment per nursing and weekly ICU skin rounds  - moisture barrier to be applied with rafael care  - active skin problems addressed with nursing on daily rounds     Lines:   PIVs  Hays 4/30 (will stay in until POD 7 (5/6)  per urology  LLQ ESTEBAN drain 4/30     Dispo: readmit to SICU. Pt discussed with SICU attending, Dr. Stone and ICU fellow         Critical care time: 45 minutes       Alisha Moreno, APRN-CNP

## 2024-05-03 NOTE — PROGRESS NOTES
Physical Therapy    Physical Therapy Treatment    Patient Name: Yobany Small  MRN: [id number]  Today's Date: 5/3/2024  Time Calculation  Start Time: 1330  Stop Time: 1358  Time Calculation (min): 28 min     Assessment/Plan   PT Assessment  Evaluation/Treatment Tolerance: Patient limited by pain  Medical Staff Made Aware: Yes  End of Session Communication: Bedside nurse (ESTEBAN drain leaking prior and throughout session - RN and team aware; Pt with O2 off at end of session with wife - Pt educated on importance of O2 - Pt redonned - RN aware)  Assessment Comment: Pt able to amb 30ft x 2 with WW ad CGA. Pt required mod vc for safetydue to decreased safety awareness with mobility. Patient continues to benefit from skilled physical therapy to maximize functional mobility and safety. Pt remains appropriate for low intensity therapy when medically appropriate for DC.  End of Session Patient Position: Bed, 3 rail up, Alarm off, not on at start of session (Pt sitting EOB with wife; CB in reach; RN present)     PT Plan  Treatment/Interventions: Bed mobility, Transfer training, Gait training, Stair training, Balance training, Neuromuscular re-education, Strengthening, Endurance training, Therapeutic exercise, Therapeutic activity  PT Plan: Skilled PT  PT Frequency: 3 times per week  PT Discharge Recommendations: Low intensity level of continued care (Anticipate with pain management)  PT Recommended Transfer Status: Assist x1, Assistive device  PT - OK to Discharge: Yes meaning pt seen and dc rec made  RN cleared prior to session    General Visit Information:   PT  Visit  PT Received On: 05/03/24  General  Reason for Referral: explant of EVAR, open repair of abdominal aortic aneurysm via thoracoretroperitoneal approach, and left nephroureterectomy.  Past Medical History Relevant to Rehab: AAA s/p EVAR (5/2020) c/b RTOR to r/o abdominal compartment syndrome and treat type II leak, requiring RTOR, anemia, HTN, HLD, CAD s/p PCI w/  ELLY to Lcx (2020). HFrEF 25-30% 2/2 ischemic cardiomypoathy s/p ICD.  Family/Caregiver Present: Yes  Caregiver Feedback: Wife present throughout session  Prior to Session Communication: Bedside nurse, Physician (cleared prior to session)  Patient Position Received: Bed, 3 rail up, Alarm off, not on at start of session  Preferred Learning Style: auditory, verbal  General Comment: Pt pleasant and agreeable to therapy    Subjective   Precautions:  Precautions  Medical Precautions: Fall precautions, Oxygen therapy device and L/min, Abdominal precautions (5L)    Objective   Pain:  Pain Assessment  Pain Assessment: 0-10  Pain Score: 8  Pain Type: Acute pain, Surgical pain  Pain Location: Abdomen  Pain Interventions: Repositioned, Ambulation/increased activity  Response to Interventions: no change  Cognition:  Cognition  Overall Cognitive Status: Within Functional Limits  Orientation Level: Oriented X4  Postural Control:  Postural Control  Postural Control: Within Functional Limits  Static Sitting Balance  Static Sitting-Balance Support: Bilateral upper extremity supported, Feet supported  Static Sitting-Level of Assistance: Close supervision  Static Standing Balance  Static Standing-Balance Support: Bilateral upper extremity supported  Static Standing-Level of Assistance: Contact guard  Static Standing-Comment/Number of Minutes: WW    Activity Tolerance:  Activity Tolerance  Endurance: Endurance does not limit participation in activity  Treatments:  Therapeutic Activity  Therapeutic Activity Performed: Yes  Therapeutic Activity 1: Increased time for skilled line management for safe functional mobility  Therapeutic Activity 2: static sitting EOB x 10 min with supervision  Therapeutic Activity 3: static standing with ww 2 trials x 2 min with CGA    Bed Mobility  Bed Mobility: Yes  Bed Mobility 1  Bed Mobility 1: Supine to sitting  Level of Assistance 1: Contact guard  Bed Mobility Comments 1: HOB elevated, min vc for log  roll and hand placement    Ambulation/Gait Training  Ambulation/Gait Training Performed: Yes  Ambulation/Gait Training 1  Surface 1: Level tile  Device 1: Rolling walker  Assistance 1: Contact guard  Quality of Gait 1: Inconsistent stride length, Decreased step length  Comments/Distance (ft) 1: 30ft x 2; mod vc for safety and AD use  Transfers  Transfer: Yes  Transfer 1  Transfer From 1: Sit to, Stand to  Transfer to 1: Stand, Sit  Technique 1: Sit to stand, Stand to sit  Transfer Device 1: Walker  Transfer Level of Assistance 1: Contact guard  Trials/Comments 1: min vc for safety, eccentric control, and hand placement    Outcome Measures:  [facility] Basic Mobility  Turning from your back to your side while in a flat bed without using bedrails: A little  Moving from lying on your back to sitting on the side of a flat bed without using bedrails: A little  Moving to and from bed to chair (including a wheelchair): A little  Standing up from a chair using your arms (e.g. wheelchair or bedside chair): A little  To walk in hospital room: A little  Climbing 3-5 steps with railing: A lot  Basic Mobility - Total Score: 17    Education Documentation  Body Mechanics, taught by Arely Ochoa PT at 5/3/2024  3:19 PM.  Learner: Patient  Readiness: Acceptance  Method: Explanation, Demonstration  Response: Verbalizes Understanding, Needs Reinforcement    Mobility Training, taught by Arely Ochoa PT at 5/3/2024  3:19 PM.  Learner: Patient  Readiness: Acceptance  Method: Explanation, Demonstration  Response: Verbalizes Understanding, Needs Reinforcement    Education Comments  No comments found.      Encounter Problems       Encounter Problems (Active)       Balance       Pt will demonstrated ability to score at least 24/28 on the Tinetti balance assessment tool to ensure safety upon D/C.  (Progressing)       Start:  05/02/24    Expected End:  05/16/24               Mobility       Pt will demonstrated ability to ambulate >/=250ft  with proper form and no balance deficits for safe home going.   (Progressing)       Start:  05/02/24    Expected End:  05/16/24            Pt will demonstrate ability to ascend/descend 4 stairs with unilateral rail and no balance deficits for safe home going.  (Progressing)       Start:  05/02/24    Expected End:  05/16/24               PT Transfers       Pt will demonstrated ability to complete bed mobility and sit<>stand transfers without assistance and assistive device to safely return home.  (Progressing)       Start:  05/02/24    Expected End:  05/16/24               Safety       LTG - Patient will adhere to hip precautions during ADL's and transfers       Start:  05/03/24            LTG - Patient will demonstrate safety requirements appropriate to situation/environment       Start:  05/03/24            LTG - Patient will utilize safety techniques       Start:  05/03/24            STG - Patient locks brakes on wheelchair       Start:  05/03/24            STG - Patient uses call light consistently to request assistance with transfers       Start:  05/03/24            STG - Patient uses gait belt during all transfers       Start:  05/03/24            Goal 1       Start:  05/03/24            Goal 2       Start:  05/03/24            Goal 3       Start:  05/03/24

## 2024-05-04 ENCOUNTER — APPOINTMENT (OUTPATIENT)
Dept: RADIOLOGY | Facility: HOSPITAL | Age: 69
DRG: 252 | End: 2024-05-04
Payer: COMMERCIAL

## 2024-05-04 LAB
ABO GROUP (TYPE) IN BLOOD: NORMAL
ABO GROUP (TYPE) IN BLOOD: NORMAL
ALBUMIN SERPL BCP-MCNC: 3.2 G/DL (ref 3.4–5)
ALBUMIN SERPL BCP-MCNC: 3.3 G/DL (ref 3.4–5)
ANION GAP SERPL CALC-SCNC: 11 MMOL/L (ref 10–20)
ANION GAP SERPL CALC-SCNC: 13 MMOL/L (ref 10–20)
ANTIBODY SCREEN: NORMAL
ANTIBODY SCREEN: NORMAL
BUN SERPL-MCNC: 31 MG/DL (ref 6–23)
BUN SERPL-MCNC: 32 MG/DL (ref 6–23)
CA-I BLD-SCNC: 1.04 MMOL/L (ref 1.1–1.33)
CA-I BLD-SCNC: 1.15 MMOL/L (ref 1.1–1.33)
CALCIUM SERPL-MCNC: 7.7 MG/DL (ref 8.6–10.6)
CALCIUM SERPL-MCNC: 7.8 MG/DL (ref 8.6–10.6)
CHLORIDE SERPL-SCNC: 102 MMOL/L (ref 98–107)
CHLORIDE SERPL-SCNC: 102 MMOL/L (ref 98–107)
CO2 SERPL-SCNC: 26 MMOL/L (ref 21–32)
CO2 SERPL-SCNC: 29 MMOL/L (ref 21–32)
CREAT SERPL-MCNC: 1.32 MG/DL (ref 0.5–1.3)
CREAT SERPL-MCNC: 1.38 MG/DL (ref 0.5–1.3)
EGFRCR SERPLBLD CKD-EPI 2021: 56 ML/MIN/1.73M*2
EGFRCR SERPLBLD CKD-EPI 2021: 59 ML/MIN/1.73M*2
ERYTHROCYTE [DISTWIDTH] IN BLOOD BY AUTOMATED COUNT: 14.5 % (ref 11.5–14.5)
ERYTHROCYTE [DISTWIDTH] IN BLOOD BY AUTOMATED COUNT: 14.5 % (ref 11.5–14.5)
GLUCOSE SERPL-MCNC: 117 MG/DL (ref 74–99)
GLUCOSE SERPL-MCNC: 80 MG/DL (ref 74–99)
HCT VFR BLD AUTO: 33.2 % (ref 41–52)
HCT VFR BLD AUTO: 35.7 % (ref 41–52)
HGB BLD-MCNC: 10.6 G/DL (ref 13.5–17.5)
HGB BLD-MCNC: 11.9 G/DL (ref 13.5–17.5)
MAGNESIUM SERPL-MCNC: 2.34 MG/DL (ref 1.6–2.4)
MAGNESIUM SERPL-MCNC: 2.61 MG/DL (ref 1.6–2.4)
MCH RBC QN AUTO: 29.3 PG (ref 26–34)
MCH RBC QN AUTO: 30.7 PG (ref 26–34)
MCHC RBC AUTO-ENTMCNC: 31.9 G/DL (ref 32–36)
MCHC RBC AUTO-ENTMCNC: 33.3 G/DL (ref 32–36)
MCV RBC AUTO: 92 FL (ref 80–100)
MCV RBC AUTO: 92 FL (ref 80–100)
NRBC BLD-RTO: 0 /100 WBCS (ref 0–0)
NRBC BLD-RTO: 0 /100 WBCS (ref 0–0)
PHOSPHATE SERPL-MCNC: 2.1 MG/DL (ref 2.5–4.9)
PHOSPHATE SERPL-MCNC: 2.6 MG/DL (ref 2.5–4.9)
PLATELET # BLD AUTO: 198 X10*3/UL (ref 150–450)
PLATELET # BLD AUTO: 208 X10*3/UL (ref 150–450)
POTASSIUM SERPL-SCNC: 3.8 MMOL/L (ref 3.5–5.3)
POTASSIUM SERPL-SCNC: 4.1 MMOL/L (ref 3.5–5.3)
RBC # BLD AUTO: 3.62 X10*6/UL (ref 4.5–5.9)
RBC # BLD AUTO: 3.88 X10*6/UL (ref 4.5–5.9)
RH FACTOR (ANTIGEN D): NORMAL
RH FACTOR (ANTIGEN D): NORMAL
SODIUM SERPL-SCNC: 137 MMOL/L (ref 136–145)
SODIUM SERPL-SCNC: 138 MMOL/L (ref 136–145)
WBC # BLD AUTO: 13.2 X10*3/UL (ref 4.4–11.3)
WBC # BLD AUTO: 14.7 X10*3/UL (ref 4.4–11.3)

## 2024-05-04 PROCEDURE — 2500000001 HC RX 250 WO HCPCS SELF ADMINISTERED DRUGS (ALT 637 FOR MEDICARE OP)

## 2024-05-04 PROCEDURE — 2500000005 HC RX 250 GENERAL PHARMACY W/O HCPCS

## 2024-05-04 PROCEDURE — 2500000002 HC RX 250 W HCPCS SELF ADMINISTERED DRUGS (ALT 637 FOR MEDICARE OP, ALT 636 FOR OP/ED): Performed by: NURSE PRACTITIONER

## 2024-05-04 PROCEDURE — 71045 X-RAY EXAM CHEST 1 VIEW: CPT | Performed by: RADIOLOGY

## 2024-05-04 PROCEDURE — 83735 ASSAY OF MAGNESIUM: CPT | Performed by: NURSE PRACTITIONER

## 2024-05-04 PROCEDURE — 80069 RENAL FUNCTION PANEL: CPT | Performed by: NURSE PRACTITIONER

## 2024-05-04 PROCEDURE — 86901 BLOOD TYPING SEROLOGIC RH(D): CPT | Performed by: NURSE PRACTITIONER

## 2024-05-04 PROCEDURE — 71275 CT ANGIOGRAPHY CHEST: CPT

## 2024-05-04 PROCEDURE — 99291 CRITICAL CARE FIRST HOUR: CPT

## 2024-05-04 PROCEDURE — 71275 CT ANGIOGRAPHY CHEST: CPT | Performed by: RADIOLOGY

## 2024-05-04 PROCEDURE — 76942 ECHO GUIDE FOR BIOPSY: CPT | Performed by: STUDENT IN AN ORGANIZED HEALTH CARE EDUCATION/TRAINING PROGRAM

## 2024-05-04 PROCEDURE — 2500000004 HC RX 250 GENERAL PHARMACY W/ HCPCS (ALT 636 FOR OP/ED)

## 2024-05-04 PROCEDURE — 2500000005 HC RX 250 GENERAL PHARMACY W/O HCPCS: Performed by: NURSE PRACTITIONER

## 2024-05-04 PROCEDURE — 99024 POSTOP FOLLOW-UP VISIT: CPT | Performed by: SURGERY

## 2024-05-04 PROCEDURE — 94667 MNPJ CHEST WALL 1ST: CPT

## 2024-05-04 PROCEDURE — 86901 BLOOD TYPING SEROLOGIC RH(D): CPT

## 2024-05-04 PROCEDURE — 99223 1ST HOSP IP/OBS HIGH 75: CPT | Performed by: STUDENT IN AN ORGANIZED HEALTH CARE EDUCATION/TRAINING PROGRAM

## 2024-05-04 PROCEDURE — 36415 COLL VENOUS BLD VENIPUNCTURE: CPT | Performed by: NURSE PRACTITIONER

## 2024-05-04 PROCEDURE — 94668 MNPJ CHEST WALL SBSQ: CPT

## 2024-05-04 PROCEDURE — 94640 AIRWAY INHALATION TREATMENT: CPT

## 2024-05-04 PROCEDURE — 71045 X-RAY EXAM CHEST 1 VIEW: CPT

## 2024-05-04 PROCEDURE — 85027 COMPLETE CBC AUTOMATED: CPT | Performed by: NURSE PRACTITIONER

## 2024-05-04 PROCEDURE — 2020000001 HC ICU ROOM DAILY

## 2024-05-04 PROCEDURE — 82330 ASSAY OF CALCIUM: CPT | Performed by: NURSE PRACTITIONER

## 2024-05-04 PROCEDURE — 2500000001 HC RX 250 WO HCPCS SELF ADMINISTERED DRUGS (ALT 637 FOR MEDICARE OP): Performed by: NURSE PRACTITIONER

## 2024-05-04 PROCEDURE — 2550000001 HC RX 255 CONTRASTS: Performed by: SURGERY

## 2024-05-04 RX ORDER — ALBUTEROL SULFATE 0.83 MG/ML
2.5 SOLUTION RESPIRATORY (INHALATION) 3 TIMES DAILY
Status: DISCONTINUED | OUTPATIENT
Start: 2024-05-04 | End: 2024-05-06

## 2024-05-04 RX ORDER — CALCIUM GLUCONATE 20 MG/ML
1 INJECTION, SOLUTION INTRAVENOUS ONCE
Status: COMPLETED | OUTPATIENT
Start: 2024-05-04 | End: 2024-05-04

## 2024-05-04 RX ORDER — FUROSEMIDE 10 MG/ML
40 INJECTION INTRAMUSCULAR; INTRAVENOUS ONCE
Status: COMPLETED | OUTPATIENT
Start: 2024-05-04 | End: 2024-05-04

## 2024-05-04 RX ORDER — LIDOCAINE 560 MG/1
2 PATCH PERCUTANEOUS; TOPICAL; TRANSDERMAL DAILY
Status: DISCONTINUED | OUTPATIENT
Start: 2024-05-04 | End: 2024-05-06

## 2024-05-04 RX ORDER — IPRATROPIUM BROMIDE AND ALBUTEROL SULFATE 2.5; .5 MG/3ML; MG/3ML
3 SOLUTION RESPIRATORY (INHALATION)
Status: CANCELLED | OUTPATIENT
Start: 2024-05-04

## 2024-05-04 RX ORDER — SODIUM CHLORIDE FOR INHALATION 3 %
3 VIAL, NEBULIZER (ML) INHALATION 3 TIMES DAILY
Status: DISCONTINUED | OUTPATIENT
Start: 2024-05-04 | End: 2024-05-06

## 2024-05-04 RX ORDER — ACETAMINOPHEN 325 MG/1
975 TABLET ORAL 3 TIMES DAILY
Status: DISCONTINUED | OUTPATIENT
Start: 2024-05-04 | End: 2024-05-10 | Stop reason: HOSPADM

## 2024-05-04 RX ORDER — METHOCARBAMOL 500 MG/1
1000 TABLET, FILM COATED ORAL EVERY 8 HOURS SCHEDULED
Status: DISCONTINUED | OUTPATIENT
Start: 2024-05-04 | End: 2024-05-10 | Stop reason: HOSPADM

## 2024-05-04 RX ORDER — GLYCOPYRROLATE 0.2 MG/ML
INJECTION INTRAMUSCULAR; INTRAVENOUS
Status: DISPENSED
Start: 2024-05-04 | End: 2024-05-04

## 2024-05-04 RX ORDER — LIDOCAINE HYDROCHLORIDE 10 MG/ML
INJECTION INFILTRATION; PERINEURAL
Status: DISPENSED
Start: 2024-05-04 | End: 2024-05-04

## 2024-05-04 RX ORDER — ROPIVACAINE IN 0.9% SOD CHL/PF 0.2 %
14 PLASTIC BAG, INJECTION (ML) EPIDURAL CONTINUOUS
Status: DISCONTINUED | OUTPATIENT
Start: 2024-05-04 | End: 2024-05-07

## 2024-05-04 RX ADMIN — HEPARIN SODIUM 5000 UNITS: 5000 INJECTION INTRAVENOUS; SUBCUTANEOUS at 06:19

## 2024-05-04 RX ADMIN — ALBUTEROL SULFATE 2.5 MG: 2.5 SOLUTION RESPIRATORY (INHALATION) at 15:50

## 2024-05-04 RX ADMIN — PANTOPRAZOLE SODIUM 40 MG: 40 TABLET, DELAYED RELEASE ORAL at 06:19

## 2024-05-04 RX ADMIN — ACETAMINOPHEN 975 MG: 325 TABLET ORAL at 14:35

## 2024-05-04 RX ADMIN — HEPARIN SODIUM 5000 UNITS: 5000 INJECTION INTRAVENOUS; SUBCUTANEOUS at 21:02

## 2024-05-04 RX ADMIN — SACUBITRIL AND VALSARTAN 1 TABLET: 24; 26 TABLET, FILM COATED ORAL at 08:29

## 2024-05-04 RX ADMIN — IOHEXOL 80 ML: 350 INJECTION, SOLUTION INTRAVENOUS at 00:44

## 2024-05-04 RX ADMIN — SODIUM CHLORIDE SOLN NEBU 3% 3 ML: 3 NEBU SOLN at 20:20

## 2024-05-04 RX ADMIN — METOPROLOL TARTRATE 25 MG: 25 TABLET, FILM COATED ORAL at 20:30

## 2024-05-04 RX ADMIN — SACUBITRIL AND VALSARTAN 1 TABLET: 24; 26 TABLET, FILM COATED ORAL at 20:31

## 2024-05-04 RX ADMIN — CALCIUM GLUCONATE 1 G: 20 INJECTION, SOLUTION INTRAVENOUS at 14:53

## 2024-05-04 RX ADMIN — Medication 4 L/MIN: at 15:50

## 2024-05-04 RX ADMIN — METOPROLOL TARTRATE 25 MG: 25 TABLET, FILM COATED ORAL at 08:29

## 2024-05-04 RX ADMIN — Medication: at 20:10

## 2024-05-04 RX ADMIN — FUROSEMIDE 40 MG: 10 INJECTION, SOLUTION INTRAMUSCULAR; INTRAVENOUS at 10:10

## 2024-05-04 RX ADMIN — ASPIRIN 81 MG 81 MG: 81 TABLET ORAL at 08:28

## 2024-05-04 RX ADMIN — Medication 6 L/MIN: at 08:55

## 2024-05-04 RX ADMIN — ALBUTEROL SULFATE 2.5 MG: 2.5 SOLUTION RESPIRATORY (INHALATION) at 20:21

## 2024-05-04 RX ADMIN — ACETAMINOPHEN 975 MG: 325 TABLET ORAL at 20:30

## 2024-05-04 RX ADMIN — METHOCARBAMOL 1000 MG: 500 TABLET ORAL at 21:01

## 2024-05-04 RX ADMIN — IPRATROPIUM BROMIDE AND ALBUTEROL SULFATE 3 ML: .5; 3 SOLUTION RESPIRATORY (INHALATION) at 08:48

## 2024-05-04 RX ADMIN — LIDOCAINE 2 PATCH: 4 PATCH TOPICAL at 08:29

## 2024-05-04 RX ADMIN — TIOTROPIUM BROMIDE INHALATION SPRAY 2 PUFF: 3.12 SPRAY, METERED RESPIRATORY (INHALATION) at 12:20

## 2024-05-04 RX ADMIN — METHOCARBAMOL 1000 MG: 100 INJECTION, SOLUTION INTRAMUSCULAR; INTRAVENOUS at 06:19

## 2024-05-04 RX ADMIN — ACETAMINOPHEN 975 MG: 325 TABLET ORAL at 08:29

## 2024-05-04 RX ADMIN — POTASSIUM PHOSPHATE, MONOBASIC POTASSIUM PHOSPHATE, DIBASIC 15 MMOL: 224; 236 INJECTION, SOLUTION, CONCENTRATE INTRAVENOUS at 16:44

## 2024-05-04 RX ADMIN — HEPARIN SODIUM 5000 UNITS: 5000 INJECTION INTRAVENOUS; SUBCUTANEOUS at 14:35

## 2024-05-04 RX ADMIN — SODIUM CHLORIDE SOLN NEBU 3% 3 ML: 3 NEBU SOLN at 15:50

## 2024-05-04 RX ADMIN — PREDNISONE 5 MG: 10 TABLET ORAL at 08:29

## 2024-05-04 RX ADMIN — METHOCARBAMOL 1000 MG: 500 TABLET ORAL at 12:23

## 2024-05-04 RX ADMIN — Medication 4 L/MIN: at 20:20

## 2024-05-04 RX ADMIN — SENNOSIDES AND DOCUSATE SODIUM 2 TABLET: 50; 8.6 TABLET ORAL at 20:30

## 2024-05-04 ASSESSMENT — COGNITIVE AND FUNCTIONAL STATUS - GENERAL
DAILY ACTIVITIY SCORE: 18
CLIMB 3 TO 5 STEPS WITH RAILING: A LITTLE
TURNING FROM BACK TO SIDE WHILE IN FLAT BAD: A LITTLE
MOVING FROM LYING ON BACK TO SITTING ON SIDE OF FLAT BED WITH BEDRAILS: A LITTLE
WALKING IN HOSPITAL ROOM: A LITTLE
MOBILITY SCORE: 18
EATING MEALS: A LITTLE
DRESSING REGULAR LOWER BODY CLOTHING: A LITTLE
HELP NEEDED FOR BATHING: A LITTLE
MOVING TO AND FROM BED TO CHAIR: A LITTLE
PERSONAL GROOMING: A LITTLE
TOILETING: A LITTLE
STANDING UP FROM CHAIR USING ARMS: A LITTLE
DRESSING REGULAR UPPER BODY CLOTHING: A LITTLE

## 2024-05-04 ASSESSMENT — PAIN SCALES - GENERAL
PAINLEVEL_OUTOF10: 3
PAINLEVEL_OUTOF10: 0 - NO PAIN
PAINLEVEL_OUTOF10: 8
PAINLEVEL_OUTOF10: 0 - NO PAIN
PAINLEVEL_OUTOF10: 0 - NO PAIN

## 2024-05-04 ASSESSMENT — PAIN - FUNCTIONAL ASSESSMENT
PAIN_FUNCTIONAL_ASSESSMENT: 0-10

## 2024-05-04 NOTE — PROGRESS NOTES
Vascular Surgery Progress Note    Subjective  Transferred to ICU yesterday for acute hypoxic episode and increased O2 requirements.     CT PE negative. Reports significant pain at incisions and states coughing is difficult due to pain    Current Meds  Scheduled medications  aspirin, 81 mg, oral, Daily  fluticasone furoate-vilanteroL, 1 puff, inhalation, Daily  heparin (porcine), 5,000 Units, subcutaneous, q8h  ipratropium-albuteroL, 3 mL, nebulization, q6h  metoprolol tartrate, 25 mg, oral, BID  pantoprazole, 40 mg, oral, Daily before breakfast  predniSONE, 5 mg, oral, Daily  sacubitriL-valsartan, 1 tablet, oral, BID  sennosides-docusate sodium, 2 tablet, oral, BID  sodium chloride, 3 mL, nebulization, q6h MARIO ALBERTO  tiotropium, 2 puff, inhalation, Daily      Continuous medications  HYDROmorphone,   lactated Ringer's, 5 mL/hr, Last Rate: 5 mL/hr (05/04/24 0600)      PRN medications  PRN medications: hydrALAZINE, naloxone, oxygen    Objective    Vitals:   Temp:  [36.4 °C (97.5 °F)-36.8 °C (98.2 °F)] 36.8 °C (98.2 °F)  Heart Rate:  [61-87] 64  Resp:  [12-24] 18  BP: ()/(52-71) 120/59      I/O  I/O last 3 completed shifts:  In: 792.5 (14.2 mL/kg) [P.O.:80; I.V.:112.5 (2 mL/kg); Blood:350; IV Piggyback:250]  Out: 3915 (70.3 mL/kg) [Urine:3315 (1.7 mL/kg/hr); Drains:70; Chest Tube:530]  Weight: 55.7 kg       Physical Exam  General: laying in bed, NAD  Head: normocephalic, atraumatic  ENT: mucosa are moist   Respiratory: nonlabored breathing on HFNC  CV: RRR  GI: abdomen is soft, nontender, nondistended. ESTEBAN drain with somewhat sanguinous appearing output. Midline incision with island dressing in place, mild strikethrough  MSK: SHAYLA  Extremities: no swelling or deformity of b/l Les. Palpable R DP/PT, L PT   Neuro: CN II-XII grossly intact. Sensation to light touch intact    Labs:  Lab Results   Component Value Date    WBC 13.2 (H) 05/04/2024    HGB 10.6 (L) 05/04/2024    HCT 33.2 (L) 05/04/2024    MCV 92 05/04/2024    PLT  198 05/04/2024     Lab Results   Component Value Date    GLUCOSE 80 05/04/2024    CALCIUM 7.7 (L) 05/04/2024     05/04/2024    K 4.1 05/04/2024    CO2 29 05/04/2024     05/04/2024    BUN 32 (H) 05/04/2024    CREATININE 1.32 (H) 05/04/2024     Lab Results   Component Value Date    ALT 6 (L) 05/03/2024    AST 20 05/03/2024    ALKPHOS 41 05/03/2024    BILITOT 0.4 05/03/2024     Results from last 7 days   Lab Units 05/03/24  0845   APTT seconds 28   INR  1.0     Results from last 7 days   Lab Units 05/01/24  0601   POCT PH, ARTERIAL pH 7.42   POCT PCO2, ARTERIAL mm Hg 37*   POCT PO2, ARTERIAL mm Hg 78*   POCT HCO3 CALCULATED, ARTERIAL mmol/L 24.0   POCT BASE EXCESS, ARTERIAL mmol/L -0.3         Imaging  CT angio chest for pulmonary embolism    Result Date: 5/4/2024  STUDY: CT ANGIO CHEST FOR PULMONARY EMBOLISM;  5/4/2024 12:43 am   INDICATION: Signs/Symptoms:desaturation.   Per EMR: 69 y.o. male with hx of AAA s/p EVAR (5/2020) c/b RTOR to r/o abdominal compartment syndrome and treat type II leak, requiring RTOR. Now with type Ia endoleak of the aortic graft. L upper pole renal mass w/ bx positive for urothelial carcinoma. Other PMH includes anemia, COPD, HTN, HLD, CAD s/p PCI w/ ELLY to Lcx (2020). HFrEF 25-30% 2/2 ischemic cardiomypoathy s/p ICD. PFTs (April 18, 2024): Moderate obstructive disease.   COMPARISON: CTA chest abdomen pelvis 11/11/2023. PET/CT whole-body 03/07/2024. CT chest 03/03/2022.   ACCESSION NUMBER(S): [id number]   ORDERING CLINICIAN: JOVI DOWNS   TECHNIQUE: Helical data acquisition of the chest was obtained after intravenous administration of 80 mL of Omnipaque 350 as per PE protocol. Images were reformatted in coronal and sagittal planes. Axial and coronal maximum intensity projection (MIP) images were created and reviewed.   FINDINGS: POTENTIAL LIMITATIONS OF THE STUDY: The assessment is limited by respiratory motion. All findings are reported within these limitations.   HEART AND  VESSELS: There are no discrete filling defects within main pulmonary artery and its branches to suggest acute pulmonary embolism. Borderline dilation of the main pulmonary artery measuring up to 3 cm, new compared to 11/11/2023.   The thoracic aorta normal in course and caliber.There is mild scattered atherosclerosis present, including calcified and noncalcified plaques.Although, the study is not tailored for evaluation of aorta, there is no definite evidence of acute aortic pathology. Mild coronary artery calcifications are seen. Please note,the study is not optimized for evaluation of coronary arteries. Note is made of a stent within the left circumflex artery.   The cardiac chambers are not enlarged.There is no pericardial effusion seen.   MEDIASTINUM AND TORIE, LOWER NECK AND AXILLA: The visualized thyroid gland is within normal limits. There is a small sized hiatal hernia with mild concentric mid to distal esophageal wall thickening; correlate with concern for reflux esophagitis. Several prominent, however nonenlarged mediastinal nodes. Overall, no enlarged intrathoracic or axillary lymph nodes by imaging criteria.   LUNGS AND AIRWAYS: The trachea and central airways are patent. There is no endobronchial lesion, although there is small amount of nonobstructive mucous material seen within right mainstem bronchus.   Severe apical predominant emphysematous change, similar to prior exam. Scattered curvilinear/bandlike opacities throughout the lungs, most prominent within the upper lobes, favored to represent scarring versus subsegmental atelectasis. There are clustered cystic airspaces predominantly within the bilateral lower lobes and right middle lobe, most consistent with a component of honeycombing. Interval development of small bilateral (right-greater-than-left) pleural effusions with associated atelectatic change. No pneumothorax.   Unchanged focus of pleural-parenchymal scarring within the right upper lobe  (series 402, image 108). Several scattered pulmonary nodules, appearing stable to decreased in size compared to prior exams. For example there is a stable 0.7 cm right upper lobe nodule (series 402, image 157) when compared to 03/03/2022. Decreased size of the previously described left upper lobe nodule measuring 0.8 cm, as compared to 1 cm on 11/11/2023.   UPPER ABDOMEN: Redemonstration of the outpouching of the descending abdominal aorta at the celiac artery origin, measuring 6 cm in depth. 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 Numerous low-density hepatic lesions, incompletely evaluated on current examination due to contrast phase timing, however appears stable compared to prior CTA. Smooth without   CHEST WALL AND OSSEOUS STRUCTURES: Left-sided implantable cardiac ICD/pacemaker. There are multiple foci of subcutaneous emphysema within the soft tissues of the left anterolateral chest and abdominal wall. No acute osseous pathology.       1. No evidence of acute pulmonary embolism. 2. Interval development of small bilateral (right-greater-than-left) pleural effusions with associated atelectatic change. Borderline dilation of the main pulmonary artery measuring up to 3 cm, new compared to 11/11/2023. Correlate with patient's volume status. 3. Multiple foci of subcutaneous emphysema within the soft tissues of the left anterolateral chest and abdominal wall. Correlate with recent procedural history. 4. Severe apical predominant emphysematous change, similar to prior exam. There is superimposed honeycombing, most predominant within the bilateral lower lobes and right middle lobe. Consider nonurgent/routine HRCT for evaluation of concomitant fibrosis. 5. Small sized hiatal hernia with mild concentric mid to distal esophageal wall thickening; correlate with concern for reflux  esophagitis. 6. Several scattered pulmonary nodules, appearing stable to decreased in size compared to prior exams. 7. 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 8. Other stable findings, as above.     I personally reviewed the images/study and I agree with the findings as stated by Ar Chen MD. This study was interpreted at [place], [place]   MACRO: None     Dictation workstation:   [id number]    XR chest 1 view    Result Date: 5/3/2024  STUDY: XR CHEST 1 VIEW;  5/3/2024 4:45 pm   INDICATION: Signs/Symptoms:Desaturation following chest tube removal.   COMPARISON: Same day chest radiograph 2:33 p.m.   ACCESSION NUMBER(S): [id number]   ORDERING CLINICIAN: RANJAN TYSON   FINDINGS: AP radiograph of the chest was provided.   Left chest wall AICD/pacemaker with leads projecting over the right ventricle. Surgical staples overlie the left hemithorax.   CARDIOMEDIASTINAL SILHOUETTE: Cardiomediastinal silhouette is normal in size and configuration.   LUNGS: No definitive evidence of pneumothorax. There is similar coarsened lung markings within the right upper lobe. Right basilar opacity is similar compared to prior exam. There is trace blunting of the bilateral costophrenic angles.   ABDOMEN: No remarkable upper abdominal findings.   BONES: No acute osseous changes.       1. No definitive evidence of pneumothorax. 2. Trace blunting of the bilateral costophrenic angles that could be compatible with pleural effusion or atelectasis. 3. Similar findings of chronic lung changes.   I personally reviewed the images/study and I agree with the findings as stated by Kuldip Griffith MD. This study was interpreted at [place], [place].   MACRO: None     Dictation workstation:   [id number]    XR chest 1 view    Result Date: 5/3/2024  Interpreted By:  Cristiano Kc and Nakamoto  Kuldip STUDY: XR CHEST 1 VIEW;  5/3/2024 5:15 am   INDICATION: Signs/Symptoms:ICU rounds.   COMPARISON: Chest radiograph 05/02/2024   ACCESSION NUMBER(S): [id number]   ORDERING CLINICIAN: LINDSAY CRENSHAW   FINDINGS: AP radiograph of the chest was provided. Poorly penetrated radiograph.   Central venous catheter and introducer has been removed. Left-sided chest tube is in similar position compared to prior exam. There is a left chest wall AICD/pacemaker with lead projecting over the expected position of the right ventricle. Surgical staples overlie the left hemithorax.   CARDIOMEDIASTINAL SILHOUETTE: Cardiomediastinal silhouette is normal in size and configuration.   LUNGS: No definitive evidence of the pneumothorax. There is similar coarsened markings within the right upper lobe that likely represent chronic lung changes. Right basilar opacity is similar to slightly improved compared to prior exam. The left costophrenic angle is not completely visualized. No sizable pleural effusions.   ABDOMEN: No remarkable upper abdominal findings.   BONES: No acute osseous changes.       1. Right basilar opacities similar to slightly improved compared to prior exam. This finding could be compatible with atelectasis. 2. Medical devices and postsurgical changes as described above. 3. Redemonstrated coarsened markings within the right upper lobe that likely represent chronic lung changes.   I personally reviewed the images/study and I agree with the findings as stated by Kuldip Griffith MD. This study was interpreted at [facility], [place], [place].   MACRO: None   Signed by: Cristiano Kc 5/3/2024 11:31 AM Dictation workstation:   [id number]     Assessment:  69 y.o. 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  Transferred to floor 5/3, but returned to ICU due to increasing O2 requirements. PE negative    Plan:   - Pain control per ICU, Acute pain following, appreciate recs  - Chest tube removed 5/3, post-pull CXR without ptx  - Recommend aggressive pulmonary toilet  - Wean O2 as able  - Maintain ESTEBAN to bulb suction, drain per urology  - Spinal cord perfusion goal: MAP >80 mmHg  - Continue diet, PO meds  - Maintain K>4, Mg>2, Hgb>10, Plts>100, INR<1.4  - Labs q12h for 24 hrs  - monitor UOP  - SCDs  - PT/OT, OOB as able  - Okay for Dvt ppx    Discussed with fellow Dr. Hardin and attending Dr. Gabriela Archer MD  PGY-3 General Surgery  Vascular [id number]

## 2024-05-04 NOTE — PROCEDURES
Peripheral Block    Patient location during procedure: pre-op  Start time: 5/4/2024 11:00 AM  End time: 5/4/2024 11:15 AM  Reason for block: at surgeon's request and post-op pain management  Staffing  Performed: resident   Authorized by: Marlys Bradley MD    Performed by: Marlys Bradley MD  Preanesthetic Checklist  Completed: patient identified, IV checked, site marked, risks and benefits discussed, surgical consent, monitors and equipment checked, pre-op evaluation and timeout performed   Timeout performed at: 5/4/2024 11:00 AM  Peripheral Block  Patient position: sitting  Prep: ChloraPrep  Patient monitoring: heart rate and continuous pulse ox  Block type: STELLA  Laterality: B/L  Injection technique: catheter  Guidance: ultrasound guided  Local infiltration: ropivacaine  Needle  Needle type: Tuohy   Needle gauge: 26 G  Needle length: 8 cm  Needle localization: ultrasound guidance     image stored in chart  Assessment  Injection assessment: negative aspiration for heme, no paresthesia on injection, incremental injection and local visualized surrounding nerve on ultrasound  Paresthesia pain: none  Heart rate change: no  Slow fractionated injection: no  Additional Notes  Erector spinae plane block: Informed consent obtained.  Risks, benefits, and alternatives discussed.  ASA monitors placed, and timeout performed.  Patient positioned, prepped with chlorhexidine, and draped with sterile towels.  Ultrasound guidance used to visualize the erector spine a muscle above the TP/costal junction at T9.  Good visualization of the needle throughout duration of the procedure.  Aspiration was negative.  15 cc of 0.5 percent ropivacaine injected with visualization of spread bilaterally.  Catheters threaded and secured. Patient tolerated procedure well.    Timeout by CTNADIAU VIRGIL CHINCHILLA

## 2024-05-04 NOTE — PROGRESS NOTES
"Yobany Small is a 69 y.o. male on day 4 of admission presenting with Type Ia endoleak of aortic graft (CMS-HCC).    Subjective   MARCIO overnight. Weaned to 8L NC O2. This am endorsing pain at incision sites. Rates 7/10 at rest. Pain prohibits him from taking deep breaths. Denies SOB, lightheadedness, chest pain. During hypoxic episode yesterday that led to transfer to ICU. Pt denies subjective SOB, cough, confusion, chest pain. States he was \"chilling.\"        Objective     Physical Exam  Constitutional:       Appearance: Normal appearance.   HENT:      Head: Normocephalic.      Nose: Nose normal.   Eyes:      Pupils: Pupils are equal, round, and reactive to light.      EOM intact   Cardiovascular:      Rate and Rhythm: Normal rate and regular rhythm.      Pulses:           Dorsalis pedis pulses are 1+ on the right side and 0 on the left side.        Posterior tibial pulses are 1+ on the right side and 1+ on the left side.   Pulmonary:      Effort: Pulmonary effort is normal.      Breath sounds: Normal breath sounds.      Comments: 2L NC  Abdominal:      General: There is distension.      Palpations: Abdomen is soft.      Comments: L transverse abdominal incision stapled, RIVAS. Some areas of ecchymosis around inferior aspect. ESTEBAN drain with serosang drainage   Genitourinary:     Comments: Hays with clear yellow drainage  Skin:     General: Skin is warm and dry.      Capillary Refill: Capillary refill takes less than 2 seconds.   Neurological:      General: No focal deficit present.      Mental Status: He is alert and oriented to person, place, and time.   Psychiatric:         Attention and Perception: Attention normal.         Mood and Affect: Mood is anxious.   Last Recorded Vitals  Blood pressure 127/62, pulse 75, temperature 36.8 °C (98.2 °F), temperature source Temporal, resp. rate 16, height 1.803 m (5' 11\"), weight 55.7 kg (122 lb 12.7 oz), SpO2 99%.  Intake/Output last 3 Shifts:  I/O last 3 completed " shifts:  In: 575.9 (10.3 mL/kg) [P.O.:80; I.V.:245.9 (4.4 mL/kg); IV Piggyback:250]  Out: 2780 (49.9 mL/kg) [Urine:2330 (1.2 mL/kg/hr); Drains:50; Chest Tube:400]  Weight: 55.7 kg     Relevant Results  Scheduled medications  acetaminophen, 975 mg, oral, TID  aspirin, 81 mg, oral, Daily  fluticasone furoate-vilanteroL, 1 puff, inhalation, Daily  heparin (porcine), 5,000 Units, subcutaneous, q8h  ipratropium-albuteroL, 3 mL, nebulization, q6h  lidocaine, 2 patch, transdermal, Daily  methocarbamol, 1,000 mg, oral, q8h MARIO ALBERTO  metoprolol tartrate, 25 mg, oral, BID  pantoprazole, 40 mg, oral, Daily before breakfast  predniSONE, 5 mg, oral, Daily  sacubitriL-valsartan, 1 tablet, oral, BID  sennosides-docusate sodium, 2 tablet, oral, BID  sodium chloride, 3 mL, nebulization, q6h MARIO ALBERTO  tiotropium, 2 puff, inhalation, Daily      Continuous medications  HYDROmorphone,   lactated Ringer's, 5 mL/hr, Last Rate: 5 mL/hr (05/04/24 0700)      PRN medications  PRN medications: hydrALAZINE, naloxone, oxygen    Results for orders placed or performed during the hospital encounter of 04/30/24 (from the past 24 hour(s))   Creatinine, Fluid   Result Value Ref Range    Creatinine,  Fluid 1.43 Not established mg/dL   POCT GLUCOSE   Result Value Ref Range    POCT Glucose 80 74 - 99 mg/dL   POCT GLUCOSE   Result Value Ref Range    POCT Glucose 105 (H) 74 - 99 mg/dL   Type and screen   Result Value Ref Range    ABO TYPE AB     Rh TYPE POS     ANTIBODY SCREEN NEG    Renal Function Panel   Result Value Ref Range    Glucose 80 74 - 99 mg/dL    Sodium 138 136 - 145 mmol/L    Potassium 4.1 3.5 - 5.3 mmol/L    Chloride 102 98 - 107 mmol/L    Bicarbonate 29 21 - 32 mmol/L    Anion Gap 11 10 - 20 mmol/L    Urea Nitrogen 32 (H) 6 - 23 mg/dL    Creatinine 1.32 (H) 0.50 - 1.30 mg/dL    eGFR 59 (L) >60 mL/min/1.73m*2    Calcium 7.7 (L) 8.6 - 10.6 mg/dL    Phosphorus 2.6 2.5 - 4.9 mg/dL    Albumin 3.2 (L) 3.4 - 5.0 g/dL   Magnesium   Result Value Ref Range     Magnesium 2.61 (H) 1.60 - 2.40 mg/dL   CBC   Result Value Ref Range    WBC 13.2 (H) 4.4 - 11.3 x10*3/uL    nRBC 0.0 0.0 - 0.0 /100 WBCs    RBC 3.62 (L) 4.50 - 5.90 x10*6/uL    Hemoglobin 10.6 (L) 13.5 - 17.5 g/dL    Hematocrit 33.2 (L) 41.0 - 52.0 %    MCV 92 80 - 100 fL    MCH 29.3 26.0 - 34.0 pg    MCHC 31.9 (L) 32.0 - 36.0 g/dL    RDW 14.5 11.5 - 14.5 %    Platelets 198 150 - 450 x10*3/uL   Calcium, Ionized   Result Value Ref Range    POCT Calcium, Ionized 1.15 1.1 - 1.33 mmol/L         This patient has a urinary catheter   Reason for the urinary catheter remaining today? perioperative use for selected surgical procedures      Assessment/Plan   Principal Problem:    Type Ia endoleak of aortic graft (CMS-[facility])  Active Problems:    Urothelial carcinoma of kidney, left (Multi)    69 y.o. male with hx of AAA s/p EVAR (5/2020) c/b RTOR to r/o abdominal compartment syndrome and treat type II leak, requiring RTOR. 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 Recent hospitalization for LLQ pain and gross hematuria -- was found to have L upper pole renal mass w/ bx positive for urothelial carcinoma. Other PMH includes anemia, HTN, HLD, CAD s/p PCI w/ ELLY to Lcx (2020). HFrEF 25-30% 2/2 ischemic cardiomypoathy s/p ICD. Preop stress test w/ no inducible ischemia and stable LVEF on TTE. He has COPD w/ daily inhaler use. Preop PFTs s/f moderate obstructive disease.      Now s/p open AAA repair and explant with Dr. Street and left nephroureterectomy for urothelial carcinoma of L kidney w/ Dr. Thompson on 4/30. He was in the ICU immediately post op until 5/2. Post op course complicated by difficult to control acute post op pain, acute pain team following. Heart failure consulted for continued management of his known HFrEF. He was transitioned from cleviprex infusion to po metoprolol and entresto. He was transferred to [facility] with telemetry without event.     5/3, he became acutely  hypoxic approximately 30 min after L chest tube removal. He had increased FiO2 requirement from 2L to 10L HF NC. His SpO2 increased from 86% to 91%. A stat CXR was obtained which was unremarkable. He was transferred back to ICU for continuous monitoring and chest physiotherapy.      Plan:  NEURO: No PMH. Acute post op pain improved, APS signed off 5/2.   - ongoing neuro/neurovasc/pain assessments  - continue hydromorphone pca  - scheduled robaxin  - lidocaine patch   - APS re-engaged   - PT/OT -> adv activity as tolerated     CV: PMH ruptured AAA s/p EVAR (2020) which was c/b RTOR to r/o abdominal compartment syndrome and treat type II leak, requiring RTOR. Other PMH CAD s/p ELLY to LCx, 40% stenosis of proximal LAD (2020), HFrEF 25-30% 2/2 cardiomyopathy s/p ICD, HTN, HLD. Preop stress test not c/f ischemia, and VIJAY w/ stable EF. Now with enlargement of sac from 3.7 to 4.4 cm and type Ia endoleak s/p open repair and explant with Dr. Street 4/30. Post-op baseline palpable R DP, PT and L PT pulses. Repeat echo 5/1 w/ LVEF 55%, moderate dilation of aortic root. Currently hemodynamically intact without vasoactive support.  - MAPs overnight 70s-80s  - MAP goal >80   - continue metoprolol and entresto.  - PRN hydralazine 10mg q4hr for MAP >90  - continue asa   - continuous ekg/hourly and prn NIBP monitoring  - Home meds: Continue to hold simvastatin.      PULM: Hx of COPD. Preop PFTs indicative of moderate-severe obstructive disease FEV1/FVC (52% predicted). 5/3 Acute hypoxic respiratory insufficiency, desaturation to 86%, increased FiO2 requirement from 2L NC to 10 HF NC. No acute distress, able to speak full sentences. L pleural chest tube removed 30min prior to desaturation. CXR without evidence of obvious PTX, overall unchanged from previous CXR. Notable thick sounding mucus with cough, nonproductive  - CT PE w/ no evidence of PE. Small R>L pleural effusion. Severe emphysematous changes. Honeycombing c/f concomitant  fibrosis  - CXR this AM: worsening bibasilar edema/effusion/atelectasis   - Post op pain limiting airway clearance and deep breathing, will plan for block with APS   - duoneb and 3% saline nebs scheduled q6h  - vpep with oxy-jet  - wean O2 as tolerated   - Q1h incentive spirometer while awake  - Daily and CXR   - continue scheduled Spiriva and breo-ellipta   - Will need outpt follow up with pulmonology   - Home meds: trelegy-ellipta       GI: No PMH. Supra mesenteric cross clamp time 40 minutes.   - regular diet  - PPI QD  - bowel regimen  - LFTS, amylase and lipase prn  - serial abdominal exams, continued assessment of abdominal drains -- 25 out      : Baseline hematuria 2/2 urothelial carcinoma of L kidney. Baseline Cr 0.94. S/p left nephroureterectomy 4/30 w/ Dr. Thompson. ESTEBAN creatinine level unremarkable, not indicative of leak 5/3. LAWRENCE stable.  - Cr DT to 1.3 from 1.4. Will CTM  - 40 mg Lasix iso increased O2 requirement/worsening CXR  -  Will plan for ESTEBAN Cr on 5/7 followed by xray cystogram if negative. If cystogram negative, will remove juarez 5/8 AM, and plan for subsequent ESTEBAN drain removal 5/9 if outputs remain low.   - Check renal function panel daily and PRN  - Goal U/O >0.5ml/kg/hr.    - Replete electrolytes judiciously  - Home meds: oxybutynin 10mg PRN, tamsulosin 0.4mg. Hold for now.      HEME: Hx anemia. Acute surgical blood loss anemia. Last transfused 1 RBC 5/2  - CBC daily and PRN  - coags prn  - SQH and scds  - ongoing monitoring for bleeding  - Home meds: Iron, B12. Hold for now.      ENDO: No hx. A1C 5.3 4/2024, TSH WNL 5/2023  - Q4h BG and SSI Lispro per ICU protocol.  - Continue home pred   - q4hr POCT BGs     ID: Afebrile. Mild leukocytosis. Ceftriaxone discontinued 5/3.  - monitor for s/s infection  - q4h temps, wbc trend daily     MSK: Hx of psoriatic arthritis. Rheumatology consulted  - continue home prednisone 5mg   - Consider dosing with home MTX with next due dosing if no signs/symptoms  of severe infection/post-op complication      Skin: No known active skin issues.  - preventative Mepilex dressings in place on sacrum and heels  - change preventative Mepilex weekly or more frequently as indicated (when moist/soiled)   - every shift skin assessment per nursing and weekly ICU skin rounds  - moisture barrier to be applied with rafael care  - active skin problems addressed with nursing on daily rounds     Lines:   PIVs  Hays 4/30 (will stay in until 5/8 per urology)  LLQ ESTEBAN drain 4/30     Dispo:  SICU for now. Potentially stepdown tomorrow if improvement in respiratory status. Pt discussed with SICU attending, Dr. Stone and ICU fellow         Kimmie Puentes MD  Anesthesiology PGY-1   SICU [id number]

## 2024-05-04 NOTE — NURSING NOTE
Patient admitted to CTICU at 1900. He is A/Ox4, NAD, and working on his laptop.    Patient state's he felt the best since before arriving to the hospital just before this incident happened.     He arrived to the unit on 8L HFNC with an SpO2 of 94%. PulseOx sensor is present on patient's left middle finger, with his hand curled up into a fist, it appears moist. A new PulseOx is applied to patient's left ring finger and SpO2 is reading 97%. Patient has an intermittent, moist, non-productive cough. Breath sounds diminished.     VSS with normal sinus rhythm and normotensive BP.     Will continue to monitor.

## 2024-05-04 NOTE — CONSULTS
Consults  Acute Pain Service    Yobany Small is a 69 y.o. year old male patient who is POD1 from open AAA repair and explant with Dr. Street and left nephroureterectomy for urothelial carcinoma of L kidney w/ Dr. Thompson on 4/30. Acute pain consulted for post-operative pain control. Acute pain initially consulted for pre-operative nerve block to which the patient refused. Patient returned to CTICU on 5/4 with respiratory insufficiency 2/2 inadequate pain control. Currently on 2LL NC oxygenation 90-96%. He rates his pain as 3/10 at rest and 9-10/10 with deep inspiration and coughing. Pain localized to left incision site and ESTEBAN drain site. Last received 5000u SQH at 0600.     PMH:  anemia, HTN, HLD, CAD s/p PCI w/ ELLY to Lcx (2020). HFrEF 25-30% 2/2 ischemic cardiomypoathy s/p ICD. Preop stress test w/ no inducible ischemia and stable LVEF on TTE       Past Medical History:   Diagnosis Date    Abnormal findings on diagnostic imaging of heart and coronary circulation     Abnormal echocardiogram    Abnormal findings on diagnostic imaging of other specified body structures     Abnormal ultrasound    Abnormal findings on diagnostic imaging of other specified body structures     Abnormal ultrasound    Abnormal findings on diagnostic imaging of other specified body structures     Abnormal chest x-ray    Abnormal findings on diagnostic imaging of other specified body structures     Abnormal CT of the chest    Abnormal findings on diagnostic imaging of other specified body structures     Abnormal computed tomography angiography (CTA)    Abnormal findings on diagnostic imaging of other specified body structures     Abnormal CT of the chest    BPH (benign prostatic hyperplasia)     COPD (chronic obstructive pulmonary disease) (Multi)     Coronary artery disease     Hyperlipidemia     Hypertension     Personal history of other medical treatment     History of echocardiogram        Past Surgical History:   Procedure  Laterality Date    CT ABDOMEN PELVIS ANGIOGRAM W AND/OR WO IV CONTRAST  2019    CT ABDOMEN PELVIS ANGIOGRAM W AND/OR WO IV CONTRAST 2019 PAR ANCILLARY LEGACY    CT ABDOMEN PELVIS ANGIOGRAM W AND/OR WO IV CONTRAST  2020    CT ABDOMEN PELVIS ANGIOGRAM W AND/OR WO IV CONTRAST 2020 PAR ANCILLARY LEGACY    OTHER SURGICAL HISTORY  2019    Salivary gland surgery    OTHER SURGICAL HISTORY  2020    Bronchoscopy    OTHER SURGICAL HISTORY  2020    Seroma incision and drainage    OTHER SURGICAL HISTORY  2021    Cardioverter defibrillator insertion    OTHER SURGICAL HISTORY  2020    Cardiac catheterization with stent placement    OTHER SURGICAL HISTORY  2020    Abdominal aortic aneurysm repair endovascular        Family History   Problem Relation Name Age of Onset    No Known Problems Mother          no relationship    No Known Problems Father          no relationship        Social History     Socioeconomic History    Marital status:      Spouse name: Not on file    Number of children: Not on file    Years of education: Not on file    Highest education level: Not on file   Occupational History    Not on file   Tobacco Use    Smoking status: Former     Current packs/day: 0.00     Types: Cigarettes     Quit date:      Years since quittin.3    Smokeless tobacco: Never   Vaping Use    Vaping status: Never Used   Substance and Sexual Activity    Alcohol use: Not Currently    Drug use: Never    Sexual activity: Not on file   Other Topics Concern    Not on file   Social History Narrative    Not on file     Social Determinants of Health     Financial Resource Strain: Low Risk  (2023)    Overall Financial Resource Strain (CARDIA)     Difficulty of Paying Living Expenses: Not hard at all   Food Insecurity: Not on file   Transportation Needs: No Transportation Needs (2023)    PRAPARE - Transportation     Lack of Transportation (Medical): No     Lack of  Transportation (Non-Medical): No   Physical Activity: Not on file   Stress: Not on file   Social Connections: Not on file   Intimate Partner Violence: Not on file   Housing Stability: Low Risk  (11/18/2023)    Housing Stability Vital Sign     Unable to Pay for Housing in the Last Year: No     Number of Places Lived in the Last Year: 1     Unstable Housing in the Last Year: No        No Known Allergies      Review of Systems  Gen: No fatigue, anorexia, insomnia, fever.   Eyes: No vision loss, double vision, drainage, eye pain.   ENT: No pharyngitis, dry mouth, no hearing changes or ear discharge  Cardiac: No chest pain, palpitations, syncope, near syncope.   Pulmonary: No shortness of breath, cough, hemoptysis.   Heme/lymph: No swollen glands, fever, bleeding.   GI: No abdominal pain, change in bowel habits, melena, hematemesis, hematochezia, nausea, vomiting, diarrhea.   : No discharge, dysuria, frequency, urgency, hematuria.  Endo: No polyuria or weight loss.   Musculoskeletal: Negative for any pain or loss of ROM/weakness  Skin: No rashes or lesions  Neuro: Normal speech, no numbness or weakness. No gait difficulties  Review of systems is otherwise negative unless stated above or in history of present illness.    Physical Exam:  Constitutional:  no distress, alert and cooperative  Eyes: clear sclera  Head/Neck: No apparent injury, trachea midline  Respiratory/Thorax: Patent airways, thorax symmetric, breathing comfortably  Cardiovascular: no pitting edema  Gastrointestinal: Nondistended  Musculoskeletal: ROM intact  Extremities: no clubbing  Neurological: alert, jimenez x4  Psychological: Appropriate affect    Results for orders placed or performed during the hospital encounter of 04/30/24 (from the past 24 hour(s))   POCT GLUCOSE   Result Value Ref Range    POCT Glucose 105 (H) 74 - 99 mg/dL   Type and screen   Result Value Ref Range    ABO TYPE AB     Rh TYPE POS     ANTIBODY SCREEN NEG    Renal Function Panel    Result Value Ref Range    Glucose 80 74 - 99 mg/dL    Sodium 138 136 - 145 mmol/L    Potassium 4.1 3.5 - 5.3 mmol/L    Chloride 102 98 - 107 mmol/L    Bicarbonate 29 21 - 32 mmol/L    Anion Gap 11 10 - 20 mmol/L    Urea Nitrogen 32 (H) 6 - 23 mg/dL    Creatinine 1.32 (H) 0.50 - 1.30 mg/dL    eGFR 59 (L) >60 mL/min/1.73m*2    Calcium 7.7 (L) 8.6 - 10.6 mg/dL    Phosphorus 2.6 2.5 - 4.9 mg/dL    Albumin 3.2 (L) 3.4 - 5.0 g/dL   Magnesium   Result Value Ref Range    Magnesium 2.61 (H) 1.60 - 2.40 mg/dL   CBC   Result Value Ref Range    WBC 13.2 (H) 4.4 - 11.3 x10*3/uL    nRBC 0.0 0.0 - 0.0 /100 WBCs    RBC 3.62 (L) 4.50 - 5.90 x10*6/uL    Hemoglobin 10.6 (L) 13.5 - 17.5 g/dL    Hematocrit 33.2 (L) 41.0 - 52.0 %    MCV 92 80 - 100 fL    MCH 29.3 26.0 - 34.0 pg    MCHC 31.9 (L) 32.0 - 36.0 g/dL    RDW 14.5 11.5 - 14.5 %    Platelets 198 150 - 450 x10*3/uL   Calcium, Ionized   Result Value Ref Range    POCT Calcium, Ionized 1.15 1.1 - 1.33 mmol/L   Renal Function Panel   Result Value Ref Range    Glucose 117 (H) 74 - 99 mg/dL    Sodium 137 136 - 145 mmol/L    Potassium 3.8 3.5 - 5.3 mmol/L    Chloride 102 98 - 107 mmol/L    Bicarbonate 26 21 - 32 mmol/L    Anion Gap 13 10 - 20 mmol/L    Urea Nitrogen 31 (H) 6 - 23 mg/dL    Creatinine 1.38 (H) 0.50 - 1.30 mg/dL    eGFR 56 (L) >60 mL/min/1.73m*2    Calcium 7.8 (L) 8.6 - 10.6 mg/dL    Phosphorus 2.1 (L) 2.5 - 4.9 mg/dL    Albumin 3.3 (L) 3.4 - 5.0 g/dL   Magnesium   Result Value Ref Range    Magnesium 2.34 1.60 - 2.40 mg/dL   CBC   Result Value Ref Range    WBC 14.7 (H) 4.4 - 11.3 x10*3/uL    nRBC 0.0 0.0 - 0.0 /100 WBCs    RBC 3.88 (L) 4.50 - 5.90 x10*6/uL    Hemoglobin 11.9 (L) 13.5 - 17.5 g/dL    Hematocrit 35.7 (L) 41.0 - 52.0 %    MCV 92 80 - 100 fL    MCH 30.7 26.0 - 34.0 pg    MCHC 33.3 32.0 - 36.0 g/dL    RDW 14.5 11.5 - 14.5 %    Platelets 208 150 - 450 x10*3/uL   Calcium, Ionized   Result Value Ref Range    POCT Calcium, Ionized 1.04 (L) 1.1 - 1.33 mmol/L       Yobany Small is a 69 y.o. year old male patient who is POD1 from open AAA repair and explant with Dr. Street and left nephroureterectomy for urothelial carcinoma of L kidney w/ Dr. Thompson on 4/30. Acute pain consulted for post-operative pain control. Acute pain initially consulted for pre-operative nerve block to which the patient refused. Patient returned to CTICU on 5/4 with respiratory insufficiency 2/2 inadequate pain control s/p BL STELLA block with catheters on 5/4.     Plan:    - Bilateral STELLA blocks with catheters performed in CTICU on 5/4/24  - Ambit ball with Ropivacaine 0.2%/NaCl 0.9% 500mL, Rate 7 cc/hr bilaterally  - Ambit medication will not interfere with pain medication prescribed by the primary team.   - Please be aware of local anesthetic toxic dose and absorption variability before considering lidocaine patches  - Acute pain service will follow while catheters in place  - Rest of pain management per primary team    Acute Pain Resident  pg [phone] ph [phone]

## 2024-05-04 NOTE — PROGRESS NOTES
Urology [place]  Consult Note      Patient: Yobany Small  Age/Sex: 69 y.o., male  MRN: [id number]  Date of surgery: 4/30/2024  Admit Date: 4/30/2024   Code Status: Full Code  Length of Stay: 4      Interval History/Overnight Events:   SOB after chest tube removal yesterday - transferred to ICU  [place] draining clear yellow urine  Remains hemodynamically stable      Objective  05/02 1900 - 05/04 0659  In: 575.9 [P.O.:80; I.V.:245.9]  Out: 2780 [Urine:2330; Drains:50]    Medications:  Current Facility-Administered Medications   Medication Dose Route Frequency Provider Last Rate Last Admin    acetaminophen (Tylenol) tablet 975 mg  975 mg oral TID Kimmie Puentes MD   975 mg at 05/04/24 0829    aspirin chewable tablet 81 mg  81 mg oral Daily Oralia Murray MD   81 mg at 05/04/24 0828    fluticasone furoate-vilanteroL (Breo Ellipta) 200-25 mcg/dose inhaler 1 puff  1 puff inhalation Daily Oralia Murray MD        heparin (porcine) injection 5,000 Units  5,000 Units subcutaneous q8h Oralia Murray MD   5,000 Units at 05/04/24 0619    hydrALAZINE (Apresoline) injection 10 mg  10 mg intravenous q4h PRN Oralia Murray MD   10 mg at 05/02/24 1458    hydromorphone PCA 0.5 mg/mL in NS opioid naive   intravenous Continuous Oralia Murray MD   New Syringe/Cartridge at 05/03/24 1624    ipratropium-albuteroL (Duo-Neb) 0.5-2.5 mg/3 mL nebulizer solution 3 mL  3 mL nebulization q6h COURTNEY Keene-CNP   3 mL at 05/04/24 0848    lactated Ringer's infusion  5 mL/hr intravenous Continuous Oralia Murray MD 5 mL/hr at 05/04/24 0700 5 mL/hr at 05/04/24 0700    lidocaine 4 % patch 2 patch  2 patch transdermal Daily Kimmie Puentes MD   2 patch at 05/04/24 0829    methocarbamol (Robaxin) tablet 1,000 mg  1,000 mg oral q8h [facility] Kimmie Puentes MD        metoprolol tartrate (Lopressor) tablet 25 mg  25 mg oral BID Oralia Murray MD   25 mg at 05/04/24 0829    naloxone (Narcan) injection 0.2 mg  0.2 mg  intravenous PRN Oralia Murray MD        oxygen (O2) therapy   inhalation Continuous PRN - O2/gases Oralia Murray MD   6 L/min at 05/04/24 0855    pantoprazole (ProtoNix) EC tablet 40 mg  40 mg oral Daily before breakfast PATY Keene   40 mg at 05/04/24 0619    predniSONE (Deltasone) tablet 5 mg  5 mg oral Daily Oralia Murray MD   5 mg at 05/04/24 0829    sacubitriL-valsartan (Entresto) 24-26 mg per tablet 1 tablet  1 tablet oral BID Oralia Murray MD   1 tablet at 05/04/24 0829    sennosides-docusate sodium (Mary Beth-Colace) 8.6-50 mg per tablet 2 tablet  2 tablet oral BID PATY Keene   2 tablet at 05/03/24 2143    sodium chloride 3 % nebulizer solution 3 mL  3 mL nebulization q6h [facility] PATY Keene        tiotropium (Spiriva Respimat) 2.5 mcg/actuation inhaler 2 puff  2 puff inhalation Daily Oralia Murray MD           Physical Exam                                                                                                                         Gen -  No acute distress, up in chair     Neuro - Alert, oriented, conversant     CV -  Regular rate and rhythm on continuous monitor     Pulm - Symmetric chest rise, non-labored breathing, CT: SS on suction     Abd - Soft, appropriately tender, non-distended     Ext - Warm, well perfused     Skin - without jaundice       Psych - appropriate tone, affect      - juarez in place draining CYU      Imaging  None new    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 Thompson) on 4/30/2024. From a urologic standpoint his recovery has been uncomplicated.    5/1: In ICU, extubated and off pressors. Cr 1.25  5/2: Remains in ICU, hemodynamically stable, getting 1U pRBC. Cr 1.34 yesterday PM  5/3: RNF. Labs pending  5/4: SOB following chest tube removal, transferred  back to ICU     - Maintain juarez   - Will plan for ESTEBAN Cr on 5/7 followed by xray cystogram if negative. If cystogram negative, will remove juarez 5/8 AM, and plan for subsequent ESTEBAN drain removal 5/9 if outputs remain low.   -Rest of care per ICU/ Vascular surgery    Seen with with attending Dr. Donna Al MD   Urology PGY6  [id number]

## 2024-05-05 ENCOUNTER — APPOINTMENT (OUTPATIENT)
Dept: RADIOLOGY | Facility: HOSPITAL | Age: 69
DRG: 252 | End: 2024-05-05
Payer: COMMERCIAL

## 2024-05-05 LAB
ALBUMIN SERPL BCP-MCNC: 3.3 G/DL (ref 3.4–5)
ANION GAP SERPL CALC-SCNC: 13 MMOL/L (ref 10–20)
BLOOD EXPIRATION DATE: NORMAL
BUN SERPL-MCNC: 29 MG/DL (ref 6–23)
CA-I BLD-SCNC: 1.05 MMOL/L (ref 1.1–1.33)
CALCIUM SERPL-MCNC: 7.7 MG/DL (ref 8.6–10.6)
CHLORIDE SERPL-SCNC: 102 MMOL/L (ref 98–107)
CO2 SERPL-SCNC: 27 MMOL/L (ref 21–32)
CREAT SERPL-MCNC: 1.32 MG/DL (ref 0.5–1.3)
DISPENSE STATUS: NORMAL
EGFRCR SERPLBLD CKD-EPI 2021: 59 ML/MIN/1.73M*2
ERYTHROCYTE [DISTWIDTH] IN BLOOD BY AUTOMATED COUNT: 14.4 % (ref 11.5–14.5)
GLUCOSE SERPL-MCNC: 97 MG/DL (ref 74–99)
HCT VFR BLD AUTO: 38 % (ref 41–52)
HGB BLD-MCNC: 12.4 G/DL (ref 13.5–17.5)
MAGNESIUM SERPL-MCNC: 2.12 MG/DL (ref 1.6–2.4)
MCH RBC QN AUTO: 30 PG (ref 26–34)
MCHC RBC AUTO-ENTMCNC: 32.6 G/DL (ref 32–36)
MCV RBC AUTO: 92 FL (ref 80–100)
NRBC BLD-RTO: 0 /100 WBCS (ref 0–0)
PHOSPHATE SERPL-MCNC: 2.7 MG/DL (ref 2.5–4.9)
PLATELET # BLD AUTO: 234 X10*3/UL (ref 150–450)
POTASSIUM SERPL-SCNC: 3.5 MMOL/L (ref 3.5–5.3)
PRODUCT BLOOD TYPE: 8400
PRODUCT CODE: NORMAL
RBC # BLD AUTO: 4.14 X10*6/UL (ref 4.5–5.9)
SODIUM SERPL-SCNC: 138 MMOL/L (ref 136–145)
UNIT ABO: NORMAL
UNIT NUMBER: NORMAL
UNIT RH: NORMAL
UNIT VOLUME: 350
WBC # BLD AUTO: 13.5 X10*3/UL (ref 4.4–11.3)
XM INTEP: NORMAL

## 2024-05-05 PROCEDURE — 2500000004 HC RX 250 GENERAL PHARMACY W/ HCPCS (ALT 636 FOR OP/ED): Performed by: NURSE PRACTITIONER

## 2024-05-05 PROCEDURE — 2500000002 HC RX 250 W HCPCS SELF ADMINISTERED DRUGS (ALT 637 FOR MEDICARE OP, ALT 636 FOR OP/ED): Performed by: NURSE PRACTITIONER

## 2024-05-05 PROCEDURE — 2500000004 HC RX 250 GENERAL PHARMACY W/ HCPCS (ALT 636 FOR OP/ED)

## 2024-05-05 PROCEDURE — 94640 AIRWAY INHALATION TREATMENT: CPT

## 2024-05-05 PROCEDURE — 2500000006 HC RX 250 W HCPCS SELF ADMINISTERED DRUGS (ALT 637 FOR ALL PAYERS)

## 2024-05-05 PROCEDURE — 80069 RENAL FUNCTION PANEL: CPT | Performed by: NURSE PRACTITIONER

## 2024-05-05 PROCEDURE — 2500000001 HC RX 250 WO HCPCS SELF ADMINISTERED DRUGS (ALT 637 FOR MEDICARE OP): Performed by: NURSE PRACTITIONER

## 2024-05-05 PROCEDURE — 2500000001 HC RX 250 WO HCPCS SELF ADMINISTERED DRUGS (ALT 637 FOR MEDICARE OP)

## 2024-05-05 PROCEDURE — 71045 X-RAY EXAM CHEST 1 VIEW: CPT

## 2024-05-05 PROCEDURE — 2500000005 HC RX 250 GENERAL PHARMACY W/O HCPCS

## 2024-05-05 PROCEDURE — 83735 ASSAY OF MAGNESIUM: CPT | Performed by: NURSE PRACTITIONER

## 2024-05-05 PROCEDURE — 36415 COLL VENOUS BLD VENIPUNCTURE: CPT | Performed by: NURSE PRACTITIONER

## 2024-05-05 PROCEDURE — 99291 CRITICAL CARE FIRST HOUR: CPT

## 2024-05-05 PROCEDURE — 82330 ASSAY OF CALCIUM: CPT | Performed by: NURSE PRACTITIONER

## 2024-05-05 PROCEDURE — 2500000005 HC RX 250 GENERAL PHARMACY W/O HCPCS: Performed by: NURSE PRACTITIONER

## 2024-05-05 PROCEDURE — 1200000002 HC GENERAL ROOM WITH TELEMETRY DAILY

## 2024-05-05 PROCEDURE — 99231 SBSQ HOSP IP/OBS SF/LOW 25: CPT | Performed by: STUDENT IN AN ORGANIZED HEALTH CARE EDUCATION/TRAINING PROGRAM

## 2024-05-05 PROCEDURE — 2060000001 HC INTERMEDIATE ICU ROOM DAILY

## 2024-05-05 PROCEDURE — 71045 X-RAY EXAM CHEST 1 VIEW: CPT | Performed by: RADIOLOGY

## 2024-05-05 PROCEDURE — 99223 1ST HOSP IP/OBS HIGH 75: CPT | Performed by: INTERNAL MEDICINE

## 2024-05-05 PROCEDURE — 85027 COMPLETE CBC AUTOMATED: CPT | Performed by: NURSE PRACTITIONER

## 2024-05-05 RX ORDER — POTASSIUM CHLORIDE 14.9 MG/ML
20 INJECTION INTRAVENOUS
Status: DISPENSED | OUTPATIENT
Start: 2024-05-05 | End: 2024-05-05

## 2024-05-05 RX ORDER — FUROSEMIDE 10 MG/ML
40 INJECTION INTRAMUSCULAR; INTRAVENOUS ONCE
Status: COMPLETED | OUTPATIENT
Start: 2024-05-05 | End: 2024-05-05

## 2024-05-05 RX ORDER — CALCIUM GLUCONATE 20 MG/ML
1 INJECTION, SOLUTION INTRAVENOUS ONCE
Status: COMPLETED | OUTPATIENT
Start: 2024-05-05 | End: 2024-05-05

## 2024-05-05 RX ORDER — POTASSIUM CHLORIDE 20 MEQ/1
40 TABLET, EXTENDED RELEASE ORAL ONCE
Status: COMPLETED | OUTPATIENT
Start: 2024-05-05 | End: 2024-05-05

## 2024-05-05 RX ORDER — POTASSIUM CHLORIDE 20 MEQ/1
TABLET, EXTENDED RELEASE ORAL
Status: COMPLETED
Start: 2024-05-05 | End: 2024-05-05

## 2024-05-05 RX ADMIN — HEPARIN SODIUM 5000 UNITS: 5000 INJECTION INTRAVENOUS; SUBCUTANEOUS at 06:21

## 2024-05-05 RX ADMIN — Medication: at 23:39

## 2024-05-05 RX ADMIN — ACETAMINOPHEN 975 MG: 325 TABLET ORAL at 15:08

## 2024-05-05 RX ADMIN — ASPIRIN 81 MG 81 MG: 81 TABLET ORAL at 08:31

## 2024-05-05 RX ADMIN — ACETAMINOPHEN 975 MG: 325 TABLET ORAL at 08:31

## 2024-05-05 RX ADMIN — FUROSEMIDE 40 MG: 10 INJECTION, SOLUTION INTRAMUSCULAR; INTRAVENOUS at 10:02

## 2024-05-05 RX ADMIN — POTASSIUM CHLORIDE 40 MEQ: 1500 TABLET, EXTENDED RELEASE ORAL at 03:00

## 2024-05-05 RX ADMIN — LIDOCAINE 2 PATCH: 4 PATCH TOPICAL at 08:32

## 2024-05-05 RX ADMIN — SACUBITRIL AND VALSARTAN 1 TABLET: 24; 26 TABLET, FILM COATED ORAL at 19:57

## 2024-05-05 RX ADMIN — HEPARIN SODIUM 5000 UNITS: 5000 INJECTION INTRAVENOUS; SUBCUTANEOUS at 19:57

## 2024-05-05 RX ADMIN — METOPROLOL TARTRATE 25 MG: 25 TABLET, FILM COATED ORAL at 08:34

## 2024-05-05 RX ADMIN — PREDNISONE 5 MG: 10 TABLET ORAL at 08:31

## 2024-05-05 RX ADMIN — ACETAMINOPHEN 975 MG: 325 TABLET ORAL at 19:57

## 2024-05-05 RX ADMIN — PANTOPRAZOLE SODIUM 40 MG: 40 TABLET, DELAYED RELEASE ORAL at 06:21

## 2024-05-05 RX ADMIN — SENNOSIDES AND DOCUSATE SODIUM 2 TABLET: 50; 8.6 TABLET ORAL at 19:58

## 2024-05-05 RX ADMIN — SODIUM CHLORIDE SOLN NEBU 3% 3 ML: 3 NEBU SOLN at 11:11

## 2024-05-05 RX ADMIN — METHOCARBAMOL 1000 MG: 500 TABLET ORAL at 19:57

## 2024-05-05 RX ADMIN — METHOCARBAMOL 1000 MG: 500 TABLET ORAL at 06:21

## 2024-05-05 RX ADMIN — POTASSIUM CHLORIDE 20 MEQ: 14.9 INJECTION, SOLUTION INTRAVENOUS at 02:11

## 2024-05-05 RX ADMIN — POTASSIUM CHLORIDE 40 MEQ: 1500 TABLET, EXTENDED RELEASE ORAL at 02:52

## 2024-05-05 RX ADMIN — METOPROLOL TARTRATE 25 MG: 25 TABLET, FILM COATED ORAL at 19:57

## 2024-05-05 RX ADMIN — CALCIUM GLUCONATE 1 G: 20 INJECTION, SOLUTION INTRAVENOUS at 06:22

## 2024-05-05 RX ADMIN — HEPARIN SODIUM 5000 UNITS: 5000 INJECTION INTRAVENOUS; SUBCUTANEOUS at 13:51

## 2024-05-05 RX ADMIN — ALBUTEROL SULFATE 2.5 MG: 2.5 SOLUTION RESPIRATORY (INHALATION) at 11:10

## 2024-05-05 RX ADMIN — SACUBITRIL AND VALSARTAN 1 TABLET: 24; 26 TABLET, FILM COATED ORAL at 08:31

## 2024-05-05 RX ADMIN — METHOCARBAMOL 1000 MG: 500 TABLET ORAL at 13:51

## 2024-05-05 ASSESSMENT — PAIN SCALES - GENERAL
PAINLEVEL_OUTOF10: 0 - NO PAIN
PAINLEVEL_OUTOF10: 3
PAINLEVEL_OUTOF10: 0 - NO PAIN

## 2024-05-05 ASSESSMENT — PAIN - FUNCTIONAL ASSESSMENT
PAIN_FUNCTIONAL_ASSESSMENT: 0-10

## 2024-05-05 NOTE — PROGRESS NOTES
Vascular Surgery Progress Note    Subjective  No acute events overnight. Continues to have pain in his left side.   States that his breathing is unchanged and not bothering him, just the pain.    Current Meds  Scheduled medications  acetaminophen, 975 mg, oral, TID  albuterol, 2.5 mg, nebulization, TID  aspirin, 81 mg, oral, Daily  calcium gluconate, 1 g, intravenous, Once  fluticasone furoate-vilanteroL, 1 puff, inhalation, Daily  heparin (porcine), 5,000 Units, subcutaneous, q8h  lidocaine, 2 patch, transdermal, Daily  methocarbamol, 1,000 mg, oral, q8h MARIO ALBERTO  metoprolol tartrate, 25 mg, oral, BID  pantoprazole, 40 mg, oral, Daily before breakfast  predniSONE, 5 mg, oral, Daily  sacubitriL-valsartan, 1 tablet, oral, BID  sennosides-docusate sodium, 2 tablet, oral, BID  sodium chloride, 3 mL, nebulization, TID  tiotropium, 2 puff, inhalation, Daily      Continuous medications  HYDROmorphone,   lactated Ringer's, 5 mL/hr, Last Rate: 5 mL/hr (05/05/24 0400)  ropivacaine (PF) in NS cmpd, 14 mL/hr      PRN medications  PRN medications: hydrALAZINE, naloxone, oxygen    Objective    Vitals:   Temp:  [36.3 °C (97.3 °F)-36.6 °C (97.9 °F)] 36.3 °C (97.3 °F)  Heart Rate:  [65-99] 79  Resp:  [13-24] 15  BP: ()/(52-95) 119/54      I/O  I/O last 3 completed shifts:  In: 559 (10 mL/kg) [I.V.:259 (4.6 mL/kg); IV Piggyback:300]  Out: 3970 (71.3 mL/kg) [Urine:3520 (1.8 mL/kg/hr); Drains:50; Chest Tube:400]  Weight: 55.7 kg       Physical Exam  General: laying in bed, NAD  Head: normocephalic, atraumatic  ENT: mucosa are moist   Respiratory: nonlabored breathing on HFNC  CV: RRR  GI: abdomen is soft, nontender, nondistended. ESTEBAN drain with somewhat sanguinous appearing output. Midline incision with island dressing in place, mild strikethrough  MSK: SHAYLA  Extremities: no swelling or deformity of b/l Les. Palpable R DP/PT, L PT   Neuro: CN II-XII grossly intact. Sensation to light touch intact    Labs:  Lab Results   Component  Value Date    WBC 13.5 (H) 05/05/2024    HGB 12.4 (L) 05/05/2024    HCT 38.0 (L) 05/05/2024    MCV 92 05/05/2024     05/05/2024     Lab Results   Component Value Date    GLUCOSE 97 05/05/2024    CALCIUM 7.7 (L) 05/05/2024     05/05/2024    K 3.5 05/05/2024    CO2 27 05/05/2024     05/05/2024    BUN 29 (H) 05/05/2024    CREATININE 1.32 (H) 05/05/2024     Lab Results   Component Value Date    ALT 6 (L) 05/03/2024    AST 20 05/03/2024    ALKPHOS 41 05/03/2024    BILITOT 0.4 05/03/2024     Results from last 7 days   Lab Units 05/03/24  0845   APTT seconds 28   INR  1.0     Results from last 7 days   Lab Units 05/01/24  0601   POCT PH, ARTERIAL pH 7.42   POCT PCO2, ARTERIAL mm Hg 37*   POCT PO2, ARTERIAL mm Hg 78*   POCT HCO3 CALCULATED, ARTERIAL mmol/L 24.0   POCT BASE EXCESS, ARTERIAL mmol/L -0.3         Imaging  XR chest 1 view    Result Date: 5/4/2024  Interpreted By:  Juwan Cornell, STUDY: XR CHEST 1 VIEW; 5/4/2024 3:42 am   INDICATION: Signs/Symptoms:ICU rounds.   COMPARISON: 05/03/2024.   ACCESSION NUMBER(S): [id number]   ORDERING CLINICIAN: LINDSAY CRENSHAW   FINDINGS:     CARDIOMEDIASTINAL SILHOUETTE: Right ventricular AICD device in place. Cardiomediastinal silhouette is normal in size and configuration.   LUNGS: Severe emphysematous changes slight interval worsening in basilar interstitial edema/atelectasis. No pneumothorax.   ABDOMEN: No remarkable upper abdominal findings.   BONES: No acute osseous changes.       1.  Slight interval worsening in basilar edema/atelectasis/effusions.     Signed by: Juwan Cornell 5/4/2024 10:05 AM Dictation workstation:   [id number]    CT angio chest for pulmonary embolism    Result Date: 5/4/2024  Interpreted By:  Juwan Cornell,  Cherry Joyner STUDY: CT ANGIO CHEST FOR PULMONARY EMBOLISM;  5/4/2024 12:43 am   INDICATION: Signs/Symptoms:desaturation.   Per EMR: 69 y.o. male with hx of AAA s/p EVAR (5/2020) c/b RTOR to r/o abdominal compartment  syndrome and treat type II leak, requiring RTOR. Now with type Ia endoleak of the aortic graft. L upper pole renal mass w/ bx positive for urothelial carcinoma. Other PMH includes anemia, COPD, HTN, HLD, CAD s/p PCI w/ ELLY to Lcx (2020). HFrEF 25-30% 2/2 ischemic cardiomypoathy s/p ICD. PFTs (April 18, 2024): Moderate obstructive disease.   COMPARISON: CTA chest abdomen pelvis 11/11/2023. PET/CT whole-body 03/07/2024. CT chest 03/03/2022.   ACCESSION NUMBER(S): [id number]   ORDERING CLINICIAN: JOVI DOWNS   TECHNIQUE: Helical data acquisition of the chest was obtained after intravenous administration of 80 mL of Omnipaque 350 as per PE protocol. Images were reformatted in coronal and sagittal planes. Axial and coronal maximum intensity projection (MIP) images were created and reviewed.   FINDINGS: POTENTIAL LIMITATIONS OF THE STUDY: The assessment is limited by respiratory motion. All findings are reported within these limitations.   HEART AND VESSELS: There are no discrete filling defects within main pulmonary artery and its branches to suggest acute pulmonary embolism. Borderline dilation of the main pulmonary artery measuring up to 3 cm, new compared to 11/11/2023.   The thoracic aorta normal in course and caliber.There is mild scattered atherosclerosis present, including calcified and noncalcified plaques.Although, the study is not tailored for evaluation of aorta, there is no definite evidence of acute aortic pathology. Mild coronary artery calcifications are seen. Please note,the study is not optimized for evaluation of coronary arteries. Note is made of a stent within the left circumflex artery.   The cardiac chambers are not enlarged.There is no pericardial effusion seen.   MEDIASTINUM AND TORIE, LOWER NECK AND AXILLA: The visualized thyroid gland is within normal limits. There is a small sized hiatal hernia with mild concentric mid to distal esophageal wall thickening; correlate with concern for reflux  esophagitis. Several prominent, however nonenlarged mediastinal nodes. Overall, no enlarged intrathoracic or axillary lymph nodes by imaging criteria.   LUNGS AND AIRWAYS: The trachea and central airways are patent. There is no endobronchial lesion, although there is small amount of nonobstructive mucous material seen within right mainstem bronchus.   Severe apical predominant emphysematous change, similar to prior exam. Scattered curvilinear/bandlike opacities throughout the lungs, most prominent within the upper lobes, favored to represent scarring versus subsegmental atelectasis. There are clustered cystic airspaces predominantly within the bilateral lower lobes and right middle lobe, most consistent with a component of honeycombing. Interval development of small bilateral (right-greater-than-left) pleural effusions with associated atelectatic change. No pneumothorax.   Unchanged focus of pleural-parenchymal scarring within the right upper lobe (series 402, image 108). Several scattered pulmonary nodules, appearing stable to decreased in size compared to prior exams. For example there is a stable 0.7 cm right upper lobe nodule (series 402, image 157) when compared to 03/03/2022. Decreased size of the previously described left upper lobe nodule measuring 0.8 cm, as compared to 1 cm on 11/11/2023.   UPPER ABDOMEN: Redemonstration of the outpouching of the descending abdominal aorta at the celiac artery origin, measuring 6 cm in depth. 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 Numerous low-density hepatic lesions, incompletely evaluated on current examination due to contrast phase timing, however appears stable compared to prior CTA. Smooth without   CHEST WALL AND OSSEOUS STRUCTURES: Left-sided implantable cardiac ICD/pacemaker. There are multiple foci of subcutaneous emphysema  within the soft tissues of the left anterolateral chest and abdominal wall. No acute osseous pathology.       1. No evidence of acute pulmonary embolism. 2. Interval development of small bilateral (right-greater-than-left) pleural effusions with associated atelectatic change. Borderline dilation of the main pulmonary artery measuring up to 3 cm, new compared to 11/11/2023. Correlate with patient's volume status. 3. Multiple foci of subcutaneous emphysema within the soft tissues of the left anterolateral chest and abdominal wall. Correlate with recent procedural history. 4. Severe apical predominant emphysematous change, similar to prior exam. There is superimposed honeycombing, most predominant within the bilateral lower lobes and right middle lobe. Consider nonurgent/routine HRCT for evaluation of concomitant fibrosis. 5. Small sized hiatal hernia with mild concentric mid to distal esophageal wall thickening; correlate with concern for reflux esophagitis. 6. Several scattered pulmonary nodules, appearing stable to decreased in size compared to prior exams. 7. 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 8. Other stable findings, as above.     I personally reviewed the images/study and I agree with the findings as stated by Ar Chen MD. This study was interpreted at [facility], [place], [place]   MACRO: None   Signed by: Juwan Cornell 5/4/2024 10:04 AM Dictation workstation:   [id number]    XR chest 1 view    Result Date: 5/4/2024  Interpreted By:  Juwan Cornell and Nakamoto Kent STUDY: XR CHEST 1 VIEW;  5/3/2024 4:45 pm   INDICATION: Signs/Symptoms:Desaturation following chest tube removal.   COMPARISON: Same day chest radiograph 2:33 p.m.   ACCESSION NUMBER(S): [id number]   ORDERING CLINICIAN: RANJAN TYSON   FINDINGS: AP radiograph of the chest was provided.   Left chest wall AICD/pacemaker with leads  projecting over the right ventricle. Surgical staples overlie the left hemithorax.   CARDIOMEDIASTINAL SILHOUETTE: Cardiomediastinal silhouette is normal in size and configuration.   LUNGS: No definitive evidence of pneumothorax. There is similar coarsened lung markings within the right upper lobe. Right basilar opacity is similar compared to prior exam. There is trace blunting of the bilateral costophrenic angles.   ABDOMEN: No remarkable upper abdominal findings.   BONES: No acute osseous changes.       1. No definitive evidence of pneumothorax. 2. Trace blunting of the bilateral costophrenic angles that could be compatible with pleural effusion or atelectasis. 3. Similar findings of chronic lung changes.   I personally reviewed the images/study and I agree with the findings as stated by Kuldip Griffith MD. This study was interpreted at [facility], [place], [place].   MACRO: None   Signed by: Juwan Cornell 5/4/2024 10:02 AM Dictation workstation:   [id number]    XR chest 1 view    Result Date: 5/4/2024  Interpreted By:  Juwan Cornell, STUDY: XR CHEST 1 VIEW; 5/3/2024 2:42 pm   INDICATION: Signs/Symptoms:post chest tube removal.   COMPARISON: 05/03/2024.   ACCESSION NUMBER(S): [id number]   ORDERING CLINICIAN: CHANCE CINTRON   FINDINGS: Interval removal of left-sided chest tube.   CARDIOMEDIASTINAL SILHOUETTE: Cardiomediastinal silhouette is normal in size and configuration. Right ventricular AICD device in place.   LUNGS: Slight interval worsening in right basilar edema/atelectasis. Can not exclude small left apical pneumothorax.   ABDOMEN: No remarkable upper abdominal findings.   BONES: No acute osseous changes.       1.  Can not exclude small left apical pneumothorax status post chest tube removal. Severe emphysematous changes with basilar edema/atelectasis.     Signed by: Juwan Cornell 5/4/2024 10:01 AM Dictation workstation:   [id number]    XR chest 1 view    Result  Date: 5/4/2024  Interpreted By:  Juwan Cornell, STUDY: XR CHEST 1 VIEW; 5/3/2024 11:38 am   INDICATION: Signs/Symptoms:Evaluate for PTX.   COMPARISON: 05/03/2024   ACCESSION NUMBER(S): [id number]   ORDERING CLINICIAN: RANJAN TYSON   FINDINGS: Left-sided chest tube in place.   CARDIOMEDIASTINAL SILHOUETTE: Cardiomediastinal silhouette is normal in size and configuration. Right ventricular AICD device place   LUNGS: Severe emphysematous changes with basilar atelectasis and right midlung scarring/atelectasis. No gross evidence of pneumothorax.   ABDOMEN: No remarkable upper abdominal findings.   BONES: No acute osseous changes.       1.  Left-sided chest tube in place without pneumothorax. Severe emphysematous changes with basilar edema.     Signed by: Juwan Cornell 5/4/2024 10:00 AM Dictation workstation:   [id number]     Assessment:  69 y.o. 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 Transferred to floor 5/3, but returned to ICU due to increasing O2 requirements. PE negative per CTA from 5/3.    Plan:   - Pain control per ICU, Acute pain following, appreciate recs  - Chest tube removed 5/3, post-pull CXR without ptx  - Continue aggressive pulmonary toilet  - Pulmonology consulted for further management of emphysema/desaturations  - Wean O2 as able (COPD Goals)  - Maintain ESTEBAN to bulb suction, drain per urology  - Spinal cord perfusion goal: MAP >80 mmHg  - Continue diet, PO meds  - Maintain K>4, Mg>2, Hgb>10, Plts>100, INR<1.4  - Labs q12h for 24 hrs  - monitor UOP  - SCDs  - PT/OT, OOB as able  - Okay for Dvt ppx    Discussed with fellow Dr. Hardin and attending Dr. Gabriela Tyson, DO  Department of Surgery / IR Integrated PGY1  Vascular [id number]

## 2024-05-05 NOTE — CONSULTS
Reason For Consult  Hypoxemic respiratory Failure    History Of Present Illness  Yobany Small is a 69 y.o. male presenting with hx of AAA s/p EVAR (5/2020) c/b type Ia endoleak of the aortic graft, recent diagnosis of urothelial carcinoma, severe emphysema with moderate COPD, HTN, HLD, CAD s/p PCI w/ ELLY to Lcx (2020), HFrEF 25-30% 2/2 ischemic cardiomypoathy who is s/p open AAA repari and L Nephroureterectomy on 4/30. Pulmonary were consulted for hypoxic respiratory failure.    - Moderate obstruction COPD with severe emphysema and mMRC of 1-2 at baseline  - CT chest reviewed from 5/3 with Right small effusion and bilateral basilar atelectasis which are expected after his major surgery and hospitalization  - NO signs or symptoms of COPD exacerbation when I assessed the patient this morning  - Has a lot of post operative pain and unable to take a good deep breath. He is working as much as he can with the incentive spirometry and PT.       Past Medical History  He has a past medical history of Abnormal findings on diagnostic imaging of heart and coronary circulation, Abnormal findings on diagnostic imaging of other specified body structures, Abnormal findings on diagnostic imaging of other specified body structures, Abnormal findings on diagnostic imaging of other specified body structures, Abnormal findings on diagnostic imaging of other specified body structures, Abnormal findings on diagnostic imaging of other specified body structures, Abnormal findings on diagnostic imaging of other specified body structures, BPH (benign prostatic hyperplasia), COPD (chronic obstructive pulmonary disease) (Multi), Coronary artery disease, Hyperlipidemia, Hypertension, and Personal history of other medical treatment.    Surgical History  He has a past surgical history that includes Other surgical history (05/30/2019); Other surgical history (06/16/2020); Other surgical history (06/16/2020); Other surgical history  "(04/02/2021); Other surgical history (01/30/2020); Other surgical history (06/16/2020); CT angio abdomen pelvis w and or wo IV IV contrast (11/7/2019); and CT angio abdomen pelvis w and or wo IV IV contrast (7/8/2020).     Social History  He reports that he quit smoking about 6 years ago. His smoking use included cigarettes. He has never used smokeless tobacco. He reports that he does not currently use alcohol. He reports that he does not use drugs.    Family History  Family History   Problem Relation Name Age of Onset    No Known Problems Mother          no relationship    No Known Problems Father          no relationship        Allergies  Patient has no known allergies.    Review of Systems  Negative except to what is in the HPI     Physical Exam  General: laying in bed, NAD  Head: normocephalic, atraumatic  ENT: mucosa are moist   Respiratory: nonlabored breathing on HFNC  CV: RRR  GI: abdomen is soft, nontender, nondistended. ESTEBAN drain with somewhat sanguinous appearing output. Midline incision with island dressing in place, mild strikethrough  MSK: SHAYLA  Extremities: no swelling or deformity of b/l Les. Palpable R DP/PT, L PT   Neuro: CN II-XII grossly intact. Sensation to light touch intact     Last Recorded Vitals  Blood pressure 126/69, pulse 83, temperature 36.3 °C (97.3 °F), temperature source Temporal, resp. rate 19, height 1.803 m (5' 11\"), weight 67.4 kg (148 lb 9.4 oz), SpO2 97%.    Relevant Results  CT chest 5/3/2024  1. No evidence of acute pulmonary embolism.  2. Interval development of small bilateral (right-greater-than-left)  pleural effusions with associated atelectatic change. Borderline  dilation of the main pulmonary artery measuring up to 3 cm, new  compared to 11/11/2023. Correlate with patient's volume status.  3. Multiple foci of subcutaneous emphysema within the soft tissues of  the left anterolateral chest and abdominal wall. Correlate with  recent procedural history.  4. Severe apical " predominant emphysematous change, similar to prior  exam. There is superimposed honeycombing, most predominant within the  bilateral lower lobes and right middle lobe. Consider  nonurgent/routine HRCT for evaluation of concomitant fibrosis.  5. Small sized hiatal hernia with mild concentric mid to distal  esophageal wall thickening; correlate with concern for reflux  esophagitis.  6. Several scattered pulmonary nodules, appearing stable to decreased  in size compared to prior exams.  7. 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  8. Other stable findings, as above.     Assessment/Plan   Yobany Small is a 69 y.o. male presenting with hx of AAA s/p EVAR (5/2020) c/b type Ia endoleak of the aortic graft, recent diagnosis of urothelial carcinoma, severe emphysema with moderate COPD, HTN, HLD, CAD s/p PCI w/ ELLY to Lcx (2020), HFrEF 25-30% 2/2 ischemic cardiomypoathy who is s/p open AAA repari and L Nephroureterectomy on 4/30. Pulmonary were consulted for hypoxic respiratory failure.    Mr. Small hypoxemic respiratory failure is due to his severe emphysema as he doesn't have a lot of reserve with a component post-operative atelectasis.    Recommendations:  - No indication for treating for COPD exacerbation at this juncture  - Continue the excellent work at controlling his pain and mobolizing him  - Continue with aggressive bronchopulmonary toileting and incentive spirometry   - Can ask RT to use EZ-PEP if the patient is having trouble with flutter valves and incentive spirometer    Case discussed with Dr. Whyte    I spent 30 minutes in the professional and overall care of this patient.      Fermín Sanchez MD

## 2024-05-05 NOTE — CARE PLAN
Problem: Pain  Goal: My pain/discomfort is manageable  Outcome: Progressing     Problem: Safety  Goal: Patient will be injury free during hospitalization  Outcome: Progressing  Goal: I will remain free of falls  Outcome: Progressing     Problem: Daily Care  Goal: Daily care needs are met  Outcome: Progressing     Problem: Psychosocial Needs  Goal: Demonstrates ability to cope with hospitalization/illness  Outcome: Progressing  Goal: Collaborate with me, my family, and caregiver to identify my specific goals  Outcome: Progressing     Problem: Fall/Injury  Goal: Not fall by end of shift  Outcome: Progressing  Goal: Be free from injury by end of the shift  Outcome: Progressing  Goal: Verbalize understanding of personal risk factors for fall in the hospital  Outcome: Progressing  Goal: Verbalize understanding of risk factor reduction measures to prevent injury from fall in the home  Outcome: Progressing  Goal: Use assistive devices by end of the shift  Outcome: Progressing  Goal: Pace activities to prevent fatigue by end of the shift  Outcome: Progressing     Problem: Respiratory  Goal: Clear secretions with interventions this shift  Outcome: Progressing  Goal: Minimize anxiety/maximize coping throughout shift  Outcome: Progressing  Goal: Minimal/no exertional discomfort or dyspnea this shift  Outcome: Progressing  Goal: No signs of respiratory distress (eg. Use of accessory muscles. Peds grunting)  Outcome: Progressing  Goal: Patent airway maintained this shift  Outcome: Progressing     Problem: Pain - Adult  Goal: Verbalizes/displays adequate comfort level or baseline comfort level  Outcome: Progressing     Problem: Safety - Adult  Goal: Free from fall injury  Outcome: Progressing

## 2024-05-05 NOTE — PROGRESS NOTES
"Yobany Small is a 69 y.o. male on day 5 of admission presenting with Type Ia endoleak of aortic graft (CMS-[facility]).    Subjective   Pt did well overnight. No acute events. Down to 4L NC, satting >92%.     Objective     Physical Exam  Constitutional:       Appearance: Normal appearance.   HENT:      Head: Normocephalic.      Nose: Nose normal.   Eyes:      Pupils: Pupils are equal, round, and reactive to light.      EOM intact   Cardiovascular:      Rate and Rhythm: Normal rate and regular rhythm. No murmurs.   Pulmonary:      Effort: Pulmonary effort is normal.      Breath sounds: Coarse breath sounds BL.      Comments: 4L NC  Abdominal:      General: Mild distension.      Palpations: Abdomen is soft.      Comments: L transverse abdominal incision stapled, RIVAS. Some areas of ecchymosis around inferior aspect. ESTEBAN drain with serosang drainage   Genitourinary:     Comments: Hays with clear yellow drainage  Skin:     General: Skin is warm and dry.      Capillary Refill: Capillary refill takes less than 2 seconds.   Neurological:      General: No focal deficit present.      Mental Status: He is alert and oriented to person, place, and time.   Psychiatric:         Attention and Perception: Attention normal.        Last Recorded Vitals  Blood pressure 126/61, pulse 66, temperature 36.3 °C (97.3 °F), temperature source Temporal, resp. rate 18, height 1.803 m (5' 11\"), weight 67.4 kg (148 lb 9.4 oz), SpO2 93%.  Intake/Output last 3 Shifts:  I/O last 3 completed shifts:  In: 383 (5.7 mL/kg) [I.V.:333 (4.9 mL/kg); IV Piggyback:50]  Out: 3455 (51.3 mL/kg) [Urine:3400 (1.4 mL/kg/hr); Drains:55]  Weight: 67.4 kg     Relevant Results  Scheduled medications  acetaminophen, 975 mg, oral, TID  albuterol, 2.5 mg, nebulization, TID  aspirin, 81 mg, oral, Daily  fluticasone furoate-vilanteroL, 1 puff, inhalation, Daily  heparin (porcine), 5,000 Units, subcutaneous, q8h  lidocaine, 2 patch, transdermal, Daily  methocarbamol, 1,000 mg, " oral, q8h MARIO ALBERTO  metoprolol tartrate, 25 mg, oral, BID  pantoprazole, 40 mg, oral, Daily before breakfast  predniSONE, 5 mg, oral, Daily  sacubitriL-valsartan, 1 tablet, oral, BID  sennosides-docusate sodium, 2 tablet, oral, BID  sodium chloride, 3 mL, nebulization, TID  tiotropium, 2 puff, inhalation, Daily      Continuous medications  HYDROmorphone,   lactated Ringer's, 5 mL/hr, Last Rate: 5 mL/hr (05/05/24 1100)  ropivacaine (PF) in NS cmpd, 14 mL/hr      PRN medications  PRN medications: hydrALAZINE, naloxone, oxygen      Results for orders placed or performed during the hospital encounter of 04/30/24 (from the past 24 hour(s))   Type and screen   Result Value Ref Range    ABO TYPE AB     Rh TYPE POS     ANTIBODY SCREEN NEG    Renal Function Panel   Result Value Ref Range    Glucose 117 (H) 74 - 99 mg/dL    Sodium 137 136 - 145 mmol/L    Potassium 3.8 3.5 - 5.3 mmol/L    Chloride 102 98 - 107 mmol/L    Bicarbonate 26 21 - 32 mmol/L    Anion Gap 13 10 - 20 mmol/L    Urea Nitrogen 31 (H) 6 - 23 mg/dL    Creatinine 1.38 (H) 0.50 - 1.30 mg/dL    eGFR 56 (L) >60 mL/min/1.73m*2    Calcium 7.8 (L) 8.6 - 10.6 mg/dL    Phosphorus 2.1 (L) 2.5 - 4.9 mg/dL    Albumin 3.3 (L) 3.4 - 5.0 g/dL   Magnesium   Result Value Ref Range    Magnesium 2.34 1.60 - 2.40 mg/dL   CBC   Result Value Ref Range    WBC 14.7 (H) 4.4 - 11.3 x10*3/uL    nRBC 0.0 0.0 - 0.0 /100 WBCs    RBC 3.88 (L) 4.50 - 5.90 x10*6/uL    Hemoglobin 11.9 (L) 13.5 - 17.5 g/dL    Hematocrit 35.7 (L) 41.0 - 52.0 %    MCV 92 80 - 100 fL    MCH 30.7 26.0 - 34.0 pg    MCHC 33.3 32.0 - 36.0 g/dL    RDW 14.5 11.5 - 14.5 %    Platelets 208 150 - 450 x10*3/uL   Calcium, Ionized   Result Value Ref Range    POCT Calcium, Ionized 1.04 (L) 1.1 - 1.33 mmol/L   Calcium, Ionized   Result Value Ref Range    POCT Calcium, Ionized 1.05 (L) 1.1 - 1.33 mmol/L   CBC   Result Value Ref Range    WBC 13.5 (H) 4.4 - 11.3 x10*3/uL    nRBC 0.0 0.0 - 0.0 /100 WBCs    RBC 4.14 (L) 4.50 - 5.90  x10*6/uL    Hemoglobin 12.4 (L) 13.5 - 17.5 g/dL    Hematocrit 38.0 (L) 41.0 - 52.0 %    MCV 92 80 - 100 fL    MCH 30.0 26.0 - 34.0 pg    MCHC 32.6 32.0 - 36.0 g/dL    RDW 14.4 11.5 - 14.5 %    Platelets 234 150 - 450 x10*3/uL   Magnesium   Result Value Ref Range    Magnesium 2.12 1.60 - 2.40 mg/dL   Renal Function Panel   Result Value Ref Range    Glucose 97 74 - 99 mg/dL    Sodium 138 136 - 145 mmol/L    Potassium 3.5 3.5 - 5.3 mmol/L    Chloride 102 98 - 107 mmol/L    Bicarbonate 27 21 - 32 mmol/L    Anion Gap 13 10 - 20 mmol/L    Urea Nitrogen 29 (H) 6 - 23 mg/dL    Creatinine 1.32 (H) 0.50 - 1.30 mg/dL    eGFR 59 (L) >60 mL/min/1.73m*2    Calcium 7.7 (L) 8.6 - 10.6 mg/dL    Phosphorus 2.7 2.5 - 4.9 mg/dL    Albumin 3.3 (L) 3.4 - 5.0 g/dL         Assessment/Plan   Principal Problem:    Type Ia endoleak of aortic graft (CMS-[facility])  Active Problems:    Urothelial carcinoma of kidney, left (Multi)    69 y.o. male with hx of AAA s/p EVAR (5/2020) c/b RTOR to r/o abdominal compartment syndrome and treat type II leak, requiring RTOR. 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 Recent hospitalization for LLQ pain and gross hematuria -- was found to have L upper pole renal mass w/ bx positive for urothelial carcinoma. Other PMH includes anemia, HTN, HLD, CAD s/p PCI w/ ELLY to Lcx (2020). HFrEF 25-30% 2/2 ischemic cardiomypoathy s/p ICD. Preop stress test w/ no inducible ischemia and stable LVEF on TTE. He has COPD w/ daily inhaler use. Preop PFTs s/f moderate obstructive disease.      Now s/p open AAA repair and explant with Dr. Street and left nephroureterectomy for urothelial carcinoma of L kidney w/ Dr. Thompson on 4/30. He was in the ICU immediately post op until 5/2. Post op course complicated by difficult to control acute post op pain, acute pain team following. Heart failure consulted for continued management of his known HFrEF. He was transitioned from cleviprex  infusion to po metoprolol and entresto. He was transferred to [facility] with telemetry without event.     5/3, he became acutely hypoxic approximately 30 min after L chest tube removal. He had increased FiO2 requirement from 2L to 10L HF NC. His SpO2 increased from 86% to 91%. A stat CXR was obtained which was unremarkable. He was transferred back to ICU for continuous monitoring and chest physiotherapy.         Plan:  NEURO: No PMH. Persistent complaints of rafael-incisional post op pain at 7/10. Worse with activity. Likely contributing to respiratory status. Did not receive preop blocks, but APS re-engaged -- now s/p EPS blocks with catheters on 5/4. Improvement in post op pain.   - ongoing neuro/neurovasc/pain assessments  - continue hydromorphone pca  - scheduled robaxin  - lidocaine patch   - APS re-engaged. S/P EPS blocks w/ catheters on 5/4.   - PT/OT -> adv activity as tolerated     CV: PMH ruptured AAA s/p EVAR (2020) which was c/b RTOR to r/o abdominal compartment syndrome and treat type II leak, requiring RTOR. Other PMH CAD s/p ELLY to LCx, 40% stenosis of proximal LAD (2020), HFrEF 25-30% 2/2 cardiomyopathy s/p ICD, HTN, HLD. Preop stress test not c/f ischemia, and VIJAY w/ stable EF. Now with enlargement of sac from 3.7 to 4.4 cm and type Ia endoleak s/p open repair and explant with Dr. Street 4/30. Post-op baseline palpable R DP, PT and L PT pulses. Repeat echo 5/1 w/ LVEF 55%, moderate dilation of aortic root. Currently hemodynamically intact without vasoactive support.  - MAP goal >80   - continue metoprolol and entresto.  - PRN hydralazine 10mg q4hr for MAP >90  - continue asa   - continuous ekg/hourly and prn NIBP monitoring  - Home meds: Continue to hold simvastatin.      PULM: Hx of COPD. Preop PFTs indicative of moderate-severe obstructive disease FEV1/FVC (52% predicted). 5/3 Acute hypoxic respiratory insufficiency, desaturation to 86%, increased FiO2 requirement from 2L NC to 10 HF NC. No acute  distress, able to speak full sentences. L pleural chest tube removed 30min prior to desaturation. CXR without evidence of obvious PTX, overall unchanged from previous CXR. Notable thick sounding mucus with cough, nonproductive. CT PE w/ no evidence of PE. Small R>L pleural effusion. Severe emphysematous changes. Honeycombing c/f concomitant fibrosis. Weaned to 4L NC O2.   - S/p diuresis yesterday. CXR w/ improvement in bibasilar edema/effusion/atelectasis.   - 40mg lasix   - Spoke with RT regarding importance of maintaining bronchial hygiene once transferred to floor  - Pulm consulted given severity of underlying disease and to establish outpatient follow up.   - duoneb and 3% saline nebs scheduled q6h  - vpep with oxy-jet  - wean O2 as tolerated   - Q1h incentive spirometer while awake  - Daily CXR   - continue scheduled Spiriva and breo-ellipta   - Will need outpt follow up with pulmonology   - Home meds: trelegy-ellipta       GI: No PMH. Supra mesenteric cross clamp time 40 minutes.   - regular diet  - PPI QD  - bowel regimen  - LFTS, amylase and lipase prn  - serial abdominal exams, continued assessment of abdominal drains      : Baseline hematuria 2/2 urothelial carcinoma of L kidney. Baseline Cr 0.94. S/p left nephroureterectomy 4/30 w/ Dr. Thompson. ESTEBAN creatinine level unremarkable, not indicative of leak 5/3. LAWRENCE stable.  - Cr continues to improve  - 3.5 L of UOP yesterday c/p diuresis. Will plan for another 40mg lasix today.   -  Will plan for ESTEBAN Cr on 5/7 followed by xray cystogram if negative. If cystogram negative, will remove juarez 5/8 AM, and plan for subsequent ESTEBAN drain removal 5/9 if outputs remain low.   - Check renal function panel daily and PRN  - Goal U/O >0.5ml/kg/hr.    - Replete electrolytes judiciously  - Home meds: oxybutynin 10mg PRN, tamsulosin 0.4mg. Hold for now.      HEME: Hx anemia. Acute surgical blood loss anemia. Last transfused 1 RBC 5/2.  - Hgb stable   - CBC daily and PRN  -  coags prn  - SQH and scds  - ongoing monitoring for bleeding  - Home meds: Iron, B12. Hold for now.      ENDO: No hx. A1C 5.3 4/2024, TSH WNL 5/2023  - Q4h BG and SSI Lispro per ICU protocol.  - Continue home pred   - q4hr POCT BGs     ID: Afebrile. Mild leukocytosis. Ceftriaxone discontinued 5/3.  - monitor for s/s infection  - q4h temps, wbc trend daily     MSK: Hx of psoriatic arthritis. Rheumatology consulted  - continue home prednisone 5mg   - Consider dosing with home MTX with next due dosing if no signs/symptoms of severe infection/post-op complication      Skin: No known active skin issues.  - preventative Mepilex dressings in place on sacrum and heels  - change preventative Mepilex weekly or more frequently as indicated (when moist/soiled)   - every shift skin assessment per nursing and weekly ICU skin rounds  - moisture barrier to be applied with rafael care  - active skin problems addressed with nursing on daily rounds     Lines:   PIVs  Hays 4/30 (will stay in until 5/8 per urology)  LLQ ESTEBAN drain 4/30     Dispo:  Ok for transfer to stepdown     Pt discussed with SICU attending, Dr. Stone.         Kimmie Puentes MD  Anesthesiology PGY-1   SICU [id number]

## 2024-05-05 NOTE — PROGRESS NOTES
Acute Pain Service    Postop Pain HPI -   Palliative: relieved with IV analgesics and regional local anesthetics  Provocative: movement  Quality:  burning and aching  Radiation:  none  Severity:  6/10, 8/10 with movement  Timing: constant    24-HOUR OPIOID CONSUMPTION:  Dilaudid PCA 8.2 mg    Scheduled medications  acetaminophen, 975 mg, oral, TID  albuterol, 2.5 mg, nebulization, TID  aspirin, 81 mg, oral, Daily  fluticasone furoate-vilanteroL, 1 puff, inhalation, Daily  heparin (porcine), 5,000 Units, subcutaneous, q8h  lidocaine, 2 patch, transdermal, Daily  methocarbamol, 1,000 mg, oral, q8h MARIO ALBERTO  metoprolol tartrate, 25 mg, oral, BID  pantoprazole, 40 mg, oral, Daily before breakfast  predniSONE, 5 mg, oral, Daily  sacubitriL-valsartan, 1 tablet, oral, BID  sennosides-docusate sodium, 2 tablet, oral, BID  sodium chloride, 3 mL, nebulization, TID  tiotropium, 2 puff, inhalation, Daily      Continuous medications  HYDROmorphone,   lactated Ringer's, 5 mL/hr, Last Rate: 5 mL/hr (05/05/24 1100)  ropivacaine (PF) in NS cmpd, 14 mL/hr      PRN medications  PRN medications: hydrALAZINE, naloxone, oxygen     Physical Exam:  Constitutional:  no distress, alert and cooperative  Eyes: clear sclera  Head/Neck: No apparent injury, trachea midline  Respiratory/Thorax: Patent airways, thorax symmetric, breathing comfortably  Cardiovascular: no pitting edema  Gastrointestinal: Nondistended  Musculoskeletal: ROM intact  Extremities: no clubbing  Neurological: alert, jimenez x4  Psychological: Appropriate affect    Results for orders placed or performed during the hospital encounter of 04/30/24 (from the past 24 hour(s))   Type and screen   Result Value Ref Range    ABO TYPE AB     Rh TYPE POS     ANTIBODY SCREEN NEG    Renal Function Panel   Result Value Ref Range    Glucose 117 (H) 74 - 99 mg/dL    Sodium 137 136 - 145 mmol/L    Potassium 3.8 3.5 - 5.3 mmol/L    Chloride 102 98 - 107 mmol/L    Bicarbonate 26 21 - 32 mmol/L    Anion  Gap 13 10 - 20 mmol/L    Urea Nitrogen 31 (H) 6 - 23 mg/dL    Creatinine 1.38 (H) 0.50 - 1.30 mg/dL    eGFR 56 (L) >60 mL/min/1.73m*2    Calcium 7.8 (L) 8.6 - 10.6 mg/dL    Phosphorus 2.1 (L) 2.5 - 4.9 mg/dL    Albumin 3.3 (L) 3.4 - 5.0 g/dL   Magnesium   Result Value Ref Range    Magnesium 2.34 1.60 - 2.40 mg/dL   CBC   Result Value Ref Range    WBC 14.7 (H) 4.4 - 11.3 x10*3/uL    nRBC 0.0 0.0 - 0.0 /100 WBCs    RBC 3.88 (L) 4.50 - 5.90 x10*6/uL    Hemoglobin 11.9 (L) 13.5 - 17.5 g/dL    Hematocrit 35.7 (L) 41.0 - 52.0 %    MCV 92 80 - 100 fL    MCH 30.7 26.0 - 34.0 pg    MCHC 33.3 32.0 - 36.0 g/dL    RDW 14.5 11.5 - 14.5 %    Platelets 208 150 - 450 x10*3/uL   Calcium, Ionized   Result Value Ref Range    POCT Calcium, Ionized 1.04 (L) 1.1 - 1.33 mmol/L   Calcium, Ionized   Result Value Ref Range    POCT Calcium, Ionized 1.05 (L) 1.1 - 1.33 mmol/L   CBC   Result Value Ref Range    WBC 13.5 (H) 4.4 - 11.3 x10*3/uL    nRBC 0.0 0.0 - 0.0 /100 WBCs    RBC 4.14 (L) 4.50 - 5.90 x10*6/uL    Hemoglobin 12.4 (L) 13.5 - 17.5 g/dL    Hematocrit 38.0 (L) 41.0 - 52.0 %    MCV 92 80 - 100 fL    MCH 30.0 26.0 - 34.0 pg    MCHC 32.6 32.0 - 36.0 g/dL    RDW 14.4 11.5 - 14.5 %    Platelets 234 150 - 450 x10*3/uL   Magnesium   Result Value Ref Range    Magnesium 2.12 1.60 - 2.40 mg/dL   Renal Function Panel   Result Value Ref Range    Glucose 97 74 - 99 mg/dL    Sodium 138 136 - 145 mmol/L    Potassium 3.5 3.5 - 5.3 mmol/L    Chloride 102 98 - 107 mmol/L    Bicarbonate 27 21 - 32 mmol/L    Anion Gap 13 10 - 20 mmol/L    Urea Nitrogen 29 (H) 6 - 23 mg/dL    Creatinine 1.32 (H) 0.50 - 1.30 mg/dL    eGFR 59 (L) >60 mL/min/1.73m*2    Calcium 7.7 (L) 8.6 - 10.6 mg/dL    Phosphorus 2.7 2.5 - 4.9 mg/dL    Albumin 3.3 (L) 3.4 - 5.0 g/dL        Yobany Small is a 69 y.o. year old male patient who is POD1 from open AAA repair and explant with Dr. Street and left nephroureterectomy for urothelial carcinoma of L kidney w/ Dr. Thompson on  4/30. Acute pain consulted for post-operative pain control. Acute pain initially consulted for pre-operative nerve block to which the patient refused. Patient returned to [facility]ICU on 5/4 with respiratory insufficiency 2/2 inadequate pain control s/p BL STELLA block with catheters on 5/4.      Plan:     - Bilateral STELLA blocks with catheters performed in CTICU on 5/4/24  - Ambit ball with Ropivacaine 0.2%/NaCl 0.9% 500mL, Rate 7 cc/hr bilaterally  - Bolused 5cc 0.5% Ropivacaine bilaterally today  - Ambit medication will not interfere with pain medication prescribed by the primary team.   - Please be aware of local anesthetic toxic dose and absorption variability before considering lidocaine patches  - Acute pain service will follow while catheters in place  - Rest of pain management per primary team     Acute Pain Resident  pg [phone] ph [phone]

## 2024-05-06 ENCOUNTER — APPOINTMENT (OUTPATIENT)
Dept: RADIOLOGY | Facility: HOSPITAL | Age: 69
DRG: 252 | End: 2024-05-06
Payer: COMMERCIAL

## 2024-05-06 PROBLEM — G89.18 POST-OP PAIN: Status: ACTIVE | Noted: 2024-05-06

## 2024-05-06 LAB
ALBUMIN SERPL BCP-MCNC: 2.9 G/DL (ref 3.4–5)
ANION GAP SERPL CALC-SCNC: 11 MMOL/L (ref 10–20)
BUN SERPL-MCNC: 29 MG/DL (ref 6–23)
CA-I BLD-SCNC: 1.11 MMOL/L (ref 1.1–1.33)
CALCIUM SERPL-MCNC: 7.7 MG/DL (ref 8.6–10.6)
CHLORIDE SERPL-SCNC: 106 MMOL/L (ref 98–107)
CO2 SERPL-SCNC: 27 MMOL/L (ref 21–32)
CREAT SERPL-MCNC: 1.23 MG/DL (ref 0.5–1.3)
EGFRCR SERPLBLD CKD-EPI 2021: 64 ML/MIN/1.73M*2
ERYTHROCYTE [DISTWIDTH] IN BLOOD BY AUTOMATED COUNT: 14.5 % (ref 11.5–14.5)
GLUCOSE BLD MANUAL STRIP-MCNC: 107 MG/DL (ref 74–99)
GLUCOSE BLD MANUAL STRIP-MCNC: 83 MG/DL (ref 74–99)
GLUCOSE SERPL-MCNC: 81 MG/DL (ref 74–99)
HCT VFR BLD AUTO: 32.8 % (ref 41–52)
HGB BLD-MCNC: 10.8 G/DL (ref 13.5–17.5)
MAGNESIUM SERPL-MCNC: 1.94 MG/DL (ref 1.6–2.4)
MCH RBC QN AUTO: 30.2 PG (ref 26–34)
MCHC RBC AUTO-ENTMCNC: 32.9 G/DL (ref 32–36)
MCV RBC AUTO: 92 FL (ref 80–100)
NRBC BLD-RTO: 0 /100 WBCS (ref 0–0)
PHOSPHATE SERPL-MCNC: 2.7 MG/DL (ref 2.5–4.9)
PLATELET # BLD AUTO: 230 X10*3/UL (ref 150–450)
POTASSIUM SERPL-SCNC: 4 MMOL/L (ref 3.5–5.3)
RBC # BLD AUTO: 3.58 X10*6/UL (ref 4.5–5.9)
SODIUM SERPL-SCNC: 140 MMOL/L (ref 136–145)
WBC # BLD AUTO: 11.8 X10*3/UL (ref 4.4–11.3)

## 2024-05-06 PROCEDURE — 2500000005 HC RX 250 GENERAL PHARMACY W/O HCPCS: Performed by: NURSE PRACTITIONER

## 2024-05-06 PROCEDURE — 2500000004 HC RX 250 GENERAL PHARMACY W/ HCPCS (ALT 636 FOR OP/ED)

## 2024-05-06 PROCEDURE — 2500000001 HC RX 250 WO HCPCS SELF ADMINISTERED DRUGS (ALT 637 FOR MEDICARE OP)

## 2024-05-06 PROCEDURE — 1200000002 HC GENERAL ROOM WITH TELEMETRY DAILY

## 2024-05-06 PROCEDURE — 2500000002 HC RX 250 W HCPCS SELF ADMINISTERED DRUGS (ALT 637 FOR MEDICARE OP, ALT 636 FOR OP/ED): Performed by: NURSE PRACTITIONER

## 2024-05-06 PROCEDURE — 2500000004 HC RX 250 GENERAL PHARMACY W/ HCPCS (ALT 636 FOR OP/ED): Performed by: STUDENT IN AN ORGANIZED HEALTH CARE EDUCATION/TRAINING PROGRAM

## 2024-05-06 PROCEDURE — 85027 COMPLETE CBC AUTOMATED: CPT | Performed by: NURSE PRACTITIONER

## 2024-05-06 PROCEDURE — 2500000005 HC RX 250 GENERAL PHARMACY W/O HCPCS

## 2024-05-06 PROCEDURE — 71045 X-RAY EXAM CHEST 1 VIEW: CPT

## 2024-05-06 PROCEDURE — 82330 ASSAY OF CALCIUM: CPT | Performed by: NURSE PRACTITIONER

## 2024-05-06 PROCEDURE — 2060000001 HC INTERMEDIATE ICU ROOM DAILY

## 2024-05-06 PROCEDURE — 94640 AIRWAY INHALATION TREATMENT: CPT

## 2024-05-06 PROCEDURE — 94668 MNPJ CHEST WALL SBSQ: CPT

## 2024-05-06 PROCEDURE — 99232 SBSQ HOSP IP/OBS MODERATE 35: CPT | Performed by: STUDENT IN AN ORGANIZED HEALTH CARE EDUCATION/TRAINING PROGRAM

## 2024-05-06 PROCEDURE — 2500000004 HC RX 250 GENERAL PHARMACY W/ HCPCS (ALT 636 FOR OP/ED): Mod: JZ

## 2024-05-06 PROCEDURE — 82947 ASSAY GLUCOSE BLOOD QUANT: CPT

## 2024-05-06 PROCEDURE — 84100 ASSAY OF PHOSPHORUS: CPT | Performed by: NURSE PRACTITIONER

## 2024-05-06 PROCEDURE — 36415 COLL VENOUS BLD VENIPUNCTURE: CPT | Performed by: NURSE PRACTITIONER

## 2024-05-06 PROCEDURE — P9045 ALBUMIN (HUMAN), 5%, 250 ML: HCPCS | Mod: JZ

## 2024-05-06 PROCEDURE — 2500000001 HC RX 250 WO HCPCS SELF ADMINISTERED DRUGS (ALT 637 FOR MEDICARE OP): Performed by: NURSE PRACTITIONER

## 2024-05-06 PROCEDURE — 83735 ASSAY OF MAGNESIUM: CPT | Performed by: NURSE PRACTITIONER

## 2024-05-06 PROCEDURE — 71045 X-RAY EXAM CHEST 1 VIEW: CPT | Performed by: RADIOLOGY

## 2024-05-06 RX ORDER — ALBUMIN HUMAN 50 G/1000ML
12.5 SOLUTION INTRAVENOUS ONCE
Status: COMPLETED | OUTPATIENT
Start: 2024-05-06 | End: 2024-05-06

## 2024-05-06 RX ORDER — ALBUTEROL SULFATE 0.83 MG/ML
2.5 SOLUTION RESPIRATORY (INHALATION) EVERY 6 HOURS PRN
Status: DISCONTINUED | OUTPATIENT
Start: 2024-05-06 | End: 2024-05-10 | Stop reason: HOSPADM

## 2024-05-06 RX ORDER — OXYCODONE HYDROCHLORIDE 5 MG/1
10 TABLET ORAL EVERY 4 HOURS PRN
Status: DISCONTINUED | OUTPATIENT
Start: 2024-05-06 | End: 2024-05-10 | Stop reason: HOSPADM

## 2024-05-06 RX ORDER — SODIUM CHLORIDE FOR INHALATION 3 %
3 VIAL, NEBULIZER (ML) INHALATION EVERY 6 HOURS PRN
Status: DISCONTINUED | OUTPATIENT
Start: 2024-05-06 | End: 2024-05-10 | Stop reason: HOSPADM

## 2024-05-06 RX ORDER — HYDROMORPHONE HYDROCHLORIDE 1 MG/ML
0.2 INJECTION, SOLUTION INTRAMUSCULAR; INTRAVENOUS; SUBCUTANEOUS
Status: DISCONTINUED | OUTPATIENT
Start: 2024-05-06 | End: 2024-05-09

## 2024-05-06 RX ORDER — OXYCODONE HYDROCHLORIDE 5 MG/1
5 TABLET ORAL EVERY 4 HOURS PRN
Status: DISCONTINUED | OUTPATIENT
Start: 2024-05-06 | End: 2024-05-10 | Stop reason: HOSPADM

## 2024-05-06 RX ADMIN — OXYCODONE HYDROCHLORIDE 10 MG: 5 TABLET ORAL at 19:37

## 2024-05-06 RX ADMIN — METHOCARBAMOL 1000 MG: 500 TABLET ORAL at 05:02

## 2024-05-06 RX ADMIN — METHOCARBAMOL 1000 MG: 500 TABLET ORAL at 21:00

## 2024-05-06 RX ADMIN — Medication 4 L/MIN: at 10:08

## 2024-05-06 RX ADMIN — ACETAMINOPHEN 975 MG: 325 TABLET ORAL at 19:37

## 2024-05-06 RX ADMIN — METOPROLOL TARTRATE 25 MG: 25 TABLET, FILM COATED ORAL at 19:37

## 2024-05-06 RX ADMIN — ACETAMINOPHEN 975 MG: 325 TABLET ORAL at 09:19

## 2024-05-06 RX ADMIN — METHOCARBAMOL 1000 MG: 500 TABLET ORAL at 14:35

## 2024-05-06 RX ADMIN — ALBUTEROL SULFATE 2.5 MG: 2.5 SOLUTION RESPIRATORY (INHALATION) at 08:40

## 2024-05-06 RX ADMIN — ALBUMIN HUMAN 12.5 G: 0.05 INJECTION, SOLUTION INTRAVENOUS at 22:25

## 2024-05-06 RX ADMIN — METOPROLOL TARTRATE 25 MG: 25 TABLET, FILM COATED ORAL at 09:19

## 2024-05-06 RX ADMIN — ACETAMINOPHEN 975 MG: 325 TABLET ORAL at 14:34

## 2024-05-06 RX ADMIN — SODIUM CHLORIDE SOLN NEBU 3% 3 ML: 3 NEBU SOLN at 08:39

## 2024-05-06 RX ADMIN — PREDNISONE 5 MG: 10 TABLET ORAL at 09:19

## 2024-05-06 RX ADMIN — SACUBITRIL AND VALSARTAN 1 TABLET: 24; 26 TABLET, FILM COATED ORAL at 19:37

## 2024-05-06 RX ADMIN — OXYCODONE HYDROCHLORIDE 10 MG: 5 TABLET ORAL at 14:33

## 2024-05-06 RX ADMIN — HEPARIN SODIUM 5000 UNITS: 5000 INJECTION INTRAVENOUS; SUBCUTANEOUS at 05:02

## 2024-05-06 RX ADMIN — OXYCODONE HYDROCHLORIDE 5 MG: 5 TABLET ORAL at 09:47

## 2024-05-06 RX ADMIN — HEPARIN SODIUM 5000 UNITS: 5000 INJECTION INTRAVENOUS; SUBCUTANEOUS at 14:34

## 2024-05-06 RX ADMIN — ASPIRIN 81 MG 81 MG: 81 TABLET ORAL at 09:19

## 2024-05-06 RX ADMIN — PANTOPRAZOLE SODIUM 40 MG: 40 TABLET, DELAYED RELEASE ORAL at 05:02

## 2024-05-06 RX ADMIN — HEPARIN SODIUM 5000 UNITS: 5000 INJECTION INTRAVENOUS; SUBCUTANEOUS at 19:37

## 2024-05-06 RX ADMIN — HYDROMORPHONE HYDROCHLORIDE 0.2 MG: 1 INJECTION, SOLUTION INTRAMUSCULAR; INTRAVENOUS; SUBCUTANEOUS at 23:02

## 2024-05-06 RX ADMIN — SACUBITRIL AND VALSARTAN 1 TABLET: 24; 26 TABLET, FILM COATED ORAL at 09:19

## 2024-05-06 ASSESSMENT — PAIN SCALES - GENERAL
PAINLEVEL_OUTOF10: 0 - NO PAIN
PAINLEVEL_OUTOF10: 0 - NO PAIN
PAINLEVEL_OUTOF10: 7
PAINLEVEL_OUTOF10: 5 - MODERATE PAIN
PAINLEVEL_OUTOF10: 7
PAINLEVEL_OUTOF10: 3
PAINLEVEL_OUTOF10: 5 - MODERATE PAIN
PAINLEVEL_OUTOF10: 0 - NO PAIN
PAINLEVEL_OUTOF10: 3
PAINLEVEL_OUTOF10: 1
PAINLEVEL_OUTOF10: 0 - NO PAIN
PAINLEVEL_OUTOF10: 5 - MODERATE PAIN
PAINLEVEL_OUTOF10: 0 - NO PAIN
PAINLEVEL_OUTOF10: 6
PAINLEVEL_OUTOF10: 4
PAINLEVEL_OUTOF10: 0 - NO PAIN

## 2024-05-06 ASSESSMENT — PAIN - FUNCTIONAL ASSESSMENT

## 2024-05-06 ASSESSMENT — PAIN DESCRIPTION - LOCATION: LOCATION: INCISION

## 2024-05-06 ASSESSMENT — PAIN DESCRIPTION - ORIENTATION: ORIENTATION: LEFT

## 2024-05-06 NOTE — PROGRESS NOTES
"Yobany Small is a 69 y.o. 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    Subjective   Pt did well overnight. No acute events. Seen this AM sitting upright in bed, A+Ox3, in relatively good spirits. Still complaining of a \"squeezing\" pain in his abdomen but states it is manageable with current pain regimen.     Objective     Physical Exam  Constitutional:       Appearance: Normal appearance.   HENT:      Head: Normocephalic.      Nose: Nose normal.   Eyes:      Pupils: Pupils are equal, round, and reactive to light.      EOM intact   Cardiovascular:      Rate and Rhythm: Normal rate and regular rhythm. No murmurs.   Pulmonary:      Effort: Pulmonary effort is normal.      Breath sounds: Coarse breath sounds BL.      Comments: 4L NC  Abdominal:      General: Mild distension.      Palpations: Abdomen is soft.      Comments: L transverse abdominal incision stapled, RIVAS. Some areas of ecchymosis around inferior aspect. ESTEBAN drain with serosang drainage.  Genitourinary:     Comments: Hays with clear yellow drainage  Skin:     General: Skin is warm and dry.      Capillary Refill: Capillary refill takes less than 2 seconds.   Neurological:      General: No focal deficit present.      Mental Status: He is alert and oriented to person, place, and time.   Psychiatric:         Attention and Perception: Attention normal.        Last Recorded Vitals  Blood pressure 133/64, pulse 87, temperature 35.8 °C (96.4 °F), temperature source Temporal, resp. rate 17, height 1.803 m (5' 11\"), weight 67.4 kg (148 lb 9.4 oz), SpO2 98%.  Intake/Output last 3 Shifts:  I/O last 3 completed shifts:  In: 221.6 (3.3 mL/kg) [I.V.:221.6 (3.3 mL/kg)]  Out: 2325 (34.5 mL/kg) [Urine:2305 (0.9 mL/kg/hr); Drains:20]  Weight: 67.4 kg     Relevant Results  Scheduled medications  acetaminophen, 975 mg, oral, TID  albuterol, 2.5 mg, nebulization, TID  aspirin, 81 mg, oral, Daily  fluticasone furoate-vilanteroL, 1 " puff, inhalation, Daily  heparin (porcine), 5,000 Units, subcutaneous, q8h  lidocaine, 2 patch, transdermal, Daily  methocarbamol, 1,000 mg, oral, q8h MARIO ALBERTO  metoprolol tartrate, 25 mg, oral, BID  pantoprazole, 40 mg, oral, Daily before breakfast  predniSONE, 5 mg, oral, Daily  sacubitriL-valsartan, 1 tablet, oral, BID  sennosides-docusate sodium, 2 tablet, oral, BID  sodium chloride, 3 mL, nebulization, TID  tiotropium, 2 puff, inhalation, Daily      Continuous medications  HYDROmorphone,   lactated Ringer's, 5 mL/hr, Last Rate: 5 mL/hr (05/06/24 0800)  ropivacaine (PF) in NS cmpd, 14 mL/hr      PRN medications  PRN medications: hydrALAZINE, naloxone, oxygen      Results for orders placed or performed during the hospital encounter of 04/30/24 (from the past 24 hour(s))   Calcium, Ionized   Result Value Ref Range    POCT Calcium, Ionized 1.11 1.1 - 1.33 mmol/L   CBC   Result Value Ref Range    WBC 11.8 (H) 4.4 - 11.3 x10*3/uL    nRBC 0.0 0.0 - 0.0 /100 WBCs    RBC 3.58 (L) 4.50 - 5.90 x10*6/uL    Hemoglobin 10.8 (L) 13.5 - 17.5 g/dL    Hematocrit 32.8 (L) 41.0 - 52.0 %    MCV 92 80 - 100 fL    MCH 30.2 26.0 - 34.0 pg    MCHC 32.9 32.0 - 36.0 g/dL    RDW 14.5 11.5 - 14.5 %    Platelets 230 150 - 450 x10*3/uL   Renal Function Panel   Result Value Ref Range    Glucose 81 74 - 99 mg/dL    Sodium 140 136 - 145 mmol/L    Potassium 4.0 3.5 - 5.3 mmol/L    Chloride 106 98 - 107 mmol/L    Bicarbonate 27 21 - 32 mmol/L    Anion Gap 11 10 - 20 mmol/L    Urea Nitrogen 29 (H) 6 - 23 mg/dL    Creatinine 1.23 0.50 - 1.30 mg/dL    eGFR 64 >60 mL/min/1.73m*2    Calcium 7.7 (L) 8.6 - 10.6 mg/dL    Phosphorus 2.7 2.5 - 4.9 mg/dL    Albumin 2.9 (L) 3.4 - 5.0 g/dL   Magnesium   Result Value Ref Range    Magnesium 1.94 1.60 - 2.40 mg/dL   POCT GLUCOSE   Result Value Ref Range    POCT Glucose 83 74 - 99 mg/dL         Assessment/Plan   Principal Problem:    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 male with hx of AAA s/p EVAR (5/2020) c/b RTOR to r/o abdominal compartment syndrome and treat type II leak, requiring RTOR. 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 Recent hospitalization for LLQ pain and gross hematuria -- was found to have L upper pole renal mass w/ bx positive for urothelial carcinoma. Other PMH includes anemia, HTN, HLD, CAD s/p PCI w/ ELLY to Lcx (2020). HFrEF 25-30% 2/2 ischemic cardiomypoathy s/p ICD. Preop stress test w/ no inducible ischemia and stable LVEF on TTE. He has COPD w/ daily inhaler use. Preop PFTs s/f moderate obstructive disease.      Now s/p open AAA repair and explant with Dr. Street and left nephroureterectomy for urothelial carcinoma of L kidney w/ Dr. Thompson on 4/30. He was in the ICU immediately post op until 5/2. Post op course complicated by difficult to control acute post op pain, acute pain team following. Heart failure consulted for continued management of his known HFrEF. He was transitioned from cleviprex infusion to po metoprolol and entresto. He was transferred to [facility] with telemetry without event.     5/3, he became acutely hypoxic approximately 30 min after L chest tube removal. He had increased FiO2 requirement from 2L to 10L HF NC. His SpO2 increased from 86% to 91%. A stat CXR was obtained which was unremarkable. He was transferred back to ICU for continuous monitoring and chest physiotherapy. Now stable with improving respiratory parameters.         Plan:  NEURO: No PMH. Persistent complaints of rafael-incisional post op pain at 6/10. Worse with activity. Likely contributing to respiratory status. Did not receive preop blocks, but APS re-engaged -- now s/p EPS blocks with catheters on 5/4. Improvement in post op pain.   - ongoing neuro/neurovasc/pain assessments  - continue hydromorphone PCA, plan to wean with introduction of long acting PO meds  - Oxy 5/10 q4h PRN  -  scheduled robaxin and tylenol  - lidocaine patches held while blocks infusing  - APS re-engaged. S/P EPS blocks w/ catheters on 5/4. Appreciate APS recs  - PT/OT -> adv activity as tolerated, needs aggressive mobilization     CV: PMH ruptured AAA s/p EVAR (2020) which was c/b RTOR to r/o abdominal compartment syndrome and treat type II leak, requiring RTOR. Other PMH CAD s/p ELLY to LCx, 40% stenosis of proximal LAD (2020), HFrEF 25-30% 2/2 cardiomyopathy s/p AICD, HTN, HLD. Preop stress test not c/f ischemia, and VIJAY w/ stable EF. Now with enlargement of sac from 3.7 to 4.4 cm and type Ia endoleak s/p open repair and explant with Dr. Street 4/30. Post-op baseline palpable R DP, PT and L PT pulses. Repeat echo 5/1 w/ LVEF 55%, moderate dilation of aortic root. Currently hemodynamically intact without vasoactive support.  - MAP goal >80   - continue ASA, metop, entresto  - Increase Metop if HR consistently elevated  - PRN hydralazine 10mg q4hr for MAP >90  - continuous ekg/hourly and prn NIBP monitoring  - Home meds: Continue to hold simvastatin.      PULM: Hx of COPD. Preop PFTs indicative of moderate-severe obstructive disease FEV1/FVC (52% predicted). 5/3 Acute hypoxic respiratory insufficiency, desaturation to 86%, increased FiO2 requirement from 2L NC to 10 HF NC. No acute distress, able to speak full sentences. L pleural chest tube removed 30min prior to desaturation. CXR without evidence of obvious PTX, overall unchanged from previous CXR. Notable thick sounding mucus with cough, nonproductive. CT PE w/ no evidence of PE. Small R>L pleural effusion. Severe emphysematous changes. Honeycombing c/f concomitant fibrosis. Weaned to 4L NC O2.   - Daily CXR w/ improvement in bibasilar edema/effusion/atelectasis.   - Spoke with RT regarding importance of maintaining bronchial hygiene once transferred to floor  - Pulm consulted given severity of underlying disease and to establish outpatient follow up.   - duoneb and  3% saline nebs scheduled q6h  - vpep with oxy-jet  - wean O2 as tolerated for goal spo2 92% or higher  - Q1h incentive spirometer while awake  - continue scheduled Spiriva and breo-ellipta   - Will need outpt follow up with pulmonology   - Home meds: trelegy-ellipta       GI: No PMH. Supra mesenteric cross clamp time 40 minutes.   - regular diet  - PPI QD  - bowel regimen  - LFTS, amylase and lipase prn  - serial abdominal exams, continued assessment of abdominal drain     : Baseline hematuria 2/2 urothelial carcinoma of L kidney. Baseline Cr 0.94. S/p left nephroureterectomy 4/30 w/ Dr. Thompson. ESTEBAN creatinine level unremarkable, not indicative of leak 5/3. LAWRENCE stable.  - Cr continues to improve  - Will plan for ESTEBAN Cr on 5/7 followed by xray cystogram if negative. If cystogram negative, will remove juarez 5/8 AM, and plan for subsequent ESTEBAN drain removal 5/9 if outputs remain low.   - Check renal function panel daily and PRN  - Goal U/O >0.5ml/kg/hr.    - Replete electrolytes judiciously  - Home meds: oxybutynin 10mg PRN, tamsulosin 0.4mg. Hold for now.      HEME: Hx anemia. Acute surgical blood loss anemia. Last transfused 1 RBC 5/2.  - Hgb stable   - CBC daily and PRN  - coags prn  - SQH and SCDs  - ongoing monitoring for bleeding  - Home meds: Iron, B12. Hold for now.      ENDO: No hx. A1C 5.3 4/2024, TSH WNL 5/2023. BG wnl.   - Continue home pred   - q4hr POCT BGs     ID: Afebrile. Mild leukocytosis. Ceftriaxone discontinued 5/3.  - monitor for s/s infection  - q4h temps, wbc trend daily     MSK: Hx of psoriatic arthritis. Rheumatology consulted  - continue home prednisone 5mg   - Consider dosing with home MTX with next due dosing if no signs/symptoms of severe infection/post-op complication      Skin: No known active skin issues.  - preventative Mepilex dressings in place on sacrum and heels  - change preventative Mepilex weekly or more frequently as indicated (when moist/soiled)   - every shift skin assessment  per nursing and weekly ICU skin rounds  - moisture barrier to be applied with rafael care  - active skin problems addressed with nursing on daily rounds     Lines:   PIVs  Hays 4/30 (will stay in until 5/8 per urology)  LLQ ESTEBAN drain 4/30 (will stay in until 5/9 as per urology)     Dispo: Ok for transfer to stepdown. Pt discussed with SICU attending, Dr. Villalpando.      Huber Baumann MD  Anesthesiology PGY-3   SICU [id number]

## 2024-05-06 NOTE — PROGRESS NOTES
Urology [place]  Consult Note      Patient: Yobany Small  Age/Sex: 69 y.o., male  MRN: [id number]  Date of surgery: 4/30/2024  Admit Date: 4/30/2024   Code Status: Full Code  Length of Stay: 6      Interval History/Overnight Events:   Hays draining clear yellow urine  Remains hemodynamically stable      Objective  05/04 1900 - 05/06 0659  In: 221.6 [I.V.:221.6]  Out: 2325 [Urine:2305; Drains:20]    Medications:  Current Facility-Administered Medications   Medication Dose Route Frequency Provider Last Rate Last Admin    acetaminophen (Tylenol) tablet 975 mg  975 mg oral TID Kimmie Puentes MD   975 mg at 05/06/24 0919    albuterol 2.5 mg /3 mL (0.083 %) nebulizer solution 2.5 mg  2.5 mg nebulization TID COURTNEY Keene-CNP   2.5 mg at 05/06/24 0840    aspirin chewable tablet 81 mg  81 mg oral Daily Oralia Murray MD   81 mg at 05/06/24 0919    fluticasone furoate-vilanteroL (Breo Ellipta) 200-25 mcg/dose inhaler 1 puff  1 puff inhalation Daily Oralia Murray MD        heparin (porcine) injection 5,000 Units  5,000 Units subcutaneous q8h Kimmie Puentes MD   5,000 Units at 05/06/24 0502    hydrALAZINE (Apresoline) injection 10 mg  10 mg intravenous q4h PRN Oralia Murray MD   10 mg at 05/02/24 1458    hydromorphone PCA 0.5 mg/mL in NS opioid naive   intravenous Continuous Oralia Murray MD   New Syringe/Cartridge at 05/05/24 2339    lactated Ringer's infusion  5 mL/hr intravenous Continuous Oralia Murray MD 5 mL/hr at 05/06/24 0800 5 mL/hr at 05/06/24 0800    methocarbamol (Robaxin) tablet 1,000 mg  1,000 mg oral q8h MARIO ALBERTO Kimmie Puentes MD   1,000 mg at 05/06/24 0502    metoprolol tartrate (Lopressor) tablet 25 mg  25 mg oral BID Oralia Murray MD   25 mg at 05/06/24 0919    naloxone (Narcan) injection 0.2 mg  0.2 mg intravenous PRN Oralia Murray MD        oxyCODONE (Roxicodone) immediate release tablet 10 mg  10 mg oral q4h PRN Keshawn Hale, APRN-CNP        oxyCODONE  (Roxicodone) immediate release tablet 5 mg  5 mg oral q4h PRN PATY Rich   5 mg at 05/06/24 0947    oxygen (O2) therapy   inhalation Continuous PRN - O2/gases Oralia Murray MD   4 L/min at 05/06/24 1008    pantoprazole (ProtoNix) EC tablet 40 mg  40 mg oral Daily before breakfast PATY Keene   40 mg at 05/06/24 0502    predniSONE (Deltasone) tablet 5 mg  5 mg oral Daily Oralia Murray MD   5 mg at 05/06/24 0919    ropivacaine (PF) in NS cmpd (Naropin) 1000 mg/500 mL (0.2%) infusion  14 mL/hr infiltration Continuous Marlys Bradley MD        sacubitriL-valsartan (Entresto) 24-26 mg per tablet 1 tablet  1 tablet oral BID Oralia Murray MD   1 tablet at 05/06/24 0919    sennosides-docusate sodium (Mary Beth-Colace) 8.6-50 mg per tablet 2 tablet  2 tablet oral BID PATY Keene   2 tablet at 05/05/24 1958    sodium chloride 3 % nebulizer solution 3 mL  3 mL nebulization TID PATY Keene   3 mL at 05/06/24 0839    tiotropium (Spiriva Respimat) 2.5 mcg/actuation inhaler 2 puff  2 puff inhalation Daily Oralia Murray MD   2 puff at 05/04/24 1220       Physical Exam                                                                                                                         Gen -  No acute distress, up in chair     Neuro - Alert, oriented, conversant     CV -  Regular rate and rhythm on continuous monitor     Pulm - Symmetric chest rise, non-labored breathing, CT: SS on suction     Abd - Soft, appropriately tender, non-distended     Ext - Warm, well perfused     Skin - without jaundice       Psych - appropriate tone, affect      - juarez in place draining CYU      Imaging  CTA 5/3:  IMPRESSION:  1. No evidence of acute pulmonary embolism.  2. Interval development of small bilateral (right-greater-than-left)  pleural effusions with associated atelectatic change. Borderline  dilation of the main pulmonary artery measuring up to 3 cm,  new  compared to 11/11/2023. Correlate with patient's volume status.  3. Multiple foci of subcutaneous emphysema within the soft tissues of  the left anterolateral chest and abdominal wall. Correlate with  recent procedural history.  4. Severe apical predominant emphysematous change, similar to prior  exam. There is superimposed honeycombing, most predominant within the  bilateral lower lobes and right middle lobe. Consider  nonurgent/routine HRCT for evaluation of concomitant fibrosis.  5. Small sized hiatal hernia with mild concentric mid to distal  esophageal wall thickening; correlate with concern for reflux  esophagitis.  6. Several scattered pulmonary nodules, appearing stable to decreased  in size compared to prior exams.  7. 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  8. Other stable findings, as above.    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 Thompson) on 4/30/2024. From a urologic standpoint his recovery has been uncomplicated.    5/1: In ICU, extubated and off pressors. Cr 1.25  5/2: Remains in ICU, hemodynamically stable, getting 1U pRBC. Cr 1.34 yesterday PM  5/3: [facility]. Labs pending. ESTEBAN Cr 1.43  5/4: SOB following chest tube removal, transferred back to ICU  5/6: Still in ICU, pending transfer back to [facility]    - Maintain juarez   - Will plan for ESTEBAN Cr on 5/7 followed by xray cystogram if negative. If cystogram negative, will remove juarez 5/8 AM, and plan for subsequent ESTEBAN drain removal 5/9 if outputs remain low.   -Rest of care per ICU/ Vascular surgery    Seen with Chief resident Dr. Ahmadi with attending Dr. Donna Crespo MD   Urology PGY1  [id number]

## 2024-05-06 NOTE — PROGRESS NOTES
Vascular Surgery Progress Note    Subjective  No acute events overnight, on 6L NC this Am  Pain remains similar to prior, localized to his left side    Current Meds  Scheduled medications  acetaminophen, 975 mg, oral, TID  albuterol, 2.5 mg, nebulization, TID  aspirin, 81 mg, oral, Daily  fluticasone furoate-vilanteroL, 1 puff, inhalation, Daily  heparin (porcine), 5,000 Units, subcutaneous, q8h  lidocaine, 2 patch, transdermal, Daily  methocarbamol, 1,000 mg, oral, q8h MARIO ALBERTO  metoprolol tartrate, 25 mg, oral, BID  pantoprazole, 40 mg, oral, Daily before breakfast  predniSONE, 5 mg, oral, Daily  sacubitriL-valsartan, 1 tablet, oral, BID  sennosides-docusate sodium, 2 tablet, oral, BID  sodium chloride, 3 mL, nebulization, TID  tiotropium, 2 puff, inhalation, Daily      Continuous medications  HYDROmorphone,   lactated Ringer's, 5 mL/hr, Last Rate: 5 mL/hr (05/06/24 0600)  ropivacaine (PF) in NS cmpd, 14 mL/hr      PRN medications  PRN medications: hydrALAZINE, naloxone, oxygen    Objective    Vitals:   Temp:  [35.8 °C (96.4 °F)-36.7 °C (98.1 °F)] 35.8 °C (96.4 °F)  Heart Rate:  [65-87] 69  Resp:  [14-23] 16  BP: ()/(50-88) 102/57      I/O  I/O last 3 completed shifts:  In: 221.6 (3.3 mL/kg) [I.V.:221.6 (3.3 mL/kg)]  Out: 2325 (34.5 mL/kg) [Urine:2305 (0.9 mL/kg/hr); Drains:20]  Weight: 67.4 kg       Physical Exam  General: laying in bed, NAD  Head: normocephalic, atraumatic  ENT: mucosa are moist   Respiratory: nonlabored breathing on NC  CV: RRR  GI: abdomen is soft, nontender, nondistended. ESTEBAN drain with serosanguinous appearing output.  MSK: SHAYLA  Extremities: no swelling or deformity of b/l Les. Palpable R DP/PT, L PT   Neuro: CN II-XII grossly intact. Sensation to light touch intact    Labs:  Lab Results   Component Value Date    WBC 11.8 (H) 05/06/2024    HGB 10.8 (L) 05/06/2024    HCT 32.8 (L) 05/06/2024    MCV 92 05/06/2024     05/06/2024     Lab Results   Component Value Date    GLUCOSE 81  05/06/2024    CALCIUM 7.7 (L) 05/06/2024     05/06/2024    K 4.0 05/06/2024    CO2 27 05/06/2024     05/06/2024    BUN 29 (H) 05/06/2024    CREATININE 1.23 05/06/2024     Lab Results   Component Value Date    ALT 6 (L) 05/03/2024    AST 20 05/03/2024    ALKPHOS 41 05/03/2024    BILITOT 0.4 05/03/2024     Results from last 7 days   Lab Units 05/03/24  0845   APTT seconds 28   INR  1.0     Results from last 7 days   Lab Units 05/01/24  0601   POCT PH, ARTERIAL pH 7.42   POCT PCO2, ARTERIAL mm Hg 37*   POCT PO2, ARTERIAL mm Hg 78*   POCT HCO3 CALCULATED, ARTERIAL mmol/L 24.0   POCT BASE EXCESS, ARTERIAL mmol/L -0.3         Imaging  XR chest 1 view    Result Date: 5/5/2024  Interpreted By:  Juwan Cornell, STUDY: XR CHEST 1 VIEW; 5/5/2024 3:56 am   INDICATION: Signs/Symptoms:ICU rounds.   COMPARISON: 05/04/2024.   ACCESSION NUMBER(S): [id number]   ORDERING CLINICIAN: LINDSAY CRENSHAW   FINDINGS:     CARDIOMEDIASTINAL SILHOUETTE: Cardiomediastinal silhouette is normal in size and configuration. Right ventricular AICD device in place.   LUNGS: Slight improvement in right basilar edema with continued left basilar consolidation/effusion. No pneumothorax. Emphysematous changes again noted.   ABDOMEN: No remarkable upper abdominal findings.   BONES: No acute osseous changes.       1.  Slight improvement in right basilar atelectasis/edema with continued left basilar consolidation/effusion.     Signed by: Juwan Cornell 5/5/2024 9:11 AM Dictation workstation:   [id number]     Assessment:  69 y.o. 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 Transferred to floor 5/3, but returned to ICU due to increasing O2 requirements. PE negative per CTA from 5/3.    Plan:   - Pain control per ICU, Acute pain following, appreciate recs  -  Chest tube removed 5/3, post-pull CXR without ptx  - Continue aggressive pulmonary toilet  - Pulmonology consulted- Continue Prednisone 5mg PO.  - Wean O2 as able (COPD Goals)  - Maintain ESTEBAN to bulb suction, drain per urology. Repeat drain creatinine on 5/7, follow by cystogram. If cystogram negative, plan for juarez removal 5/8 and subsequent ESTEBAN removal on 5/9.  - Continue diet, PO meds  - Maintain K>4, Mg>2, Hgb>10, Plts>100, INR<1.4  - Labs q12h for 24 hrs  - monitor UOP  - SCDs  - PT/OT, OOB as able  - Okay for Dvt ppx    Discussed with attending, Dr. Poornima Farr DO  Department of Surgery / IR Integrated PGY1  Vascular [id number]

## 2024-05-06 NOTE — PROGRESS NOTES
Acute Pain Service    Postop Pain HPI -   Palliative: relieved with IV analgesics and regional local anesthetics  Provocative: movement  Quality:  burning and aching  Radiation:  none  Severity:  6/10  Timing: constant    24-HOUR OPIOID CONSUMPTION:  Dilaudid PCA 13.6 mg    Scheduled medications  acetaminophen, 975 mg, oral, TID  albuterol, 2.5 mg, nebulization, TID  aspirin, 81 mg, oral, Daily  fluticasone furoate-vilanteroL, 1 puff, inhalation, Daily  heparin (porcine), 5,000 Units, subcutaneous, q8h  lidocaine, 2 patch, transdermal, Daily  methocarbamol, 1,000 mg, oral, q8h MARIO ALBERTO  metoprolol tartrate, 25 mg, oral, BID  pantoprazole, 40 mg, oral, Daily before breakfast  predniSONE, 5 mg, oral, Daily  sacubitriL-valsartan, 1 tablet, oral, BID  sennosides-docusate sodium, 2 tablet, oral, BID  sodium chloride, 3 mL, nebulization, TID  tiotropium, 2 puff, inhalation, Daily      Continuous medications  HYDROmorphone,   lactated Ringer's, 5 mL/hr, Last Rate: 5 mL/hr (05/06/24 0800)  ropivacaine (PF) in NS cmpd, 14 mL/hr      PRN medications  PRN medications: hydrALAZINE, naloxone, oxygen     Physical Exam:  Constitutional:  no distress, alert and cooperative  Eyes: clear sclera  Head/Neck: No apparent injury, trachea midline  Respiratory/Thorax: Patent airways, thorax symmetric, breathing comfortably  Cardiovascular: no pitting edema  Gastrointestinal: Nondistended  Musculoskeletal: ROM intact  Extremities: no clubbing  Neurological: alert, jimenez x4  Psychological: Appropriate affect    Results for orders placed or performed during the hospital encounter of 04/30/24 (from the past 24 hour(s))   Calcium, Ionized   Result Value Ref Range    POCT Calcium, Ionized 1.11 1.1 - 1.33 mmol/L   CBC   Result Value Ref Range    WBC 11.8 (H) 4.4 - 11.3 x10*3/uL    nRBC 0.0 0.0 - 0.0 /100 WBCs    RBC 3.58 (L) 4.50 - 5.90 x10*6/uL    Hemoglobin 10.8 (L) 13.5 - 17.5 g/dL    Hematocrit 32.8 (L) 41.0 - 52.0 %    MCV 92 80 - 100 fL    MCH  30.2 26.0 - 34.0 pg    MCHC 32.9 32.0 - 36.0 g/dL    RDW 14.5 11.5 - 14.5 %    Platelets 230 150 - 450 x10*3/uL   Renal Function Panel   Result Value Ref Range    Glucose 81 74 - 99 mg/dL    Sodium 140 136 - 145 mmol/L    Potassium 4.0 3.5 - 5.3 mmol/L    Chloride 106 98 - 107 mmol/L    Bicarbonate 27 21 - 32 mmol/L    Anion Gap 11 10 - 20 mmol/L    Urea Nitrogen 29 (H) 6 - 23 mg/dL    Creatinine 1.23 0.50 - 1.30 mg/dL    eGFR 64 >60 mL/min/1.73m*2    Calcium 7.7 (L) 8.6 - 10.6 mg/dL    Phosphorus 2.7 2.5 - 4.9 mg/dL    Albumin 2.9 (L) 3.4 - 5.0 g/dL   Magnesium   Result Value Ref Range    Magnesium 1.94 1.60 - 2.40 mg/dL   POCT GLUCOSE   Result Value Ref Range    POCT Glucose 83 74 - 99 mg/dL        Yobany Small is a 69 y.o. year old male patient who is POD1 from open AAA repair and explant with Dr. Street and left nephroureterectomy for urothelial carcinoma of L kidney w/ Dr. Thompson on 4/30. Acute pain consulted for post-operative pain control. Acute pain initially consulted for pre-operative nerve block to which the patient refused. Patient returned to CTICU on 5/4 with respiratory insufficiency 2/2 inadequate pain control s/p BL STELLA block with catheters on 5/4.      Plan:     - Bilateral STELLA blocks with catheters performed in CTICU on 5/4/24  - Ambit ball with Ropivacaine 0.2%/NaCl 0.9% 500mL, Rate 7 cc/hr bilaterally  - Refill of ropivicaine today  - Ambit medication will not interfere with pain medication prescribed by the primary team.   - Please be aware of local anesthetic toxic dose and absorption variability before considering lidocaine patches  - Acute pain service will follow while catheters in place  - Rest of pain management per primary team     Acute Pain Resident  pg [phone] ph [phone]

## 2024-05-07 ENCOUNTER — APPOINTMENT (OUTPATIENT)
Dept: RADIOLOGY | Facility: HOSPITAL | Age: 69
DRG: 252 | End: 2024-05-07
Payer: COMMERCIAL

## 2024-05-07 PROBLEM — G89.18 POST-OPERATIVE PAIN: Status: ACTIVE | Noted: 2024-05-07

## 2024-05-07 LAB
ALBUMIN SERPL BCP-MCNC: 3.5 G/DL (ref 3.4–5)
ANION GAP SERPL CALC-SCNC: 13 MMOL/L (ref 10–20)
BUN SERPL-MCNC: 29 MG/DL (ref 6–23)
CALCIUM SERPL-MCNC: 8.5 MG/DL (ref 8.6–10.6)
CHLORIDE SERPL-SCNC: 101 MMOL/L (ref 98–107)
CO2 SERPL-SCNC: 29 MMOL/L (ref 21–32)
CREAT FLD-MCNC: 1.4 MG/DL
CREAT SERPL-MCNC: 1.27 MG/DL (ref 0.5–1.3)
EGFRCR SERPLBLD CKD-EPI 2021: 62 ML/MIN/1.73M*2
ERYTHROCYTE [DISTWIDTH] IN BLOOD BY AUTOMATED COUNT: 14.6 % (ref 11.5–14.5)
GLUCOSE SERPL-MCNC: 75 MG/DL (ref 74–99)
HCT VFR BLD AUTO: 39.2 % (ref 41–52)
HGB BLD-MCNC: 12.4 G/DL (ref 13.5–17.5)
MCH RBC QN AUTO: 29.3 PG (ref 26–34)
MCHC RBC AUTO-ENTMCNC: 31.6 G/DL (ref 32–36)
MCV RBC AUTO: 93 FL (ref 80–100)
NRBC BLD-RTO: 0 /100 WBCS (ref 0–0)
PHOSPHATE SERPL-MCNC: 2.6 MG/DL (ref 2.5–4.9)
PLATELET # BLD AUTO: 320 X10*3/UL (ref 150–450)
POTASSIUM SERPL-SCNC: 4.2 MMOL/L (ref 3.5–5.3)
RBC # BLD AUTO: 4.23 X10*6/UL (ref 4.5–5.9)
SODIUM SERPL-SCNC: 139 MMOL/L (ref 136–145)
WBC # BLD AUTO: 14.3 X10*3/UL (ref 4.4–11.3)

## 2024-05-07 PROCEDURE — 2500000004 HC RX 250 GENERAL PHARMACY W/ HCPCS (ALT 636 FOR OP/ED)

## 2024-05-07 PROCEDURE — 2500000001 HC RX 250 WO HCPCS SELF ADMINISTERED DRUGS (ALT 637 FOR MEDICARE OP)

## 2024-05-07 PROCEDURE — 36415 COLL VENOUS BLD VENIPUNCTURE: CPT

## 2024-05-07 PROCEDURE — 2500000005 HC RX 250 GENERAL PHARMACY W/O HCPCS

## 2024-05-07 PROCEDURE — 2550000001 HC RX 255 CONTRASTS: Performed by: SURGERY

## 2024-05-07 PROCEDURE — 82570 ASSAY OF URINE CREATININE: CPT

## 2024-05-07 PROCEDURE — 2500000004 HC RX 250 GENERAL PHARMACY W/ HCPCS (ALT 636 FOR OP/ED): Performed by: STUDENT IN AN ORGANIZED HEALTH CARE EDUCATION/TRAINING PROGRAM

## 2024-05-07 PROCEDURE — 2500000001 HC RX 250 WO HCPCS SELF ADMINISTERED DRUGS (ALT 637 FOR MEDICARE OP): Performed by: NURSE PRACTITIONER

## 2024-05-07 PROCEDURE — 99233 SBSQ HOSP IP/OBS HIGH 50: CPT | Performed by: STUDENT IN AN ORGANIZED HEALTH CARE EDUCATION/TRAINING PROGRAM

## 2024-05-07 PROCEDURE — 85027 COMPLETE CBC AUTOMATED: CPT

## 2024-05-07 PROCEDURE — 84100 ASSAY OF PHOSPHORUS: CPT

## 2024-05-07 PROCEDURE — 1200000002 HC GENERAL ROOM WITH TELEMETRY DAILY

## 2024-05-07 PROCEDURE — 74455 X-RAY URETHRA/BLADDER: CPT

## 2024-05-07 PROCEDURE — 94668 MNPJ CHEST WALL SBSQ: CPT

## 2024-05-07 RX ORDER — ACETAMINOPHEN 500 MG
5 TABLET ORAL DAILY
Status: DISCONTINUED | OUTPATIENT
Start: 2024-05-07 | End: 2024-05-10 | Stop reason: HOSPADM

## 2024-05-07 RX ORDER — LIDOCAINE 560 MG/1
1 PATCH PERCUTANEOUS; TOPICAL; TRANSDERMAL DAILY
Status: DISCONTINUED | OUTPATIENT
Start: 2024-05-07 | End: 2024-05-10 | Stop reason: HOSPADM

## 2024-05-07 RX ORDER — TRAZODONE HYDROCHLORIDE 50 MG/1
25 TABLET ORAL ONCE
Status: COMPLETED | OUTPATIENT
Start: 2024-05-07 | End: 2024-05-07

## 2024-05-07 RX ORDER — FUROSEMIDE 10 MG/ML
20 INJECTION INTRAMUSCULAR; INTRAVENOUS ONCE
Status: COMPLETED | OUTPATIENT
Start: 2024-05-07 | End: 2024-05-07

## 2024-05-07 RX ADMIN — SACUBITRIL AND VALSARTAN 1 TABLET: 24; 26 TABLET, FILM COATED ORAL at 20:53

## 2024-05-07 RX ADMIN — OXYCODONE HYDROCHLORIDE 10 MG: 5 TABLET ORAL at 00:54

## 2024-05-07 RX ADMIN — FUROSEMIDE 20 MG: 10 INJECTION, SOLUTION INTRAMUSCULAR; INTRAVENOUS at 00:49

## 2024-05-07 RX ADMIN — METOPROLOL TARTRATE 25 MG: 25 TABLET, FILM COATED ORAL at 20:53

## 2024-05-07 RX ADMIN — SACUBITRIL AND VALSARTAN 1 TABLET: 24; 26 TABLET, FILM COATED ORAL at 09:00

## 2024-05-07 RX ADMIN — ACETAMINOPHEN 975 MG: 325 TABLET ORAL at 08:03

## 2024-05-07 RX ADMIN — OXYCODONE HYDROCHLORIDE 10 MG: 5 TABLET ORAL at 23:38

## 2024-05-07 RX ADMIN — SENNOSIDES AND DOCUSATE SODIUM 2 TABLET: 50; 8.6 TABLET ORAL at 08:03

## 2024-05-07 RX ADMIN — OXYCODONE HYDROCHLORIDE 10 MG: 5 TABLET ORAL at 17:43

## 2024-05-07 RX ADMIN — Medication 3 L/MIN: at 15:00

## 2024-05-07 RX ADMIN — TRAZODONE HYDROCHLORIDE 25 MG: 50 TABLET ORAL at 20:53

## 2024-05-07 RX ADMIN — PREDNISONE 5 MG: 10 TABLET ORAL at 08:04

## 2024-05-07 RX ADMIN — Medication 2 L/MIN: at 08:00

## 2024-05-07 RX ADMIN — ASPIRIN 81 MG 81 MG: 81 TABLET ORAL at 08:03

## 2024-05-07 RX ADMIN — Medication 2 L/MIN: at 08:41

## 2024-05-07 RX ADMIN — LIDOCAINE 1 PATCH: 4 PATCH TOPICAL at 20:52

## 2024-05-07 RX ADMIN — OXYCODONE HYDROCHLORIDE 10 MG: 5 TABLET ORAL at 12:57

## 2024-05-07 RX ADMIN — HEPARIN SODIUM 5000 UNITS: 5000 INJECTION INTRAVENOUS; SUBCUTANEOUS at 14:42

## 2024-05-07 RX ADMIN — METOPROLOL TARTRATE 25 MG: 25 TABLET, FILM COATED ORAL at 08:04

## 2024-05-07 RX ADMIN — HYDROMORPHONE HYDROCHLORIDE 0.2 MG: 1 INJECTION, SOLUTION INTRAMUSCULAR; INTRAVENOUS; SUBCUTANEOUS at 03:22

## 2024-05-07 RX ADMIN — METHOCARBAMOL 1000 MG: 500 TABLET ORAL at 14:41

## 2024-05-07 RX ADMIN — HEPARIN SODIUM 5000 UNITS: 5000 INJECTION INTRAVENOUS; SUBCUTANEOUS at 21:00

## 2024-05-07 RX ADMIN — DIATRIZOATE MEGLUMINE 120 ML: 300 INJECTION, SOLUTION INTRAVENOUS at 13:27

## 2024-05-07 RX ADMIN — METHOCARBAMOL 1000 MG: 500 TABLET ORAL at 21:00

## 2024-05-07 RX ADMIN — OXYCODONE HYDROCHLORIDE 10 MG: 5 TABLET ORAL at 08:04

## 2024-05-07 ASSESSMENT — COGNITIVE AND FUNCTIONAL STATUS - GENERAL
EATING MEALS: A LITTLE
MOVING FROM LYING ON BACK TO SITTING ON SIDE OF FLAT BED WITH BEDRAILS: A LITTLE
WALKING IN HOSPITAL ROOM: A LITTLE
DAILY ACTIVITIY SCORE: 18
CLIMB 3 TO 5 STEPS WITH RAILING: A LITTLE
TOILETING: A LITTLE
PERSONAL GROOMING: A LITTLE
MOVING TO AND FROM BED TO CHAIR: A LITTLE
HELP NEEDED FOR BATHING: A LITTLE
DRESSING REGULAR UPPER BODY CLOTHING: A LITTLE
STANDING UP FROM CHAIR USING ARMS: A LITTLE
MOBILITY SCORE: 18
DRESSING REGULAR LOWER BODY CLOTHING: A LITTLE
TURNING FROM BACK TO SIDE WHILE IN FLAT BAD: A LITTLE

## 2024-05-07 ASSESSMENT — PAIN SCALES - GENERAL
PAINLEVEL_OUTOF10: 0 - NO PAIN
PAINLEVEL_OUTOF10: 0 - NO PAIN
PAINLEVEL_OUTOF10: 5 - MODERATE PAIN
PAINLEVEL_OUTOF10: 5 - MODERATE PAIN
PAINLEVEL_OUTOF10: 7
PAINLEVEL_OUTOF10: 4
PAINLEVEL_OUTOF10: 9
PAINLEVEL_OUTOF10: 2
PAINLEVEL_OUTOF10: 8
PAINLEVEL_OUTOF10: 7
PAINLEVEL_OUTOF10: 0 - NO PAIN

## 2024-05-07 ASSESSMENT — PAIN DESCRIPTION - DESCRIPTORS
DESCRIPTORS: TIGHTNESS;TENDER
DESCRIPTORS: TIGHTNESS
DESCRIPTORS: ACHING
DESCRIPTORS: TIGHTNESS
DESCRIPTORS: THROBBING;TENDER
DESCRIPTORS: BURNING
DESCRIPTORS: BURNING

## 2024-05-07 ASSESSMENT — PAIN DESCRIPTION - LOCATION
LOCATION: INCISION
LOCATION: INCISION
LOCATION: CHEST
LOCATION: INCISION

## 2024-05-07 ASSESSMENT — PAIN DESCRIPTION - ORIENTATION
ORIENTATION: LEFT

## 2024-05-07 NOTE — SIGNIFICANT EVENT
Called by nursing due to unwitnessed fall. Patient reports he was walking out of the bathroom and had felt a little lightheaded when he stood up. He lost his balance and landed softly on his knees. He did not hit his head. He denies knee pain. He is having some anxiety and difficulty sleeping. On exam his knees are atraumatic, he is moving all 4 extremities, he has no neurological deficits noted. Patient is on 5LNC up from 3LNC, seems to be anxiety related, patient denies SOB.     Visit Vitals  /84 (BP Location: Left arm, Patient Position: Sitting)   Pulse 93   Temp 36.6 °C (97.9 °F) (Temporal)   Resp 18     Patient exam overall reassuring, patient is very anxious and is getting poor sleep. Will put on continuous pulse ox and give trazodone tonight for sleep (patient refuses melatonin). We will watch closely overnight, bed alarm is on.    Oralia Murray MD  General Surgery  Vascular [phone]

## 2024-05-07 NOTE — PROGRESS NOTES
Acute Pain Service    Postop Pain HPI -   Palliative: relieved with IV analgesics and regional local anesthetics  Provocative: movement  Quality:  burning and aching  Radiation:  none  Severity:  5/10  Timing: constant    24-HOUR OPIOID CONSUMPTION:  Oxycodone 35mg   Dilaudid 0.4mg    Scheduled medications  acetaminophen, 975 mg, oral, TID  aspirin, 81 mg, oral, Daily  fluticasone furoate-vilanteroL, 1 puff, inhalation, Daily  heparin (porcine), 5,000 Units, subcutaneous, q8h  melatonin, 5 mg, oral, Daily  methocarbamol, 1,000 mg, oral, q8h MARIO ALBERTO  metoprolol tartrate, 25 mg, oral, BID  pantoprazole, 40 mg, oral, Daily before breakfast  predniSONE, 5 mg, oral, Daily  sacubitriL-valsartan, 1 tablet, oral, BID  sennosides-docusate sodium, 2 tablet, oral, BID  tiotropium, 2 puff, inhalation, Daily      Continuous medications  lactated Ringer's, 5 mL/hr, Last Rate: 5 mL/hr (05/07/24 0900)  ropivacaine (PF) in NS cmpd, 14 mL/hr      PRN medications  PRN medications: albuterol, dextromethorphan-guaifenesin, hydrALAZINE, HYDROmorphone, naloxone, oxyCODONE, oxyCODONE, oxygen, sodium chloride     Physical Exam:  Constitutional:  no distress, alert and cooperative  Eyes: clear sclera  Head/Neck: No apparent injury, trachea midline  Respiratory/Thorax: Patent airways, thorax symmetric, breathing comfortably  Cardiovascular: no pitting edema  Gastrointestinal: Nondistended  Musculoskeletal: ROM intact  Extremities: no clubbing  Neurological: alert, jimenez x4  Psychological: Appropriate affect    Results for orders placed or performed during the hospital encounter of 04/30/24 (from the past 24 hour(s))   POCT GLUCOSE   Result Value Ref Range    POCT Glucose 107 (H) 74 - 99 mg/dL        Yobany Small is a 69 y.o. year old male patient who is POD1 from open AAA repair and explant with Dr. Street and left nephroureterectomy for urothelial carcinoma of L kidney w/ Dr. Thompson on 4/30. Acute pain consulted for post-operative pain  control. Acute pain initially consulted for pre-operative nerve block to which the patient refused. Patient returned to CTICU on 5/4 with respiratory insufficiency 2/2 inadequate pain control s/p BL STELLA block with catheters on 5/4.      Plan:     - Bilateral STELLA blocks with catheters performed in CTICU on 5/4/24  - Ambit catheters removed today  - Acute pain service will sign off  - Rest of pain management per primary team     Acute Pain Resident  pg [phone] ph [phone]

## 2024-05-07 NOTE — DISCHARGE INSTRUCTIONS
Follow up:  -[facility] urology, within 1-2 weeks of hospital discharge, for biopsy coordination as discussed during your hospitalization  [address] [place] [address] [phone]    -vascular surgery, within 2-3 weeks of hospital discharge  [address] [facility] [address], [place], [place] [id number] [phone]    -lung nodule clinic within 1-3 weeks of hospital discharge  [phone]  [facility] [address]  [address]  [address] [place] [address] [place] [address]     Warm/Dry

## 2024-05-07 NOTE — PROGRESS NOTES
[facility]  Consult Note      Patient: Yobany Small  Age/Sex: 69 y.o., male  MRN: [id number]  Date of surgery: 4/30/2024  Admit Date: 4/30/2024   Code Status: Full Code  Length of Stay: 7      Interval History/Overnight Events:   [place] draining clear yellow urine  Transferred to SDU, O2 requirements weaning  Still complaining of pain over incision    Objective  05/05 1900 - 05/07 0659  In: 600 [I.V.:100]  Out: 2260 [Urine:2250; Drains:10]    Medications:  Current Facility-Administered Medications   Medication Dose Route Frequency Provider Last Rate Last Admin    acetaminophen (Tylenol) tablet 975 mg  975 mg oral TID Angelo Farr DO   975 mg at 05/07/24 0803    albuterol 2.5 mg /3 mL (0.083 %) nebulizer solution 2.5 mg  2.5 mg nebulization q6h PRN Angelo Farr DO        aspirin chewable tablet 81 mg  81 mg oral Daily Angelo Farr DO   81 mg at 05/07/24 0803    dextromethorphan-guaifenesin (Mucinex DM)  mg per 12 hr tablet 1 tablet  1 tablet oral BID PRN Angelo Farr DO        fluticasone furoate-vilanteroL (Breo Ellipta) 200-25 mcg/dose inhaler 1 puff  1 puff inhalation Daily Angelo Farr DO        heparin (porcine) injection 5,000 Units  5,000 Units subcutaneous q8h Angelo Farr DO   5,000 Units at 05/06/24 1937    hydrALAZINE (Apresoline) injection 10 mg  10 mg intravenous q4h PRN Angelo Farr DO   10 mg at 05/02/24 1458    HYDROmorphone (Dilaudid) injection 0.2 mg  0.2 mg intravenous q1h PRN Angelo Farr DO   0.2 mg at 05/07/24 0322    lactated Ringer's infusion  5 mL/hr intravenous Continuous Angelo Farr DO 5 mL/hr at 05/07/24 0900 5 mL/hr at 05/07/24 0900    melatonin tablet 5 mg  5 mg oral Daily Angelo Farr DO        methocarbamol (Robaxin) tablet 1,000 mg  1,000 mg oral q8h [facility] Angelo Farr DO   1,000 mg at 05/06/24 2100    metoprolol tartrate (Lopressor) tablet 25 mg  25 mg oral BID Angelo Farr DO   25 mg at 05/07/24 0804    naloxone (Narcan) injection 0.2 mg  0.2 mg  intravenous PRN Angelo Farr, DO        oxyCODONE (Roxicodone) immediate release tablet 10 mg  10 mg oral q4h PRN Angelo Farr, DO   10 mg at 05/07/24 1257    oxyCODONE (Roxicodone) immediate release tablet 5 mg  5 mg oral q4h PRN Agnelo Farr, DO   5 mg at 05/06/24 0947    oxygen (O2) therapy   inhalation Continuous PRN - O2/gases Angelo Farr DO   2 L/min at 05/07/24 0841    pantoprazole (ProtoNix) EC tablet 40 mg  40 mg oral Daily before breakfast Angelo Farr, DO   40 mg at 05/06/24 0502    predniSONE (Deltasone) tablet 5 mg  5 mg oral Daily Angelo Farr, DO   5 mg at 05/07/24 0804    ropivacaine (PF) in NS cmpd (Naropin) 1000 mg/500 mL (0.2%) infusion  14 mL/hr infiltration Continuous Marlys Bradley MD        sacubitriL-valsartan (Entresto) 24-26 mg per tablet 1 tablet  1 tablet oral BID Angelo Farr, DO   1 tablet at 05/07/24 0900    sennosides-docusate sodium (Mary Beth-Colace) 8.6-50 mg per tablet 2 tablet  2 tablet oral BID Angelo Farr, DO   2 tablet at 05/07/24 0803    sodium chloride 3 % nebulizer solution 3 mL  3 mL nebulization q6h PRN Angelo Farr DO        tiotropium (Spiriva Respimat) 2.5 mcg/actuation inhaler 2 puff  2 puff inhalation Daily Angelo Farr DO   2 puff at 05/04/24 1220       Physical Exam                                                                                                                         Gen -  No acute distress, up in chair     Neuro - Alert, oriented, conversant     CV -  Regular rate and rhythm on continuous monitor     Pulm - Symmetric chest rise, non-labored breathing, CT: SS on suction     Abd - Soft, appropriately tender, non-distended     Ext - Warm, well perfused     Skin - without jaundice       Psych - appropriate tone, affect      - juarez in place draining CYU, scattered ecchymoses over penis and L groin      Imaging  CTA 5/3:  IMPRESSION:  1. No evidence of acute pulmonary embolism.  2. Interval development of small bilateral  (right-greater-than-left)  pleural effusions with associated atelectatic change. Borderline  dilation of the main pulmonary artery measuring up to 3 cm, new  compared to 11/11/2023. Correlate with patient's volume status.  3. Multiple foci of subcutaneous emphysema within the soft tissues of  the left anterolateral chest and abdominal wall. Correlate with  recent procedural history.  4. Severe apical predominant emphysematous change, similar to prior  exam. There is superimposed honeycombing, most predominant within the  bilateral lower lobes and right middle lobe. Consider  nonurgent/routine HRCT for evaluation of concomitant fibrosis.  5. Small sized hiatal hernia with mild concentric mid to distal  esophageal wall thickening; correlate with concern for reflux  esophagitis.  6. Several scattered pulmonary nodules, appearing stable to decreased  in size compared to prior exams.  7. 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  8. Other stable findings, as above.    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 Thompson) on 4/30/2024. From a urologic standpoint his recovery has been uncomplicated.    5/1: In ICU, extubated and off pressors. Cr 1.25  5/2: Remains in ICU, hemodynamically stable, getting 1U pRBC. Cr 1.34 yesterday PM  5/3: [facility]. Labs pending. ESTEBAN Cr 1.43  5/4: SOB following chest tube removal, transferred back to ICU  5/6: Still in ICU, pending transfer back to [facility]  5/7: transferred to SDU, cystogram negative for leak    - OK for trial of void in AM of 5/8, please continue to trend ESTEBAN outputs tomorrow and will consider subsequent ESTEBAN drain removal 5/9 if outputs remain low  -Rest of care per ICU/ Vascular surgery    D/w attending Dr. Donna Ahmadi MD  Urology PGY-5  Adult [phone] / Peds  [phone]

## 2024-05-07 NOTE — PROGRESS NOTES
Vascular Surgery Progress Note    Subjective  No acute events overnight  Down to 2L NC  Still having similar pain in his left side    Current Meds  Scheduled medications  acetaminophen, 975 mg, oral, TID  aspirin, 81 mg, oral, Daily  fluticasone furoate-vilanteroL, 1 puff, inhalation, Daily  heparin (porcine), 5,000 Units, subcutaneous, q8h  methocarbamol, 1,000 mg, oral, q8h MARIO ALBERTO  metoprolol tartrate, 25 mg, oral, BID  pantoprazole, 40 mg, oral, Daily before breakfast  predniSONE, 5 mg, oral, Daily  sacubitriL-valsartan, 1 tablet, oral, BID  sennosides-docusate sodium, 2 tablet, oral, BID  tiotropium, 2 puff, inhalation, Daily      Continuous medications  lactated Ringer's, 5 mL/hr, Last Rate: 5 mL/hr (05/07/24 0800)  ropivacaine (PF) in NS cmpd, 14 mL/hr      PRN medications  PRN medications: albuterol, hydrALAZINE, HYDROmorphone, naloxone, oxyCODONE, oxyCODONE, oxygen, sodium chloride    Objective    Vitals:   Temp:  [35.4 °C (95.7 °F)-36.8 °C (98.2 °F)] 36.8 °C (98.2 °F)  Heart Rate:  [] 105  Resp:  [15-25] 21  BP: ()/() 119/71      I/O  I/O last 3 completed shifts:  In: 600 (8.9 mL/kg) [I.V.:100 (1.5 mL/kg); Blood:250; IV Piggyback:250]  Out: 2260 (33.5 mL/kg) [Urine:2250 (0.9 mL/kg/hr); Drains:10]  Weight: 67.4 kg       Physical Exam  General: laying in bed, NAD  Head: normocephalic, atraumatic  ENT: mucosa are moist   Respiratory: nonlabored breathing on NC  CV: RRR  GI: abdomen is soft, nontender, nondistended. ESTEBAN drain with serosanguinous appearing output.  MSK: SHAYLA  Extremities: no swelling or deformity of b/l Les. Palpable R DP/PT, L PT   Neuro: CN II-XII grossly intact. Sensation to light touch intact    Labs:  Lab Results   Component Value Date    WBC 11.8 (H) 05/06/2024    HGB 10.8 (L) 05/06/2024    HCT 32.8 (L) 05/06/2024    MCV 92 05/06/2024     05/06/2024     Lab Results   Component Value Date    GLUCOSE 81 05/06/2024    CALCIUM 7.7 (L) 05/06/2024     05/06/2024    K  4.0 05/06/2024    CO2 27 05/06/2024     05/06/2024    BUN 29 (H) 05/06/2024    CREATININE 1.23 05/06/2024     Lab Results   Component Value Date    ALT 6 (L) 05/03/2024    AST 20 05/03/2024    ALKPHOS 41 05/03/2024    BILITOT 0.4 05/03/2024     Results from last 7 days   Lab Units 05/03/24  0845   APTT seconds 28   INR  1.0     Results from last 7 days   Lab Units 05/01/24  0601   POCT PH, ARTERIAL pH 7.42   POCT PCO2, ARTERIAL mm Hg 37*   POCT PO2, ARTERIAL mm Hg 78*   POCT HCO3 CALCULATED, ARTERIAL mmol/L 24.0   POCT BASE EXCESS, ARTERIAL mmol/L -0.3         Imaging  XR chest 1 view    Result Date: 5/6/2024  Interpreted By:  Nacho Kelsey, STUDY: XR CHEST 1 VIEW;  5/6/2024 4:08 am   INDICATION: Signs/Symptoms:ICU rounds.   COMPARISON: Chest radiograph dated 5/5/2024 subsegmental atelectasis and small left pleural effusion is noted.   ACCESSION NUMBER(S): [id number]   ORDERING CLINICIAN: LINDSAY CRENSHAW   FINDINGS: AP radiograph of the chest   Medical devices: A left subclavian Medtronic cardiac AICD is noted with electrodes in satisfactory position. Skin clips bib an incision overlying the lower left thorax and upper abdomen.   The heart is normal in size. Atheromatous ectasia of the thoracic aorta is noted.   Severe chronic obstructive pulmonary disease and emphysema is noted. Multifocal parenchymal scarring is noted. Slightly increased interstitial opacities within the right lower lobe is noted.       1. Severe chronic obstructive pulmonary disease and multifocal parenchymal scarring 2. Subsegmental atelectasis and small left pleural effusion 3. Slightly increased interstitial opacities within the right lower lobe       Signed by: Nacho Kelsey 5/6/2024 12:09 PM Dictation workstation:   [id number]     Assessment:  69 y.o. 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 Transferred to floor 5/3, but returned to ICU due to increasing O2 requirements. PE negative per CTA from 5/3.    Plan:   - Pain control per ICU, Acute pain following, appreciate recs  - Chest tube removed 5/3, post-pull CXR without ptx  - Continue aggressive pulmonary toilet  - Pulmonology consulted- Continue Prednisone 5mg PO.  - Wean O2 as able (COPD Goals)  - Maintain ESTEBAN to bulb suction, drain per urology. Repeat drain creatinine on 5/7, follow by cystogram. If cystogram negative, plan for juarez removal 5/8 and subsequent ESTEBAN removal on 5/9. Will discuss with urology the potential of removing juarez today is above studies are sufficient  - Continue diet, PO meds  - Maintain K>4, Mg>2, Hgb>10, Plts>100, INR<1.4  - Labs q12h for 24 hrs  - monitor UOP  - SCDs  - PT/OT, OOB as able  - Continue DVT prophylaxis  - Okay for transfer to to regular nursing floor    Seen and discussed with vascular fellow, Dr. Workman and attending Dr. Gabriela Farr,   Department of Surgery / IR Integrated PGY1  Vascular [phone]

## 2024-05-07 NOTE — NURSING NOTE
Patient transferred to LT03 via wheelchair. Patient belongings including computer, , phone, medications, respiratory medications and device, robe and clothing, black bag transferred with patient. LT03 nurse Derian called and given report prior to patient arrival to LT. MD Huber Baumann notified and aware of patient transfer prior to transfer.

## 2024-05-07 NOTE — PROGRESS NOTES
Occupational Therapy                 Therapy Communication Note    Patient Name: Yobany Small  MRN: [id number]  Today's Date: 5/7/2024     Discipline: Occupational Therapy    Missed Visit Reason: Missed Visit Reason:  (assisted RN with transfer to w/c in prep to transfer to floor at this time. will f/u as able/appropriate)    Missed Time: Attempt    ALIS DE LOS SANTOS, OT

## 2024-05-07 NOTE — PROGRESS NOTES
"Yobany Small is a 69 y.o. male on day 7 of admission presenting with Type Ia endoleak of aortic graft (CMS-HCC).    Subjective   Pt did well overnight. No acute events. Seen this AM sitting upright in bed, A+Ox3, in relatively good spirits. Still complaining of a \"squeezing\" pain in his abdomen. Thinks that current pain regimen \"isn't doing much\", however appears relatively comfortable and in no acute distress.     Objective     Physical Exam  Constitutional:       Appearance: Normal appearance.   HENT:      Head: Normocephalic.      Nose: Nose normal.   Eyes:      Pupils: Pupils are equal, round, and reactive to light.      EOM intact   Cardiovascular:      Rate and Rhythm: Normal rate and regular rhythm. No murmurs.   Pulmonary:      Effort: Pulmonary effort is normal.      Breath sounds: Coarse breath sounds BL.      Comments: 2L NC  Abdominal:      General: Mild distension.      Palpations: Abdomen is soft.      Comments: L transverse abdominal incision stapled, RIVAS. Some areas of ecchymosis around inferior aspect. ESTEBAN drain with serosang drainage.  Genitourinary:     Comments: Hays with clear yellow drainage  Skin:     General: Skin is warm and dry.      Capillary Refill: Capillary refill takes less than 2 seconds.   Neurological:      General: No focal deficit present.      Mental Status: He is alert and oriented to person, place, and time.   Psychiatric:         Attention and Perception: Attention normal.        Last Recorded Vitals  Blood pressure 115/66, pulse 94, temperature 35.9 °C (96.6 °F), temperature source Temporal, resp. rate 23, height 1.803 m (5' 11\"), weight 67.4 kg (148 lb 9.4 oz), SpO2 96%.  Intake/Output last 3 Shifts:  I/O last 3 completed shifts:  In: 600 (8.9 mL/kg) [I.V.:100 (1.5 mL/kg); Blood:250; IV Piggyback:250]  Out: 2260 (33.5 mL/kg) [Urine:2250 (0.9 mL/kg/hr); Drains:10]  Weight: 67.4 kg     Relevant Results  Scheduled medications  acetaminophen, 975 mg, oral, TID  aspirin, 81 " mg, oral, Daily  fluticasone furoate-vilanteroL, 1 puff, inhalation, Daily  heparin (porcine), 5,000 Units, subcutaneous, q8h  methocarbamol, 1,000 mg, oral, q8h MARIO ALBERTO  metoprolol tartrate, 25 mg, oral, BID  pantoprazole, 40 mg, oral, Daily before breakfast  predniSONE, 5 mg, oral, Daily  sacubitriL-valsartan, 1 tablet, oral, BID  sennosides-docusate sodium, 2 tablet, oral, BID  tiotropium, 2 puff, inhalation, Daily      Continuous medications  lactated Ringer's, 5 mL/hr, Last Rate: 5 mL/hr (05/06/24 0800)  ropivacaine (PF) in NS cmpd, 14 mL/hr      PRN medications  PRN medications: albuterol, hydrALAZINE, HYDROmorphone, naloxone, oxyCODONE, oxyCODONE, oxygen, sodium chloride      Results for orders placed or performed during the hospital encounter of 04/30/24 (from the past 24 hour(s))   POCT GLUCOSE   Result Value Ref Range    POCT Glucose 107 (H) 74 - 99 mg/dL         Assessment/Plan   Principal Problem:    Type Ia endoleak of aortic graft (CMS-[facility])  Active Problems:    Post-op pain    Urothelial carcinoma of kidney, left (Multi)    69 y.o. male with hx of AAA s/p EVAR (5/2020) c/b RTOR to r/o abdominal compartment syndrome and treat type II leak, requiring RTOR. 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 Recent hospitalization for LLQ pain and gross hematuria -- was found to have L upper pole renal mass w/ bx positive for urothelial carcinoma. Other PMH includes anemia, HTN, HLD, CAD s/p PCI w/ ELLY to Lcx (2020). HFrEF 25-30% 2/2 ischemic cardiomypoathy s/p ICD. Preop stress test w/ no inducible ischemia and stable LVEF on TTE. He has COPD w/ daily inhaler use. Preop PFTs s/f moderate obstructive disease.      Now s/p open AAA repair and explant with Dr. Street and left nephroureterectomy for urothelial carcinoma of L kidney w/ Dr. Thompson on 4/30. He was in the ICU immediately post op until 5/2. Post op course complicated by difficult to control acute post op pain,  acute pain team following. Heart failure consulted for continued management of his known HFrEF. He was transitioned from cleviprex infusion to po metoprolol and entresto. He was transferred to [facility] with telemetry without event.     5/3, he became acutely hypoxic approximately 30 min after L chest tube removal. He had increased FiO2 requirement from 2L to 10L HF NC. His SpO2 increased from 86% to 91%. A stat CXR was obtained which was unremarkable. He was transferred back to ICU for continuous monitoring and chest physiotherapy. Now stable with improving respiratory parameters.         Plan:  NEURO: No PMH. Persistent complaints of rafael-incisional post op pain at 6/10. Worse with activity. Likely contributing to respiratory status. Did not receive preop blocks, but APS re-engaged -- now s/p EPS blocks with catheters on 5/4. Improvement in post op pain.   - ongoing neuro/neurovasc/pain assessments  - Oxy 5/10 q4h PRN  - scheduled robaxin and tylenol  - lidocaine patches held while blocks infusing  - APS re-engaged. S/P EPS blocks w/ catheters on 5/4. Appreciate APS recs  - PT/OT -> adv activity as tolerated, needs aggressive mobilization     CV: PMH ruptured AAA s/p EVAR (2020) which was c/b RTOR to r/o abdominal compartment syndrome and treat type II leak, requiring RTOR. Other PMH CAD s/p ELLY to LCx, 40% stenosis of proximal LAD (2020), HFrEF 25-30% 2/2 cardiomyopathy s/p AICD, HTN, HLD. Preop stress test not c/f ischemia, and VIJAY w/ stable EF. Now with enlargement of sac from 3.7 to 4.4 cm and type Ia endoleak s/p open repair and explant with Dr. Street 4/30. Post-op baseline palpable R DP, PT and L PT pulses. Repeat echo 5/1 w/ LVEF 55%, moderate dilation of aortic root. Currently hemodynamically intact without vasoactive support.  - MAP goal >80   - continue ASA, metop, entresto  - Increase Metop if HR consistently elevated  - PRN hydralazine 10mg q4hr for MAP >90  - continuous ekg/hourly and prn NIBP  monitoring  - Home meds: Continue to hold simvastatin.      PULM: Hx of COPD. Preop PFTs indicative of moderate-severe obstructive disease FEV1/FVC (52% predicted). 5/3 Acute hypoxic respiratory insufficiency, desaturation to 86%, increased FiO2 requirement from 2L NC to 10 HF NC. No acute distress, able to speak full sentences. L pleural chest tube removed 30min prior to desaturation. CXR without evidence of obvious PTX, overall unchanged from previous CXR. Notable thick sounding mucus with cough, nonproductive. CT PE w/ no evidence of PE. Small R>L pleural effusion. Severe emphysematous changes. Honeycombing c/f concomitant fibrosis. Weaned to 2L NC O2.   - Daily CXR w/ improvement in bibasilar edema/effusion/atelectasis.   - Spoke with RT regarding importance of maintaining bronchial hygiene once transferred to floor  - Pulm consulted given severity of underlying disease and to establish outpatient follow up.   - duoneb and 3% saline nebs scheduled q6h  - vpep with oxy-jet  - wean O2 as tolerated for goal spo2 92% or higher  - Q1h incentive spirometer while awake  - continue scheduled Spiriva and breo-ellipta   - Will need outpt follow up with pulmonology   - Home meds: trelegy-ellipta       GI: No PMH. Supra mesenteric cross clamp time 40 minutes.   - regular diet  - PPI QD  - bowel regimen  - LFTS, amylase and lipase prn  - serial abdominal exams, continued assessment of abdominal drain     : Baseline hematuria 2/2 urothelial carcinoma of L kidney. Baseline Cr 0.94. S/p left nephroureterectomy 4/30 w/ Dr. Thompson. ESTEBAN creatinine level unremarkable, not indicative of leak 5/3. LAWRENCE stable.  - Cr continues to improve  - Will plan for ESTEBAN Cr on 5/7 followed by xray cystogram if negative. If cystogram negative, will remove juarez 5/8 AM, and plan for subsequent ESTEBAN drain removal 5/9 if outputs remain low.   - Check renal function panel daily and PRN  - Goal U/O >0.5ml/kg/hr.    - Replete electrolytes judiciously  -  Home meds: oxybutynin 10mg PRN, tamsulosin 0.4mg. Hold for now.      HEME: Hx anemia. Acute surgical blood loss anemia. Last transfused 1 RBC 5/2.  - Hgb stable   - CBC daily and PRN  - coags prn  - SQH and SCDs  - ongoing monitoring for bleeding  - Home meds: Iron, B12. Hold for now.      ENDO: No hx. A1C 5.3 4/2024, TSH WNL 5/2023. BG trend wnl.   - Continue home pred      ID: Afebrile. Mild leukocytosis. Ceftriaxone discontinued 5/3.  - monitor for s/s infection  - q4h temps, wbc trend daily     MSK: Hx of psoriatic arthritis. Rheumatology consulted  - continue home prednisone 5mg   - Consider dosing with home MTX with next due dosing if no signs/symptoms of severe infection/post-op complication      Skin: No known active skin issues.  - preventative Mepilex dressings in place on sacrum and heels  - change preventative Mepilex weekly or more frequently as indicated (when moist/soiled)   - every shift skin assessment per nursing and weekly ICU skin rounds  - moisture barrier to be applied with rafael care  - active skin problems addressed with nursing on daily rounds     Lines:   PIVs  Hays 4/30 (will stay in until 5/8 per urology)  LLQ ESTEBAN drain 4/30 (will stay in until 5/9 as per urology)     Dispo: Ok for transfer to floor. Pt discussed with SICU attending, Dr. Love.      Huber Baumann MD  Anesthesiology PGY-3   SICU [id number]

## 2024-05-07 NOTE — NURSING NOTE
Pt was found sitting at the end of the bed. I asked how he was doing and he informed me that he had just fallen trying to ambulate to the bathroom by himself. I asked the patient how he fell and he stated that he lost his balance and fell softly to his knees. I asked the pt if he hit his head and he assured me that he did not. I assisted the patient to lay back down in bed and turned the bed alarm on. I then informed my charge nurse and manager, and then called the patients wife. Patient did not have any cuts or bruises on his knees and his head did not show any apparent trauma. Provider also notified.

## 2024-05-08 LAB
ALBUMIN SERPL BCP-MCNC: 3.1 G/DL (ref 3.4–5)
ALP SERPL-CCNC: 56 U/L (ref 33–136)
ALT SERPL W P-5'-P-CCNC: 13 U/L (ref 10–52)
ANION GAP SERPL CALC-SCNC: 15 MMOL/L (ref 10–20)
APTT PPP: 29 SECONDS (ref 27–38)
AST SERPL W P-5'-P-CCNC: 19 U/L (ref 9–39)
BILIRUB SERPL-MCNC: 0.7 MG/DL (ref 0–1.2)
BUN SERPL-MCNC: 28 MG/DL (ref 6–23)
CALCIUM SERPL-MCNC: 8.1 MG/DL (ref 8.6–10.6)
CHLORIDE SERPL-SCNC: 104 MMOL/L (ref 98–107)
CO2 SERPL-SCNC: 25 MMOL/L (ref 21–32)
CREAT SERPL-MCNC: 1.21 MG/DL (ref 0.5–1.3)
EGFRCR SERPLBLD CKD-EPI 2021: 65 ML/MIN/1.73M*2
ERYTHROCYTE [DISTWIDTH] IN BLOOD BY AUTOMATED COUNT: 14.6 % (ref 11.5–14.5)
GLUCOSE SERPL-MCNC: 122 MG/DL (ref 74–99)
HCT VFR BLD AUTO: 33.1 % (ref 41–52)
HGB BLD-MCNC: 10.6 G/DL (ref 13.5–17.5)
INR PPP: 1.3 (ref 0.9–1.1)
MAGNESIUM SERPL-MCNC: 1.89 MG/DL (ref 1.6–2.4)
MCH RBC QN AUTO: 29.1 PG (ref 26–34)
MCHC RBC AUTO-ENTMCNC: 32 G/DL (ref 32–36)
MCV RBC AUTO: 91 FL (ref 80–100)
NRBC BLD-RTO: 0 /100 WBCS (ref 0–0)
PLATELET # BLD AUTO: 337 X10*3/UL (ref 150–450)
POTASSIUM SERPL-SCNC: 3.7 MMOL/L (ref 3.5–5.3)
PROT SERPL-MCNC: 5.6 G/DL (ref 6.4–8.2)
PROTHROMBIN TIME: 14.2 SECONDS (ref 9.8–12.8)
RBC # BLD AUTO: 3.64 X10*6/UL (ref 4.5–5.9)
SODIUM SERPL-SCNC: 140 MMOL/L (ref 136–145)
WBC # BLD AUTO: 11 X10*3/UL (ref 4.4–11.3)

## 2024-05-08 PROCEDURE — 83735 ASSAY OF MAGNESIUM: CPT

## 2024-05-08 PROCEDURE — 2500000001 HC RX 250 WO HCPCS SELF ADMINISTERED DRUGS (ALT 637 FOR MEDICARE OP)

## 2024-05-08 PROCEDURE — 2500000004 HC RX 250 GENERAL PHARMACY W/ HCPCS (ALT 636 FOR OP/ED)

## 2024-05-08 PROCEDURE — 97530 THERAPEUTIC ACTIVITIES: CPT | Mod: GP,CQ

## 2024-05-08 PROCEDURE — 36415 COLL VENOUS BLD VENIPUNCTURE: CPT

## 2024-05-08 PROCEDURE — 51702 INSERT TEMP BLADDER CATH: CPT

## 2024-05-08 PROCEDURE — 97116 GAIT TRAINING THERAPY: CPT | Mod: GP,CQ

## 2024-05-08 PROCEDURE — 1200000002 HC GENERAL ROOM WITH TELEMETRY DAILY

## 2024-05-08 PROCEDURE — 85027 COMPLETE CBC AUTOMATED: CPT

## 2024-05-08 PROCEDURE — 2500000006 HC RX 250 W HCPCS SELF ADMINISTERED DRUGS (ALT 637 FOR ALL PAYERS)

## 2024-05-08 PROCEDURE — 80053 COMPREHEN METABOLIC PANEL: CPT

## 2024-05-08 PROCEDURE — 85610 PROTHROMBIN TIME: CPT

## 2024-05-08 PROCEDURE — 94668 MNPJ CHEST WALL SBSQ: CPT

## 2024-05-08 PROCEDURE — 2500000005 HC RX 250 GENERAL PHARMACY W/O HCPCS

## 2024-05-08 RX ORDER — FUROSEMIDE 10 MG/ML
20 INJECTION INTRAMUSCULAR; INTRAVENOUS ONCE
Status: COMPLETED | OUTPATIENT
Start: 2024-05-08 | End: 2024-05-08

## 2024-05-08 RX ORDER — POTASSIUM CHLORIDE 20 MEQ/1
40 TABLET, EXTENDED RELEASE ORAL ONCE
Status: COMPLETED | OUTPATIENT
Start: 2024-05-08 | End: 2024-05-08

## 2024-05-08 RX ORDER — POTASSIUM CHLORIDE 1.5 G/1.58G
40 POWDER, FOR SOLUTION ORAL ONCE
Status: DISCONTINUED | OUTPATIENT
Start: 2024-05-08 | End: 2024-05-08

## 2024-05-08 RX ORDER — LIDOCAINE HYDROCHLORIDE 20 MG/ML
1 JELLY TOPICAL ONCE
Status: COMPLETED | OUTPATIENT
Start: 2024-05-08 | End: 2024-05-08

## 2024-05-08 RX ORDER — LANOLIN ALCOHOL/MO/W.PET/CERES
400 CREAM (GRAM) TOPICAL DAILY
Status: DISCONTINUED | OUTPATIENT
Start: 2024-05-08 | End: 2024-05-09

## 2024-05-08 RX ADMIN — PREDNISONE 5 MG: 10 TABLET ORAL at 10:16

## 2024-05-08 RX ADMIN — METHOCARBAMOL 1000 MG: 500 TABLET ORAL at 06:20

## 2024-05-08 RX ADMIN — OXYCODONE HYDROCHLORIDE 10 MG: 5 TABLET ORAL at 19:33

## 2024-05-08 RX ADMIN — METOPROLOL TARTRATE 25 MG: 25 TABLET, FILM COATED ORAL at 10:16

## 2024-05-08 RX ADMIN — POTASSIUM CHLORIDE 40 MEQ: 1500 TABLET, EXTENDED RELEASE ORAL at 13:51

## 2024-05-08 RX ADMIN — FUROSEMIDE 20 MG: 10 INJECTION, SOLUTION INTRAMUSCULAR; INTRAVENOUS at 13:22

## 2024-05-08 RX ADMIN — HEPARIN SODIUM 5000 UNITS: 5000 INJECTION INTRAVENOUS; SUBCUTANEOUS at 21:37

## 2024-05-08 RX ADMIN — OXYCODONE HYDROCHLORIDE 10 MG: 5 TABLET ORAL at 03:29

## 2024-05-08 RX ADMIN — PANTOPRAZOLE SODIUM 40 MG: 40 TABLET, DELAYED RELEASE ORAL at 06:20

## 2024-05-08 RX ADMIN — HEPARIN SODIUM 5000 UNITS: 5000 INJECTION INTRAVENOUS; SUBCUTANEOUS at 13:22

## 2024-05-08 RX ADMIN — OXYCODONE HYDROCHLORIDE 10 MG: 5 TABLET ORAL at 13:23

## 2024-05-08 RX ADMIN — LIDOCAINE HYDROCHLORIDE 1 APPLICATION: 20 JELLY TOPICAL at 16:15

## 2024-05-08 RX ADMIN — SACUBITRIL AND VALSARTAN 1 TABLET: 24; 26 TABLET, FILM COATED ORAL at 10:16

## 2024-05-08 RX ADMIN — Medication 400 MG: at 13:22

## 2024-05-08 RX ADMIN — SACUBITRIL AND VALSARTAN 1 TABLET: 24; 26 TABLET, FILM COATED ORAL at 21:37

## 2024-05-08 RX ADMIN — OXYCODONE HYDROCHLORIDE 10 MG: 5 TABLET ORAL at 07:48

## 2024-05-08 RX ADMIN — HEPARIN SODIUM 5000 UNITS: 5000 INJECTION INTRAVENOUS; SUBCUTANEOUS at 06:20

## 2024-05-08 RX ADMIN — HYDROMORPHONE HYDROCHLORIDE 0.2 MG: 1 INJECTION, SOLUTION INTRAMUSCULAR; INTRAVENOUS; SUBCUTANEOUS at 10:35

## 2024-05-08 RX ADMIN — ASPIRIN 81 MG 81 MG: 81 TABLET ORAL at 10:16

## 2024-05-08 RX ADMIN — ACETAMINOPHEN 975 MG: 325 TABLET ORAL at 10:16

## 2024-05-08 RX ADMIN — METHOCARBAMOL 1000 MG: 500 TABLET ORAL at 21:37

## 2024-05-08 RX ADMIN — METHOCARBAMOL 1000 MG: 500 TABLET ORAL at 13:22

## 2024-05-08 RX ADMIN — METOPROLOL TARTRATE 25 MG: 25 TABLET, FILM COATED ORAL at 21:37

## 2024-05-08 RX ADMIN — HYDROMORPHONE HYDROCHLORIDE 0.2 MG: 1 INJECTION, SOLUTION INTRAMUSCULAR; INTRAVENOUS; SUBCUTANEOUS at 16:12

## 2024-05-08 ASSESSMENT — COGNITIVE AND FUNCTIONAL STATUS - GENERAL
DAILY ACTIVITIY SCORE: 19
TURNING FROM BACK TO SIDE WHILE IN FLAT BAD: A LITTLE
DRESSING REGULAR UPPER BODY CLOTHING: A LITTLE
CLIMB 3 TO 5 STEPS WITH RAILING: A LOT
PERSONAL GROOMING: A LITTLE
STANDING UP FROM CHAIR USING ARMS: A LITTLE
TURNING FROM BACK TO SIDE WHILE IN FLAT BAD: A LITTLE
MOVING TO AND FROM BED TO CHAIR: A LITTLE
CLIMB 3 TO 5 STEPS WITH RAILING: A LOT
WALKING IN HOSPITAL ROOM: A LITTLE
TURNING FROM BACK TO SIDE WHILE IN FLAT BAD: A LITTLE
DRESSING REGULAR UPPER BODY CLOTHING: A LITTLE
PERSONAL GROOMING: A LITTLE
TOILETING: A LITTLE
MOVING TO AND FROM BED TO CHAIR: A LITTLE
MOVING FROM LYING ON BACK TO SITTING ON SIDE OF FLAT BED WITH BEDRAILS: A LITTLE
TOILETING: A LITTLE
MOBILITY SCORE: 17
STANDING UP FROM CHAIR USING ARMS: A LITTLE
STANDING UP FROM CHAIR USING ARMS: A LITTLE
WALKING IN HOSPITAL ROOM: A LITTLE
HELP NEEDED FOR BATHING: A LITTLE
HELP NEEDED FOR BATHING: A LITTLE
MOVING FROM LYING ON BACK TO SITTING ON SIDE OF FLAT BED WITH BEDRAILS: A LITTLE
CLIMB 3 TO 5 STEPS WITH RAILING: A LOT
MOVING TO AND FROM BED TO CHAIR: A LITTLE
DRESSING REGULAR LOWER BODY CLOTHING: A LITTLE
MOBILITY SCORE: 17
DRESSING REGULAR LOWER BODY CLOTHING: A LITTLE
DAILY ACTIVITIY SCORE: 19
WALKING IN HOSPITAL ROOM: A LITTLE
MOVING FROM LYING ON BACK TO SITTING ON SIDE OF FLAT BED WITH BEDRAILS: A LITTLE
MOBILITY SCORE: 17

## 2024-05-08 ASSESSMENT — PAIN - FUNCTIONAL ASSESSMENT
PAIN_FUNCTIONAL_ASSESSMENT: 0-10

## 2024-05-08 ASSESSMENT — PAIN SCALES - GENERAL
PAINLEVEL_OUTOF10: 8
PAINLEVEL_OUTOF10: 5 - MODERATE PAIN
PAINLEVEL_OUTOF10: 4
PAINLEVEL_OUTOF10: 7
PAINLEVEL_OUTOF10: 7
PAINLEVEL_OUTOF10: 2
PAINLEVEL_OUTOF10: 7
PAINLEVEL_OUTOF10: 3
PAINLEVEL_OUTOF10: 4
PAINLEVEL_OUTOF10: 3

## 2024-05-08 ASSESSMENT — PAIN DESCRIPTION - DESCRIPTORS
DESCRIPTORS: ACHING
DESCRIPTORS: ACHING;SHARP
DESCRIPTORS: ACHING;SORE
DESCRIPTORS: ACHING;THROBBING;TENDER

## 2024-05-08 NOTE — PROGRESS NOTES
Physical Therapy    Physical Therapy Treatment    Patient Name: Yobany Small  MRN: [id number]  Today's Date: 5/8/2024    Is this an IP or OP visit? IP Time Calculation  Start Time: 0930  Stop Time: 0959  Time Calculation (min): 29 min    Assessment/Plan   PT Assessment  PT Assessment Results: Decreased strength, Decreased endurance, Impaired balance, Decreased mobility, Impaired judgement, Decreased safety awareness  End of Session Communication: Bedside nurse  Assessment Comment: Pt with decreased safety awareness throughout treatment. Pt attempts to rush with mobility and requires max cuing to slow down for increased safety. Upon initial stand, pt attempted to rapidly get across the room to commode but required Min A and Max cuing for pt to wait until all lines were untangled from his body.  End of Session Patient Position: Up in chair, Alarm on     PT Plan  Treatment/Interventions: Bed mobility, Transfer training, Gait training, Stair training, Balance training, Neuromuscular re-education, Strengthening, Endurance training, Therapeutic exercise, Therapeutic activity  PT Plan: Skilled PT  PT Frequency: 3 times per week  PT Discharge Recommendations: Low intensity level of continued care (Anticipate with pain management)  PT Recommended Transfer Status: Assist x1, Assistive device  PT - OK to Discharge: Yes      General Visit Information:   PT  Visit  PT Received On: 05/08/24  General  Prior to Session Communication: Bedside nurse  Patient Position Received: Bed, 3 rail up, Alarm off, not on at start of session  General Comment: Pt supine in bed upon arrival. Pt agreeable to therapy. When asked about pt's recent fall the night before pt stated he was not hurt and expressed frustration with people continuing to ask him about it.    Subjective   Precautions:  Precautions  Medical Precautions: Fall precautions, Oxygen therapy device and L/min, Abdominal precautions (6L via NC.)  Precautions Comment: MAP >80  Vital  Signs:  Vital Signs  Heart Rate: 97  SpO2: 90 % (Pt desat to 84% during ambultion requiring the need to sit, pt at 80% in sitting, RN cleared O2 to be turned up to 8L and pt cued on proper breathing, SpO2 improved to 93%. 93% post treatment.)  BP: 108/73  MAP (mmHg): 85  BP Location: Right arm  BP Method: Automatic  Patient Position: Sitting    Objective   Pain:  Pain Assessment  Pain Assessment:  (Pt did not rate pain.)  Pain Type: Acute pain  Pain Location: Head  Pain Descriptors: Aching  Pain Frequency: Intermittent  Pain Interventions:  (RN notified.)  Cognition:  Cognition  Overall Cognitive Status: Within Functional Limits    Activity Tolerance:  Activity Tolerance  Endurance: Tolerates less than 10 min exercise with changes in vital signs  Treatments:  Bed Mobility  Bed Mobility: Yes  Bed Mobility 1  Bed Mobility 1: Supine to sitting  Level of Assistance 1: Close supervision  Bed Mobility Comments 1: Log roll.    Ambulation/Gait Training  Ambulation/Gait Training Performed: Yes  Ambulation/Gait Training 1  Surface 1: Level tile  Device 1: Rolling walker  Assistance 1: Contact guard  Quality of Gait 1: Inconsistent stride length, Forward flexed posture, Diminished heel strike (Pt ambulates quickly despite cuing to slow down, pt with decreased safety awareness.)  Comments/Distance (ft) 1: 10' Pt became unsteady with SpO2 decreasing to 84%, RN retrieved chair for pt to sit, SpO2 at 80% with pt sititng, RN cleared O2 to be turned to 8L, SpO2 improved to 93%.  Ambulation/Gait Training 2  Surface 2: Level tile  Device 2: No device  Assistance 2: Minimum assistance  Quality of Gait 2: Forward flexed posture (unsteady, grabbing for objects.)  Comments/Distance (ft) 2: Pt stated he needed to use the commode immediatly upon standing and attempted to rush across the room to get the the commode. Pt required therapist Max cuing to wait until cords were untangled around pt's legs before attempting to  walk.  Transfers  Transfer: Yes  Transfer 1  Transfer From 1: Sit to, Stand to  Transfer to 1: Stand, Sit  Technique 1: Sit to stand, Stand to sit  Transfer Device 1: Walker  Transfer Level of Assistance 1: Close supervision  Transfers 2  Transfer From 2: Sit to, Commode-standard to  Transfer to 2: Commode-standard, Stand  Technique 2: Sit to stand, Stand to sit  Transfer Device 2: Walker  Transfer Level of Assistance 2: Contact guard    Outcome Measures:  [facility] Basic Mobility  Turning from your back to your side while in a flat bed without using bedrails: A little  Moving from lying on your back to sitting on the side of a flat bed without using bedrails: A little  Moving to and from bed to chair (including a wheelchair): A little  Standing up from a chair using your arms (e.g. wheelchair or bedside chair): A little  To walk in hospital room: A little  Climbing 3-5 steps with railing: A lot  Basic Mobility - Total Score: 17    Education Documentation  Body Mechanics, taught by Clarissa Robles PTA at 5/8/2024 11:00 AM.  Learner: Patient  Readiness: Acceptance  Method: Explanation  Response: Verbalizes Understanding    Mobility Training, taught by Clarissa Robles PTA at 5/8/2024 11:00 AM.  Learner: Patient  Readiness: Acceptance  Method: Explanation  Response: Verbalizes Understanding    Education Comments  No comments found.        OP EDUCATION:       Encounter Problems       Encounter Problems (Active)       Balance       Pt will demonstrated ability to score at least 24/28 on the Tinetti balance assessment tool to ensure safety upon D/C.  (Progressing)       Start:  05/02/24    Expected End:  05/16/24               Mobility       Pt will demonstrated ability to ambulate >/=250ft with proper form and no balance deficits for safe home going.   (Progressing)       Start:  05/02/24    Expected End:  05/16/24            Pt will demonstrate ability to ascend/descend 4 stairs with unilateral rail and no balance  deficits for safe home going.  (Progressing)       Start:  05/02/24    Expected End:  05/16/24               PT Transfers       Pt will demonstrated ability to complete bed mobility and sit<>stand transfers without assistance and assistive device to safely return home.  (Progressing)       Start:  05/02/24    Expected End:  05/16/24               Pain - Adult          Safety       LTG - Patient will adhere to hip precautions during ADL's and transfers       Start:  05/03/24    Expected End:  05/07/24            LTG - Patient will demonstrate safety requirements appropriate to situation/environment       Start:  05/03/24    Expected End:  05/07/24            LTG - Patient will utilize safety techniques       Start:  05/03/24    Expected End:  05/07/24            STG - Patient locks brakes on wheelchair       Start:  05/03/24    Expected End:  05/07/24            STG - Patient uses call light consistently to request assistance with transfers       Start:  05/03/24    Expected End:  05/07/24            STG - Patient uses gait belt during all transfers       Start:  05/03/24    Expected End:  05/07/24            Goal 1       Start:  05/03/24    Expected End:  05/07/24            Goal 2       Start:  05/03/24    Expected End:  05/07/24            Goal 3       Start:  05/03/24    Expected End:  05/07/24

## 2024-05-08 NOTE — PROGRESS NOTES
[facility]  Consult Note      Patient: Yobany Small  Age/Sex: 69 y.o., male  MRN: [id number]  Date of surgery: 4/30/2024  Admit Date: 4/30/2024   Code Status: Full Code  Length of Stay: 8      Interval History/Overnight Events:   Had unwitnessed fall last evening after losing his balance and landed softly on his knees, no head injury.   Hays removed yesterday, voiding spontaneously, denies hematuria    Objective  05/06 1900 - 05/08 0659  In: 808.8 [I.V.:308.8]  Out: 1755 [Urine:1750; Drains:5]    Medications:  Current Facility-Administered Medications   Medication Dose Route Frequency Provider Last Rate Last Admin    acetaminophen (Tylenol) tablet 975 mg  975 mg oral TID Angelo Farr DO   975 mg at 05/07/24 0803    albuterol 2.5 mg /3 mL (0.083 %) nebulizer solution 2.5 mg  2.5 mg nebulization q6h PRN Angelo Farr DO        aspirin chewable tablet 81 mg  81 mg oral Daily Angelo Farr DO   81 mg at 05/07/24 0803    dextromethorphan-guaifenesin (Mucinex DM)  mg per 12 hr tablet 1 tablet  1 tablet oral BID PRN Angelo Farr DO        fluticasone furoate-vilanteroL (Breo Ellipta) 200-25 mcg/dose inhaler 1 puff  1 puff inhalation Daily Angelo Farr DO        heparin (porcine) injection 5,000 Units  5,000 Units subcutaneous q8h Angelo Farr DO   5,000 Units at 05/08/24 0620    hydrALAZINE (Apresoline) injection 10 mg  10 mg intravenous q4h PRN Angelo Farr DO   10 mg at 05/02/24 1458    HYDROmorphone (Dilaudid) injection 0.2 mg  0.2 mg intravenous q1h PRN Angelo Farr DO   0.2 mg at 05/07/24 0322    lactated Ringer's infusion  5 mL/hr intravenous Continuous Angelo Farr DO 5 mL/hr at 05/07/24 0900 5 mL/hr at 05/07/24 0900    lidocaine 4 % patch 1 patch  1 patch transdermal Daily Oralia Murray MD   1 patch at 05/07/24 2052    melatonin tablet 5 mg  5 mg oral Daily Angelo Farr DO        methocarbamol (Robaxin) tablet 1,000 mg  1,000 mg oral q8h [facility] Angelo Farr DO   1,000 mg at 05/08/24  0620    metoprolol tartrate (Lopressor) tablet 25 mg  25 mg oral BID Angelo Farr, DO   25 mg at 05/07/24 2053    naloxone (Narcan) injection 0.2 mg  0.2 mg intravenous PRN Angelo Farr DO        oxyCODONE (Roxicodone) immediate release tablet 10 mg  10 mg oral q4h PRN Angelo Farr DO   10 mg at 05/08/24 0329    oxyCODONE (Roxicodone) immediate release tablet 5 mg  5 mg oral q4h PRN Angelo Farr DO   5 mg at 05/06/24 0947    oxygen (O2) therapy   inhalation Continuous PRN - O2/gases Angelo Farr DO   3 L/min at 05/07/24 1500    pantoprazole (ProtoNix) EC tablet 40 mg  40 mg oral Daily before breakfast Angelo Farr DO   40 mg at 05/08/24 0620    predniSONE (Deltasone) tablet 5 mg  5 mg oral Daily Angelo Farr DO   5 mg at 05/07/24 0804    sacubitriL-valsartan (Entresto) 24-26 mg per tablet 1 tablet  1 tablet oral BID Angelo Farr, DO   1 tablet at 05/07/24 2053    sennosides-docusate sodium (Mary Beth-Colace) 8.6-50 mg per tablet 2 tablet  2 tablet oral BID Angelo Farr, DO   2 tablet at 05/07/24 0803    sodium chloride 3 % nebulizer solution 3 mL  3 mL nebulization q6h PRN Angelo Farr DO        tiotropium (Spiriva Respimat) 2.5 mcg/actuation inhaler 2 puff  2 puff inhalation Daily Angelo Farr DO   2 puff at 05/04/24 1220       Physical Exam                                                                                                                         Gen -  No acute distress, up in chair     Neuro - Alert, oriented, conversant     CV -  Regular rate and rhythm on continuous monitor     Pulm - Symmetric chest rise, non-labored breathing     Abd - Soft, appropriately tender, non-distended     Ext - Warm, well perfused     Skin - without jaundice, staples over incision c/d/I, ESTEBAN drain minimal SS output       Psych - appropriate tone, affect       scattered ecchymoses over penis and L groin      Imaging    Cystourethrogram    5/7: negative for post-op urinary bladder leak    Assessment  69 y.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 Thompson) on 4/30/2024. From a urologic standpoint his recovery has been uncomplicated.    5/1: In ICU, extubated and off pressors. Cr 1.25  5/2: Remains in ICU, hemodynamically stable, getting 1U pRBC. Cr 1.34 yesterday PM  5/3: [facility]. Labs pending. ESTEBAN Cr 1.43  5/4: SOB following chest tube removal, transferred back to ICU  5/6: Still in ICU, pending transfer back to [facility]  5/7: transferred to SDU, cystogram negative for leak  5/8: Hays removed yesterday, denies hematuria    Recommendations:  -Recommend trial of void this morning 5/8  -Please continue to trend ESTEBAN output this morning and will consider subsequent ESTEBAN drain removal this afternoon if output remains low  -Rest of care per ICU/ Vascular surgery    D/w Chief resident Ivana Ahmadi and Attending Dr. Thompson.    --------------------------------------------  Chely Blancas PA-C  Urology  Consult Uro: [phone]  After 5pm and Weekends: [phone]

## 2024-05-08 NOTE — SIGNIFICANT EVENT
ESTEBAN removed at bedside this afternoon.  Patient had multiple attempts to urinate this afternoon and had multiple bladder scans greater than 300.  Spoke with patient regarding the need to replace catheter given recent nephroureterectomy and tension on bladder cuff.  Patient was amenable.    Procedure: 18f Slovak coude juarez catheter placement    Indication: Urinary retention    Findings: Uneventful Juarez catheter placement    Details:     The patient was prepped and draped in the usual sterile fashion. A 18 Slovak coudé juarez catheter was inserted per urethra.  No resistance was met. The catheter was hubbed with return of clear yellow urine. The balloon was inflated with 10cc sterile water. The catheter was stat-locked to the patient's thigh and left to drainage. The patient tolerated the procedure well.    Recommendations:  -Maintain juarez catheter   -Can take Flomax 0.4 if patient can tolerate mild hypotensive side effects  -Encourage ambulation  -Can attempt TOV at day of discharge if desired.  If fails, can go home with catheter or CIC (per patient preference) and will need to have urologic follow-up 1 to 2 weeks after discharge for rTOV.    Madiha Crespo MD   Urology  Pager: [phone]

## 2024-05-08 NOTE — PROGRESS NOTES
Vascular Surgery Progress Note    Subjective  Yesterday evening patient had an unwitnessed fall where he lost his balance and landed softly on his knees. He denied any symptoms from this. Vitals wnl at time.  Otherwise, stated that his pain seemed slightly improved this AM.    Current Meds  Scheduled medications  acetaminophen, 975 mg, oral, TID  aspirin, 81 mg, oral, Daily  fluticasone furoate-vilanteroL, 1 puff, inhalation, Daily  heparin (porcine), 5,000 Units, subcutaneous, q8h  lidocaine, 1 patch, transdermal, Daily  melatonin, 5 mg, oral, Daily  methocarbamol, 1,000 mg, oral, q8h MARIO ALBERTO  metoprolol tartrate, 25 mg, oral, BID  pantoprazole, 40 mg, oral, Daily before breakfast  predniSONE, 5 mg, oral, Daily  sacubitriL-valsartan, 1 tablet, oral, BID  sennosides-docusate sodium, 2 tablet, oral, BID  tiotropium, 2 puff, inhalation, Daily      Continuous medications  lactated Ringer's, 5 mL/hr, Last Rate: 5 mL/hr (05/07/24 0900)      PRN medications  PRN medications: albuterol, dextromethorphan-guaifenesin, hydrALAZINE, HYDROmorphone, naloxone, oxyCODONE, oxyCODONE, oxygen, sodium chloride    Objective    Vitals:   Temp:  [36.6 °C (97.9 °F)-37.2 °C (99 °F)] 36.8 °C (98.2 °F)  Heart Rate:  [] 94  Resp:  [16-20] 20  BP: (108-149)/(71-84) 108/73      I/O  I/O last 3 completed shifts:  In: 808.8 (12 mL/kg) [I.V.:308.8 (4.6 mL/kg); Blood:250; IV Piggyback:250]  Out: 1755 (26 mL/kg) [Urine:1750 (0.7 mL/kg/hr); Drains:5]  Weight: 67.4 kg       Physical Exam  General: laying in bed, NAD  Head: normocephalic, atraumatic  ENT: mucosa are moist   Respiratory: nonlabored breathing on NC  CV: RRR  GI: abdomen is soft, nontender, nondistended. ESTEBAN drain with minimal serosanguinous appearing output.  MSK: SHAYLA  Extremities: no swelling or deformity of b/l Les. Palpable R DP/PT, L PT   Neuro: CN II-XII grossly intact. Sensation to light touch intact    Labs:  Lab Results   Component Value Date    WBC 11.0 05/08/2024    HGB 10.6  (L) 05/08/2024    HCT 33.1 (L) 05/08/2024    MCV 91 05/08/2024     05/08/2024     Lab Results   Component Value Date    GLUCOSE 122 (H) 05/08/2024    CALCIUM 8.1 (L) 05/08/2024     05/08/2024    K 3.7 05/08/2024    CO2 25 05/08/2024     05/08/2024    BUN 28 (H) 05/08/2024    CREATININE 1.21 05/08/2024     Lab Results   Component Value Date    ALT 13 05/08/2024    AST 19 05/08/2024    ALKPHOS 56 05/08/2024    BILITOT 0.7 05/08/2024     Results from last 7 days   Lab Units 05/08/24  0704   APTT seconds 29   INR  1.3*               Imaging  FL voiding cystourethrogram    Result Date: 5/7/2024  Interpreted By:  Jelly Toure,  and Jacob Salcedo STUDY: FL VOIDING CYSTOURETHROGRAM;  5/7/2024 1:25 pm   INDICATION: Signs/Symptoms:S/p nephroureterectomy. Per EMR: History of left mid ureteral tumor status post left nephroureterectomy.   COMPARISON: None.   ACCESSION NUMBER(S): [id number]   ORDERING CLINICIAN: HERVE BECKHAM   TECHNIQUE: A single KUB  film was obtained.  The patient arrived with a Hays catheter in place. Subsequently, 180 cc of Cystografin was delivered through the patient's Hays catheter. Multiple fluoroscopic images with AP and oblique views were obtained during and after bladder filling. The patient then voided the contrast material.  A post void radiograph was obtained. The patient tolerated the procedure well. Total fluoroscopy time was  1.6 minutes.   FINDINGS: Initial KUB film demonstrates  a nonspecific bowel gas pattern. Surgical staples along the left hemiabdomen. Surgical clips in the left medial hemiabdomen and hemipelvis. Right common iliac stent. Surgical drain projects over the left lower quadrant. Partial visualization of urinary Hays in the mid lower pelvis.   180 cc of Cystografin was administered through the Hays under fluoroscopic guidance. Fluoroscopic images demonstrate no evidence of contrast extravasation from the bladder to suggest a leak. The bladder  contour is unremarkable.   Post-contrast pre-void KUB (@ 11:14 AM) demonstrates contrast in the bladder.   Post-contrast post-void KUB (@ 11:25 AM) demonstrates moderate residual contrast in the bladder.       1. No evidence of contrast extravasation from the urinary bladder to suggest postoperative leak.   I personally reviewed the images/study and I agree with the findings as stated by Matias James DO, PGY-2. This study was interpreted at [facility], [place], [place].   MACRO: None   Signed by: Jelly Toure 5/7/2024 9:02 PM Dictation workstation:   [id number]     Assessment:  69 y.o. 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 Transferred to floor 5/3, but returned to ICU due to increasing O2 requirements. PE negative per CTA from 5/3.    Plan:   Neuro:   - Pain control with tylenol, robaxin, oxycodone 5/10, dilaudid prn  - Melatonin + Trazodone for sleep    CV:  - ASA 81 mg daily  - Metoprolol tartrate 25mg BID oral  - sacubitriL-valsartan, 1 tablet, oral, BID     Heme:  - DVT prophylaxis w/ SQH  - Continue ASA 81mg    Resp:  - COPD at baseline, not previously on home O2  - Continue Prednisone 5mg PO  - Continue home inhalers  - Continue respiratory therapy  - Mucinex prn    GI:  - Regular diet    :  - Urology following  - Hays removed at midnight, passed TOV  - Continue to trend ESTEBAN drain output, if low consider ESTEBAN drain this evening  - strict I/O   - will replace lytes PRN     ID:  - No indication for antibiotics at this time    Endo:  - No indication for insulin therapy at this time  - Monitor BG as needed    MSK:  - PT/OT  - SCDs    Ppx:  - SQH  - PPI    Dispo:  - Continue RNF    Seen and discussed with vascular fellow, Dr. Workman   Discussed with attending, Dr. Charanjit Stephens  DO Yordan  Department of Surgery / IR Integrated PGY1  Vascular [id number]

## 2024-05-09 LAB
ALBUMIN SERPL BCP-MCNC: 2.9 G/DL (ref 3.4–5)
ALP SERPL-CCNC: 49 U/L (ref 33–136)
ALT SERPL W P-5'-P-CCNC: 17 U/L (ref 10–52)
ANION GAP SERPL CALC-SCNC: 13 MMOL/L (ref 10–20)
AST SERPL W P-5'-P-CCNC: 22 U/L (ref 9–39)
BILIRUB SERPL-MCNC: 0.7 MG/DL (ref 0–1.2)
BUN SERPL-MCNC: 29 MG/DL (ref 6–23)
CALCIUM SERPL-MCNC: 8.1 MG/DL (ref 8.6–10.6)
CHLORIDE SERPL-SCNC: 104 MMOL/L (ref 98–107)
CO2 SERPL-SCNC: 27 MMOL/L (ref 21–32)
CREAT SERPL-MCNC: 1.33 MG/DL (ref 0.5–1.3)
EGFRCR SERPLBLD CKD-EPI 2021: 58 ML/MIN/1.73M*2
ERYTHROCYTE [DISTWIDTH] IN BLOOD BY AUTOMATED COUNT: 14.6 % (ref 11.5–14.5)
GLUCOSE SERPL-MCNC: 88 MG/DL (ref 74–99)
HCT VFR BLD AUTO: 32.1 % (ref 41–52)
HGB BLD-MCNC: 10.2 G/DL (ref 13.5–17.5)
MAGNESIUM SERPL-MCNC: 1.95 MG/DL (ref 1.6–2.4)
MCH RBC QN AUTO: 30.3 PG (ref 26–34)
MCHC RBC AUTO-ENTMCNC: 31.8 G/DL (ref 32–36)
MCV RBC AUTO: 95 FL (ref 80–100)
NRBC BLD-RTO: 0 /100 WBCS (ref 0–0)
PLATELET # BLD AUTO: 361 X10*3/UL (ref 150–450)
POTASSIUM SERPL-SCNC: 4.1 MMOL/L (ref 3.5–5.3)
PROT SERPL-MCNC: 5.6 G/DL (ref 6.4–8.2)
RBC # BLD AUTO: 3.37 X10*6/UL (ref 4.5–5.9)
SODIUM SERPL-SCNC: 140 MMOL/L (ref 136–145)
WBC # BLD AUTO: 10.9 X10*3/UL (ref 4.4–11.3)

## 2024-05-09 PROCEDURE — 2500000004 HC RX 250 GENERAL PHARMACY W/ HCPCS (ALT 636 FOR OP/ED): Performed by: NURSE PRACTITIONER

## 2024-05-09 PROCEDURE — 94668 MNPJ CHEST WALL SBSQ: CPT

## 2024-05-09 PROCEDURE — 2500000001 HC RX 250 WO HCPCS SELF ADMINISTERED DRUGS (ALT 637 FOR MEDICARE OP)

## 2024-05-09 PROCEDURE — 1200000002 HC GENERAL ROOM WITH TELEMETRY DAILY

## 2024-05-09 PROCEDURE — 85027 COMPLETE CBC AUTOMATED: CPT

## 2024-05-09 PROCEDURE — 36415 COLL VENOUS BLD VENIPUNCTURE: CPT

## 2024-05-09 PROCEDURE — 99024 POSTOP FOLLOW-UP VISIT: CPT | Performed by: NURSE PRACTITIONER

## 2024-05-09 PROCEDURE — 2500000004 HC RX 250 GENERAL PHARMACY W/ HCPCS (ALT 636 FOR OP/ED)

## 2024-05-09 PROCEDURE — 80053 COMPREHEN METABOLIC PANEL: CPT

## 2024-05-09 PROCEDURE — 83735 ASSAY OF MAGNESIUM: CPT

## 2024-05-09 RX ORDER — FUROSEMIDE 10 MG/ML
20 INJECTION INTRAMUSCULAR; INTRAVENOUS ONCE
Status: COMPLETED | OUTPATIENT
Start: 2024-05-09 | End: 2024-05-09

## 2024-05-09 RX ADMIN — OXYCODONE HYDROCHLORIDE 10 MG: 5 TABLET ORAL at 18:15

## 2024-05-09 RX ADMIN — METOPROLOL TARTRATE 25 MG: 25 TABLET, FILM COATED ORAL at 21:04

## 2024-05-09 RX ADMIN — SACUBITRIL AND VALSARTAN 1 TABLET: 24; 26 TABLET, FILM COATED ORAL at 21:03

## 2024-05-09 RX ADMIN — METOPROLOL TARTRATE 25 MG: 25 TABLET, FILM COATED ORAL at 09:25

## 2024-05-09 RX ADMIN — FUROSEMIDE 20 MG: 10 INJECTION, SOLUTION INTRAMUSCULAR; INTRAVENOUS at 09:25

## 2024-05-09 RX ADMIN — OXYCODONE HYDROCHLORIDE 10 MG: 5 TABLET ORAL at 09:26

## 2024-05-09 RX ADMIN — OXYCODONE HYDROCHLORIDE 10 MG: 5 TABLET ORAL at 00:33

## 2024-05-09 RX ADMIN — SACUBITRIL AND VALSARTAN 1 TABLET: 24; 26 TABLET, FILM COATED ORAL at 09:25

## 2024-05-09 RX ADMIN — HEPARIN SODIUM 5000 UNITS: 5000 INJECTION INTRAVENOUS; SUBCUTANEOUS at 05:11

## 2024-05-09 RX ADMIN — HEPARIN SODIUM 5000 UNITS: 5000 INJECTION INTRAVENOUS; SUBCUTANEOUS at 21:04

## 2024-05-09 RX ADMIN — OXYCODONE HYDROCHLORIDE 10 MG: 5 TABLET ORAL at 14:03

## 2024-05-09 RX ADMIN — METHOCARBAMOL 1000 MG: 500 TABLET ORAL at 14:03

## 2024-05-09 RX ADMIN — PANTOPRAZOLE SODIUM 40 MG: 40 TABLET, DELAYED RELEASE ORAL at 09:25

## 2024-05-09 RX ADMIN — ASPIRIN 81 MG 81 MG: 81 TABLET ORAL at 09:25

## 2024-05-09 RX ADMIN — PREDNISONE 5 MG: 10 TABLET ORAL at 09:25

## 2024-05-09 RX ADMIN — METHOCARBAMOL 1000 MG: 500 TABLET ORAL at 21:03

## 2024-05-09 RX ADMIN — HEPARIN SODIUM 5000 UNITS: 5000 INJECTION INTRAVENOUS; SUBCUTANEOUS at 14:04

## 2024-05-09 RX ADMIN — OXYCODONE HYDROCHLORIDE 10 MG: 5 TABLET ORAL at 04:47

## 2024-05-09 RX ADMIN — METHOCARBAMOL 1000 MG: 500 TABLET ORAL at 05:10

## 2024-05-09 ASSESSMENT — PAIN SCALES - GENERAL
PAINLEVEL_OUTOF10: 3
PAINLEVEL_OUTOF10: 7
PAINLEVEL_OUTOF10: 2
PAINLEVEL_OUTOF10: 7
PAINLEVEL_OUTOF10: 3
PAINLEVEL_OUTOF10: 7
PAINLEVEL_OUTOF10: 3

## 2024-05-09 ASSESSMENT — PAIN - FUNCTIONAL ASSESSMENT
PAIN_FUNCTIONAL_ASSESSMENT: 0-10

## 2024-05-09 ASSESSMENT — COGNITIVE AND FUNCTIONAL STATUS - GENERAL
CLIMB 3 TO 5 STEPS WITH RAILING: A LOT
DAILY ACTIVITIY SCORE: 19
MOBILITY SCORE: 19
PERSONAL GROOMING: A LITTLE
MOVING TO AND FROM BED TO CHAIR: A LITTLE
STANDING UP FROM CHAIR USING ARMS: A LITTLE
DRESSING REGULAR LOWER BODY CLOTHING: A LITTLE
WALKING IN HOSPITAL ROOM: A LITTLE
DRESSING REGULAR UPPER BODY CLOTHING: A LITTLE
HELP NEEDED FOR BATHING: A LITTLE
TOILETING: A LITTLE

## 2024-05-09 ASSESSMENT — PAIN DESCRIPTION - DESCRIPTORS
DESCRIPTORS: ACHING;SHARP
DESCRIPTORS: ACHING;SHARP;TENDER
DESCRIPTORS: SHARP;TENDER

## 2024-05-09 ASSESSMENT — PAIN DESCRIPTION - LOCATION
LOCATION: CHEST
LOCATION: INCISION
LOCATION: CHEST
LOCATION: CHEST
LOCATION: INCISION

## 2024-05-09 ASSESSMENT — PAIN DESCRIPTION - ORIENTATION
ORIENTATION: LEFT;LOWER
ORIENTATION: LEFT

## 2024-05-09 NOTE — SIGNIFICANT EVENT
Rapid Response RN Note    Rapid response RN at bedside for RADAR score 6 due to the following VS: T 37 °Celsius; HR 82 ; RR 17; /67; SPO2 92%.     Reviewed above VS with bedside RN.  VS within patient's current trends.  Patient denied pain, shortness of breath, dizziness or lightheadedness.  No interventions by rapid response team indicated at this time.      Staff to page rapid response for any concerns or acute change in condition/VS.

## 2024-05-09 NOTE — HOSPITAL COURSE
69 year old male with past medical history significant for HTN, HLD, CAD (s/p PCI with ELLY 2020), HFrEF (25-30%), ischemic cardiomyopathy (s/p ICD), urothelial carcinoma, AAA (s/p EVAR 2020) who is admitted status post extent IV thoracoabdominal aneurysm repair with reimplantatin of renal artery for type 1a endoleak, and left nephroureterectomy by urology for left ureteral cancer. He was transferred to the ICU postoperatively, intubated and stable. He progressed and was transferred out of the ICU to the nursing division on POD 2. On 5/3, he was noted to have increased oxygen requirements and was subsequently transferred back to the ICU for acute monitoring. CT PE was negative. His post-operative course was also significant for urinary retention requiring reinsertion of juarez catheter. He otherwise progressed to tolerate home diet and pain adequately controlled. He was evaluated by physical therapy and recommendations made for home physical therapy at time of hospital discharge. Short term home nursing was also ordered to assist with indwelling juarez catheter management and care.   He was discharged on daily aspirin and will follow up in the outpatient vascular surgery clinic in 1 month as scheduled.

## 2024-05-09 NOTE — PROGRESS NOTES
VASCULAR SURGERY PROGRESS NOTE  Subjective   No issues overnight.     Objective   Vitals:    05/09/24 0530   BP: 108/66   Pulse:    Resp: 18   Temp: 37.2 °C (99 °F)   SpO2: 99%      Exam:  General: laying in bed, NAD  Head: normocephalic, atraumatic  ENT: mucosa are moist   Respiratory: nonlabored breathing on NC  CV: RRR  GI: abdomen is soft, nontender, nondistended.   MSK: SERRATO x4  Extremities: mild bilateral leg edema.   Pulses: Palpable R DP/PT, L PT   Neuro: CN II-XII grossly intact. Sensation to light touch intact    Relevant Results  Medications:  Scheduled Meds:  acetaminophen, 975 mg, oral, TID  aspirin, 81 mg, oral, Daily  fluticasone furoate-vilanteroL, 1 puff, inhalation, Daily  furosemide, 20 mg, intravenous, Once  heparin (porcine), 5,000 Units, subcutaneous, q8h  lidocaine, 1 patch, transdermal, Daily  melatonin, 5 mg, oral, Daily  methocarbamol, 1,000 mg, oral, q8h MARIO ALBERTO  metoprolol tartrate, 25 mg, oral, BID  pantoprazole, 40 mg, oral, Daily before breakfast  predniSONE, 5 mg, oral, Daily  sacubitriL-valsartan, 1 tablet, oral, BID  sennosides-docusate sodium, 2 tablet, oral, BID  tiotropium, 2 puff, inhalation, Daily      Continuous Infusions:   PRN Meds:.  PRN medications: albuterol, dextromethorphan-guaifenesin, naloxone, oxyCODONE, oxyCODONE, oxygen, sodium chloride    Labs:  Results from last 7 days   Lab Units 05/08/24  0703 05/07/24  1343 05/06/24  0319   WBC AUTO x10*3/uL 11.0 14.3* 11.8*   HEMOGLOBIN g/dL 10.6* 12.4* 10.8*   PLATELETS AUTO x10*3/uL 337 320 230      Results from last 7 days   Lab Units 05/08/24  0704 05/07/24  1343 05/06/24  0319   SODIUM mmol/L 140 139 140   POTASSIUM mmol/L 3.7 4.2 4.0   CHLORIDE mmol/L 104 101 106   CO2 mmol/L 25 29 27   BUN mg/dL 28* 29* 29*   CREATININE mg/dL 1.21 1.27 1.23   GLUCOSE mg/dL 122* 75 81   MAGNESIUM mg/dL 1.89  --  1.94   PHOSPHORUS mg/dL  --  2.6 2.7      Results from last 7 days   Lab Units 05/08/24  0704 05/03/24  0845   INR  1.3* 1.0    PROTIME seconds 14.2* 11.6   APTT seconds 29 28     Assessment/Plan   69 y.o. 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 Transferred to floor 5/3, but returned to ICU due to increasing O2 requirements. PE negative per CTA from 5/3.     Plan:  Neuro: acute postoperative pain  - continue tylenol, oxy, robaxin, lidocaine patch for pain control  - continue neurovascular checks every 4 hrs    CV: hx of HTN, CAD (s/p PCI with ELLY 2020), HLD, HFrEF (EF 25-30%) 2/2 IM s/p ICD, rAAA (s/p EVAR 2020), type 1 endoleak   - maintain blood pressure control  - continue metoprolol with hold parameters   - continue entresto   - continue atorvastatin/ASA    Pulm: COPD, acute hypoxic respiratory failure (5/3)   - continue to encourage IS hourly while awake  - OOB to chair and increase ambulation as tolerated  - continue breo ellipta & spiriva, PRN albuterol   - wean oxygen as tolerated   - 20 IV lasix today     FENGI: hx of hematuria 2/2 urothelial carcinoma of L kidney s/p left nephroureterectomy 4/30 with Dr. Thompson, urinary retention, hypokalemia, hypoalbuminemia, hypomagnesemia  - continue regular diet with oral supplements  - continue bowel regimen to prevent OIC   - continue PPI  - juarez re-inserted 5/8 for urinary retention, will require outpatient juarez removal   - ESTEBAN drain removed by urology on 5/8  - strict I&O, daily weight   - RFP as clinically indicated    Endo:   - no issues     Heme: acute blood loss anemia   - no s/sx of bleeding  - CBC as clinically indicated    ID:   - trend temp q4  - CBC as clinically indicated     MSK: hx of psoriatic arthritis   - continue home prednisone   - rheumatology consulted, outpatient follow up requested     Prophylaxis:  - SQH/SCDs    Dispo:   - PT recs home PT   - continue care on  regular nursing floor  - plan to remove staples prior to discharge    Courtney Ortiz, APRN-CNP

## 2024-05-09 NOTE — PROGRESS NOTES
"Yobany Small is a 69 y.o. male on day 9 of admission presenting with Type Ia endoleak of aortic graft (CMS-HCC).    Transitional Care Coordination Progress Note:   Patient discussed during interdisciplinary rounds.   Team members present: (TCC, Vacs)   Plan per Medical/Surgical team: (s/p explant of EVAR, open repair of abdominal aortic aneurysm via thoracoretroperitoneal approach, and left nephroureterectomy. Transferred to floor 5/3- urinary retention- urology consult)   Discharge disposition: (home ? HC)   Status- In patient   Payer- [facility]  Potential Barriers: (urology consult- vascular managament)   ADOD: (1-2 days)   .Isabella Bhakta RN [facility] [address] [phone]       Physical Exam    Last Recorded Vitals  Blood pressure 94/55, pulse 82, temperature 36.8 °C (98.2 °F), temperature source Temporal, resp. rate 17, height 1.803 m (5' 11\"), weight 55.5 kg (122 lb 6.4 oz), SpO2 98%.  Intake/Output last 3 Shifts:  I/O last 3 completed shifts:  In: - (0 mL/kg)   Out: 2873 (51.7 mL/kg) [Urine:2868 (1.4 mL/kg/hr); Drains:5]  Weight: 55.5 kg           Assessment/Plan   Principal Problem:    Type Ia endoleak of aortic graft (CMS-HCC)  Active Problems:    Post-op pain    Post-operative pain    Urothelial carcinoma of kidney, left (Multi)              Isabella Bhakta RN      "

## 2024-05-10 ENCOUNTER — DOCUMENTATION (OUTPATIENT)
Dept: HOME HEALTH SERVICES | Facility: HOME HEALTH | Age: 69
End: 2024-05-10
Payer: COMMERCIAL

## 2024-05-10 ENCOUNTER — HOME HEALTH ADMISSION (OUTPATIENT)
Dept: HOME HEALTH SERVICES | Facility: HOME HEALTH | Age: 69
End: 2024-05-10
Payer: COMMERCIAL

## 2024-05-10 VITALS
TEMPERATURE: 98.6 F | BODY MASS INDEX: 17.14 KG/M2 | HEIGHT: 71 IN | SYSTOLIC BLOOD PRESSURE: 101 MMHG | WEIGHT: 122.4 LBS | DIASTOLIC BLOOD PRESSURE: 61 MMHG | OXYGEN SATURATION: 92 % | RESPIRATION RATE: 18 BRPM | HEART RATE: 115 BPM

## 2024-05-10 PROCEDURE — 2500000001 HC RX 250 WO HCPCS SELF ADMINISTERED DRUGS (ALT 637 FOR MEDICARE OP)

## 2024-05-10 PROCEDURE — 2500000005 HC RX 250 GENERAL PHARMACY W/O HCPCS

## 2024-05-10 PROCEDURE — 2500000004 HC RX 250 GENERAL PHARMACY W/ HCPCS (ALT 636 FOR OP/ED)

## 2024-05-10 PROCEDURE — 94640 AIRWAY INHALATION TREATMENT: CPT

## 2024-05-10 RX ORDER — METHOCARBAMOL 1000 MG/1
1000 TABLET, COATED ORAL 3 TIMES DAILY
Qty: 90 TABLET | Refills: 2 | Status: SHIPPED | OUTPATIENT
Start: 2024-05-10

## 2024-05-10 RX ORDER — LIDOCAINE 560 MG/1
1 PATCH PERCUTANEOUS; TOPICAL; TRANSDERMAL DAILY
Qty: 30 PATCH | Refills: 0 | Status: SHIPPED | OUTPATIENT
Start: 2024-05-11

## 2024-05-10 RX ORDER — OXYCODONE HYDROCHLORIDE 5 MG/1
5 TABLET ORAL EVERY 6 HOURS PRN
Qty: 15 TABLET | Refills: 0 | Status: SHIPPED | OUTPATIENT
Start: 2024-05-10 | End: 2024-05-14 | Stop reason: SDUPTHER

## 2024-05-10 RX ORDER — METOPROLOL TARTRATE 25 MG/1
25 TABLET, FILM COATED ORAL 2 TIMES DAILY
Qty: 60 TABLET | Refills: 3 | Status: SHIPPED | OUTPATIENT
Start: 2024-05-10

## 2024-05-10 RX ORDER — OXYCODONE HYDROCHLORIDE 5 MG/1
5 TABLET ORAL EVERY 6 HOURS PRN
Qty: 15 TABLET | Refills: 0 | Status: SHIPPED | OUTPATIENT
Start: 2024-05-10 | End: 2024-05-10

## 2024-05-10 RX ADMIN — OXYCODONE HYDROCHLORIDE 10 MG: 5 TABLET ORAL at 15:49

## 2024-05-10 RX ADMIN — PREDNISONE 5 MG: 10 TABLET ORAL at 09:52

## 2024-05-10 RX ADMIN — METOPROLOL TARTRATE 25 MG: 25 TABLET, FILM COATED ORAL at 09:52

## 2024-05-10 RX ADMIN — OXYCODONE HYDROCHLORIDE 10 MG: 5 TABLET ORAL at 05:39

## 2024-05-10 RX ADMIN — OXYCODONE HYDROCHLORIDE 10 MG: 5 TABLET ORAL at 00:48

## 2024-05-10 RX ADMIN — SACUBITRIL AND VALSARTAN 1 TABLET: 24; 26 TABLET, FILM COATED ORAL at 09:52

## 2024-05-10 RX ADMIN — HEPARIN SODIUM 5000 UNITS: 5000 INJECTION INTRAVENOUS; SUBCUTANEOUS at 05:40

## 2024-05-10 RX ADMIN — Medication 3 L/MIN: at 10:05

## 2024-05-10 RX ADMIN — METHOCARBAMOL 1000 MG: 500 TABLET ORAL at 15:49

## 2024-05-10 RX ADMIN — METHOCARBAMOL 1000 MG: 500 TABLET ORAL at 05:39

## 2024-05-10 RX ADMIN — OXYCODONE HYDROCHLORIDE 10 MG: 5 TABLET ORAL at 09:57

## 2024-05-10 RX ADMIN — ASPIRIN 81 MG 81 MG: 81 TABLET ORAL at 09:52

## 2024-05-10 RX ADMIN — PANTOPRAZOLE SODIUM 40 MG: 40 TABLET, DELAYED RELEASE ORAL at 06:34

## 2024-05-10 ASSESSMENT — PAIN SCALES - GENERAL
PAINLEVEL_OUTOF10: 7
PAINLEVEL_OUTOF10: 7
PAINLEVEL_OUTOF10: 4
PAINLEVEL_OUTOF10: 4
PAINLEVEL_OUTOF10: 7

## 2024-05-10 ASSESSMENT — PAIN DESCRIPTION - DESCRIPTORS
DESCRIPTORS: TENDER;THROBBING
DESCRIPTORS: THROBBING

## 2024-05-10 ASSESSMENT — PAIN - FUNCTIONAL ASSESSMENT
PAIN_FUNCTIONAL_ASSESSMENT: 0-10

## 2024-05-10 ASSESSMENT — PAIN DESCRIPTION - LOCATION: LOCATION: INCISION

## 2024-05-10 ASSESSMENT — PAIN DESCRIPTION - ORIENTATION: ORIENTATION: LEFT

## 2024-05-10 NOTE — SIGNIFICANT EVENT
Rapid Response RN Note     05/10/24 0653   Onset Documentation   Rapid Response Initiated By Radar auto page   [place] [facility]  [place] [id number])   Pager Time [phone]   Arrival Time 0653   Primary Reason for Call Radar auto page  (score 7)     Rapid response RN at bedside for RADAR score 7 due to the following VS: T 36.4 °Celsius;  ; RR 19; /65; SPO2 93%.     Reviewed above VS with bedside RN.  VS within patient's current trends.   BP improved from prior RADAR alerts.  No acute changes.  Staff to page rapid response for any concerns or acute change in condition/VS.

## 2024-05-10 NOTE — SIGNIFICANT EVENT
Rapid Response RN Note    Rapid response RN paged for RADAR score 8 due to the following VS: T 37.1 °Celsius;  ; RR 19; BP 94/59; SPO2 93%.     Reviewed above VS with bedside RN.  VS within patient's current trends.  Patient denied pain, shortness of breath, dizziness or lightheadedness.  No interventions by rapid response team indicated at this time.      Staff to page rapid response for any concerns or acute change in condition/VS.

## 2024-05-10 NOTE — SIGNIFICANT EVENT
Rapid Response RN responding for RADAR score of 6 due to the following VS: T 36.7 °Celsius; HR 88 ; RR 18; BP 97/62; SPO2 92% .      Reviewed abnormal VS with bedside RN who expressed no concerns regarding the patient's current condition based upon these.  No interventions by Rapid Response team indicated at this time.

## 2024-05-10 NOTE — PROGRESS NOTES
"Yobany Small is a 69 y.o. male on day 10 of admission presenting with Type Ia endoleak of aortic graft (CMS-HCC).  Patient is medically ready for discharge.  Patient will need new oxygen set up.  Referral has been placed with [employer].  A tank is to be delivered to the bedside to meet home going needs.        Physical Exam    Last Recorded Vitals  Blood pressure 101/61, pulse (!) 115, temperature 37 °C (98.6 °F), temperature source Temporal, resp. rate 18, height 1.803 m (5' 11\"), weight 55.5 kg (122 lb 6.4 oz), SpO2 92%.  Intake/Output last 3 Shifts:  I/O last 3 completed shifts:  In: 600 (10.8 mL/kg) [P.O.:600]  Out: 1575 (28.4 mL/kg) [Urine:1575 (0.8 mL/kg/hr)]  Weight: 55.5 kg       Assessment/Plan   Principal Problem:    Type Ia endoleak of aortic graft (CMS-HCC)  Active Problems:    Post-op pain    Post-operative pain    Urothelial carcinoma of kidney, left (Multi)            Isabella Bhakta RN      "

## 2024-05-10 NOTE — HH CARE COORDINATION
Home Care received a Referral for Nursing and Physical Therapy. We have processed the referral for a Start of Care on 5/11-5/12/24.     If you have any questions or concerns, please feel free to contact us at [phone]. Follow the prompts, enter your five digit zip code, and you will be directed to your care team on WEST 3.

## 2024-05-10 NOTE — SIGNIFICANT EVENT
Patient will require home oxygen when he leaves the hospital. He is amenable to this treatment. His blood oxygen saturation on room air is 80%. When he ambulates on room air his saturation drops to 80%. It improves to 93% when he is on 3L O2 at rest.     Aneudy Workman MD  Vascular Surgery Fellow  Service Pager: [phone]

## 2024-05-10 NOTE — NURSING NOTE
RN completed walking pulse ox assessment with pt. While at rest, patient satting at 93% on 3L O2. Once ambulating, pt dropped to 80% on 3L. RN assisted pt back to chair and waited for pulse ox to come back up to 93%. RN then increased O2 to 4L NC and tried ambulating again. Pt dropped to 80% on the 4L, RN assisted pt back to chair once again. RN then increased O2 dose to 6L NC, and ambulated once again. Pt dropped to 83% this time. Unable to increase O2 anymore with NC, RN concluded walking pulse ox assessment. Of note, pt was nonsymptomatic at time of every desaturation.

## 2024-05-13 NOTE — DISCHARGE SUMMARY
Discharge Diagnosis  Type Ia endoleak of aortic graft (CMS-HCC)    Issues Requiring Follow-Up  Hypoxia requiring O2    Test Results Pending At Discharge  Pending Labs       Order Current Status    Surgical Pathology Exam In process    AFB Culture/Smear Preliminary result    Fungal Culture/Smear Preliminary result            Hospital Course  69 year old male with past medical history significant for HTN, HLD, CAD (s/p PCI with ELLY 2020), HFrEF (25-30%), ischemic cardiomyopathy (s/p ICD), urothelial carcinoma, AAA (s/p EVAR 2020) who is admitted status post extent IV thoracoabdominal aneurysm repair with reimplantatin of renal artery for type 1a endoleak, and left nephroureterectomy by urology for left ureteral cancer. He was transferred to the ICU postoperatively, intubated and stable. He progressed and was transferred out of the ICU to the nursing division on POD 2. On 5/3, he was noted to have increased oxygen requirements and was subsequently transferred back to the ICU for acute monitoring. CT PE was negative. His post-operative course was also significant for urinary retention requiring reinsertion of juarez catheter. He otherwise progressed to tolerate home diet and pain adequately controlled. He was evaluated by physical therapy and recommendations made for home physical therapy at time of hospital discharge. Short term home nursing was also ordered to assist with indwelling juarez catheter management and care.   He was discharged on daily aspirin and will follow up in the outpatient vascular surgery clinic in 1 month as scheduled.     Pertinent Physical Exam At Time of Discharge  General: laying in bed, NAD  Head: normocephalic, atraumatic  ENT: mucosa are moist   Respiratory: nonlabored breathing on NC  CV: RRR  GI: abdomen is soft, nontender, nondistended.   MSK: SERRATO x4  Extremities: mild bilateral leg edema.   Pulses: Palpable R DP/PT, L PT   Neuro: CN II-XII grossly intact. Sensation to light touch  intact    Home Medications     Medication List      START taking these medications     lidocaine 4 % patch; Place 1 patch over 12 hours on the skin once daily.   Remove & discard patch within 12 hours or as directed by MD.   methocarbamoL 1,000 mg tablet; Take 1,000 mg by mouth 3 times a day.   metoprolol tartrate 25 mg tablet; Commonly known as: Lopressor; Take 1   tablet (25 mg) by mouth 2 times a day.     CHANGE how you take these medications     oxybutynin XL 10 mg 24 hr tablet; Commonly known as: Ditropan-XL; Take 1   tablet (10 mg) by mouth once daily as needed (take 1 tablet daily as   needed for bladder spasms). Do not crush, chew, or split.; What changed:   Another medication with the same name was removed. Continue taking this   medication, and follow the directions you see here.     CONTINUE taking these medications     aspirin 81 mg EC tablet   Entresto 24-26 mg tablet; Generic drug: sacubitriL-valsartan; Take 1   tablet by mouth 2 times a day.   FeosoL tablet; Generic drug: ferrous sulfate (325 mg ferrous sulfate)   methotrexate 2.5 mg tablet; Commonly known as: Trexall   oxyCODONE 5 mg immediate release tablet; Commonly known as: Roxicodone;   Take 1 tablet (5 mg) by mouth every 6 hours if needed for severe pain (7 -   10).   predniSONE 10 mg tablet; Commonly known as: Deltasone   simvastatin 40 mg tablet; Commonly known as: Zocor; TAKE 1 TABLET BY   MOUTH ONCE DAILY AT BEDTIME   tamsulosin 0.4 mg 24 hr capsule; Commonly known as: Flomax   Trelegy Ellipta 100-62.5-25 mcg blister with device; Generic drug:   fluticasone-umeclidin-vilanter     STOP taking these medications     chlorhexidine 0.12 % solution; Commonly known as: Peridex   chlorhexidine 4 % external liquid; Commonly known as: Hibiclens   metoprolol succinate XL 50 mg 24 hr tablet; Commonly known as: Toprol-XL       Outpatient Follow-Up  Future Appointments   Date Time Provider Department Center   5/14/2024  3:20 PM COURTNEY Carrasquillo-CNP  [id number] [place]   5/15/2024  1:00 PM Brenden Carl, PT [facility]   6/5/2024 10:00 AM Kar [address] MD [id number]   6/7/2024  1:30 PM Дмитрий Thompson MD MPH [id number] [place]       Aneudy Workman MD

## 2024-05-14 ENCOUNTER — TELEPHONE (OUTPATIENT)
Dept: VASCULAR SURGERY | Facility: HOSPITAL | Age: 69
End: 2024-05-14

## 2024-05-14 ENCOUNTER — OFFICE VISIT (OUTPATIENT)
Dept: UROLOGY | Facility: CLINIC | Age: 69
End: 2024-05-14
Payer: COMMERCIAL

## 2024-05-14 VITALS
DIASTOLIC BLOOD PRESSURE: 71 MMHG | SYSTOLIC BLOOD PRESSURE: 108 MMHG | HEIGHT: 72 IN | BODY MASS INDEX: 16.52 KG/M2 | HEART RATE: 116 BPM | WEIGHT: 122 LBS | TEMPERATURE: 97.2 F

## 2024-05-14 DIAGNOSIS — C64.2: ICD-10-CM

## 2024-05-14 DIAGNOSIS — K59.00 CONSTIPATION, UNSPECIFIED CONSTIPATION TYPE: ICD-10-CM

## 2024-05-14 DIAGNOSIS — G89.18 PAIN AT SURGICAL SITE: ICD-10-CM

## 2024-05-14 DIAGNOSIS — R33.9 URINARY RETENTION: Primary | ICD-10-CM

## 2024-05-14 PROCEDURE — 3078F DIAST BP <80 MM HG: CPT | Performed by: NURSE PRACTITIONER

## 2024-05-14 PROCEDURE — 1160F RVW MEDS BY RX/DR IN RCRD: CPT | Performed by: NURSE PRACTITIONER

## 2024-05-14 PROCEDURE — 99214 OFFICE O/P EST MOD 30 MIN: CPT | Performed by: NURSE PRACTITIONER

## 2024-05-14 PROCEDURE — 1159F MED LIST DOCD IN RCRD: CPT | Performed by: NURSE PRACTITIONER

## 2024-05-14 PROCEDURE — 1111F DSCHRG MED/CURRENT MED MERGE: CPT | Performed by: NURSE PRACTITIONER

## 2024-05-14 PROCEDURE — 1036F TOBACCO NON-USER: CPT | Performed by: NURSE PRACTITIONER

## 2024-05-14 PROCEDURE — 3074F SYST BP LT 130 MM HG: CPT | Performed by: NURSE PRACTITIONER

## 2024-05-14 RX ORDER — OXYCODONE HYDROCHLORIDE 5 MG/1
5 TABLET ORAL NIGHTLY PRN
Qty: 5 TABLET | Refills: 0 | Status: SHIPPED | OUTPATIENT
Start: 2024-05-14 | End: 2024-05-19

## 2024-05-14 NOTE — PROGRESS NOTES
05/14/24   [id number]    Chief Complaint   Patient presents with    TOV    TOV, constipation, requesting pain pills,     Subjective      HPI Yobany Small is a 69 y.o. male who presents for TOV, constipated, requested pain pills. Status post aortic aneurysm repair retroperitoneal and left nephrectomy by Dr. Thompson; Newly diagnosed left upper pole renal mass w bx positive for urothelial carcinoma;     Passed  ml instilled, voided 130 ml, on flomax and oxybutynin; no fever, no symptoms of UTI; discussed stopping oxybutynin as it was given for bladder spasms and contributing to dry mouth and constipation and to continue tamsulosin;    No BM 4 days, discussed constipation and how contributing to discomfort and could effect urination, discussed glycerin suppository; belly soft and passing gas now;     Requesting pain pills for left nephrectomy incisional pain, took last oxycodone 5 mg this morning, OARRS report done; patient agreed to start using tylenol and using the narcotics just for severe pain in evening to help him sleep at night;     Wife w patient, reviewed plan of care and answered questions.     PMH, PSH, FH, SH reviewed.    Im[facility] on 4/30/24 for detailed history:     hx of AAA s/p EVAR (5/2020) c/b RTOR to r/o abdominal compartment syndrome and treat type II leak, requiring RTOR. 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 Recent hospitalization for LLQ pain and gross hematuria -- was found to have L upper pole renal mass w/ bx positive for urothelial carcinoma. Other PMH includes anemia, HTN, HLD, CAD s/p PCI w/ ELLY to Lcx (2020). HFrEF 25-30% 2/2 ischemic cardiomypoathy s/p ICD. Preop stress test w/ no inducible ischemia and stable LVEF on TTE. He has COPD w/ daily inhaler use. Preop PFTs s/f moderate obstructive disease.      Now s/p open AAA repair and explant with Dr. Street and left nephroureterectomy for urothelial carcinoma of L  "kidney w/ Dr. Thompson on 4/30.         Objective     /71   Pulse (!) 116   Temp 36.2 °C (97.2 °F)   Ht 1.816 m (5' 11.5\")   Wt 55.3 kg (122 lb)   BMI 16.78 kg/m²    Physical Exam  Skin:     Comments: Left nephrectomy incisions well approximated, no sign of infection, healing well, steri strips still intact; drain site healed, DSD removed, left open to air, no sign of infection, no erythema;        General: Appears comfortable and in no apparent distress, well nourished  Head: Normocephalic, atraumatic  Neck: trachea midline  Respiratory: respirations unlabored, no wheezes, and no use of accessory muscles  Cardiovascular: at rest no dyspnea, well perfused  Skin: no visible rashes or lesions  Neurologic: grossly intact, oriented to person, place, and time  Psychiatric: mood and affect appropriate  Musculoskeletal: in chair for appt. no difficulty w upper body movement    Assessment/Plan   Problem List Items Addressed This Visit          Genitourinary and Reproductive    Other hydronephrosis    Relevant Medications    oxyCODONE (Roxicodone) 5 mg immediate release tablet     Other Visit Diagnoses       Urinary retention    -  Primary    Constipation, unspecified constipation type        Pain at surgical site              No orders of the defined types were placed in this encounter.     Discussed using tylenol, narcotic Rx sent in as requested to use at bedtime  Discussed constipation, no BM for 4 days  Glycerin suppository or Senna if needed over the counter  Start MiraLAX  Incisions look great healing well  Discussed if unable to urinate to go to ER this evening, passed TOV well  Continue tamsulosin, stop Oxybutynin (had been given for bladder spasms w catheter)  Call nurse line if any issues or concerns sooner  Has follow up w Dr. Thompson scheduled        Maricruz Pinto, COURTNEY-CNP  Lab Results   Component Value Date    GLUCOSE 88 05/09/2024    CALCIUM 8.1 (L) 05/09/2024     05/09/2024    K 4.1 05/09/2024 "    CO2 27 05/09/2024     05/09/2024    BUN 29 (H) 05/09/2024    CREATININE 1.33 (H) 05/09/2024

## 2024-05-14 NOTE — PATIENT INSTRUCTIONS
Discussed using tylenol, narcotic Rx sent in as requested to use at bedtime  Discussed constipation, no BM for 4 days  Glycerin suppository or Senna if needed over the counter  Start MiraLAX  Incisions look great healing well  Discussed if unable to urinate to go to ER this evening, passed TOV well  Continue tamsulosin, stop Oxybutynin (had been given for bladder spasms w catheter)  Call nurse line if any issues or concerns sooner  Has follow up w Dr. Thompson scheduled

## 2024-05-14 NOTE — TELEPHONE ENCOUNTER
I have  had  the pleasure of speaking with Mrs. Small  both today and yesterday.  She has called  questioning   the home going  medications  that her   is on following his recent  surgery.   Following review  with Dr. Street I have  recommended that the patient  contact  Mr. Small's  PCP  to  review  his medications post hospital discharge.   Mrs. Small had verbalized an understanding of the instructions both yesterday and today.   She has called today asking  how long   will need to take  methocarbamol 1000  mg  as ordered by the vascular surgery team while Mr. Small was in the  hospital.   I have  sent a  message to Dr. Street to review.  TREVIN Crespo

## 2024-05-14 NOTE — Clinical Note
Vincent Thompson and Dr. Street,  MrKamila Yobany Small came in this afternoon for TOV but many other issues. Mainly wanting pain medicine, I gave him #5 of the oxycodone 5 mg he had been taking and encouraged him to use tylenol and narcotic to help sleep at bedtime to stretch out and management of constipation. He was wondering if he could take ibuprofen? Told him I would reach out to you as I was unsure.   Thanks, Maricruz

## 2024-05-15 DIAGNOSIS — G89.29 OTHER CHRONIC PAIN: Primary | ICD-10-CM

## 2024-05-16 ENCOUNTER — APPOINTMENT (OUTPATIENT)
Dept: UROLOGY | Facility: CLINIC | Age: 69
End: 2024-05-16
Payer: COMMERCIAL

## 2024-05-16 ENCOUNTER — HOME CARE VISIT (OUTPATIENT)
Dept: HOME HEALTH SERVICES | Facility: HOME HEALTH | Age: 69
End: 2024-05-16
Payer: COMMERCIAL

## 2024-05-16 ENCOUNTER — TELEPHONE (OUTPATIENT)
Dept: CARDIOLOGY | Facility: CLINIC | Age: 69
End: 2024-05-16

## 2024-05-16 VITALS — SYSTOLIC BLOOD PRESSURE: 112 MMHG | HEART RATE: 77 BPM | DIASTOLIC BLOOD PRESSURE: 73 MMHG

## 2024-05-16 PROCEDURE — 0023 HH SOC

## 2024-05-16 PROCEDURE — G0151 HHCP-SERV OF PT,EA 15 MIN: HCPCS

## 2024-05-16 PROCEDURE — 169592 NO-PAY CLAIM PROCEDURE

## 2024-05-16 SDOH — HEALTH STABILITY: MENTAL HEALTH: SMOKING IN HOME: 0

## 2024-05-16 SDOH — ECONOMIC STABILITY: HOUSING INSECURITY: EVIDENCE OF SMOKING MATERIAL: 0

## 2024-05-16 ASSESSMENT — ACTIVITIES OF DAILY LIVING (ADL)
ENTERING_EXITING_HOME: MODERATE ASSIST
OASIS_M1830: 05

## 2024-05-16 ASSESSMENT — ENCOUNTER SYMPTOMS
PERSON REPORTING PAIN: PATIENT
HYPERTENSION: 1
HIGHEST PAIN SEVERITY IN PAST 24 HOURS: 7/10
LOWEST PAIN SEVERITY IN PAST 24 HOURS: 4/10
PAIN LOCATION: ABDOMEN
PAIN: 1

## 2024-05-16 NOTE — TELEPHONE ENCOUNTER
His wife antoni called because Yobany has been having an allergic reaction to Methocarbamol 100 mg tablet. She said he has had swollen lips, sore throat, blurry vision. Some symptoms have gone down but not gone away, its going on the fourth day of having the reaction. I reached out to Dr. Street and he said to stop the medication ( which they have) and he said If they have stopped the drug then these symptoms are probably not related to that drug. He would advise them to reach out to the PCP or go to the ER if the symptoms are concerning enough. He suggested OTC benadryl for the allergic reaction.     I called the patient back to let her know, she stated they do not have a primary care provider and now they have to go to the ER to get a medication to help relieve the symptoms. Patients wife said thank you and hung up the phone.

## 2024-05-16 NOTE — HOME HEALTH
patient seen for pt admission into [facility] services today with his wife present.  he went to the hospital to have aaa repair and a kidney reviewed.  prior to this he walked with no device, indep indep with dressing and bathing and driving .  he lives with his wife and son in a home where everything he needs is on the first floor and there are 4 steps to exit.  patient wanted to stop the admission process due to pain .  i was able to get enough info to finish the admission

## 2024-05-20 ENCOUNTER — HOME CARE VISIT (OUTPATIENT)
Dept: HOME HEALTH SERVICES | Facility: HOME HEALTH | Age: 69
End: 2024-05-20
Payer: COMMERCIAL

## 2024-05-20 LAB
FUNGUS SPEC CULT: NORMAL
FUNGUS SPEC FUNGUS STN: NORMAL

## 2024-05-20 PROCEDURE — G0157 HHC PT ASSISTANT EA 15: HCPCS | Mod: CQ

## 2024-05-20 ASSESSMENT — ENCOUNTER SYMPTOMS
PERSON REPORTING PAIN: PATIENT
HIGHEST PAIN SEVERITY IN PAST 24 HOURS: 6/10
PAIN LOCATION: BACK
PAIN: 1

## 2024-05-22 LAB
LAB AP BLOCK FOR ADDITIONAL STUDIES: NORMAL
LABORATORY COMMENT REPORT: NORMAL
PATH REPORT.FINAL DX SPEC: NORMAL
PATH REPORT.GROSS SPEC: NORMAL
PATH REPORT.RELEVANT HX SPEC: NORMAL
PATH REPORT.TOTAL CANCER: NORMAL
PATHOLOGY SYNOPTIC REPORT: NORMAL

## 2024-05-23 ENCOUNTER — HOME CARE VISIT (OUTPATIENT)
Dept: HOME HEALTH SERVICES | Facility: HOME HEALTH | Age: 69
End: 2024-05-23
Payer: COMMERCIAL

## 2024-05-28 ENCOUNTER — HOME CARE VISIT (OUTPATIENT)
Dept: HOME HEALTH SERVICES | Facility: HOME HEALTH | Age: 69
End: 2024-05-28
Payer: COMMERCIAL

## 2024-05-31 ENCOUNTER — HOME CARE VISIT (OUTPATIENT)
Dept: HOME HEALTH SERVICES | Facility: HOME HEALTH | Age: 69
End: 2024-05-31
Payer: COMMERCIAL

## 2024-06-04 ENCOUNTER — HOME CARE VISIT (OUTPATIENT)
Dept: HOME HEALTH SERVICES | Facility: HOME HEALTH | Age: 69
End: 2024-06-04
Payer: COMMERCIAL

## 2024-06-05 ENCOUNTER — OFFICE VISIT (OUTPATIENT)
Dept: VASCULAR SURGERY | Facility: HOSPITAL | Age: 69
End: 2024-06-05
Payer: COMMERCIAL

## 2024-06-05 ENCOUNTER — TELEPHONE (OUTPATIENT)
Dept: VASCULAR SURGERY | Facility: CLINIC | Age: 69
End: 2024-06-05

## 2024-06-05 VITALS
BODY MASS INDEX: 16.58 KG/M2 | HEIGHT: 71 IN | OXYGEN SATURATION: 91 % | SYSTOLIC BLOOD PRESSURE: 87 MMHG | DIASTOLIC BLOOD PRESSURE: 58 MMHG | HEART RATE: 94 BPM | WEIGHT: 118.4 LBS

## 2024-06-05 DIAGNOSIS — I71.40 ABDOMINAL AORTIC ANEURYSM (AAA) WITHOUT RUPTURE, UNSPECIFIED PART (CMS-HCC): ICD-10-CM

## 2024-06-05 DIAGNOSIS — I71.41 PARARENAL ABDOMINAL AORTIC ANEURYSM (AAA) WITHOUT RUPTURE (CMS-HCC): Primary | ICD-10-CM

## 2024-06-05 PROCEDURE — 3078F DIAST BP <80 MM HG: CPT | Performed by: SURGERY

## 2024-06-05 PROCEDURE — 1036F TOBACCO NON-USER: CPT | Performed by: SURGERY

## 2024-06-05 PROCEDURE — 99024 POSTOP FOLLOW-UP VISIT: CPT | Performed by: SURGERY

## 2024-06-05 PROCEDURE — 1111F DSCHRG MED/CURRENT MED MERGE: CPT | Performed by: SURGERY

## 2024-06-05 PROCEDURE — 1159F MED LIST DOCD IN RCRD: CPT | Performed by: SURGERY

## 2024-06-05 PROCEDURE — 3074F SYST BP LT 130 MM HG: CPT | Performed by: SURGERY

## 2024-06-05 RX ORDER — OXYCODONE HYDROCHLORIDE 5 MG/1
5 TABLET ORAL EVERY 6 HOURS PRN
Qty: 15 TABLET | Refills: 0 | Status: SHIPPED | OUTPATIENT
Start: 2024-06-05 | End: 2024-06-19

## 2024-06-05 ASSESSMENT — COLUMBIA-SUICIDE SEVERITY RATING SCALE - C-SSRS
6. HAVE YOU EVER DONE ANYTHING, STARTED TO DO ANYTHING, OR PREPARED TO DO ANYTHING TO END YOUR LIFE?: NO
1. IN THE PAST MONTH, HAVE YOU WISHED YOU WERE DEAD OR WISHED YOU COULD GO TO SLEEP AND NOT WAKE UP?: NO
2. HAVE YOU ACTUALLY HAD ANY THOUGHTS OF KILLING YOURSELF?: NO

## 2024-06-05 NOTE — PROGRESS NOTES
Vascular Surgery Postoperative Note    CC: Post op visit    HPI:  Yobany Small is 69 y.o. male presenting for follow up after recent extent IV thoracoabdominal aneurysm repair performed on 3/19/24 for perivisceral AAA with prior EVAR for rupture. He is doing well, recovering slowly. He also had a left nephroureterectomy at the time due to ureteral cancer. His appetite is poor and he has lost about 4 pounds. He has pain at the incision site.    Medical History:   has a past medical history of Abnormal findings on diagnostic imaging of heart and coronary circulation, Abnormal findings on diagnostic imaging of other specified body structures, Abnormal findings on diagnostic imaging of other specified body structures, Abnormal findings on diagnostic imaging of other specified body structures, Abnormal findings on diagnostic imaging of other specified body structures, Abnormal findings on diagnostic imaging of other specified body structures, Abnormal findings on diagnostic imaging of other specified body structures, BPH (benign prostatic hyperplasia), COPD (chronic obstructive pulmonary disease) (Multi), Coronary artery disease, Hyperlipidemia, Hypertension, and Personal history of other medical treatment.    Meds:   Current Outpatient Medications on File Prior to Visit   Medication Sig Dispense Refill    aspirin 81 mg EC tablet Take 1 tablet (81 mg) by mouth once daily.      ferrous sulfate, 325 mg ferrous sulfate, (FeosoL) tablet Take 1 tablet by mouth once daily with breakfast.      fluticasone-umeclidin-vilanter (Trelegy Ellipta) 100-62.5-25 mcg blister with device Inhale 1 puff once daily.      lidocaine 4 % patch Place 1 patch over 12 hours on the skin once daily. Remove & discard patch within 12 hours or as directed by MD. 30 patch 0    methocarbamol 1,000 mg tablet Take 1,000 mg by mouth 3 times a day. 90 tablet 2    methotrexate (Trexall) 2.5 mg tablet Take 6 tablets (15 mg total) by mouth 1 (one) time per  week.  Follow directions carefully, and ask to explain any part you do not understand. Take exactly as directed.  6 tabs Saturday      metoprolol tartrate (Lopressor) 25 mg tablet Take 1 tablet (25 mg) by mouth 2 times a day. 60 tablet 3    predniSONE (Deltasone) 10 mg tablet Take 0.5 tablets (5 mg) by mouth once daily.      sacubitriL-valsartan (Entresto) 24-26 mg tablet Take 1 tablet by mouth 2 times a day. 180 tablet 3    simvastatin (Zocor) 40 mg tablet TAKE 1 TABLET BY MOUTH ONCE DAILY AT BEDTIME 90 tablet 0    tamsulosin (Flomax) 0.4 mg 24 hr capsule 1 capsule (0.4 mg) once daily.       No current facility-administered medications on file prior to visit.        Allergies:   Allergies   Allergen Reactions    Methocarbamol Swelling and Blurred vision       SH:    Social Determinants of Health     Tobacco Use: Medium Risk (6/5/2024)    Patient History     Smoking Tobacco Use: Former     Smokeless Tobacco Use: Never     Passive Exposure: Not on file   Alcohol Use: Not At Risk (11/18/2023)    AUDIT-C     Frequency of Alcohol Consumption: Never     Average Number of Drinks: Patient does not drink     Frequency of Binge Drinking: Never   Financial Resource Strain: Low Risk  (11/18/2023)    Overall Financial Resource Strain (CARDIA)     Difficulty of Paying Living Expenses: Not hard at all   Food Insecurity: Not on file   Transportation Needs: No Transportation Needs (5/16/2024)    OASIS : Transportation     Lack of Transportation (Medical): No     Lack of Transportation (Non-Medical): No     Patient Unable or Declines to Respond: No   Physical Activity: Not on file   Stress: Not on file   Social Connections: Feeling Socially Integrated (5/16/2024)    OASIS : Social Isolation     Frequency of experiencing loneliness or isolation: Never   Intimate Partner Violence: Not on file   Depression: Not at risk (6/5/2024)    PHQ-2     PHQ-2 Score: 0   Housing Stability: Low Risk  (11/18/2023)    Housing Stability  Vital Sign     Unable to Pay for Housing in the Last Year: No     Number of Places Lived in the Last Year: 1     Unstable Housing in the Last Year: No   Utilities: Not on file   Digital Equity: Not on file   Health Literacy: Adequate Health Literacy (5/16/2024)    OASIS : Health Literacy     Frequency of needing help to read materials from doctor or pharmacy: Never        FH:  Family History   Problem Relation Name Age of Onset    No Known Problems Mother          no relationship    No Known Problems Father          no relationship        ROS:  All systems were reviewed and are negative except as per HPI.    Objective:  Vitals:  Vitals:    06/05/24 0955   BP: 87/58   Pulse: 94   SpO2:         Exam:  In NAD, frail appearing  Abd Soft, ND/NT  Vascular examination:  Palpable femoral pulses bilaterally  R PT/DP are palpable, left PT/DP are not palpable but popliteal pulse is palpable  Feet are warm     Assessment & Plan:  S/p extent IV thoraco repair. Recovering slowly. I will prescribe more pain medication. I asked him to discuss use of NSAIDs with Dr. Hobbs. 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            Kar Street M.D.

## 2024-06-05 NOTE — TELEPHONE ENCOUNTER
I have attempted to contact      . There is no answer at the following phone number    [phone]     . I have left a voice mail message for the patient to contact Dr. Street's office nurse at [phone] to schedule a   3 month follow up  with a CTA  prior to the office visit.   Radha Fountain RN BSN

## 2024-06-06 ENCOUNTER — HOME CARE VISIT (OUTPATIENT)
Dept: HOME HEALTH SERVICES | Facility: HOME HEALTH | Age: 69
End: 2024-06-06
Payer: COMMERCIAL

## 2024-06-07 ENCOUNTER — OFFICE VISIT (OUTPATIENT)
Dept: UROLOGY | Facility: CLINIC | Age: 69
End: 2024-06-07
Payer: COMMERCIAL

## 2024-06-07 ENCOUNTER — HOME CARE VISIT (OUTPATIENT)
Dept: HOME HEALTH SERVICES | Facility: HOME HEALTH | Age: 69
End: 2024-06-07
Payer: COMMERCIAL

## 2024-06-07 VITALS
DIASTOLIC BLOOD PRESSURE: 58 MMHG | BODY MASS INDEX: 16.46 KG/M2 | WEIGHT: 118 LBS | HEART RATE: 84 BPM | TEMPERATURE: 96.8 F | SYSTOLIC BLOOD PRESSURE: 90 MMHG

## 2024-06-07 DIAGNOSIS — C64.2 UROTHELIAL CARCINOMA OF KIDNEY, LEFT (MULTI): Primary | ICD-10-CM

## 2024-06-07 PROCEDURE — 1111F DSCHRG MED/CURRENT MED MERGE: CPT | Performed by: STUDENT IN AN ORGANIZED HEALTH CARE EDUCATION/TRAINING PROGRAM

## 2024-06-07 PROCEDURE — 3078F DIAST BP <80 MM HG: CPT | Performed by: STUDENT IN AN ORGANIZED HEALTH CARE EDUCATION/TRAINING PROGRAM

## 2024-06-07 PROCEDURE — 99024 POSTOP FOLLOW-UP VISIT: CPT | Performed by: STUDENT IN AN ORGANIZED HEALTH CARE EDUCATION/TRAINING PROGRAM

## 2024-06-07 PROCEDURE — 3074F SYST BP LT 130 MM HG: CPT | Performed by: STUDENT IN AN ORGANIZED HEALTH CARE EDUCATION/TRAINING PROGRAM

## 2024-06-07 NOTE — PROGRESS NOTES
Subjective     Yobany Small is a 69 y.o. 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     He had urinary retention post-operatively. He is now without catheter. He is doing well. He had one episode of gross hematuria yesterday. He has some residual pain in the area.     He also underwent triple A repair with Dr. Street.     He was taken for L URS by Dr. You with [facility] [place]. Pathology showed detached un oriented fragments of papillary urothelial neoplasm, favoring LG papillary urothelial carcinoma. Report stated there was a significant amount of tumor blocking the ureter and a stent was placed. He had bx completed at [facility] [place] which showed LG urothelial carcinoma.     Past medical, surgical, family and social history were reviewed and unchanged since last visit besides what is in the HPI.               Review of Systems   All other systems reviewed and are negative.      Objective   Physical Exam  Gen: No acute distress     Psych: Alert and oriented x3     Resp: Nonlabored respirations     Assessment/Plan   Problem List Items Addressed This Visit             ICD-10-CM    Urothelial carcinoma of kidney, left (Multi) - Primary C64.2     We discussed the patient's pathology in detail. This was a 3.6cm LG UTC of the mid ureter.     We discussed that he is considered low risk per the AUA guidelines for follow up of upper tract UTC. This will be with cystoscopy in 3 months from surgery and cystoscopy and imaging in 6 months. The patient understands and agrees with this plan.                  Plan:  Pathology report given   6 week FUV with cystoscopy for surveillance of kidney removing <pT2 low risk UTC.      Chaitanya Attestation  By signing my name below, I, Katelyn Casalla, Scribe   attest that this documentation has been prepared under the direction and in the presence of Дмитрий Thompson MD MPH.

## 2024-06-07 NOTE — ASSESSMENT & PLAN NOTE
We discussed the patient's pathology in detail. This was a 3.6cm LG UTC of the mid ureter.     We discussed that he is considered low risk per the AUA guidelines for follow up of upper tract UTC. This will be with cystoscopy in 3 months from surgery and cystoscopy and imaging in 6 months. The patient understands and agrees with this plan.

## 2024-06-10 DIAGNOSIS — C64.2: Primary | ICD-10-CM

## 2024-06-10 RX ORDER — LORAZEPAM 0.5 MG/1
0.5 TABLET ORAL ONCE
Qty: 1 TABLET | Refills: 0 | Status: SHIPPED | OUTPATIENT
Start: 2024-06-10 | End: 2024-06-10

## 2024-06-12 LAB
ACID FAST STN SPEC: NORMAL
MYCOBACTERIUM SPEC CULT: NORMAL

## 2024-06-13 ENCOUNTER — APPOINTMENT (OUTPATIENT)
Dept: HOME HEALTH SERVICES | Facility: HOME HEALTH | Age: 69
End: 2024-06-13
Payer: COMMERCIAL

## 2024-06-13 ENCOUNTER — APPOINTMENT (OUTPATIENT)
Dept: VASCULAR SURGERY | Facility: CLINIC | Age: 69
End: 2024-06-13
Payer: COMMERCIAL

## 2024-06-19 LAB
ACID FAST STN SPEC: NORMAL
MYCOBACTERIUM SPEC CULT: NORMAL

## 2024-06-20 ASSESSMENT — ACTIVITIES OF DAILY LIVING (ADL)
OASIS_M1830: 05
HOME_HEALTH_OASIS: 01

## 2024-06-28 ENCOUNTER — TELEPHONE (OUTPATIENT)
Dept: VASCULAR SURGERY | Facility: CLINIC | Age: 69
End: 2024-06-28
Payer: COMMERCIAL

## 2024-06-28 DIAGNOSIS — G89.28 CHRONIC POST-OPERATIVE PAIN: ICD-10-CM

## 2024-06-28 DIAGNOSIS — I73.9 PAD (PERIPHERAL ARTERY DISEASE) (CMS-HCC): ICD-10-CM

## 2024-06-28 NOTE — TELEPHONE ENCOUNTER
I have attempted to contact   Mrs Small       . There is no answer at the following phone number  [phone]  . I have left a voice mail message for the patient to contact Dr. Street's  office nurse at [phone]    Following  review  with Dr. Street, a referral to pain management  has been made.  Mrs. Small has been instructed to call [phone]  to schedule an appointment with pain management.   Radha Fountain RN BSN

## 2024-06-29 DIAGNOSIS — I25.10 CORONARY ARTERY DISEASE INVOLVING NATIVE CORONARY ARTERY OF NATIVE HEART WITHOUT ANGINA PECTORIS: ICD-10-CM

## 2024-07-08 DIAGNOSIS — I25.10 CORONARY ARTERY DISEASE INVOLVING NATIVE CORONARY ARTERY OF NATIVE HEART WITHOUT ANGINA PECTORIS: ICD-10-CM

## 2024-07-09 RX ORDER — SIMVASTATIN 40 MG/1
40 TABLET, FILM COATED ORAL NIGHTLY
Qty: 90 TABLET | Refills: 3 | Status: SHIPPED | OUTPATIENT
Start: 2024-07-09

## 2024-07-09 RX ORDER — SIMVASTATIN 40 MG/1
40 TABLET, FILM COATED ORAL NIGHTLY
Qty: 90 TABLET | Refills: 3 | Status: SHIPPED | OUTPATIENT
Start: 2024-07-09 | End: 2024-07-09 | Stop reason: WASHOUT

## 2024-07-17 ENCOUNTER — APPOINTMENT (OUTPATIENT)
Dept: RHEUMATOLOGY | Facility: CLINIC | Age: 69
End: 2024-07-17
Payer: COMMERCIAL

## 2024-07-17 VITALS
HEART RATE: 82 BPM | DIASTOLIC BLOOD PRESSURE: 82 MMHG | SYSTOLIC BLOOD PRESSURE: 118 MMHG | WEIGHT: 123.6 LBS | BODY MASS INDEX: 17.24 KG/M2

## 2024-07-17 DIAGNOSIS — R21 RASH AND NONSPECIFIC SKIN ERUPTION: ICD-10-CM

## 2024-07-17 DIAGNOSIS — Z79.631 METHOTREXATE, LONG TERM, CURRENT USE: ICD-10-CM

## 2024-07-17 DIAGNOSIS — S40.021S CONTUSION OF BOTH UPPER EXTREMITIES, SEQUELA: Primary | ICD-10-CM

## 2024-07-17 DIAGNOSIS — R23.3 EASY BRUISING: ICD-10-CM

## 2024-07-17 DIAGNOSIS — S40.022S CONTUSION OF BOTH UPPER EXTREMITIES, SEQUELA: Primary | ICD-10-CM

## 2024-07-17 PROCEDURE — 1160F RVW MEDS BY RX/DR IN RCRD: CPT | Performed by: INTERNAL MEDICINE

## 2024-07-17 PROCEDURE — 99204 OFFICE O/P NEW MOD 45 MIN: CPT | Performed by: INTERNAL MEDICINE

## 2024-07-17 PROCEDURE — 1036F TOBACCO NON-USER: CPT | Performed by: INTERNAL MEDICINE

## 2024-07-17 PROCEDURE — 1159F MED LIST DOCD IN RCRD: CPT | Performed by: INTERNAL MEDICINE

## 2024-07-17 PROCEDURE — 3079F DIAST BP 80-89 MM HG: CPT | Performed by: INTERNAL MEDICINE

## 2024-07-17 PROCEDURE — 3074F SYST BP LT 130 MM HG: CPT | Performed by: INTERNAL MEDICINE

## 2024-07-17 PROCEDURE — 1126F AMNT PAIN NOTED NONE PRSNT: CPT | Performed by: INTERNAL MEDICINE

## 2024-07-17 ASSESSMENT — PAIN SCALES - GENERAL: PAINLEVEL: 0-NO PAIN

## 2024-07-17 NOTE — PROGRESS NOTES
Initial Rheumatology Patient Visit    Chief Complaint:  Yobany Small is a 69 y.o. male presenting today for New Patient Visit (New patient visit for psoriatic arthritis.).    History of Presenting Problem:   70 y/o male with history of psoriatic arthritis on methotrexate presents for second opinion.  Patient's main concern is bruising on his forearm.  Patient has been maintained on methotrexate 6 pills weekly for more than 7 years, chronic prednisone 5 mg daily.  He reports his joint symptoms are well-controlled on this regimen however he is dissatisfied with the fact that he bruises easily in his upper arm he also has nonspecific rashes that come and go on his abdomen.  He is concerned that the methotrexate is causing his symptoms.  Most of his joint symptoms were in his hands which have now improved since being on his current regimen.  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)    Asymptomatic microscopic hematuria    CAD in native artery    Cardiac LV ejection fraction 21-30%    Cardiac mass    Chronic obstructive pulmonary disease (COPD) (Multi)    Cyst of left kidney    Heart failure with reduced ejection fraction (Multi)    HTN (hypertension)    Ischemic cardiomyopathy    S/P repair of abdominal aortic aneurysm using bifurcation graft    TMJ click    Presence of coronary angioplasty implant and graft    Chronic systolic congestive heart failure (Multi)    Generalized anxiety disorder    Long term (current) use of antithrombotics/antiplatelets    Aspiration pneumonia (Multi)    History of general anesthesia    Anemia    Urothelial carcinoma (Multi)    Abnormal computed tomography scan    Abnormal echocardiography    Contact with and (suspected) exposure to  covid-19    Lower abdominal pain    Shortness of breath    Sore throat    Type Ia endoleak of aortic graft (CMS-HCC)    Urothelial carcinoma of kidney, left (Multi)    Post-op pain    Post-operative pain       Past Medical History:   Past Medical History:   Diagnosis Date    Abnormal findings on diagnostic imaging of heart and coronary circulation     Abnormal echocardiogram    Abnormal findings on diagnostic imaging of other specified body structures     Abnormal ultrasound    Abnormal findings on diagnostic imaging of other specified body structures     Abnormal ultrasound    Abnormal findings on diagnostic imaging of other specified body structures     Abnormal chest x-ray    Abnormal findings on diagnostic imaging of other specified body structures     Abnormal CT of the chest    Abnormal findings on diagnostic imaging of other specified body structures     Abnormal computed tomography angiography (CTA)    Abnormal findings on diagnostic imaging of other specified body structures     Abnormal CT of the chest    BPH (benign prostatic hyperplasia)     COPD (chronic obstructive pulmonary disease) (Multi)     Coronary artery disease     Hyperlipidemia     Hypertension     Personal history of other medical treatment     History of echocardiogram       Surgical History:   Past Surgical History:   Procedure Laterality Date    CT ABDOMEN PELVIS ANGIOGRAM W AND/OR WO IV CONTRAST  11/7/2019    CT ABDOMEN PELVIS ANGIOGRAM W AND/OR WO IV CONTRAST 11/7/2019 [facility]CILLARY [facility]GAC[facility]    CT ABDOMEN PELVIS ANGIOGRAM W AND/OR WO IV CONTRAST  7/8/2020    CT ABDOMEN PELVIS ANGIOGRAM W AND/OR WO IV CONTRAST 7/8/2020 [facility] ANCILLARY [facility]GAC[facility]    OTHER SURGICAL HISTORY  05/30/2019    Salivary gland surgery    OTHER SURGICAL HISTORY  06/16/2020    Bronchoscopy    OTHER SURGICAL HISTORY  06/16/2020    Seroma incision and drainage    OTHER SURGICAL HISTORY  04/02/2021    Cardioverter defibrillator insertion    OTHER SURGICAL HISTORY  01/30/2020     Cardiac catheterization with stent placement    OTHER SURGICAL HISTORY  06/16/2020    Abdominal aortic aneurysm repair endovascular        Allergies:   Allergies   Allergen Reactions    Methocarbamol Swelling and Blurred vision       Medications:   Current Outpatient Medications:     aspirin 81 mg EC tablet, Take 1 tablet (81 mg) by mouth once daily., Disp: , Rfl:     fluticasone-umeclidin-vilanter (Trelegy Ellipta) 100-62.5-25 mcg blister with device, Inhale 1 puff once daily., Disp: , Rfl:     methotrexate (Trexall) 2.5 mg tablet, Take 6 tablets (15 mg total) by mouth 1 (one) time per week.  Follow directions carefully, and ask to explain any part you do not understand. Take exactly as directed. 6 tabs Saturday, Disp: , Rfl:     metoprolol tartrate (Lopressor) 25 mg tablet, Take 1 tablet (25 mg) by mouth 2 times a day. (Patient taking differently: Take 1 tablet (25 mg) by mouth 1 time.), Disp: 60 tablet, Rfl: 3    predniSONE (Deltasone) 10 mg tablet, Take 0.5 tablets (5 mg) by mouth once daily., Disp: , Rfl:     sacubitriL-valsartan (Entresto) 24-26 mg tablet, Take 1 tablet by mouth 2 times a day., Disp: 180 tablet, Rfl: 3    simvastatin (Zocor) 40 mg tablet, TAKE 1 TABLET BY MOUTH ONCE DAILY AT BEDTIME, Disp: 90 tablet, Rfl: 3    ferrous sulfate, 325 mg ferrous sulfate, (FeosoL) tablet, Take 1 tablet by mouth once daily with breakfast., Disp: , Rfl:     lidocaine 4 % patch, Place 1 patch over 12 hours on the skin once daily. Remove & discard patch within 12 hours or as directed by MD. (Patient not taking: Reported on 7/17/2024), Disp: 30 patch, Rfl: 0    LORazepam (Ativan) 0.5 mg tablet, Take 1 tablet (0.5 mg) by mouth 1 time for 1 dose., Disp: 1 tablet, Rfl: 0    methocarbamol 1,000 mg tablet, Take 1,000 mg by mouth 3 times a day. (Patient not taking: Reported on 7/17/2024), Disp: 90 tablet, Rfl: 2    tamsulosin (Flomax) 0.4 mg 24 hr capsule, 1 capsule (0.4 mg) once daily., Disp: , Rfl:       Objective    Physical Examination:    Visit Vitals  /82 (BP Location: Left arm, Patient Position: Sitting, BP Cuff Size: Adult)   Pulse 82   Wt 56.1 kg (123 lb 9.6 oz)   BMI 17.24 kg/m²   Smoking Status Former   BSA 1.68 m²        Gen: NAD, A&O x 3  HEENT: clear sclera and conjunctiva,     Musculoskeletal:   Neck; WNL, full ROM  Shoulder: WNL, full ROM  Elbow:WNL, full ROM, no effusion noted  Wrist and fingers;no active synovitis noted, Full ROM in the Wrist , Good fist and   Knees:  No effusions or crepitation, full ROM.  Hips; WNL, full ROM, Negative Beau test  Ankle, Feet; WNL, full ROM    Skin: No rashes or lesions seen, no nail changes  Neuro: A&O x3, Normal Gait    Procedures :None    Orders:  No orders of the defined types were placed in this encounter.       Provider Impression:   Assessment/Plan   Encounter Diagnoses   Name Primary?    Contusion of both upper extremities, sequela Yes    Methotrexate, long term, current use     Rash and nonspecific skin eruption         70 y/o male with history of psoriatic arthritis on methotrexate presents for second opinion.  His main concern for skin changes that he is thinking is related to methotrexate therapy namely bruises that are on  his upper arm but he does not recall having trauma, nonspecific rash on his abdomen and come and go.  On exam patient is noted to have bruises on his distal upper arm which is likely related to aging coupled with chronic aspirin and steroid treatment.  As scheduled patient scheduled older just skin gets thinner and medication such as chronic steroids and low-dose aspirin make bruising more evident without any obvious trauma typically appear with daily activities.  Review of his lab work did not show any signs of thrombocytopenia suspect this is not related to methotrexate.  -Recommend patient follow-up with dermatology for nonspecific rash on his abdomen as he did not have any significant finding on exam  - no need for further  follow-up

## 2024-07-31 ENCOUNTER — TELEPHONE (OUTPATIENT)
Dept: CARDIOLOGY | Facility: CLINIC | Age: 69
End: 2024-07-31
Payer: COMMERCIAL

## 2024-07-31 DIAGNOSIS — I10 PRIMARY HYPERTENSION: ICD-10-CM

## 2024-07-31 RX ORDER — METOPROLOL TARTRATE 25 MG/1
25 TABLET, FILM COATED ORAL 2 TIMES DAILY
Qty: 60 TABLET | Refills: 3 | Status: CANCELLED | OUTPATIENT
Start: 2024-07-31

## 2024-07-31 NOTE — TELEPHONE ENCOUNTER
I spoke with patients wife and she states patient takes metoprolol Er 50 Mg tab 1 days.  This is not how it is in his med list.  Tartrate was ordered by Aneudy Devine Md.  Patient saw Dr. DEUTSCH in the hospital and told him to take this but it is not in his med list.

## 2024-08-01 DIAGNOSIS — I50.20 HEART FAILURE WITH REDUCED EJECTION FRACTION (MULTI): ICD-10-CM

## 2024-08-01 DIAGNOSIS — I10 HYPERTENSION, UNSPECIFIED TYPE: ICD-10-CM

## 2024-08-01 NOTE — TELEPHONE ENCOUNTER
Patients wife called and said he does not want to take 2 pill a day and would like to change back to Metoprolol succ 50 every day as he has been on.  I discontinued Metoprolol tart 25 bid and reordered Metoprolol succ 50 daily.

## 2024-08-02 ENCOUNTER — APPOINTMENT (OUTPATIENT)
Dept: UROLOGY | Facility: CLINIC | Age: 69
End: 2024-08-02
Payer: COMMERCIAL

## 2024-08-02 RX ORDER — METOPROLOL SUCCINATE 50 MG/1
50 TABLET, EXTENDED RELEASE ORAL DAILY
Qty: 90 TABLET | Refills: 3 | OUTPATIENT
Start: 2024-08-02 | End: 2025-08-02

## 2024-09-09 ENCOUNTER — LAB (OUTPATIENT)
Dept: LAB | Facility: LAB | Age: 69
End: 2024-09-09
Payer: COMMERCIAL

## 2024-09-09 DIAGNOSIS — I71.40 ABDOMINAL AORTIC ANEURYSM (AAA) WITHOUT RUPTURE, UNSPECIFIED PART (CMS-HCC): ICD-10-CM

## 2024-09-09 LAB
ANION GAP SERPL CALC-SCNC: 12 MMOL/L (ref 10–20)
BUN SERPL-MCNC: 25 MG/DL (ref 6–23)
CALCIUM SERPL-MCNC: 9.4 MG/DL (ref 8.6–10.3)
CHLORIDE SERPL-SCNC: 104 MMOL/L (ref 98–107)
CO2 SERPL-SCNC: 27 MMOL/L (ref 21–32)
CREAT SERPL-MCNC: 1.19 MG/DL (ref 0.5–1.3)
EGFRCR SERPLBLD CKD-EPI 2021: 67 ML/MIN/1.73M*2
GLUCOSE SERPL-MCNC: 96 MG/DL (ref 74–99)
POTASSIUM SERPL-SCNC: 4.8 MMOL/L (ref 3.5–5.3)
SODIUM SERPL-SCNC: 138 MMOL/L (ref 136–145)

## 2024-09-09 PROCEDURE — 36415 COLL VENOUS BLD VENIPUNCTURE: CPT

## 2024-09-09 PROCEDURE — 80048 BASIC METABOLIC PNL TOTAL CA: CPT

## 2024-09-11 ENCOUNTER — HOSPITAL ENCOUNTER (OUTPATIENT)
Dept: RADIOLOGY | Facility: HOSPITAL | Age: 69
Discharge: HOME | End: 2024-09-11
Payer: COMMERCIAL

## 2024-09-11 DIAGNOSIS — I71.41 PARARENAL ABDOMINAL AORTIC ANEURYSM (AAA) WITHOUT RUPTURE (CMS-HCC): ICD-10-CM

## 2024-09-11 PROCEDURE — 71275 CT ANGIOGRAPHY CHEST: CPT

## 2024-09-11 PROCEDURE — 2550000001 HC RX 255 CONTRASTS: Performed by: SURGERY

## 2024-09-18 ENCOUNTER — OFFICE VISIT (OUTPATIENT)
Dept: VASCULAR SURGERY | Facility: HOSPITAL | Age: 69
End: 2024-09-18
Payer: COMMERCIAL

## 2024-09-18 ENCOUNTER — TELEPHONE (OUTPATIENT)
Dept: VASCULAR SURGERY | Facility: CLINIC | Age: 69
End: 2024-09-18

## 2024-09-18 VITALS
HEIGHT: 71 IN | DIASTOLIC BLOOD PRESSURE: 72 MMHG | WEIGHT: 125 LBS | SYSTOLIC BLOOD PRESSURE: 92 MMHG | HEART RATE: 89 BPM | BODY MASS INDEX: 17.5 KG/M2 | OXYGEN SATURATION: 90 %

## 2024-09-18 DIAGNOSIS — I71.41 PARARENAL ABDOMINAL AORTIC ANEURYSM (AAA) WITHOUT RUPTURE (CMS-HCC): Primary | ICD-10-CM

## 2024-09-18 DIAGNOSIS — K55.1 SUPERIOR MESENTERIC ARTERY STENOSIS (MULTI): ICD-10-CM

## 2024-09-18 PROCEDURE — 1036F TOBACCO NON-USER: CPT | Performed by: SURGERY

## 2024-09-18 PROCEDURE — 1125F AMNT PAIN NOTED PAIN PRSNT: CPT | Performed by: SURGERY

## 2024-09-18 PROCEDURE — 99215 OFFICE O/P EST HI 40 MIN: CPT | Performed by: SURGERY

## 2024-09-18 PROCEDURE — 3074F SYST BP LT 130 MM HG: CPT | Performed by: SURGERY

## 2024-09-18 PROCEDURE — 3078F DIAST BP <80 MM HG: CPT | Performed by: SURGERY

## 2024-09-18 PROCEDURE — 3008F BODY MASS INDEX DOCD: CPT | Performed by: SURGERY

## 2024-09-18 RX ORDER — SODIUM CHLORIDE, SODIUM LACTATE, POTASSIUM CHLORIDE, CALCIUM CHLORIDE 600; 310; 30; 20 MG/100ML; MG/100ML; MG/100ML; MG/100ML
20 INJECTION, SOLUTION INTRAVENOUS CONTINUOUS
OUTPATIENT
Start: 2024-09-18

## 2024-09-18 RX ORDER — CEFAZOLIN SODIUM 2 G/100ML
2 INJECTION, SOLUTION INTRAVENOUS ONCE
OUTPATIENT
Start: 2024-09-18 | End: 2024-09-18

## 2024-09-18 ASSESSMENT — PATIENT HEALTH QUESTIONNAIRE - PHQ9
2. FEELING DOWN, DEPRESSED OR HOPELESS: NOT AT ALL
1. LITTLE INTEREST OR PLEASURE IN DOING THINGS: NOT AT ALL
SUM OF ALL RESPONSES TO PHQ9 QUESTIONS 1 AND 2: 0

## 2024-09-18 ASSESSMENT — PAIN SCALES - GENERAL: PAINLEVEL: 4

## 2024-09-18 ASSESSMENT — ENCOUNTER SYMPTOMS
LOSS OF SENSATION IN FEET: 0
OCCASIONAL FEELINGS OF UNSTEADINESS: 0
DEPRESSION: 0

## 2024-09-18 NOTE — PROGRESS NOTES
Vascular Surgery Clinic Note    CC: TAA    HPI:  Yobany Small is 69 y.o. male with history of rupture AAA s/p EVAR that developed type Ia endoleak. He underwent extent IV thoracabdominal type repair with explant in 4/2024. He is doing well but still has incisional pain. I reviewed his recent CTA that shows worsening SMA stenosis. He denies post prandial pain. He says his weight has been stable.     Medical History:   has a past medical history of Abnormal findings on diagnostic imaging of heart and coronary circulation, Abnormal findings on diagnostic imaging of other specified body structures, Abnormal findings on diagnostic imaging of other specified body structures, Abnormal findings on diagnostic imaging of other specified body structures, Abnormal findings on diagnostic imaging of other specified body structures, Abnormal findings on diagnostic imaging of other specified body structures, Abnormal findings on diagnostic imaging of other specified body structures, BPH (benign prostatic hyperplasia), COPD (chronic obstructive pulmonary disease) (Multi), Coronary artery disease, Hyperlipidemia, Hypertension, and Personal history of other medical treatment.    Meds:   Current Outpatient Medications on File Prior to Visit   Medication Sig Dispense Refill    aspirin 81 mg EC tablet Take 1 tablet (81 mg) by mouth once daily.      ferrous sulfate, 325 mg ferrous sulfate, (FeosoL) tablet Take 1 tablet (325 mg) by mouth once daily with breakfast.      fluticasone-umeclidin-vilanter (Trelegy Ellipta) 100-62.5-25 mcg blister with device Inhale 1 puff once daily.      LORazepam (Ativan) 0.5 mg tablet Take 1 tablet (0.5 mg) by mouth 1 time for 1 dose. 1 tablet 0    methotrexate (Trexall) 2.5 mg tablet Take 6 tablets (15 mg total) by mouth 1 (one) time per week.  Follow directions carefully, and ask to explain any part you do not understand. Take exactly as directed.  6 tabs Saturday      metoprolol succinate XL  (Toprol-XL) 50 mg 24 hr tablet Take 1 tablet (50 mg) by mouth once daily. Do not crush or chew. 90 tablet 3    predniSONE (Deltasone) 10 mg tablet Take 0.5 tablets (5 mg) by mouth once daily.      sacubitriL-valsartan (Entresto) 24-26 mg tablet Take 1 tablet by mouth 2 times a day. 180 tablet 3    simvastatin (Zocor) 40 mg tablet TAKE 1 TABLET BY MOUTH ONCE DAILY AT BEDTIME 90 tablet 3    tamsulosin (Flomax) 0.4 mg 24 hr capsule 1 capsule (0.4 mg) once daily.      lidocaine 4 % patch Place 1 patch over 12 hours on the skin once daily. Remove & discard patch within 12 hours or as directed by MD. 30 patch 0    methocarbamol 1,000 mg tablet Take 1,000 mg by mouth 3 times a day. 90 tablet 2     No current facility-administered medications on file prior to visit.        Allergies:   Allergies   Allergen Reactions    Methocarbamol Swelling and Blurred vision       SH:    Social Determinants of Health     Tobacco Use: Medium Risk (9/18/2024)    Patient History     Smoking Tobacco Use: Former     Smokeless Tobacco Use: Never     Passive Exposure: Not on file   Alcohol Use: Not At Risk (11/18/2023)    AUDIT-C     Frequency of Alcohol Consumption: Never     Average Number of Drinks: Patient does not drink     Frequency of Binge Drinking: Never   Financial Resource Strain: Low Risk  (11/18/2023)    Overall Financial Resource Strain (CARDIA)     Difficulty of Paying Living Expenses: Not hard at all   Food Insecurity: Not on file   Transportation Needs: No Transportation Needs (6/7/2024)    OASIS : Transportation     Lack of Transportation (Medical): No     Lack of Transportation (Non-Medical): No     Patient Unable or Declines to Respond: No   Physical Activity: Not on file   Stress: Not on file   Social Connections: Feeling Socially Integrated (6/7/2024)    OASIS : Social Isolation     Frequency of experiencing loneliness or isolation: Never   Intimate Partner Violence: Not on file   Depression: Not at risk  (9/18/2024)    PHQ-2     PHQ-2 Score: 0   Housing Stability: Low Risk  (11/18/2023)    Housing Stability Vital Sign     Unable to Pay for Housing in the Last Year: No     Number of Places Lived in the Last Year: 1     Unstable Housing in the Last Year: No   Utilities: Not on file   Digital Equity: Not on file   Health Literacy: Adequate Health Literacy (6/7/2024)    OASIS : Health Literacy     Frequency of needing help to read materials from doctor or pharmacy: Never        FH:  Family History   Problem Relation Name Age of Onset    No Known Problems Mother          no relationship    No Known Problems Father          no relationship        ROS:  All systems were reviewed and are negative except as per HPI.    Objective:  Vitals:  Vitals:    09/18/24 1200   BP: 92/72   Pulse:    SpO2:         Exam:  In NAD, well appearing  Abd Soft, ND/NT  Vascular examination:  Palpable radial and femoral pulses    Assessment & Plan:  Yobany Small is 69 y.o. male s/p EVAR explant. SMA stenosis appears significantly worse. I will schedule him for angiogram and possible stent placement. I will evaluate the right renal artery at that time as well. Approach will be left radial artery.      I spent a total of 40 minutes on the day of the visit.         Kar Street M.D.

## 2024-09-18 NOTE — TELEPHONE ENCOUNTER
I have attempted to contact  Mr. Small      . There is no answer at the following phone number   [phone]  . I have left a voice mail message for the patient to contact  * office nurse at [phone]      I have called to provide pre op instructions   Radha Fountain RN BSN

## 2024-09-20 ENCOUNTER — TELEPHONE (OUTPATIENT)
Dept: VASCULAR SURGERY | Facility: HOSPITAL | Age: 69
End: 2024-09-20
Payer: COMMERCIAL

## 2024-09-20 NOTE — TELEPHONE ENCOUNTER
Following review with Dr. Street you have been scheduled for a vascular  procedure on _10/23/2024  Aortogram .    [facility] to schedule a telephonic and in person appointments.  Prior to your surgery, please obtain the following blood work (requisitions have been entered into the electronic medical record). You may obtain the blood work at [facility] system laboratory.      If Dr. Street has recommended a type and cross match or type and screen your blood work must be obtained at the lab at [facility].       Please remember nothing to eat nor drink after 12 midnight.    Please wear comfortable clothes and remember to bring with you your insurance card, a form of identification and your COVID vaccination card with you.   Do not bring valuables such as credit cards, checkbooks, money nor jewelry with you.     PLEASE BRING ALL MEDICATIONS OR AN UPDATED LIST OF CURRENT MEDICATIONS WITH YOU.     You must have a  on the day of the procedure.     If you have any questions please feel free to contact our office at [phone].     Radha Fountain RN BSN   Vascular and Endovascular Surgery.

## 2024-10-07 ENCOUNTER — HOSPITAL ENCOUNTER (OUTPATIENT)
Dept: RADIOLOGY | Facility: HOSPITAL | Age: 69
Discharge: HOME | End: 2024-10-07
Payer: COMMERCIAL

## 2024-10-07 DIAGNOSIS — R50.9 FEVER, UNSPECIFIED: ICD-10-CM

## 2024-10-07 DIAGNOSIS — J06.9 ACUTE UPPER RESPIRATORY INFECTION, UNSPECIFIED: ICD-10-CM

## 2024-10-07 PROCEDURE — 71046 X-RAY EXAM CHEST 2 VIEWS: CPT | Performed by: RADIOLOGY

## 2024-10-07 PROCEDURE — 71046 X-RAY EXAM CHEST 2 VIEWS: CPT

## 2024-10-23 ENCOUNTER — HOSPITAL ENCOUNTER (OUTPATIENT)
Facility: HOSPITAL | Age: 69
Setting detail: OUTPATIENT SURGERY
Discharge: HOME | End: 2024-10-23
Attending: SURGERY | Admitting: SURGERY
Payer: COMMERCIAL

## 2024-10-23 ENCOUNTER — ANESTHESIA (OUTPATIENT)
Dept: OPERATING ROOM | Facility: HOSPITAL | Age: 69
End: 2024-10-23
Payer: COMMERCIAL

## 2024-10-23 ENCOUNTER — ANESTHESIA EVENT (OUTPATIENT)
Dept: OPERATING ROOM | Facility: HOSPITAL | Age: 69
End: 2024-10-23
Payer: COMMERCIAL

## 2024-10-23 ENCOUNTER — APPOINTMENT (OUTPATIENT)
Dept: RADIOLOGY | Facility: HOSPITAL | Age: 69
End: 2024-10-23
Payer: COMMERCIAL

## 2024-10-23 VITALS
BODY MASS INDEX: 17.59 KG/M2 | HEIGHT: 71 IN | OXYGEN SATURATION: 93 % | DIASTOLIC BLOOD PRESSURE: 61 MMHG | WEIGHT: 125.66 LBS | RESPIRATION RATE: 16 BRPM | TEMPERATURE: 97.2 F | HEART RATE: 68 BPM | SYSTOLIC BLOOD PRESSURE: 99 MMHG

## 2024-10-23 DIAGNOSIS — K55.1 SUPERIOR MESENTERIC ARTERY STENOSIS (MULTI): ICD-10-CM

## 2024-10-23 DIAGNOSIS — I71.41 PARARENAL ABDOMINAL AORTIC ANEURYSM (AAA) WITHOUT RUPTURE (CMS-HCC): Primary | ICD-10-CM

## 2024-10-23 PROBLEM — J69.0 ASPIRATION PNEUMONIA (MULTI): Status: RESOLVED | Noted: 2023-11-21 | Resolved: 2024-10-23

## 2024-10-23 PROBLEM — M06.9 RHEUMATOID ARTHRITIS: Status: ACTIVE | Noted: 2024-10-23

## 2024-10-23 LAB
ABO GROUP (TYPE) IN BLOOD: NORMAL
ACT BLD: 300 SEC (ref 83–199)
ACT BLD: 331 SEC (ref 83–199)
ANTIBODY SCREEN: NORMAL
RH FACTOR (ANTIGEN D): NORMAL

## 2024-10-23 PROCEDURE — 2780000003 HC OR 278 NO HCPCS: Performed by: SURGERY

## 2024-10-23 PROCEDURE — 7100000009 HC PHASE TWO TIME - INITIAL BASE CHARGE: Performed by: SURGERY

## 2024-10-23 PROCEDURE — C1874 STENT, COATED/COV W/DEL SYS: HCPCS | Performed by: SURGERY

## 2024-10-23 PROCEDURE — 2500000004 HC RX 250 GENERAL PHARMACY W/ HCPCS (ALT 636 FOR OP/ED): Performed by: STUDENT IN AN ORGANIZED HEALTH CARE EDUCATION/TRAINING PROGRAM

## 2024-10-23 PROCEDURE — C1760 CLOSURE DEV, VASC: HCPCS | Performed by: SURGERY

## 2024-10-23 PROCEDURE — 86901 BLOOD TYPING SEROLOGIC RH(D): CPT | Performed by: SURGERY

## 2024-10-23 PROCEDURE — 85347 COAGULATION TIME ACTIVATED: CPT

## 2024-10-23 PROCEDURE — 3600000009 HC OR TIME - EACH INCREMENTAL 1 MINUTE - PROCEDURE LEVEL FOUR: Performed by: SURGERY

## 2024-10-23 PROCEDURE — 3600000004 HC OR TIME - INITIAL BASE CHARGE - PROCEDURE LEVEL FOUR: Performed by: SURGERY

## 2024-10-23 PROCEDURE — 2500000001 HC RX 250 WO HCPCS SELF ADMINISTERED DRUGS (ALT 637 FOR MEDICARE OP): Performed by: STUDENT IN AN ORGANIZED HEALTH CARE EDUCATION/TRAINING PROGRAM

## 2024-10-23 PROCEDURE — 7100000010 HC PHASE TWO TIME - EACH INCREMENTAL 1 MINUTE: Performed by: SURGERY

## 2024-10-23 PROCEDURE — C1769 GUIDE WIRE: HCPCS | Performed by: SURGERY

## 2024-10-23 PROCEDURE — 75625 CONTRAST EXAM ABDOMINL AORTA: CPT | Performed by: SURGERY

## 2024-10-23 PROCEDURE — C1725 CATH, TRANSLUMIN NON-LASER: HCPCS | Performed by: SURGERY

## 2024-10-23 PROCEDURE — 76937 US GUIDE VASCULAR ACCESS: CPT | Performed by: SURGERY

## 2024-10-23 PROCEDURE — 36415 COLL VENOUS BLD VENIPUNCTURE: CPT | Performed by: SURGERY

## 2024-10-23 PROCEDURE — C1894 INTRO/SHEATH, NON-LASER: HCPCS | Performed by: SURGERY

## 2024-10-23 PROCEDURE — 7100000001 HC RECOVERY ROOM TIME - INITIAL BASE CHARGE: Performed by: SURGERY

## 2024-10-23 PROCEDURE — 36246 INS CATH ABD/L-EXT ART 2ND: CPT | Performed by: SURGERY

## 2024-10-23 PROCEDURE — 2720000007 HC OR 272 NO HCPCS: Performed by: SURGERY

## 2024-10-23 PROCEDURE — 2500000004 HC RX 250 GENERAL PHARMACY W/ HCPCS (ALT 636 FOR OP/ED): Performed by: SURGERY

## 2024-10-23 PROCEDURE — 2550000001 HC RX 255 CONTRASTS: Performed by: SURGERY

## 2024-10-23 PROCEDURE — 37236 OPEN/PERQ PLACE STENT 1ST: CPT | Performed by: SURGERY

## 2024-10-23 PROCEDURE — 3700000001 HC GENERAL ANESTHESIA TIME - INITIAL BASE CHARGE: Performed by: SURGERY

## 2024-10-23 PROCEDURE — 3700000002 HC GENERAL ANESTHESIA TIME - EACH INCREMENTAL 1 MINUTE: Performed by: SURGERY

## 2024-10-23 PROCEDURE — C1887 CATHETER, GUIDING: HCPCS | Performed by: SURGERY

## 2024-10-23 PROCEDURE — 7100000002 HC RECOVERY ROOM TIME - EACH INCREMENTAL 1 MINUTE: Performed by: SURGERY

## 2024-10-23 DEVICE — BX2 HEP REDUCED PROFILE BX2 6MMX29MM 6FR 135CM CATH
Type: IMPLANTABLE DEVICE | Site: ARTERIAL | Status: FUNCTIONAL
Brand: GORE VIABAHN VBX BALLOON EXPANDABLE ENDO

## 2024-10-23 RX ORDER — ONDANSETRON HYDROCHLORIDE 2 MG/ML
4 INJECTION, SOLUTION INTRAVENOUS ONCE AS NEEDED
Status: DISCONTINUED | OUTPATIENT
Start: 2024-10-23 | End: 2024-10-23 | Stop reason: HOSPADM

## 2024-10-23 RX ORDER — SODIUM CHLORIDE, SODIUM LACTATE, POTASSIUM CHLORIDE, CALCIUM CHLORIDE 600; 310; 30; 20 MG/100ML; MG/100ML; MG/100ML; MG/100ML
100 INJECTION, SOLUTION INTRAVENOUS CONTINUOUS
Status: DISCONTINUED | OUTPATIENT
Start: 2024-10-23 | End: 2024-10-23 | Stop reason: HOSPADM

## 2024-10-23 RX ORDER — MIDAZOLAM HYDROCHLORIDE 1 MG/ML
INJECTION INTRAMUSCULAR; INTRAVENOUS AS NEEDED
Status: DISCONTINUED | OUTPATIENT
Start: 2024-10-23 | End: 2024-10-23

## 2024-10-23 RX ORDER — IODIXANOL 320 MG/ML
INJECTION, SOLUTION INTRAVASCULAR AS NEEDED
Status: DISCONTINUED | OUTPATIENT
Start: 2024-10-23 | End: 2024-10-23 | Stop reason: HOSPADM

## 2024-10-23 RX ORDER — FENTANYL CITRATE 50 UG/ML
INJECTION, SOLUTION INTRAMUSCULAR; INTRAVENOUS AS NEEDED
Status: DISCONTINUED | OUTPATIENT
Start: 2024-10-23 | End: 2024-10-23

## 2024-10-23 RX ORDER — CLOPIDOGREL BISULFATE 75 MG/1
75 TABLET ORAL DAILY
Qty: 30 TABLET | Refills: 3 | Status: SHIPPED | OUTPATIENT
Start: 2024-10-23 | End: 2025-02-20

## 2024-10-23 RX ORDER — ALBUTEROL SULFATE 0.83 MG/ML
2.5 SOLUTION RESPIRATORY (INHALATION) ONCE AS NEEDED
Status: DISCONTINUED | OUTPATIENT
Start: 2024-10-23 | End: 2024-10-23 | Stop reason: HOSPADM

## 2024-10-23 RX ORDER — HYDROMORPHONE HYDROCHLORIDE 1 MG/ML
0.2 INJECTION, SOLUTION INTRAMUSCULAR; INTRAVENOUS; SUBCUTANEOUS EVERY 5 MIN PRN
Status: DISCONTINUED | OUTPATIENT
Start: 2024-10-23 | End: 2024-10-23 | Stop reason: HOSPADM

## 2024-10-23 RX ORDER — PROPOFOL 10 MG/ML
INJECTION, EMULSION INTRAVENOUS CONTINUOUS PRN
Status: DISCONTINUED | OUTPATIENT
Start: 2024-10-23 | End: 2024-10-23

## 2024-10-23 RX ORDER — LIDOCAINE HYDROCHLORIDE 10 MG/ML
0.1 INJECTION, SOLUTION INFILTRATION; PERINEURAL ONCE
Status: DISCONTINUED | OUTPATIENT
Start: 2024-10-23 | End: 2024-10-23 | Stop reason: HOSPADM

## 2024-10-23 RX ORDER — VERAPAMIL HYDROCHLORIDE 2.5 MG/ML
INJECTION, SOLUTION INTRAVENOUS AS NEEDED
Status: DISCONTINUED | OUTPATIENT
Start: 2024-10-23 | End: 2024-10-23 | Stop reason: HOSPADM

## 2024-10-23 RX ORDER — PROTAMINE SULFATE 10 MG/ML
INJECTION, SOLUTION INTRAVENOUS AS NEEDED
Status: DISCONTINUED | OUTPATIENT
Start: 2024-10-23 | End: 2024-10-23

## 2024-10-23 RX ORDER — CLOPIDOGREL BISULFATE 300 MG/1
150 TABLET, FILM COATED ORAL ONCE
Status: COMPLETED | OUTPATIENT
Start: 2024-10-23 | End: 2024-10-23

## 2024-10-23 RX ORDER — HEPARIN SODIUM 1000 [USP'U]/ML
INJECTION, SOLUTION INTRAVENOUS; SUBCUTANEOUS AS NEEDED
Status: DISCONTINUED | OUTPATIENT
Start: 2024-10-23 | End: 2024-10-23

## 2024-10-23 RX ORDER — DEXMEDETOMIDINE HYDROCHLORIDE 4 UG/ML
INJECTION, SOLUTION INTRAVENOUS CONTINUOUS PRN
Status: DISCONTINUED | OUTPATIENT
Start: 2024-10-23 | End: 2024-10-23

## 2024-10-23 RX ORDER — CEFAZOLIN 1 G/1
INJECTION, POWDER, FOR SOLUTION INTRAVENOUS AS NEEDED
Status: DISCONTINUED | OUTPATIENT
Start: 2024-10-23 | End: 2024-10-23

## 2024-10-23 RX ORDER — HYDROMORPHONE HYDROCHLORIDE 1 MG/ML
0.4 INJECTION, SOLUTION INTRAMUSCULAR; INTRAVENOUS; SUBCUTANEOUS EVERY 5 MIN PRN
Status: DISCONTINUED | OUTPATIENT
Start: 2024-10-23 | End: 2024-10-23 | Stop reason: HOSPADM

## 2024-10-23 RX ORDER — ACETAMINOPHEN 325 MG/1
650 TABLET ORAL EVERY 4 HOURS PRN
Status: DISCONTINUED | OUTPATIENT
Start: 2024-10-23 | End: 2024-10-23 | Stop reason: HOSPADM

## 2024-10-23 RX ORDER — CEFAZOLIN SODIUM 2 G/100ML
2 INJECTION, SOLUTION INTRAVENOUS ONCE
Status: DISCONTINUED | OUTPATIENT
Start: 2024-10-23 | End: 2024-10-23 | Stop reason: HOSPADM

## 2024-10-23 RX ORDER — LABETALOL HYDROCHLORIDE 5 MG/ML
5 INJECTION, SOLUTION INTRAVENOUS ONCE AS NEEDED
Status: DISCONTINUED | OUTPATIENT
Start: 2024-10-23 | End: 2024-10-23 | Stop reason: HOSPADM

## 2024-10-23 RX ORDER — PHENYLEPHRINE HYDROCHLORIDE 10 MG/ML
INJECTION INTRAVENOUS AS NEEDED
Status: DISCONTINUED | OUTPATIENT
Start: 2024-10-23 | End: 2024-10-23

## 2024-10-23 RX ORDER — LIDOCAINE HYDROCHLORIDE 10 MG/ML
INJECTION, SOLUTION INFILTRATION; PERINEURAL AS NEEDED
Status: DISCONTINUED | OUTPATIENT
Start: 2024-10-23 | End: 2024-10-23 | Stop reason: HOSPADM

## 2024-10-23 RX ORDER — SODIUM CHLORIDE, SODIUM LACTATE, POTASSIUM CHLORIDE, CALCIUM CHLORIDE 600; 310; 30; 20 MG/100ML; MG/100ML; MG/100ML; MG/100ML
20 INJECTION, SOLUTION INTRAVENOUS CONTINUOUS
Status: DISCONTINUED | OUTPATIENT
Start: 2024-10-23 | End: 2024-10-23 | Stop reason: HOSPADM

## 2024-10-23 ASSESSMENT — PAIN - FUNCTIONAL ASSESSMENT
PAIN_FUNCTIONAL_ASSESSMENT: UNABLE TO SELF-REPORT
PAIN_FUNCTIONAL_ASSESSMENT: 0-10
PAIN_FUNCTIONAL_ASSESSMENT: UNABLE TO SELF-REPORT
PAIN_FUNCTIONAL_ASSESSMENT: 0-10
PAIN_FUNCTIONAL_ASSESSMENT: UNABLE TO SELF-REPORT
PAIN_FUNCTIONAL_ASSESSMENT: 0-10

## 2024-10-23 ASSESSMENT — PAIN SCALES - GENERAL
PAINLEVEL_OUTOF10: 5 - MODERATE PAIN
PAINLEVEL_OUTOF10: 5 - MODERATE PAIN
PAINLEVEL_OUTOF10: 0 - NO PAIN
PAINLEVEL_OUTOF10: 5 - MODERATE PAIN

## 2024-10-23 ASSESSMENT — PAIN DESCRIPTION - LOCATION: LOCATION: GENERALIZED

## 2024-10-23 ASSESSMENT — PAIN SCALES - PAIN ASSESSMENT IN ADVANCED DEMENTIA (PAINAD): TOTALSCORE: MEDICATION (SEE MAR)

## 2024-10-23 NOTE — DISCHARGE INSTRUCTIONS
A stent was placed in an artery in your belly through a small puncture in your left wrist.  You will start taking a new medication, Plavix, once a day.   You will follow up with Dr. Street in 4-6 weeks. You will get an ultrasound of your belly prior to that visit to assess the stent.  If you develop any severe abdominal or back pain, bleeding or significant pain in your left hand or wrist, please immediately visit the nearest emergency department.  If you have any questions or concerns, please call the clinic.

## 2024-10-23 NOTE — ANESTHESIA PROCEDURE NOTES
Peripheral IV  Date/Time: 10/23/2024 11:21 AM      Placement  Needle size: 20 G  Laterality: right  Location: forearm  Local anesthetic: none  Site prep: chlorhexidine  Technique: anatomical landmarks  Attempts: 1

## 2024-10-23 NOTE — ANESTHESIA POSTPROCEDURE EVALUATION
Patient: Yobany Small    Procedure Summary       Date: 10/23/24 Room / Location: [facility] OR 27 / Virtual [facility] OR    Anesthesia Start: 1111 Anesthesia Stop: 1304    Procedure: Aortogram Diagnosis:       Pararenal abdominal aortic aneurysm (AAA) without rupture (CMS-HCC)      Superior mesenteric artery stenosis (Multi)      (Pararenal abdominal aortic aneurysm (AAA) without rupture (CMS-HCC) [I71.41])      (Superior mesenteric artery stenosis (Multi) [K55.1])    Surgeons: Kar Street MD Responsible Provider: Terry Hunt MD    Anesthesia Type: MAC ASA Status: 3            Anesthesia Type: MAC    Vitals Value Taken Time   /57 10/23/24 1300   Temp 36.4 °C (97.5 °F) 10/23/24 1255   Pulse 57 10/23/24 1302   Resp 14 10/23/24 1302   SpO2 100 % 10/23/24 1302   Vitals shown include unfiled device data.    Anesthesia Post Evaluation    Patient location during evaluation: PACU  Patient participation: complete - patient cannot participate  Level of consciousness: awake  Pain management: adequate  Airway patency: patent  Cardiovascular status: acceptable  Respiratory status: acceptable  Hydration status: acceptable  Postoperative Nausea and Vomiting: none        No notable events documented.

## 2024-10-23 NOTE — ANESTHESIA PREPROCEDURE EVALUATION
Patient: Yobany Small    Procedure Information       Date/Time: 10/23/24 1120    Procedure: Aortogram - LEFT RADIAL APPROACH FOR AORTOGRAM    [facility] [place]    Surgeons: Kar Street MD            Relevant Problems   Cardiac   (+) AAA (abdominal aortic aneurysm) (CMS-HCC) (Repaired )   (+) CAD in native artery   (+) Chronic systolic congestive heart failure   (+) HTN (hypertension)   (+) Superior mesenteric artery stenosis (Multi)      Pulmonary   (+) Aspiration pneumonia (Multi) (Resolved)   (+) Chronic obstructive pulmonary disease (COPD) (Multi)      Neuro   (+) Generalized anxiety disorder      /Renal   (+) Other hydronephrosis   (+) Urothelial carcinoma of kidney, left (Multi)      Hematology   (+) Anemia      Musculoskeletal   (+) Rheumatoid arthritis (On methotrexate and prednisone )      ID   (+) Aspiration pneumonia (Multi) (Resolved)     Echo 5/2024: improvement in EF from 1/2024 from 25-30%    CONCLUSIONS:   1. Poorly visualized anatomical structures due to suboptimal image quality.   2. Left ventricular systolic function appears to be low normal (EF 55%)   3. There is moderate dilatation of the aortic root.  Clinical information reviewed:                   NPO Detail:  No data recorded     Physical Exam    Airway  Mallampati: II  TM distance: >3 FB  Neck ROM: full     Cardiovascular - normal exam     Dental   (+) lower dentures, upper dentures     Pulmonary   (+) decreased breath sounds  Comments: Came in with spo2 of 84% placed on 2L NC, patient uses o2 at home on and off    Abdominal - normal exam             Anesthesia Plan    History of general anesthesia?: yes  History of complications of general anesthesia?: no    ASA 3     MAC     intravenous induction   Anesthetic plan and risks discussed with patient.  Use of blood products discussed with patient who consented to blood products.    Plan discussed with resident and attending.

## 2024-10-23 NOTE — H&P
History Of Present Illness  Yobany Small is a 69 y.o. male presenting with SMA stenosis.    Will go to OR today with Dr. Street for aortogram with SMA stenting.    No new complaints. Denies chest pain, shortness of breath, nausea, vomiting, fevers, chills.    No other concerns.     Past Medical History  Past Medical History:   Diagnosis Date    Abnormal findings on diagnostic imaging of heart and coronary circulation     Abnormal echocardiogram    Abnormal findings on diagnostic imaging of other specified body structures     Abnormal ultrasound    Abnormal findings on diagnostic imaging of other specified body structures     Abnormal ultrasound    Abnormal findings on diagnostic imaging of other specified body structures     Abnormal chest x-ray    Abnormal findings on diagnostic imaging of other specified body structures     Abnormal CT of the chest    Abnormal findings on diagnostic imaging of other specified body structures     Abnormal computed tomography angiography (CTA)    Abnormal findings on diagnostic imaging of other specified body structures     Abnormal CT of the chest    BPH (benign prostatic hyperplasia)     COPD (chronic obstructive pulmonary disease) (Multi)     Coronary artery disease     GERD (gastroesophageal reflux disease)     Hyperlipidemia     Hypertension     Personal history of other medical treatment     History of echocardiogram    Shortness of breath     Vision loss        Surgical History  Past Surgical History:   Procedure Laterality Date    CT ABDOMEN PELVIS ANGIOGRAM W AND/OR WO IV CONTRAST  11/7/2019    CT ABDOMEN PELVIS ANGIOGRAM W AND/OR WO IV CONTRAST 11/7/2019 PAR ANCILLARY LEGACY    CT ABDOMEN PELVIS ANGIOGRAM W AND/OR WO IV CONTRAST  7/8/2020    CT ABDOMEN PELVIS ANGIOGRAM W AND/OR WO IV CONTRAST 7/8/2020 PAR ANCILLARY LEGACY    OTHER SURGICAL HISTORY  05/30/2019    Salivary gland surgery    OTHER SURGICAL HISTORY  06/16/2020    Bronchoscopy    OTHER SURGICAL HISTORY   "06/16/2020    Seroma incision and drainage    OTHER SURGICAL HISTORY  04/02/2021    Cardioverter defibrillator insertion    OTHER SURGICAL HISTORY  01/30/2020    Cardiac catheterization with stent placement    OTHER SURGICAL HISTORY  06/16/2020    Abdominal aortic aneurysm repair endovascular        Social History  He reports that he quit smoking about 6 years ago. His smoking use included cigarettes. He has never used smokeless tobacco. He reports that he does not currently use alcohol. He reports that he does not use drugs.    Family History  Family History   Problem Relation Name Age of Onset    No Known Problems Mother          no relationship    No Known Problems Father          no relationship       Allergies  Methocarbamol    Review of Systems  Negative, except for what is noted in the HPI.     Physical Exam  General: No acute distress. Nontoxic.  HEENT: Atraumatic, EOMI. MMM  Card: RR  Pulm: Nonlabored breathing  Abdomen: Soft, nontender.  Extremities: MAEx4. Palpable L radial pulse.     Last Recorded Vitals  Blood pressure 120/74, pulse 97, temperature 36.3 °C (97.3 °F), temperature source Temporal, resp. rate 16, height 1.803 m (5' 11\"), weight 57 kg (125 lb 10.6 oz), SpO2 98%.      Assessment/Plan   Assessment & Plan  Superior mesenteric artery stenosis (Multi)    AAA (abdominal aortic aneurysm) (CMS-[facility])    Rheumatoid arthritis      Patient to proceed to OR with Dr. Street for aortogram and SMA stenting.    Richie Jasmine MD  Vascular Surgery, PGY4  Team Pager: [phone]  "

## 2024-10-23 NOTE — BRIEF OP NOTE
Date: 10/23/2024  OR Location: [facility] OR    Name: Yobany Small : 1955, Age: 69 y.o., MRN: [id number], Sex: male    Diagnosis  Pre-op Diagnosis      * Pararenal abdominal aortic aneurysm (AAA) without rupture (CMS-HCC) [I71.41]     * Superior mesenteric artery stenosis (Multi) [K55.1] Post-op Diagnosis     * Pararenal abdominal aortic aneurysm (AAA) without rupture (CMS-HCC) [I71.41]     * Superior mesenteric artery stenosis (Multi) [K55.1]     Procedures  Aortogram  07286 - CHG AORTOGRAPHY ABDOMINAL SERIALOGRAPHY RS&I      Surgeons      * Kar Street - Primary    Resident/Fellow/Other Assistant:  Surgeons and Role:     * Richie Jasmine MD - Resident - Assisting    Procedure Summary  Anesthesia: Monitor Anesthesia Care  ASA: III  Anesthesia Staff: Anesthesiologist: Terry Hunt MD; Ct Soto MD  Anesthesia Resident: Jluis Robb DO  Estimated Blood Loss: 10mL  Intra-op Medications:   Administrations occurring from 1120 to 1415 on 10/23/24:   Medication Name Total Dose   iodixanol (VISIPaque) 320 mg iodine/mL injection 57 mL   lidocaine (Xylocaine) 10 mg/mL (1 %) injection 2 mL   verapamil (Isoptin) injection 10 mg   ceFAZolin (Ancef) vial 1 g 2 g   dexmedeTOMIDine 4 mcg/mL in 100 mL NS infusion 28.5 mcg   fentaNYL (Sublimaze) injection 50 mcg/mL 100 mcg   heparin injection 1,000 units/mL 7,000 Units   midazolam PF (Versed) injection 1 mg/mL 2 mg   phenylephrine (Joey-Synephrine) injection 520 mcg   protamine injection 40 mg              Anesthesia Record               Intraprocedure I/O Totals          Intake    Dexmedetomidine 0.00 mL    The total shown is the total volume documented since Anesthesia Start was filed.    Total Intake 0 mL          Specimen: No specimens collected     Staff:   Circulator: Evette Moralesub Person: Ned Moralesub Person: Brianna  Circulator: Royer Serna Scrub: Mckenna    Findings:   Stenosed SMA w/ >30mmHg pressure differential.   Well-opposed  SMA stent with brisk flow, improved.  R renal artery with brisk filling, no significant stenosis.    Complications:  None; patient tolerated the procedure well.     Disposition: PACU - hemodynamically stable.  Condition: stable  Specimens Collected: No specimens collected  Attending Attestation: I was present and scrubbed for the entire procedure.    Kar Street  Phone Number: [phone]    Richie Jasmine MD  Vascular Surgery, PGY4  Team Pager: [phone]

## 2024-10-23 NOTE — OP NOTE
Operative Note     Date: 10/23/24  Name: Yobany Small   : 1955  MRN: [id number]     Diagnosis  Chronic mesenteric ischemia with worsening SMA stenosis     Procedures  Aortogram with radiologic S&I  2nd order selective catheterization SMA from L radial approach  Angioplasty and stenting of the SMA using 6mm x 29mm VBX and 9mm flaring into the aorta     Surgeon      * Kar Street - Primary    Resident/Fellow/Other Assistant:  Angelo Jasmine MD    Procedure Summary  Anesthesia: MAC, local   Estimated Blood Loss: minimal    Specimen: No specimens collected       Findings: widely patent SMA at the conclusion    Indications: Yobany Small is an 69 y.o.  y.o. male  who presents with significant weight loss and worsening SMA stenosis.    Procedure Details: The patient was brought to the hybrid room and placed supine on the table. The left wrist was prepped and draped in sterile fashion. MAC was administered. Ultrasound was used to access the left radial artery and 6Fr slender sheath was placed. Radial antispasmotic cocktail was administered. Heparin was administered. Wire access into the descending thoracic aorta was obtained and a 105cm 6Fr R2P sheath was placed to the diaphragm. Aortogram was performed to identify the level of the SMA and I was able to visualize a high grade stenosis in the proximal SMA. The SMA was catheterized. Pressure gradient between the post stenotic SMA and the aorta was measured and was 30mmHg. The lesion was stented using 6 x 29mm VBX and flared with 9mm balloon into the aorta. There was excellent flow through the SMA with no residual stenosis at the conclusion. The devices were removed and TR band used for hemostasis.      Attending Attestation: I was present and scrubbed for the entire procedure.    Kar Street

## 2024-11-15 ENCOUNTER — HOSPITAL ENCOUNTER (OUTPATIENT)
Dept: VASCULAR MEDICINE | Facility: HOSPITAL | Age: 69
Discharge: HOME | End: 2024-11-15
Payer: COMMERCIAL

## 2024-11-15 DIAGNOSIS — K55.1 SUPERIOR MESENTERIC ARTERY STENOSIS (MULTI): ICD-10-CM

## 2024-11-15 PROCEDURE — 93975 VASCULAR STUDY: CPT

## 2024-11-15 PROCEDURE — 93975 VASCULAR STUDY: CPT | Performed by: SURGERY

## 2024-12-04 ENCOUNTER — APPOINTMENT (OUTPATIENT)
Dept: RADIOLOGY | Facility: HOSPITAL | Age: 69
End: 2024-12-04
Payer: COMMERCIAL

## 2024-12-04 ENCOUNTER — TELEMEDICINE (OUTPATIENT)
Dept: VASCULAR SURGERY | Facility: HOSPITAL | Age: 69
End: 2024-12-04
Payer: COMMERCIAL

## 2024-12-04 DIAGNOSIS — K55.1 CHRONIC MESENTERIC ISCHEMIA (MULTI): Primary | ICD-10-CM

## 2024-12-04 DIAGNOSIS — I71.42 JUXTARENAL ABDOMINAL AORTIC ANEURYSM (AAA) WITHOUT RUPTURE (CMS-HCC): ICD-10-CM

## 2024-12-04 PROCEDURE — 99213 OFFICE O/P EST LOW 20 MIN: CPT | Performed by: SURGERY

## 2024-12-04 NOTE — PROGRESS NOTES
Vascular Surgery Clinic Note    CC: AAA/mesenteric    HPI:  Yobany Small is 69 y.o. male with history of explant of EVAR with extent IV thoraco repair in 5/2024. Subsequent recent SMA stent for worsening stenosis. He feels well but has residual chronic pain from surgery.      Medical History:   has a past medical history of Abnormal findings on diagnostic imaging of heart and coronary circulation, Abnormal findings on diagnostic imaging of other specified body structures, Abnormal findings on diagnostic imaging of other specified body structures, Abnormal findings on diagnostic imaging of other specified body structures, Abnormal findings on diagnostic imaging of other specified body structures, Abnormal findings on diagnostic imaging of other specified body structures, Abnormal findings on diagnostic imaging of other specified body structures, BPH (benign prostatic hyperplasia), COPD (chronic obstructive pulmonary disease) (Multi), Coronary artery disease, GERD (gastroesophageal reflux disease), Hyperlipidemia, Hypertension, Personal history of other medical treatment, Shortness of breath, and Vision loss.    Meds:   Current Outpatient Medications on File Prior to Visit   Medication Sig Dispense Refill    aspirin 81 mg EC tablet Take 1 tablet (81 mg) by mouth once daily.      clopidogrel (Plavix) 75 mg tablet Take 1 tablet (75 mg) by mouth once daily. 30 tablet 3    ferrous sulfate, 325 mg ferrous sulfate, (FeosoL) tablet Take 1 tablet (325 mg) by mouth once daily with breakfast. (Patient not taking: Reported on 10/23/2024)      fluticasone-umeclidin-vilanter (Trelegy Ellipta) 100-62.5-25 mcg blister with device Inhale 1 puff once daily.      lidocaine 4 % patch Place 1 patch over 12 hours on the skin once daily. Remove & discard patch within 12 hours or as directed by MD. (Patient not taking: Reported on 10/23/2024) 30 patch 0    methotrexate (Trexall) 2.5 mg tablet Take 6 tablets (15 mg total) by mouth 1  (one) time per week.  Follow directions carefully, and ask to explain any part you do not understand. Take exactly as directed.  6 tabs Saturday      metoprolol succinate XL (Toprol-XL) 50 mg 24 hr tablet Take 1 tablet (50 mg) by mouth once daily. Do not crush or chew. 90 tablet 3    predniSONE (Deltasone) 10 mg tablet Take 0.5 tablets (5 mg) by mouth once daily.      sacubitriL-valsartan (Entresto) 24-26 mg tablet Take 1 tablet by mouth 2 times a day. 180 tablet 3    simvastatin (Zocor) 40 mg tablet TAKE 1 TABLET BY MOUTH ONCE DAILY AT BEDTIME 90 tablet 3    tamsulosin (Flomax) 0.4 mg 24 hr capsule 1 capsule (0.4 mg) once daily. (Patient not taking: Reported on 10/23/2024)       No current facility-administered medications on file prior to visit.        Allergies:   Allergies   Allergen Reactions    Methocarbamol Swelling and Blurred vision       SH:    Social Drivers of Health     Tobacco Use: Medium Risk (10/23/2024)    Patient History     Smoking Tobacco Use: Former     Smokeless Tobacco Use: Never     Passive Exposure: Not on file   Alcohol Use: Not At Risk (11/18/2023)    AUDIT-C     Frequency of Alcohol Consumption: Never     Average Number of Drinks: Patient does not drink     Frequency of Binge Drinking: Never   Financial Resource Strain: Low Risk  (11/18/2023)    Overall Financial Resource Strain (CARDIA)     Difficulty of Paying Living Expenses: Not hard at all   Food Insecurity: Not on file   Transportation Needs: No Transportation Needs (6/7/2024)    OASIS : Transportation     Lack of Transportation (Medical): No     Lack of Transportation (Non-Medical): No     Patient Unable or Declines to Respond: No   Physical Activity: Not on file   Stress: Not on file   Social Connections: Feeling Socially Integrated (6/7/2024)    OASIS : Social Isolation     Frequency of experiencing loneliness or isolation: Never   Intimate Partner Violence: Not on file   Depression: Not at risk (9/18/2024)    PHQ-2      PHQ-2 Score: 0   Housing Stability: Low Risk  (11/18/2023)    Housing Stability Vital Sign     Unable to Pay for Housing in the Last Year: No     Number of Places Lived in the Last Year: 1     Unstable Housing in the Last Year: No   Utilities: Not on file   Digital Equity: Not on file   Health Literacy: Adequate Health Literacy (6/7/2024)    OASIS : Health Literacy     Frequency of needing help to read materials from doctor or pharmacy: Never        FH:  Family History   Problem Relation Name Age of Onset    No Known Problems Mother          no relationship    No Known Problems Father          no relationship        ROS:  All systems were reviewed and are negative except as per HPI.    Assessment & Plan:  Yobany Small is 69 y.o. male doing well s/p recent SMA stent placement. Doing well. I reviewed the duplex that shows widely patent SMA stent. RTC 6 mo with BMP, and renal/mesenteric duplex.      I spent a total of 20 minutes on the day of the visit.         Kar Street M.D.

## 2024-12-17 ENCOUNTER — TELEPHONE (OUTPATIENT)
Dept: CARDIOLOGY | Facility: CLINIC | Age: 69
End: 2024-12-17

## 2024-12-17 DIAGNOSIS — Z01.810 PREOP CARDIOVASCULAR EXAM: ICD-10-CM

## 2024-12-17 DIAGNOSIS — R06.02 SHORTNESS OF BREATH: ICD-10-CM

## 2024-12-17 DIAGNOSIS — I25.118 CORONARY ARTERY DISEASE INVOLVING NATIVE CORONARY ARTERY OF NATIVE HEART WITH OTHER FORM OF ANGINA PECTORIS: ICD-10-CM

## 2024-12-17 RX ORDER — SACUBITRIL AND VALSARTAN 24; 26 MG/1; MG/1
1 TABLET, FILM COATED ORAL 2 TIMES DAILY
Qty: 180 TABLET | Refills: 0 | Status: SHIPPED | OUTPATIENT
Start: 2024-12-17

## 2024-12-17 NOTE — TELEPHONE ENCOUNTER
Wife called and said for the past 14 days Yobany has had a cold, and weak with some SON. Not sure if maybe COVID or Pneumonia.I left a message for Kristen that she should try to get him in to his PCP or Urgent care center and if increase SON to go to the ER.

## 2024-12-18 ENCOUNTER — HOSPITAL ENCOUNTER (OUTPATIENT)
Dept: RADIOLOGY | Facility: HOSPITAL | Age: 69
Discharge: HOME | End: 2024-12-18
Payer: COMMERCIAL

## 2024-12-18 DIAGNOSIS — R05.1 ACUTE COUGH: ICD-10-CM

## 2024-12-18 DIAGNOSIS — R06.02 SHORTNESS OF BREATH: ICD-10-CM

## 2024-12-18 PROCEDURE — 71046 X-RAY EXAM CHEST 2 VIEWS: CPT | Performed by: RADIOLOGY

## 2024-12-18 PROCEDURE — 71046 X-RAY EXAM CHEST 2 VIEWS: CPT

## 2025-01-24 ENCOUNTER — TELEPHONE (OUTPATIENT)
Dept: VASCULAR SURGERY | Facility: HOSPITAL | Age: 70
End: 2025-01-24
Payer: COMMERCIAL

## 2025-01-24 NOTE — TELEPHONE ENCOUNTER
"I have had the pleasure of speaking with Mrs. Kristen Small concerning her  Yobany.   Following  review with  Dr. Kar Street,  the patient has been on Plaivx  for  3 months following stenting of the SMA.    10/23/2024  Angioplasty and stenting of the SMA     Per Dr. Street,  \"3 mo DAPT. Can be on ASA 81mg now if it's been 3 mo\".    I have informed  Mrs. Small that  Yobany may stop Plavix and  begin taking Aspirin 81 mg orally on a daily basis.     TREVIN Jackson   "

## 2025-03-21 DIAGNOSIS — I25.118 CORONARY ARTERY DISEASE INVOLVING NATIVE CORONARY ARTERY OF NATIVE HEART WITH OTHER FORM OF ANGINA PECTORIS: ICD-10-CM

## 2025-03-25 RX ORDER — SACUBITRIL AND VALSARTAN 24; 26 MG/1; MG/1
1 TABLET, FILM COATED ORAL 2 TIMES DAILY
Qty: 180 TABLET | Refills: 0 | Status: SHIPPED | OUTPATIENT
Start: 2025-03-25

## 2025-05-12 PROBLEM — K52.9 COLITIS: Status: RESOLVED | Noted: 2023-11-18 | Resolved: 2025-05-12

## 2025-05-12 PROBLEM — J02.9 SORE THROAT: Status: RESOLVED | Noted: 2024-02-21 | Resolved: 2025-05-12

## 2025-05-12 PROBLEM — I50.22 CHRONIC SYSTOLIC CONGESTIVE HEART FAILURE: Status: RESOLVED | Noted: 2020-05-04 | Resolved: 2025-05-12

## 2025-05-12 PROBLEM — R93.1 CARDIAC LV EJECTION FRACTION 21-30%: Status: RESOLVED | Noted: 2023-11-21 | Resolved: 2025-05-12

## 2025-06-02 ENCOUNTER — OFFICE VISIT (OUTPATIENT)
Dept: CARDIOLOGY | Facility: CLINIC | Age: 70
End: 2025-06-02
Payer: COMMERCIAL

## 2025-06-02 VITALS
WEIGHT: 132.2 LBS | DIASTOLIC BLOOD PRESSURE: 74 MMHG | OXYGEN SATURATION: 93 % | SYSTOLIC BLOOD PRESSURE: 122 MMHG | HEIGHT: 71 IN | BODY MASS INDEX: 18.51 KG/M2 | HEART RATE: 75 BPM

## 2025-06-02 DIAGNOSIS — I25.10 CORONARY ARTERY DISEASE, UNSPECIFIED VESSEL OR LESION TYPE, UNSPECIFIED WHETHER ANGINA PRESENT, UNSPECIFIED WHETHER NATIVE OR TRANSPLANTED HEART: ICD-10-CM

## 2025-06-02 LAB
ATRIAL RATE: 75 BPM
P AXIS: 78 DEGREES
P OFFSET: 197 MS
P ONSET: 152 MS
PR INTERVAL: 138 MS
Q ONSET: 221 MS
QRS COUNT: 13 BEATS
QRS DURATION: 98 MS
QT INTERVAL: 384 MS
QTC CALCULATION(BAZETT): 428 MS
QTC FREDERICIA: 413 MS
R AXIS: 76 DEGREES
T AXIS: -5 DEGREES
T OFFSET: 413 MS
VENTRICULAR RATE: 75 BPM

## 2025-06-02 PROCEDURE — 99212 OFFICE O/P EST SF 10 MIN: CPT | Performed by: MARRIAGE & FAMILY THERAPIST

## 2025-06-02 PROCEDURE — 99214 OFFICE O/P EST MOD 30 MIN: CPT | Performed by: STUDENT IN AN ORGANIZED HEALTH CARE EDUCATION/TRAINING PROGRAM

## 2025-06-02 PROCEDURE — 3008F BODY MASS INDEX DOCD: CPT | Performed by: STUDENT IN AN ORGANIZED HEALTH CARE EDUCATION/TRAINING PROGRAM

## 2025-06-02 PROCEDURE — 93005 ELECTROCARDIOGRAM TRACING: CPT | Performed by: STUDENT IN AN ORGANIZED HEALTH CARE EDUCATION/TRAINING PROGRAM

## 2025-06-02 PROCEDURE — 3074F SYST BP LT 130 MM HG: CPT | Performed by: STUDENT IN AN ORGANIZED HEALTH CARE EDUCATION/TRAINING PROGRAM

## 2025-06-02 PROCEDURE — 3078F DIAST BP <80 MM HG: CPT | Performed by: STUDENT IN AN ORGANIZED HEALTH CARE EDUCATION/TRAINING PROGRAM

## 2025-06-02 NOTE — PROGRESS NOTES
February 6, 2020  Patient is a 64-year-old male who underwent left heart catheterization and coronary angiogram with me in January 2020 and was found to have significant stenosis in a large size left circumflex which was treated with one drug-eluting stent. He was also found to have 40% proximal LAD stenosis and a small nondominant RCA. His ejection fraction was 20% in echocardiogram. Also there was some questionable mass in right atrium which was evaluated in transesophageal echocardiogram and possibly was a prominent eustachian valve further evaluation with cardiac MRI was suggested. Patient currently has LifeVest. He denies having any chest pain or shortness of breath or palpitation. His EKG today shows sinus rhythm with a rate of 98 and mild nonspecific ST-T changes. An echocardiogram he also had mild aortic regurgitation. His past medical history is also significant for hypertension,AAA 5.2 followed by Dr. Ngo, COPD, psoriatic arthritis on MTX.     June 17, 2020  Patient was admitted to the hospital in May 2020 because of rupture of the aortic aneurysm for which she received emergent surgery. He had prolonged recovery after that in the hospital. We repeated echocardiogram in May 2020 why he was in the hospital and ejection fraction remained to be low at 20-25%. He is here today for follow-up. He denies having any chest pain or shortness of breath. He is veering to LifeVest.     April 5, 2021  Patient denies having any chest pain or shortness of breath or palpitations. He had his ICD placed in July 2020. His EKG today shows sinus rhythm with a rate of 100 and evidence for LVH and no significant ST-T changes. He had labs done in December 2020 that showed hemoglobin of 12, creatinine 0.9, potassium 4.4 and magnesium 2.1. He has gained about 16 pounds since last visit and feels significantly better. Following up with vascular surgery in couple weeks     December 15, 2021  Patient denies having chest pain  shortness of breath or palpitation and overall feels good.        November 14, 2022  Patient denies having chest pain or shortness of breath or palpitation.  His weight is down about 9 pounds compared to the last visit. He had an EKG done in March 2022 that showed sinus rhythm with a rate of 88 and no significant ST-T changes. He had labs done in March 2022 that showed potassium 4.9, creatinine 0.9                Follow up visit     01/15/24     Yobany Small is a 68 y.o. male who is here for follow-up after over 1 year.  Patient was found to have renal cancer for which he requires to undergo surgery.  He was also found to have apparently a leak in his aortic aneurysm repair for which he also needs surgery and follows with Dr. Ngo.  Patient denies having chest pain but reports having shortness of breath with moderate to heavy activity.    TTE May 2024  CONCLUSIONS:   1. Poorly visualized anatomical structures due to suboptimal image quality.   2. Left ventricular systolic function appears to be low normal (EF 55%)   3. There is moderate dilatation of the aortic root.    Follow up visit    06/02/25    Yobany Small is a 70 y.o. male who is here for follow-up  Patient had vascular surgery done as well as nephrectomy due to renal cancer since last visit with me about a year and a half ago.  Repeat echocardiogram May of last year showed improved ejection fraction and medications.    Patient denies any chest pain, shortness of breath, palpitation.    EKG today shows normal sinus rhythm with rate of 75 and poor R progression and nonspecific ST-T changes.    Past Medical History  Medical History[1]     Past Surgical History  Surgical History[2]     Social history  Social History     Socioeconomic History    Marital status:      Spouse name: Not on file    Number of children: Not on file    Years of education: Not on file    Highest education level: Not on file   Occupational History    Not on file    Tobacco Use    Smoking status: Former     Current packs/day: 0.00     Types: Cigarettes     Quit date: 2018     Years since quittin.4    Smokeless tobacco: Never   Vaping Use    Vaping status: Never Used   Substance and Sexual Activity    Alcohol use: Not Currently    Drug use: Never    Sexual activity: Defer   Other Topics Concern    Not on file   Social History Narrative    Not on file     Social Drivers of Health     Financial Resource Strain: Low Risk  (2023)    Overall Financial Resource Strain (CARDIA)     Difficulty of Paying Living Expenses: Not hard at all   Food Insecurity: Not on file   Transportation Needs: No Transportation Needs (2024)    OASIS : Transportation     Lack of Transportation (Medical): No     Lack of Transportation (Non-Medical): No     Patient Unable or Declines to Respond: No   Physical Activity: Not on file   Stress: Not on file   Social Connections: Feeling Socially Integrated (2024)    OASIS : Social Isolation     Frequency of experiencing loneliness or isolation: Never   Intimate Partner Violence: Not on file   Housing Stability: Low Risk  (2023)    Housing Stability Vital Sign     Unable to Pay for Housing in the Last Year: No     Number of Places Lived in the Last Year: 1     Unstable Housing in the Last Year: No        Family History  Family History[3]     12 system point of review is negative except for what described in history of present illness    Allergies:  RX Allergies[4]     Outpatient Medications:  Current Outpatient Medications   Medication Instructions    aspirin 81 mg, Daily    ferrous sulfate (FEOSOL) 325 mg, Daily with breakfast    fluticasone-umeclidin-vilanter (Trelegy Ellipta) 100-62.5-25 mcg blister with device 1 puff, Daily    lidocaine 4 % patch 1 patch, transdermal, Daily, Remove & discard patch within 12 hours or as directed by MD.    methotrexate (Trexall) 2.5 mg tablet 6 tablets, Once Weekly    metoprolol succinate XL  "(TOPROL-XL) 50 mg, oral, Daily, Do not crush or chew.    predniSONE (DELTASONE) 5 mg, Daily    sacubitriL-valsartan (Entresto) 24-26 mg tablet 1 tablet, oral, 2 times daily    simvastatin (ZOCOR) 40 mg, oral, Nightly    tamsulosin (Flomax) 0.4 mg 24 hr capsule 1 capsule (0.4 mg) once daily.         Last Recorded Vitals:      10/23/2024     2:00 PM 10/23/2024     2:15 PM 10/23/2024     2:30 PM 10/23/2024     2:45 PM 10/23/2024     3:00 PM 10/23/2024     3:50 PM 6/2/2025     2:08 PM   Vitals   Systolic 95 97 100 99 99  122   Diastolic 54 56 57 58 61  74   BP Location       Left arm   Heart Rate 60 63 63 63 68  75   Temp     36.3 °C (97.3 °F) 36.2 °C (97.2 °F)    Resp 17 17 16 14 16     Height       1.803 m (5' 11\")   Weight (lb)       132.2   BMI       18.44 kg/m2   BSA (m2)       1.73 m2   Visit Report       Report    Visit Vitals  /74 (BP Location: Left arm, Patient Position: Sitting, BP Cuff Size: Adult)   Pulse 75   Ht 1.803 m (5' 11\")   Wt 60 kg (132 lb 3.2 oz)   SpO2 93%   BMI 18.44 kg/m²   Smoking Status Former   BSA 1.73 m²        Physical Exam:    General: Awake, alert/oriented x3, well developed, no acute distress  Head: Atraumatic/Normocephalic  Eyes: Normal external exam, EOMI, PERRLA  ENT: Oropharynx normal, moist mucous membranes  Cardiovascular: RRR, S1/S2, no murmurs, rubs, or gallops, radial pulses +2, no edema of extremities  Pulmonary: CTAB, no respiratory distress. No wheezes, rales, or ronchi  Abdomen: +BS, soft, non-tender, nondistended, no guarding or rebound  MSK: No joint swelling, normal movements of all extremities. Range of motion- normal.  Extremities: no edema, no cyanosis  Neuro: Alert/oriented x3, no focal motor or sensory deficits  Psychiatric: Judgment intact. Appropriate mood and behavior      I reviewed recent available cardiac studies.      Labs    Lab Results   Component Value Date    WBC 10.9 05/09/2024    HGB 10.2 (L) 05/09/2024    HCT 32.1 (L) 05/09/2024     " 05/09/2024    CHOL 149 05/22/2023    TRIG 85 05/22/2023    HDL 47.1 05/22/2023    ALT 17 05/09/2024    AST 22 05/09/2024     09/09/2024    K 4.8 09/09/2024     09/09/2024    CREATININE 1.19 09/09/2024    BUN 25 (H) 09/09/2024    CO2 27 09/09/2024    TSH 0.57 05/22/2023    INR 1.3 (H) 05/08/2024    HGBA1C 5.3 04/22/2024     Lab Results   Component Value Date    CKTOTAL 108 05/06/2020    TROPONINI <0.02 12/21/2020        Lab Results   Component Value Date    INR 1.3 (H) 05/08/2024    INR 1.0 05/03/2024    INR 1.3 (H) 05/01/2024    PROTIME 14.2 (H) 05/08/2024    PROTIME 11.6 05/03/2024    PROTIME 14.3 (H) 05/01/2024             Assessment/Plan     Patient overall is doing well from a cardiac standpoint.  Will obtain lipid profile as there is no recent lipid check.    We will continue with current medications.    Discussed the importance of heart healthy diet and exercise.    Follow-up in clinic in 1 year with prior labs including CMP, Mg, lipids and EKG at the time of visit.           Olvin Alfred MD, PhD, [facility], [facility]  Interventional Cardiology, [facility]  Associate Professor of Medicine, [facility]  Office: [phone]         **Disclaimer: This note was dictated by speech recognition, and every effort has been made to prevent any error in transcription, however minor errors may be present**         [1]   Past Medical History:  Diagnosis Date    Abnormal findings on diagnostic imaging of heart and coronary circulation     Abnormal echocardiogram    Abnormal findings on diagnostic imaging of other specified body structures     Abnormal ultrasound    Abnormal findings on diagnostic imaging of other specified body structures     Abnormal ultrasound    Abnormal findings on diagnostic imaging of other specified body structures     Abnormal chest x-ray    Abnormal findings on diagnostic imaging of other specified body structures     Abnormal CT of the  chest    Abnormal findings on diagnostic imaging of other specified body structures     Abnormal computed tomography angiography (CTA)    Abnormal findings on diagnostic imaging of other specified body structures     Abnormal CT of the chest    BPH (benign prostatic hyperplasia)     COPD (chronic obstructive pulmonary disease) (Multi)     Coronary artery disease     GERD (gastroesophageal reflux disease)     Hyperlipidemia     Hypertension     Personal history of other medical treatment     History of echocardiogram    Shortness of breath     Vision loss    [2]   Past Surgical History:  Procedure Laterality Date    CT ABDOMEN PELVIS ANGIOGRAM W AND/OR WO IV CONTRAST  11/7/2019    CT ABDOMEN PELVIS ANGIOGRAM W AND/OR WO IV CONTRAST 11/7/2019 [facility]    CT ABDOMEN PELVIS ANGIOGRAM W AND/OR WO IV CONTRAST  7/8/2020    CT ABDOMEN PELVIS ANGIOGRAM W AND/OR WO IV CONTRAST 7/8/2020 [facility]    INVASIVE VASCULAR PROCEDURE N/A 10/23/2024    Procedure: Aortogram;  Surgeon: Kar Street MD;  [facility];  Service: Vascular Surgery;  Laterality: N/A;  LEFT RADIAL APPROACH FOR AORTOGRAM    OTHER SURGICAL HISTORY  05/30/2019    Salivary gland surgery    OTHER SURGICAL HISTORY  06/16/2020    Bronchoscopy    OTHER SURGICAL HISTORY  06/16/2020    Seroma incision and drainage    OTHER SURGICAL HISTORY  04/02/2021    Cardioverter defibrillator insertion    OTHER SURGICAL HISTORY  01/30/2020    Cardiac catheterization with stent placement    OTHER SURGICAL HISTORY  06/16/2020    Abdominal aortic aneurysm repair endovascular   [3]   Family History  Problem Relation Name Age of Onset    No Known Problems Mother          no relationship    No Known Problems Father          no relationship   [4]   Allergies  Allergen Reactions    Methocarbamol Swelling and Blurred vision

## 2025-06-05 ENCOUNTER — APPOINTMENT (OUTPATIENT)
Dept: VASCULAR MEDICINE | Facility: HOSPITAL | Age: 70
End: 2025-06-05
Payer: COMMERCIAL

## 2025-06-18 ENCOUNTER — APPOINTMENT (OUTPATIENT)
Dept: VASCULAR SURGERY | Facility: HOSPITAL | Age: 70
End: 2025-06-18
Payer: COMMERCIAL

## 2025-06-23 DIAGNOSIS — I25.118 CORONARY ARTERY DISEASE INVOLVING NATIVE CORONARY ARTERY OF NATIVE HEART WITH OTHER FORM OF ANGINA PECTORIS: ICD-10-CM

## 2025-06-23 RX ORDER — SACUBITRIL AND VALSARTAN 24; 26 MG/1; MG/1
1 TABLET, FILM COATED ORAL 2 TIMES DAILY
Qty: 180 TABLET | Refills: 0 | Status: SHIPPED | OUTPATIENT
Start: 2025-06-23

## 2025-06-26 ENCOUNTER — HOSPITAL ENCOUNTER (OUTPATIENT)
Dept: VASCULAR MEDICINE | Facility: HOSPITAL | Age: 70
Discharge: HOME | End: 2025-06-26
Payer: COMMERCIAL

## 2025-06-26 DIAGNOSIS — I10 ESSENTIAL (PRIMARY) HYPERTENSION: ICD-10-CM

## 2025-06-26 DIAGNOSIS — I71.42 JUXTARENAL ABDOMINAL AORTIC ANEURYSM (AAA) WITHOUT RUPTURE: ICD-10-CM

## 2025-06-26 DIAGNOSIS — K55.1 CHRONIC MESENTERIC ISCHEMIA (MULTI): ICD-10-CM

## 2025-06-26 PROCEDURE — 93975 VASCULAR STUDY: CPT | Performed by: INTERNAL MEDICINE

## 2025-06-26 PROCEDURE — 93975 VASCULAR STUDY: CPT

## 2025-07-01 DIAGNOSIS — I25.10 CORONARY ARTERY DISEASE INVOLVING NATIVE CORONARY ARTERY OF NATIVE HEART WITHOUT ANGINA PECTORIS: ICD-10-CM

## 2025-07-01 RX ORDER — SIMVASTATIN 40 MG/1
40 TABLET, FILM COATED ORAL NIGHTLY
Qty: 90 TABLET | Refills: 3 | Status: SHIPPED | OUTPATIENT
Start: 2025-07-01

## 2025-07-02 ENCOUNTER — TELEPHONE (OUTPATIENT)
Dept: VASCULAR SURGERY | Facility: CLINIC | Age: 70
End: 2025-07-02

## 2025-07-02 ENCOUNTER — OFFICE VISIT (OUTPATIENT)
Dept: VASCULAR SURGERY | Facility: CLINIC | Age: 70
End: 2025-07-02
Payer: COMMERCIAL

## 2025-07-02 VITALS
OXYGEN SATURATION: 90 % | SYSTOLIC BLOOD PRESSURE: 134 MMHG | WEIGHT: 131 LBS | HEART RATE: 84 BPM | DIASTOLIC BLOOD PRESSURE: 77 MMHG | BODY MASS INDEX: 18.27 KG/M2

## 2025-07-02 DIAGNOSIS — I71.42 JUXTARENAL ABDOMINAL AORTIC ANEURYSM (AAA) WITHOUT RUPTURE: Primary | ICD-10-CM

## 2025-07-02 PROCEDURE — 1159F MED LIST DOCD IN RCRD: CPT | Performed by: SURGERY

## 2025-07-02 PROCEDURE — 1126F AMNT PAIN NOTED NONE PRSNT: CPT | Performed by: SURGERY

## 2025-07-02 PROCEDURE — 99215 OFFICE O/P EST HI 40 MIN: CPT | Performed by: SURGERY

## 2025-07-02 PROCEDURE — 3078F DIAST BP <80 MM HG: CPT | Performed by: SURGERY

## 2025-07-02 PROCEDURE — 3075F SYST BP GE 130 - 139MM HG: CPT | Performed by: SURGERY

## 2025-07-02 ASSESSMENT — ENCOUNTER SYMPTOMS
OCCASIONAL FEELINGS OF UNSTEADINESS: 0
LOSS OF SENSATION IN FEET: 0
DEPRESSION: 0

## 2025-07-02 ASSESSMENT — PAIN SCALES - GENERAL: PAINLEVEL_OUTOF10: 0-NO PAIN

## 2025-07-02 NOTE — TELEPHONE ENCOUNTER
I have attempted to contact  Mrs. Small  . There is no answer at the following phone number   [phone]    . I have left a voice mail message for the patient to contact Dr. Street's  office nurse at [phone]     I have  called to assist with scheduling CTA for 2026   ordered by Dr. Street this morning.   Patient has a follow up appointment  scheduled for 8/5/2026.  Radha Fountain RN BSN

## 2025-07-02 NOTE — PROGRESS NOTES
Vascular Surgery Clinic Note    CC: aaa    HPI:  Yobany Small is 70 y.o. male with history of juxtarenal AAA s/p explant of prior EVAR in 5/2024 as well as left nephroureterectomy for cancer at that time. He subsequently developed worsening SMA stenosis with post prandial pain and I placed an SMA stent in 11/2024. He is doing well and is in good spirits. He has gained weight and has no pain with eating. Mild incisional tenderness at times. No pain with walking.     Medical History:   has a past medical history of Abnormal findings on diagnostic imaging of heart and coronary circulation, Abnormal findings on diagnostic imaging of other specified body structures, Abnormal findings on diagnostic imaging of other specified body structures, Abnormal findings on diagnostic imaging of other specified body structures, Abnormal findings on diagnostic imaging of other specified body structures, Abnormal findings on diagnostic imaging of other specified body structures, Abnormal findings on diagnostic imaging of other specified body structures, BPH (benign prostatic hyperplasia), COPD (chronic obstructive pulmonary disease) (Multi), Coronary artery disease, GERD (gastroesophageal reflux disease), Hyperlipidemia, Hypertension, Personal history of other medical treatment, Shortness of breath, and Vision loss.    Meds:   Medications Ordered Prior to Encounter[1]     Allergies:   RX Allergies[2]    SH:    Social Drivers of Health     Tobacco Use: Medium Risk (10/23/2024)    Patient History     Smoking Tobacco Use: Former     Smokeless Tobacco Use: Never     Passive Exposure: Not on file   Alcohol Use: Not At Risk (11/18/2023)    AUDIT-C     Frequency of Alcohol Consumption: Never     Average Number of Drinks: Patient does not drink     Frequency of Binge Drinking: Never   Financial Resource Strain: Low Risk  (11/18/2023)    Overall Financial Resource Strain (CARDIA)     Difficulty of Paying Living Expenses: Not hard at all    Food Insecurity: Not on file   Transportation Needs: No Transportation Needs (6/7/2024)    OASIS : Transportation     Lack of Transportation (Medical): No     Lack of Transportation (Non-Medical): No     Patient Unable or Declines to Respond: No   Physical Activity: Not on file   Stress: Not on file   Social Connections: Feeling Socially Integrated (6/7/2024)    OASIS : Social Isolation     Frequency of experiencing loneliness or isolation: Never   Intimate Partner Violence: Not on file   Depression: Not at risk (9/18/2024)    PHQ-2     PHQ-2 Score: 0   Housing Stability: Low Risk  (11/18/2023)    Housing Stability Vital Sign     Unable to Pay for Housing in the Last Year: No     Number of Places Lived in the Last Year: 1     Unstable Housing in the Last Year: No   Utilities: Not on file   Digital Equity: Not on file   Health Literacy: Adequate Health Literacy (6/7/2024)    OASIS : Health Literacy     Frequency of needing help to read materials from doctor or pharmacy: Never        FH:  Family History[3]     ROS:  All systems were reviewed and are negative except as per HPI.    Objective:  Vitals:  Vitals:    07/02/25 0929   BP: 134/77   Pulse: 84   SpO2: 90%        Exam:  In NAD, well appearing  Abd Soft, ND/NT  Vascular examination:  Left flank incision is well healed with no flank bulge  Palpable PT pulses bilaterally    Assessment & Plan:  Yobany Small is 70 y.o. male doing well s/p extent IV aortic repair and subsequent SMA stent. RTC in one year with CTA abd/pelv to evaluate reconstruction.  Duplex of RRA and SMA stent looks good today, no stenosis.      I spent a total of 40 minutes on the day of the visit.         Kar Street M.D.             [1]   Current Outpatient Medications on File Prior to Visit   Medication Sig Dispense Refill    aspirin 81 mg EC tablet Take 1 tablet (81 mg) by mouth once daily.      Entresto 24-26 mg tablet Take 1 tablet by mouth twice daily 180 tablet 0     fluticasone-umeclidin-vilanter (Trelegy Ellipta) 100-62.5-25 mcg blister with device Inhale 1 puff once daily.      lidocaine 4 % patch Place 1 patch over 12 hours on the skin once daily. Remove & discard patch within 12 hours or as directed by MD. (Patient not taking: Reported on 10/23/2024) 30 patch 0    methotrexate (Trexall) 2.5 mg tablet Take 6 tablets (15 mg total) by mouth 1 (one) time per week.  Follow directions carefully, and ask to explain any part you do not understand. Take exactly as directed.  6 tabs Saturday      metoprolol succinate XL (Toprol-XL) 50 mg 24 hr tablet Take 1 tablet (50 mg) by mouth once daily. Do not crush or chew. 90 tablet 3    predniSONE (Deltasone) 10 mg tablet Take 0.5 tablets (5 mg) by mouth once daily.      simvastatin (Zocor) 40 mg tablet Take 1 tablet (40 mg) by mouth once daily at bedtime. 90 tablet 3    [DISCONTINUED] ferrous sulfate, 325 mg ferrous sulfate, (FeosoL) tablet Take 1 tablet (325 mg) by mouth once daily with breakfast. (Patient not taking: Reported on 10/23/2024)      [DISCONTINUED] simvastatin (Zocor) 40 mg tablet TAKE 1 TABLET BY MOUTH ONCE DAILY AT BEDTIME 90 tablet 3    [DISCONTINUED] tamsulosin (Flomax) 0.4 mg 24 hr capsule 1 capsule (0.4 mg) once daily. (Patient not taking: Reported on 10/23/2024)       No current facility-administered medications on file prior to visit.   [2]   Allergies  Allergen Reactions    Methocarbamol Swelling and Blurred vision   [3]   Family History  Problem Relation Name Age of Onset    No Known Problems Mother          no relationship    No Known Problems Father          no relationship

## 2025-07-09 DIAGNOSIS — I71.42 JUXTARENAL ABDOMINAL AORTIC ANEURYSM (AAA) WITHOUT RUPTURE: ICD-10-CM

## 2025-07-18 DIAGNOSIS — I50.20 HEART FAILURE WITH REDUCED EJECTION FRACTION: ICD-10-CM

## 2025-07-18 DIAGNOSIS — I10 HYPERTENSION, UNSPECIFIED TYPE: ICD-10-CM

## 2025-07-18 RX ORDER — METOPROLOL SUCCINATE 50 MG/1
50 TABLET, EXTENDED RELEASE ORAL DAILY
Qty: 90 TABLET | Refills: 3 | Status: SHIPPED | OUTPATIENT
Start: 2025-07-18 | End: 2026-07-18

## 2025-07-22 ENCOUNTER — HOSPITAL ENCOUNTER (OUTPATIENT)
Dept: CARDIOLOGY | Facility: CLINIC | Age: 70
Discharge: HOME | End: 2025-07-22
Payer: COMMERCIAL

## 2025-07-22 DIAGNOSIS — I50.20 HEART FAILURE WITH REDUCED EJECTION FRACTION: ICD-10-CM

## 2025-07-22 DIAGNOSIS — I25.5 ISCHEMIC CARDIOMYOPATHY: ICD-10-CM

## 2025-07-22 DIAGNOSIS — Z95.810 AICD (AUTOMATIC CARDIOVERTER/DEFIBRILLATOR) PRESENT: ICD-10-CM

## 2025-07-22 PROCEDURE — 93296 REM INTERROG EVL PM/IDS: CPT

## 2025-08-05 DIAGNOSIS — I71.40 ABDOMINAL AORTIC ANEURYSM (AAA) WITHOUT RUPTURE, UNSPECIFIED PART: ICD-10-CM

## 2025-08-20 ENCOUNTER — APPOINTMENT (OUTPATIENT)
Dept: VASCULAR SURGERY | Facility: HOSPITAL | Age: 70
End: 2025-08-20
Payer: COMMERCIAL

## (undated) DEVICE — CLIP, LIGATING, HORIZON, MEDIUM, TITANIUM

## (undated) DEVICE — Device

## (undated) DEVICE — STENT, URETERAL, INLAY, 6FR X 26CM, W/O GUIDEWIRE

## (undated) DEVICE — CLIP, LIGATING, HORIZON, LARGE, TITANIUM

## (undated) DEVICE — SLEEVE, VASO PRESS, CALF GARMENT, MEDIUM, GREEN

## (undated) DEVICE — SYRINGE KIT, ANGIO0ARTERION, 150ML, W/QFT,MC

## (undated) DEVICE — SUTURE, PROLENE, 6-0, 30 IN, RB-2, BLUE

## (undated) DEVICE — SUTURE, SILK, 3-0, 30 IN, MULTIPACK, BLACK

## (undated) DEVICE — HOLSTER, ELECTROSURGERY ACCESSORY, STERILE

## (undated) DEVICE — CONTAINER, SPECIMEN, 120 ML, STERILE

## (undated) DEVICE — WOUND SYSTEM, DEBRIDEMENT & CLEANING, O.R DUOPAK

## (undated) DEVICE — MANIFOLD, 4 PORT NEPTUNE STANDARD

## (undated) DEVICE — TOWEL, SURGICAL, NEURO, O/R, 16 X 26, BLUE, STERILE

## (undated) DEVICE — PUNCH,  AORTIC, LONG HANDLE 6MM

## (undated) DEVICE — TORQUE DEVICE, ACCOMODATES WIRES W/DIA .010 TO .038"."

## (undated) DEVICE — TUBING, HIGH PRESSURE, 72 IN (1200 PSI)

## (undated) DEVICE — TUBE, GASTROSTOMY, 16 FR WITH Y-PORT

## (undated) DEVICE — KIT, BEACH CHAIR TRIMANO

## (undated) DEVICE — SHEATH, GLIDESHEATH, R2P DEST SLENDER, 6FR 105CM, STRAIGHT

## (undated) DEVICE — DRESSING, NON-ADHERENT, TELFA, OUCHLESS, 3 X 8 IN, STERILE

## (undated) DEVICE — COUNTER, NEEDLE, FOAM BLOCK, W/MAGNET, W/BLADE GUARD, 10 COUNT, RED, LF

## (undated) DEVICE — DRAPE, FLUID WARMER

## (undated) DEVICE — SHEATH, GLIDESHEATH, R2P DEST SLENDER, 6FR 85CM, STRAIGHT

## (undated) DEVICE — CATHETER, 5 FR. 100CM, ANGLE TAPER AT TIP

## (undated) DEVICE — SUTURE, PROLENE, 2-0, 36 IN, MH, BLUE

## (undated) DEVICE — INSERT, CLAMP, SURGICAL, SOFT/TRACTION, STEALTH, 5 MM

## (undated) DEVICE — COVER, EQUIPMENT, SOLUTION, SLUSH, 112 X 168 CM, LF, STERILE

## (undated) DEVICE — DRAPE, SHEET, LAPAROTOMY, W/ISO-BAC, W/ARMBOARD COVERS, 98 X 122 IN, DISPOSABLE, LF, STERILE

## (undated) DEVICE — CATHETER, URETHRAL, FOLEY, 2 WAY, BARDEX IC, 12 FR, 5 CC, SILVER, LATEX

## (undated) DEVICE — SUTURE, MONOCRYL, 4-0, 18 IN, PS2, UNDYED

## (undated) DEVICE — SUTURE, GUT, 0, 36 IN, CT-1, BROWN

## (undated) DEVICE — GLOVE, SURGICAL, PI ORTHO PRO, BIOGEL, SZ 7.5

## (undated) DEVICE — GOWN, SURGICAL, SMARTGOWN, XLARGE, STERILE

## (undated) DEVICE — SUTURE, SILK, 0, 24 IN, SUTUPAK, BLACK

## (undated) DEVICE — GUIDEWIRE, STEELCORE .018 X 300CM

## (undated) DEVICE — CATHETER, NAVICROSS, 0.035 X 90CM, ANGLED TIP

## (undated) DEVICE — TOWEL, OR, XRAY DETECT 5 PK, WHITE, 17X26, W/DMT TAG, ST

## (undated) DEVICE — SUTURE, SILK, 2-0, 30 IN, SH, BLACK

## (undated) DEVICE — SUTURE, PROLENE, 6-0, 30 IN, C-1, CV-11, BLUE

## (undated) DEVICE — SUTURE, PDS II, 1, 36 IN, CT-1, VIOLET

## (undated) DEVICE — TRAY, SURESTEP, URINE METER, 14FR, SILICONE

## (undated) DEVICE — EXTENSION SET W/MALE LUER LOCK ADAPTER, VOLUME 2.4ML

## (undated) DEVICE — STAPLER, SKIN PROXIMATE, 35 WIDE

## (undated) DEVICE — APPLICATOR, CHLORAPREP, W/ORANGE TINT, 26ML

## (undated) DEVICE — TIP,  ELECTRODE COATED INSULATED, EXTENDED, LF

## (undated) DEVICE — SUTURE, PROLENE, 3-0, 36 IN, MHMH

## (undated) DEVICE — LIGASURE, MARYLAND JAW, OPEN DELIVERY

## (undated) DEVICE — SUTURE, VICRYL, 3-0, 27 IN, SH

## (undated) DEVICE — SUTURE, SILK, 2-0, TIES, 12-30 IN, BLACK

## (undated) DEVICE — CATHETER, URETHRAL, ROBNEL, 14 FR, 16 IN, LF, RED

## (undated) DEVICE — TIP, SUCTION, YANKAUER, BULB, ADULT

## (undated) DEVICE — TAPE, UMBILICAL, 1/8 X 30 IN, MULTIPACK, COTTON, WHITE

## (undated) DEVICE — SPONGE, HEMOSTATIC, CELLULOSE, SURGICEL, NU-KNIT, 6 X 9 IN

## (undated) DEVICE — GUIDEWIRE, ANGLE TIP,  .035 DIA, 260 CM, 3 CM TIP"

## (undated) DEVICE — COLLECTION BAG, FLUID, NON-STERILE

## (undated) DEVICE — SLEEVE, SUCTION, E-SEP, 125MM

## (undated) DEVICE — INSERT, CLAMP, SURGICAL, SOFT/TRACTION, STEALTH, 1 MM

## (undated) DEVICE — COVER PROBE, SOFT FLEX W/ GEL, 5 X 48 IN (13X122CM)

## (undated) DEVICE — PROTECTOR, NERVE, ULNAR, PINK

## (undated) DEVICE — SUTURE, PROLENE, 4-0, TAPER POINT, SH/SH BLUE 36IN

## (undated) DEVICE — DRAPE, PAD, INSTRUMENT, MAGNETIC, MEDIUM, 10 X 16 IN, DISPOSABLE

## (undated) DEVICE — SPONGE, DISSECTOR, PEANUT, 3/8, STERILE 5 FOAM HOLDER"

## (undated) DEVICE — SPONGE, HEMOSTATIC, CELLULOSE, SURGICEL, 2 X 14 IN

## (undated) DEVICE — SEALANT, HEMOSTATIC, FLOSEAL, 10 ML

## (undated) DEVICE — SPONGE, GAUZE, XRAY DECT, 16 PLY, 4 X 4, W/MASTER DMT,STERILE

## (undated) DEVICE — COVER, CART, 45 X 27 X 48 IN, CLEAR

## (undated) DEVICE — DRAPE, INSTRUMENT, W/POUCH, STERI DRAPE, 7 X 11 IN, DISPOSABLE, STERILE

## (undated) DEVICE — CATHETER, THORACIC, STRAIGHT, ADULT, 28 FR, PVC

## (undated) DEVICE — SUTURE, PROLENE, 3-0, 48 IN, SH, DA, BLUE

## (undated) DEVICE — TIP, SUCTION, YANKAUER, FLEXIBLE

## (undated) DEVICE — GEL, ULTRASOUND, AQUASONIC 100, 20 GM, STERILE

## (undated) DEVICE — SUTURE, PROLENE, 4-0, 36 IN, RB1, DA, BLUE

## (undated) DEVICE — PILLOW, POLYFILL, NYLEX, 21 X 27 IN

## (undated) DEVICE — CATHETER, ANGIOGRAPHIC, OMNI-FLUSH, 0.035 IN,  5F X 100 CM

## (undated) DEVICE — GUIDEWIRE, .035 X 180 BENTSON STR

## (undated) DEVICE — BAG, DECANTER

## (undated) DEVICE — TR BAND, RADIAL COMPRESSION, STANDARD, 24CM

## (undated) DEVICE — DRESSING, MEPILEX, BORDER, SACRUM, 8.7 X 9.8 IN

## (undated) DEVICE — SUTURE, ETHIBOND EXCEL 1, TAPER POINT CT-1 GREEN 30 INCH

## (undated) DEVICE — INTRODUCER SET, MICROPUNCT, STIFF, 4FR 10CM,PLATINUM TIP,NITINOLWIRE

## (undated) DEVICE — SUTURE, MONO, PDS 11, 54 IN, TP-1, VIOLET

## (undated) DEVICE — SUTURE, PROLENE, 2-0, 36 IN, SH, DA, BLUE

## (undated) DEVICE — ADHESIVE, SKIN, DERMABOND ADVANCED, 15CM, PEN-STYLE

## (undated) DEVICE — DRAPE, INCISE, ANTIMICROBIAL, IOBAN 2, LARGE, 17 X 23 IN, DISPOSABLE, STERILE

## (undated) DEVICE — PITCHER, GRADUATE, 32 OZ (1200CC), STERILE

## (undated) DEVICE — DRESSING, MEPILEX, BORDER, HEEL, 8.7 X 9.1 IN

## (undated) DEVICE — STOPCOCK, 4 WAY, SMALL BODY, W/SWIVEL, ULTRA, LIPD RESISTANT, LUER LOCK, MALE, LF

## (undated) DEVICE — STRAP, CIRCUMFERENTIAL, 2 X 76""

## (undated) DEVICE — COVER, TABLE, UHC

## (undated) DEVICE — CATHETER, IRRIG/OCCL PRUITT 9F

## (undated) DEVICE — SYRINGE, 60 CC, LUER LOCK, MONOJECT, W/O CAP, LF

## (undated) DEVICE — LOOP, VESSEL, MAXI, BLUE

## (undated) DEVICE — DRAPE, SHEET, CARDIOVASCULAR, ANTIMICROBIAL, W/ANESTHESIA SCREEN, IOBAN 2, STERI DRAPE, 107 X 133 IN, DISPOSABLE, FABRIC, BLUE, STERILE

## (undated) DEVICE — DRAPE, SHEET, FAN FOLDED, HALF, 44 X 58 IN, DISPOSABLE, LF, STERILE

## (undated) DEVICE — BASKET, STONE, RETRIEVAL, NITINOL (4WIRE, 1.9 0 TIP)

## (undated) DEVICE — DEVICE, INFLATION, ENCORE 20

## (undated) DEVICE — STRIP, SKIN CLOSURE, STERI STRIP, REINFORCED, 0.5 X 4 IN

## (undated) DEVICE — ADHESIVE, SKIN, LIQUIBAND EXCEED

## (undated) DEVICE — SHEATH, GLIDESHEATH, SLENDER, 6FR 10CM

## (undated) DEVICE — CATHETER TRAY, SURESTEP, 16FR, URINE METER W/STATLOCK

## (undated) DEVICE — COVER, TABLE, 44 X 75 IN, DISPOSABLE, LF, STERILE

## (undated) DEVICE — GOWN, ASTOUND, L

## (undated) DEVICE — BAG, DRAINAGE, URINARY, W/MONO-FLO ANTI-REFLUX DEVICE, NEEDLESS SAMPLING PORT,SPLASHGUARD II/SAFEGUARD, 2000 CC, STERILE, LF

## (undated) DEVICE — SEALANT, EVARREST FIBRIN PATCH  2 X 4

## (undated) DEVICE — SOLUTION, INJECTION, CONTRAST, OMNIPAQUE 240MG 50ML, PLUS PAK

## (undated) DEVICE — GUIDEWIRE, ANGLE TIP, .032 DIA, 150 CM, 3 CM TIP"

## (undated) DEVICE — TOWELS 4-PK

## (undated) DEVICE — CLIPPER, SURGICAL BLADE ASSEMBLY, GENERAL PURPOSE, SINGLE USE

## (undated) DEVICE — NEEDLE, SPINAL, 22 G X 3.5 IN, BLACK HUB

## (undated) DEVICE — SUTURE, PROLENE, 5-0, 36 IN, C-1, CV-11, BLUE

## (undated) DEVICE — STOPCOCK, MORSE CLASSIC

## (undated) DEVICE — SPONGE, LAP, XRAY DECT, 18IN X 18IN, W/MASTER DMT, STERILE

## (undated) DEVICE — CATHETER, IV, ANGIOCATH, 12 G X 3 IN, FEP POLYMER